# Patient Record
Sex: MALE | Race: BLACK OR AFRICAN AMERICAN | Employment: OTHER | URBAN - METROPOLITAN AREA
[De-identification: names, ages, dates, MRNs, and addresses within clinical notes are randomized per-mention and may not be internally consistent; named-entity substitution may affect disease eponyms.]

---

## 2017-03-16 ENCOUNTER — CARE COORDINATOR VISIT (OUTPATIENT)
Dept: INTERNAL MEDICINE | Age: 72
End: 2017-03-16

## 2017-03-16 ENCOUNTER — HOSPITAL ENCOUNTER (OUTPATIENT)
Age: 72
Discharge: HOME | End: 2017-03-16
Payer: MEDICARE

## 2017-03-16 ENCOUNTER — OFFICE VISIT (OUTPATIENT)
Dept: INTERNAL MEDICINE | Age: 72
End: 2017-03-16
Payer: MEDICARE

## 2017-03-16 VITALS
DIASTOLIC BLOOD PRESSURE: 67 MMHG | SYSTOLIC BLOOD PRESSURE: 124 MMHG | HEIGHT: 73 IN | WEIGHT: 234 LBS | BODY MASS INDEX: 31.01 KG/M2 | HEART RATE: 84 BPM

## 2017-03-16 DIAGNOSIS — E78.00 PURE HYPERCHOLESTEROLEMIA: ICD-10-CM

## 2017-03-16 DIAGNOSIS — N18.5 TYPE 2 DIABETES MELLITUS WITH STAGE 5 CHRONIC KIDNEY DISEASE NOT ON CHRONIC DIALYSIS, WITHOUT LONG-TERM CURRENT USE OF INSULIN (HCC): Primary | ICD-10-CM

## 2017-03-16 DIAGNOSIS — E79.0 HYPERURICEMIA: ICD-10-CM

## 2017-03-16 DIAGNOSIS — I10 ESSENTIAL HYPERTENSION: ICD-10-CM

## 2017-03-16 DIAGNOSIS — G40.909 SEIZURE DISORDER, SECONDARY (HCC): Chronic | ICD-10-CM

## 2017-03-16 DIAGNOSIS — E11.22 TYPE 2 DIABETES MELLITUS WITH STAGE 5 CHRONIC KIDNEY DISEASE NOT ON CHRONIC DIALYSIS, WITHOUT LONG-TERM CURRENT USE OF INSULIN (HCC): Primary | ICD-10-CM

## 2017-03-16 PROCEDURE — G8484 FLU IMMUNIZE NO ADMIN: HCPCS | Performed by: INTERNAL MEDICINE

## 2017-03-16 PROCEDURE — 3017F COLORECTAL CA SCREEN DOC REV: CPT | Performed by: INTERNAL MEDICINE

## 2017-03-16 PROCEDURE — 1123F ACP DISCUSS/DSCN MKR DOCD: CPT | Performed by: INTERNAL MEDICINE

## 2017-03-16 PROCEDURE — 4040F PNEUMOC VAC/ADMIN/RCVD: CPT | Performed by: INTERNAL MEDICINE

## 2017-03-16 PROCEDURE — G8599 NO ASA/ANTIPLAT THER USE RNG: HCPCS | Performed by: INTERNAL MEDICINE

## 2017-03-16 PROCEDURE — 99212 OFFICE O/P EST SF 10 MIN: CPT | Performed by: INTERNAL MEDICINE

## 2017-03-16 PROCEDURE — 3044F HG A1C LEVEL LT 7.0%: CPT | Performed by: INTERNAL MEDICINE

## 2017-03-16 PROCEDURE — G8427 DOCREV CUR MEDS BY ELIG CLIN: HCPCS | Performed by: INTERNAL MEDICINE

## 2017-03-16 PROCEDURE — 1036F TOBACCO NON-USER: CPT | Performed by: INTERNAL MEDICINE

## 2017-03-16 PROCEDURE — 99214 OFFICE O/P EST MOD 30 MIN: CPT | Performed by: INTERNAL MEDICINE

## 2017-03-16 PROCEDURE — G8417 CALC BMI ABV UP PARAM F/U: HCPCS | Performed by: INTERNAL MEDICINE

## 2017-03-16 RX ORDER — ALLOPURINOL 100 MG/1
TABLET ORAL
Qty: 30 TABLET | Refills: 5 | Status: SHIPPED | OUTPATIENT
Start: 2017-03-16 | End: 2018-04-26 | Stop reason: SDUPTHER

## 2017-03-16 RX ORDER — FOLIC ACID 1 MG/1
1 TABLET ORAL DAILY
Qty: 30 TABLET | Refills: 3 | Status: SHIPPED | OUTPATIENT
Start: 2017-03-16 | End: 2017-10-19 | Stop reason: ALTCHOICE

## 2017-03-16 RX ORDER — AMLODIPINE BESYLATE 5 MG/1
5 TABLET ORAL DAILY
COMMUNITY
End: 2017-06-20 | Stop reason: DRUGHIGH

## 2017-03-16 RX ORDER — CARVEDILOL 3.12 MG/1
3.12 TABLET ORAL 2 TIMES DAILY
Qty: 60 TABLET | Refills: 3 | Status: SHIPPED | OUTPATIENT
Start: 2017-03-16 | End: 2017-06-20 | Stop reason: SDUPTHER

## 2017-03-16 ASSESSMENT — ENCOUNTER SYMPTOMS
EYE REDNESS: 0
NAUSEA: 0
COUGH: 0
DIARRHEA: 0
SHORTNESS OF BREATH: 0
CONSTIPATION: 0
BLURRED VISION: 0
ABDOMINAL PAIN: 0
BLOOD IN STOOL: 0
HEARTBURN: 0

## 2017-03-16 ASSESSMENT — PATIENT HEALTH QUESTIONNAIRE - PHQ9
SUM OF ALL RESPONSES TO PHQ QUESTIONS 1-9: 0
1. LITTLE INTEREST OR PLEASURE IN DOING THINGS: 0
2. FEELING DOWN, DEPRESSED OR HOPELESS: 0
SUM OF ALL RESPONSES TO PHQ9 QUESTIONS 1 & 2: 0

## 2017-03-22 ENCOUNTER — CARE COORDINATOR VISIT (OUTPATIENT)
Dept: INTERNAL MEDICINE | Age: 72
End: 2017-03-22

## 2017-03-31 ENCOUNTER — HOSPITAL ENCOUNTER (INPATIENT)
Age: 72
LOS: 3 days | Discharge: HOME | DRG: 193 | End: 2017-04-03
Attending: EMERGENCY MEDICINE | Admitting: INTERNAL MEDICINE
Payer: MEDICARE

## 2017-03-31 ENCOUNTER — APPOINTMENT (OUTPATIENT)
Dept: GENERAL RADIOLOGY | Age: 72
DRG: 193 | End: 2017-03-31
Payer: MEDICARE

## 2017-03-31 DIAGNOSIS — Z99.2 ESRD (END STAGE RENAL DISEASE) ON DIALYSIS (HCC): ICD-10-CM

## 2017-03-31 DIAGNOSIS — N18.6 ESRD (END STAGE RENAL DISEASE) ON DIALYSIS (HCC): ICD-10-CM

## 2017-03-31 DIAGNOSIS — J18.9 PNEUMONIA DUE TO ORGANISM: Primary | ICD-10-CM

## 2017-03-31 DIAGNOSIS — J10.1 INFLUENZA B: ICD-10-CM

## 2017-03-31 PROBLEM — J06.9 URI (UPPER RESPIRATORY INFECTION): Status: ACTIVE | Noted: 2017-03-31

## 2017-03-31 PROBLEM — J40 BRONCHITIS: Status: ACTIVE | Noted: 2017-03-31

## 2017-03-31 PROBLEM — J40 BRONCHITIS: Status: RESOLVED | Noted: 2017-03-31 | Resolved: 2017-03-31

## 2017-03-31 LAB
ANION GAP SERPL CALCULATED.3IONS-SCNC: 21 MMOL/L (ref 9–17)
ANION GAP: 15 MMOL/L (ref 8–16)
BUN BLDV-MCNC: 22 MG/DL (ref 8–23)
BUN/CREAT BLD: ABNORMAL (ref 9–20)
CALCIUM SERPL-MCNC: 9.3 MG/DL (ref 8.6–10.4)
CHLORIDE BLD-SCNC: 97 MMOL/L (ref 98–107)
CO2: 19 MMOL/L (ref 20–31)
CREAT SERPL-MCNC: 6.93 MG/DL (ref 0.7–1.2)
DIRECT EXAM: ABNORMAL
FIO2: ABNORMAL
GFR AFRICAN AMERICAN: 10 ML/MIN
GFR NON-AFRICAN AMERICAN: 8 ML/MIN
GFR SERPL CREATININE-BSD FRML MDRD: ABNORMAL ML/MIN/{1.73_M2}
GFR SERPL CREATININE-BSD FRML MDRD: ABNORMAL ML/MIN/{1.73_M2}
GLUCOSE BLD-MCNC: 102 MG/DL (ref 70–99)
GLUCOSE BLD-MCNC: 102 MG/DL (ref 75–110)
GLUCOSE BLD-MCNC: 104 MG/DL (ref 74–106)
HCO3 VENOUS: 29.4 MMOL/L (ref 24–30)
HCO3 VENOUS: 30.7 MMOL/L (ref 24–30)
HCT VFR BLD CALC: 35.9 % (ref 41–53)
HEMOGLOBIN: 11.6 G/DL (ref 13.5–17.5)
Lab: ABNORMAL
MCH RBC QN AUTO: 30.1 PG (ref 26–34)
MCHC RBC AUTO-ENTMCNC: 32.3 G/DL (ref 31–37)
MCV RBC AUTO: 93.2 FL (ref 80–100)
NEGATIVE BASE EXCESS, VEN: ABNORMAL (ref 0–2)
NEGATIVE BASE EXCESS, VEN: ABNORMAL (ref 0–2)
O2 DEVICE/FLOW/%: ABNORMAL
O2 SAT, VEN: 25 %
PATIENT TEMP: ABNORMAL
PCO2, VEN: 48 MM HG (ref 39–55)
PCO2, VEN: 48 MM HG (ref 39–55)
PDW BLD-RTO: 20.6 % (ref 12.5–15.4)
PH VENOUS: 7.4 (ref 7.32–7.42)
PH VENOUS: 7.42 (ref 7.32–7.42)
PHENYTOIN DATE LAST DOSE: ABNORMAL
PHENYTOIN DOSE AMOUNT: ABNORMAL
PHENYTOIN DOSE TIME: ABNORMAL
PHENYTOIN LEVEL: 5.7 UG/ML (ref 10–20)
PLATELET # BLD: 162 K/UL (ref 140–450)
PMV BLD AUTO: 11.1 FL (ref 6–12)
PO2, VEN: 17 MM HG (ref 30–50)
POC BUN: 29 MG/DL (ref 6–20)
POC CHLORIDE: 99 MMOL/L (ref 98–110)
POC HEMATOCRIT: 38 % (ref 41–53)
POC HEMOGLOBIN: 12.9 GM/DL (ref 13.5–17.5)
POC LACTIC ACID, VEN: 1.21 MMOL/L (ref 0.9–1.7)
POC PCO2 TEMP: ABNORMAL MM HG
POC PH TEMP: ABNORMAL
POC PO2 TEMP: ABNORMAL MM HG
POC POTASSIUM: 4.1 MMOL/L (ref 3.5–5.1)
POC SODIUM: 139 MMOL/L (ref 136–145)
POC TROPONIN I: 0.01 NG/ML (ref 0–0.1)
POC TROPONIN I: 0.01 NG/ML (ref 0–0.1)
POC TROPONIN INTERP: NORMAL
POC TROPONIN INTERP: NORMAL
POSITIVE BASE EXCESS, VEN: 5 (ref 0–2)
POSITIVE BASE EXCESS, VEN: 6 (ref 0–2)
POTASSIUM SERPL-SCNC: 4.3 MMOL/L (ref 3.7–5.3)
RBC # BLD: 3.85 M/UL (ref 4.5–5.9)
SODIUM BLD-SCNC: 137 MMOL/L (ref 135–144)
SPECIMEN DESCRIPTION: ABNORMAL
STATUS: ABNORMAL
TOTAL CO2, VENOUS: 31 MM HG (ref 25–31)
TOTAL CO2, VENOUS: 32 MM HG (ref 25–31)
WBC # BLD: 6.2 K/UL (ref 3.5–11)

## 2017-03-31 PROCEDURE — 6370000000 HC RX 637 (ALT 250 FOR IP): Performed by: INTERNAL MEDICINE

## 2017-03-31 PROCEDURE — 87205 SMEAR GRAM STAIN: CPT

## 2017-03-31 PROCEDURE — 84132 ASSAY OF SERUM POTASSIUM: CPT

## 2017-03-31 PROCEDURE — 86403 PARTICLE AGGLUT ANTBDY SCRN: CPT

## 2017-03-31 PROCEDURE — 2580000003 HC RX 258: Performed by: EMERGENCY MEDICINE

## 2017-03-31 PROCEDURE — 82947 ASSAY GLUCOSE BLOOD QUANT: CPT

## 2017-03-31 PROCEDURE — 99285 EMERGENCY DEPT VISIT HI MDM: CPT

## 2017-03-31 PROCEDURE — 6360000002 HC RX W HCPCS: Performed by: INTERNAL MEDICINE

## 2017-03-31 PROCEDURE — 85027 COMPLETE CBC AUTOMATED: CPT

## 2017-03-31 PROCEDURE — 83605 ASSAY OF LACTIC ACID: CPT

## 2017-03-31 PROCEDURE — 87040 BLOOD CULTURE FOR BACTERIA: CPT

## 2017-03-31 PROCEDURE — 84295 ASSAY OF SERUM SODIUM: CPT

## 2017-03-31 PROCEDURE — 6370000000 HC RX 637 (ALT 250 FOR IP): Performed by: HOSPITALIST

## 2017-03-31 PROCEDURE — 82435 ASSAY OF BLOOD CHLORIDE: CPT

## 2017-03-31 PROCEDURE — 87804 INFLUENZA ASSAY W/OPTIC: CPT

## 2017-03-31 PROCEDURE — 2060000000 HC ICU INTERMEDIATE R&B

## 2017-03-31 PROCEDURE — 93005 ELECTROCARDIOGRAM TRACING: CPT

## 2017-03-31 PROCEDURE — 71020 XR CHEST STANDARD TWO VW: CPT

## 2017-03-31 PROCEDURE — 84484 ASSAY OF TROPONIN QUANT: CPT

## 2017-03-31 PROCEDURE — 85014 HEMATOCRIT: CPT

## 2017-03-31 PROCEDURE — 6370000000 HC RX 637 (ALT 250 FOR IP): Performed by: EMERGENCY MEDICINE

## 2017-03-31 PROCEDURE — 84520 ASSAY OF UREA NITROGEN: CPT

## 2017-03-31 PROCEDURE — 82803 BLOOD GASES ANY COMBINATION: CPT

## 2017-03-31 PROCEDURE — 80048 BASIC METABOLIC PNL TOTAL CA: CPT

## 2017-03-31 PROCEDURE — 2580000003 HC RX 258: Performed by: INTERNAL MEDICINE

## 2017-03-31 PROCEDURE — 80185 ASSAY OF PHENYTOIN TOTAL: CPT

## 2017-03-31 RX ORDER — VANCOMYCIN HYDROCHLORIDE 1 G/200ML
1000 INJECTION, SOLUTION INTRAVENOUS
Status: DISCONTINUED | OUTPATIENT
Start: 2017-04-03 | End: 2017-04-03

## 2017-03-31 RX ORDER — SODIUM CHLORIDE 0.9 % (FLUSH) 0.9 %
10 SYRINGE (ML) INJECTION PRN
Status: DISCONTINUED | OUTPATIENT
Start: 2017-03-31 | End: 2017-04-03 | Stop reason: HOSPADM

## 2017-03-31 RX ORDER — ATORVASTATIN CALCIUM 40 MG/1
40 TABLET, FILM COATED ORAL NIGHTLY
Status: DISCONTINUED | OUTPATIENT
Start: 2017-03-31 | End: 2017-04-03 | Stop reason: HOSPADM

## 2017-03-31 RX ORDER — ONDANSETRON 2 MG/ML
4 INJECTION INTRAMUSCULAR; INTRAVENOUS EVERY 6 HOURS PRN
Status: DISCONTINUED | OUTPATIENT
Start: 2017-03-31 | End: 2017-04-03 | Stop reason: HOSPADM

## 2017-03-31 RX ORDER — SODIUM CHLORIDE 0.9 % (FLUSH) 0.9 %
10 SYRINGE (ML) INJECTION EVERY 12 HOURS SCHEDULED
Status: DISCONTINUED | OUTPATIENT
Start: 2017-03-31 | End: 2017-04-03 | Stop reason: HOSPADM

## 2017-03-31 RX ORDER — VANCOMYCIN HYDROCHLORIDE 500 MG/100ML
500 INJECTION, SOLUTION INTRAVENOUS ONCE
Status: COMPLETED | OUTPATIENT
Start: 2017-03-31 | End: 2017-03-31

## 2017-03-31 RX ORDER — 0.9 % SODIUM CHLORIDE 0.9 %
500 INTRAVENOUS SOLUTION INTRAVENOUS ONCE
Status: COMPLETED | OUTPATIENT
Start: 2017-03-31 | End: 2017-03-31

## 2017-03-31 RX ORDER — PHENYTOIN SODIUM 100 MG/1
100 CAPSULE, EXTENDED RELEASE ORAL EVERY 8 HOURS
Status: DISCONTINUED | OUTPATIENT
Start: 2017-03-31 | End: 2017-04-03 | Stop reason: HOSPADM

## 2017-03-31 RX ORDER — GUAIFENESIN 600 MG/1
600 TABLET, EXTENDED RELEASE ORAL 2 TIMES DAILY
Status: DISCONTINUED | OUTPATIENT
Start: 2017-03-31 | End: 2017-04-03 | Stop reason: HOSPADM

## 2017-03-31 RX ORDER — ACETAMINOPHEN 325 MG/1
650 TABLET ORAL EVERY 4 HOURS PRN
Status: DISCONTINUED | OUTPATIENT
Start: 2017-03-31 | End: 2017-04-03 | Stop reason: HOSPADM

## 2017-03-31 RX ORDER — CARVEDILOL 3.12 MG/1
3.12 TABLET ORAL 2 TIMES DAILY
Status: DISCONTINUED | OUTPATIENT
Start: 2017-03-31 | End: 2017-04-03 | Stop reason: HOSPADM

## 2017-03-31 RX ORDER — BENZONATATE 100 MG/1
100 CAPSULE ORAL 3 TIMES DAILY PRN
Status: DISCONTINUED | OUTPATIENT
Start: 2017-03-31 | End: 2017-04-03 | Stop reason: HOSPADM

## 2017-03-31 RX ORDER — ALLOPURINOL 100 MG/1
100 TABLET ORAL DAILY
Status: DISCONTINUED | OUTPATIENT
Start: 2017-03-31 | End: 2017-04-03 | Stop reason: HOSPADM

## 2017-03-31 RX ORDER — OSELTAMIVIR PHOSPHATE 30 MG/1
30 CAPSULE ORAL ONCE
Status: COMPLETED | OUTPATIENT
Start: 2017-03-31 | End: 2017-03-31

## 2017-03-31 RX ORDER — ACETAMINOPHEN 500 MG
1000 TABLET ORAL ONCE
Status: COMPLETED | OUTPATIENT
Start: 2017-03-31 | End: 2017-03-31

## 2017-03-31 RX ADMIN — CARVEDILOL 3.12 MG: 3.12 TABLET, FILM COATED ORAL at 19:59

## 2017-03-31 RX ADMIN — ALLOPURINOL 100 MG: 100 TABLET ORAL at 19:58

## 2017-03-31 RX ADMIN — OSELTAMIVIR PHOSPHATE 30 MG: 30 CAPSULE ORAL at 20:26

## 2017-03-31 RX ADMIN — SODIUM CHLORIDE 500 ML: 9 INJECTION, SOLUTION INTRAVENOUS at 16:55

## 2017-03-31 RX ADMIN — ACETAMINOPHEN 650 MG: 325 TABLET ORAL at 20:34

## 2017-03-31 RX ADMIN — ENOXAPARIN SODIUM 30 MG: 100 INJECTION SUBCUTANEOUS at 19:58

## 2017-03-31 RX ADMIN — Medication 10 ML: at 20:00

## 2017-03-31 RX ADMIN — BENZONATATE 100 MG: 100 CAPSULE ORAL at 19:58

## 2017-03-31 RX ADMIN — PIPERACILLIN SODIUM, TAZOBACTAM SODIUM 2.25 G: 2; .25 INJECTION, POWDER, LYOPHILIZED, FOR SOLUTION INTRAVENOUS at 21:46

## 2017-03-31 RX ADMIN — GUAIFENESIN 600 MG: 600 TABLET, EXTENDED RELEASE ORAL at 19:59

## 2017-03-31 RX ADMIN — EXTENDED PHENYTOIN SODIUM 100 MG: 100 CAPSULE ORAL at 19:58

## 2017-03-31 RX ADMIN — ATORVASTATIN CALCIUM 40 MG: 40 TABLET, FILM COATED ORAL at 19:59

## 2017-03-31 RX ADMIN — ACETAMINOPHEN 1000 MG: 500 TABLET ORAL at 16:31

## 2017-03-31 RX ADMIN — VANCOMYCIN HYDROCHLORIDE 500 MG: 500 INJECTION, SOLUTION INTRAVENOUS at 20:29

## 2017-03-31 ASSESSMENT — ENCOUNTER SYMPTOMS
RHINORRHEA: 0
DIARRHEA: 0
CONSTIPATION: 0
NAUSEA: 0
COUGH: 1
ABDOMINAL DISTENTION: 0
SORE THROAT: 0
WHEEZING: 0
SHORTNESS OF BREATH: 1
VOMITING: 0

## 2017-03-31 ASSESSMENT — PAIN DESCRIPTION - PAIN TYPE
TYPE: ACUTE PAIN
TYPE: ACUTE PAIN

## 2017-03-31 ASSESSMENT — PAIN SCALES - GENERAL
PAINLEVEL_OUTOF10: 0
PAINLEVEL_OUTOF10: 8
PAINLEVEL_OUTOF10: 7
PAINLEVEL_OUTOF10: 8

## 2017-03-31 ASSESSMENT — PAIN DESCRIPTION - LOCATION
LOCATION: THROAT
LOCATION: OTHER (COMMENT)

## 2017-03-31 ASSESSMENT — PAIN DESCRIPTION - FREQUENCY: FREQUENCY: CONTINUOUS

## 2017-03-31 ASSESSMENT — PAIN DESCRIPTION - DESCRIPTORS: DESCRIPTORS: SORE

## 2017-04-01 LAB
ANION GAP SERPL CALCULATED.3IONS-SCNC: 20 MMOL/L (ref 9–17)
BUN BLDV-MCNC: 34 MG/DL (ref 8–23)
BUN/CREAT BLD: ABNORMAL (ref 9–20)
CALCIUM SERPL-MCNC: 8.9 MG/DL (ref 8.6–10.4)
CHLORIDE BLD-SCNC: 96 MMOL/L (ref 98–107)
CO2: 21 MMOL/L (ref 20–31)
CREAT SERPL-MCNC: 9.35 MG/DL (ref 0.7–1.2)
CULTURE: ABNORMAL
DIRECT EXAM: ABNORMAL
GFR AFRICAN AMERICAN: 7 ML/MIN
GFR NON-AFRICAN AMERICAN: 6 ML/MIN
GFR SERPL CREATININE-BSD FRML MDRD: ABNORMAL ML/MIN/{1.73_M2}
GFR SERPL CREATININE-BSD FRML MDRD: ABNORMAL ML/MIN/{1.73_M2}
GLUCOSE BLD-MCNC: 108 MG/DL (ref 75–110)
GLUCOSE BLD-MCNC: 134 MG/DL (ref 75–110)
GLUCOSE BLD-MCNC: 145 MG/DL (ref 70–99)
GLUCOSE BLD-MCNC: 90 MG/DL (ref 75–110)
HCT VFR BLD CALC: 31.6 % (ref 41–53)
HEMOGLOBIN: 10.4 G/DL (ref 13.5–17.5)
Lab: ABNORMAL
MCH RBC QN AUTO: 30.1 PG (ref 26–34)
MCHC RBC AUTO-ENTMCNC: 32.7 G/DL (ref 31–37)
MCV RBC AUTO: 92 FL (ref 80–100)
PDW BLD-RTO: 20.5 % (ref 12.5–15.4)
PLATELET # BLD: 147 K/UL (ref 140–450)
PMV BLD AUTO: 10.3 FL (ref 6–12)
POTASSIUM SERPL-SCNC: 4 MMOL/L (ref 3.7–5.3)
RBC # BLD: 3.44 M/UL (ref 4.5–5.9)
SODIUM BLD-SCNC: 137 MMOL/L (ref 135–144)
SPECIMEN DESCRIPTION: ABNORMAL
STATUS: ABNORMAL
WBC # BLD: 5 K/UL (ref 3.5–11)

## 2017-04-01 PROCEDURE — 6370000000 HC RX 637 (ALT 250 FOR IP): Performed by: INTERNAL MEDICINE

## 2017-04-01 PROCEDURE — 6370000000 HC RX 637 (ALT 250 FOR IP): Performed by: HOSPITALIST

## 2017-04-01 PROCEDURE — 97161 PT EVAL LOW COMPLEX 20 MIN: CPT

## 2017-04-01 PROCEDURE — G8979 MOBILITY GOAL STATUS: HCPCS

## 2017-04-01 PROCEDURE — 6360000002 HC RX W HCPCS: Performed by: INTERNAL MEDICINE

## 2017-04-01 PROCEDURE — G8978 MOBILITY CURRENT STATUS: HCPCS

## 2017-04-01 PROCEDURE — G8980 MOBILITY D/C STATUS: HCPCS

## 2017-04-01 PROCEDURE — 80048 BASIC METABOLIC PNL TOTAL CA: CPT

## 2017-04-01 PROCEDURE — 97535 SELF CARE MNGMENT TRAINING: CPT

## 2017-04-01 PROCEDURE — 2060000000 HC ICU INTERMEDIATE R&B

## 2017-04-01 PROCEDURE — 97165 OT EVAL LOW COMPLEX 30 MIN: CPT

## 2017-04-01 PROCEDURE — 36415 COLL VENOUS BLD VENIPUNCTURE: CPT

## 2017-04-01 PROCEDURE — 87070 CULTURE OTHR SPECIMN AEROBIC: CPT

## 2017-04-01 PROCEDURE — 2580000003 HC RX 258: Performed by: INTERNAL MEDICINE

## 2017-04-01 PROCEDURE — 82947 ASSAY GLUCOSE BLOOD QUANT: CPT

## 2017-04-01 PROCEDURE — 97116 GAIT TRAINING THERAPY: CPT

## 2017-04-01 PROCEDURE — 87205 SMEAR GRAM STAIN: CPT

## 2017-04-01 PROCEDURE — G8987 SELF CARE CURRENT STATUS: HCPCS

## 2017-04-01 PROCEDURE — G8988 SELF CARE GOAL STATUS: HCPCS

## 2017-04-01 PROCEDURE — G8989 SELF CARE D/C STATUS: HCPCS

## 2017-04-01 PROCEDURE — 85027 COMPLETE CBC AUTOMATED: CPT

## 2017-04-01 PROCEDURE — 99223 1ST HOSP IP/OBS HIGH 75: CPT | Performed by: INTERNAL MEDICINE

## 2017-04-01 PROCEDURE — 94762 N-INVAS EAR/PLS OXIMTRY CONT: CPT

## 2017-04-01 RX ORDER — LABETALOL HYDROCHLORIDE 5 MG/ML
10 INJECTION, SOLUTION INTRAVENOUS EVERY 4 HOURS PRN
Status: DISCONTINUED | OUTPATIENT
Start: 2017-04-01 | End: 2017-04-03 | Stop reason: HOSPADM

## 2017-04-01 RX ORDER — DEXTROSE MONOHYDRATE 25 G/50ML
12.5 INJECTION, SOLUTION INTRAVENOUS PRN
Status: DISCONTINUED | OUTPATIENT
Start: 2017-04-01 | End: 2017-04-03 | Stop reason: HOSPADM

## 2017-04-01 RX ORDER — HEPARIN SODIUM 5000 [USP'U]/ML
5000 INJECTION, SOLUTION INTRAVENOUS; SUBCUTANEOUS EVERY 8 HOURS SCHEDULED
Status: DISCONTINUED | OUTPATIENT
Start: 2017-04-01 | End: 2017-04-03 | Stop reason: HOSPADM

## 2017-04-01 RX ORDER — NICOTINE POLACRILEX 4 MG
15 LOZENGE BUCCAL PRN
Status: DISCONTINUED | OUTPATIENT
Start: 2017-04-01 | End: 2017-04-03 | Stop reason: HOSPADM

## 2017-04-01 RX ORDER — DEXTROSE MONOHYDRATE 50 MG/ML
100 INJECTION, SOLUTION INTRAVENOUS PRN
Status: DISCONTINUED | OUTPATIENT
Start: 2017-04-01 | End: 2017-04-03 | Stop reason: HOSPADM

## 2017-04-01 RX ADMIN — CARVEDILOL 3.12 MG: 3.12 TABLET, FILM COATED ORAL at 21:40

## 2017-04-01 RX ADMIN — EXTENDED PHENYTOIN SODIUM 100 MG: 100 CAPSULE ORAL at 12:14

## 2017-04-01 RX ADMIN — PIPERACILLIN SODIUM, TAZOBACTAM SODIUM 2.25 G: 2; .25 INJECTION, POWDER, LYOPHILIZED, FOR SOLUTION INTRAVENOUS at 23:55

## 2017-04-01 RX ADMIN — PIPERACILLIN SODIUM, TAZOBACTAM SODIUM 2.25 G: 2; .25 INJECTION, POWDER, LYOPHILIZED, FOR SOLUTION INTRAVENOUS at 04:30

## 2017-04-01 RX ADMIN — ALLOPURINOL 100 MG: 100 TABLET ORAL at 09:40

## 2017-04-01 RX ADMIN — EXTENDED PHENYTOIN SODIUM 100 MG: 100 CAPSULE ORAL at 17:53

## 2017-04-01 RX ADMIN — CARVEDILOL 3.12 MG: 3.12 TABLET, FILM COATED ORAL at 09:40

## 2017-04-01 RX ADMIN — HEPARIN SODIUM 5000 UNITS: 5000 INJECTION, SOLUTION INTRAVENOUS; SUBCUTANEOUS at 22:01

## 2017-04-01 RX ADMIN — EXTENDED PHENYTOIN SODIUM 100 MG: 100 CAPSULE ORAL at 02:58

## 2017-04-01 RX ADMIN — GUAIFENESIN 600 MG: 600 TABLET, EXTENDED RELEASE ORAL at 21:40

## 2017-04-01 RX ADMIN — ENOXAPARIN SODIUM 30 MG: 100 INJECTION SUBCUTANEOUS at 09:40

## 2017-04-01 RX ADMIN — ATORVASTATIN CALCIUM 40 MG: 40 TABLET, FILM COATED ORAL at 21:40

## 2017-04-01 RX ADMIN — PIPERACILLIN SODIUM, TAZOBACTAM SODIUM 2.25 G: 2; .25 INJECTION, POWDER, LYOPHILIZED, FOR SOLUTION INTRAVENOUS at 12:37

## 2017-04-01 RX ADMIN — GUAIFENESIN 600 MG: 600 TABLET, EXTENDED RELEASE ORAL at 09:40

## 2017-04-01 RX ADMIN — Medication 10 ML: at 09:42

## 2017-04-01 RX ADMIN — HEPARIN SODIUM 5000 UNITS: 5000 INJECTION, SOLUTION INTRAVENOUS; SUBCUTANEOUS at 13:51

## 2017-04-02 LAB
ANION GAP SERPL CALCULATED.3IONS-SCNC: 22 MMOL/L (ref 9–17)
BUN BLDV-MCNC: 47 MG/DL (ref 8–23)
BUN/CREAT BLD: ABNORMAL (ref 9–20)
CALCIUM SERPL-MCNC: 8.6 MG/DL (ref 8.6–10.4)
CHLORIDE BLD-SCNC: 95 MMOL/L (ref 98–107)
CO2: 20 MMOL/L (ref 20–31)
CREAT SERPL-MCNC: 12.4 MG/DL (ref 0.7–1.2)
CULTURE: ABNORMAL
DIRECT EXAM: ABNORMAL
GFR AFRICAN AMERICAN: 5 ML/MIN
GFR NON-AFRICAN AMERICAN: 4 ML/MIN
GFR SERPL CREATININE-BSD FRML MDRD: ABNORMAL ML/MIN/{1.73_M2}
GFR SERPL CREATININE-BSD FRML MDRD: ABNORMAL ML/MIN/{1.73_M2}
GLUCOSE BLD-MCNC: 185 MG/DL (ref 75–110)
GLUCOSE BLD-MCNC: 62 MG/DL (ref 70–99)
GLUCOSE BLD-MCNC: 76 MG/DL (ref 75–110)
HCT VFR BLD CALC: 29.9 % (ref 41–53)
HEMOGLOBIN: 9.7 G/DL (ref 13.5–17.5)
Lab: ABNORMAL
MAGNESIUM: 2.2 MG/DL (ref 1.6–2.6)
MCH RBC QN AUTO: 29.9 PG (ref 26–34)
MCHC RBC AUTO-ENTMCNC: 32.4 G/DL (ref 31–37)
MCV RBC AUTO: 92.4 FL (ref 80–100)
PDW BLD-RTO: 20.6 % (ref 12.5–15.4)
PHOSPHORUS: 6.3 MG/DL (ref 2.5–4.5)
PLATELET # BLD: 136 K/UL (ref 140–450)
PMV BLD AUTO: 10.1 FL (ref 6–12)
POTASSIUM SERPL-SCNC: 4.6 MMOL/L (ref 3.7–5.3)
RBC # BLD: 3.23 M/UL (ref 4.5–5.9)
SODIUM BLD-SCNC: 137 MMOL/L (ref 135–144)
SPECIMEN DESCRIPTION: ABNORMAL
STATUS: ABNORMAL
WBC # BLD: 4 K/UL (ref 3.5–11)

## 2017-04-02 PROCEDURE — 6370000000 HC RX 637 (ALT 250 FOR IP): Performed by: HOSPITALIST

## 2017-04-02 PROCEDURE — 2060000000 HC ICU INTERMEDIATE R&B

## 2017-04-02 PROCEDURE — 2580000003 HC RX 258: Performed by: INTERNAL MEDICINE

## 2017-04-02 PROCEDURE — 6370000000 HC RX 637 (ALT 250 FOR IP): Performed by: INTERNAL MEDICINE

## 2017-04-02 PROCEDURE — 85027 COMPLETE CBC AUTOMATED: CPT

## 2017-04-02 PROCEDURE — 80048 BASIC METABOLIC PNL TOTAL CA: CPT

## 2017-04-02 PROCEDURE — 84100 ASSAY OF PHOSPHORUS: CPT

## 2017-04-02 PROCEDURE — 36415 COLL VENOUS BLD VENIPUNCTURE: CPT

## 2017-04-02 PROCEDURE — 99232 SBSQ HOSP IP/OBS MODERATE 35: CPT | Performed by: INTERNAL MEDICINE

## 2017-04-02 PROCEDURE — 94762 N-INVAS EAR/PLS OXIMTRY CONT: CPT

## 2017-04-02 PROCEDURE — 82947 ASSAY GLUCOSE BLOOD QUANT: CPT

## 2017-04-02 PROCEDURE — 83735 ASSAY OF MAGNESIUM: CPT

## 2017-04-02 PROCEDURE — 87070 CULTURE OTHR SPECIMN AEROBIC: CPT

## 2017-04-02 PROCEDURE — 6360000002 HC RX W HCPCS: Performed by: INTERNAL MEDICINE

## 2017-04-02 PROCEDURE — 87205 SMEAR GRAM STAIN: CPT

## 2017-04-02 RX ADMIN — EXTENDED PHENYTOIN SODIUM 100 MG: 100 CAPSULE ORAL at 03:37

## 2017-04-02 RX ADMIN — ATORVASTATIN CALCIUM 40 MG: 40 TABLET, FILM COATED ORAL at 21:04

## 2017-04-02 RX ADMIN — GUAIFENESIN 600 MG: 600 TABLET, EXTENDED RELEASE ORAL at 08:29

## 2017-04-02 RX ADMIN — HEPARIN SODIUM 5000 UNITS: 5000 INJECTION, SOLUTION INTRAVENOUS; SUBCUTANEOUS at 05:58

## 2017-04-02 RX ADMIN — CARVEDILOL 3.12 MG: 3.12 TABLET, FILM COATED ORAL at 21:04

## 2017-04-02 RX ADMIN — Medication 10 ML: at 21:03

## 2017-04-02 RX ADMIN — PIPERACILLIN SODIUM, TAZOBACTAM SODIUM 2.25 G: 2; .25 INJECTION, POWDER, LYOPHILIZED, FOR SOLUTION INTRAVENOUS at 16:52

## 2017-04-02 RX ADMIN — ALLOPURINOL 100 MG: 100 TABLET ORAL at 08:29

## 2017-04-02 RX ADMIN — EXTENDED PHENYTOIN SODIUM 100 MG: 100 CAPSULE ORAL at 11:44

## 2017-04-02 RX ADMIN — PIPERACILLIN SODIUM, TAZOBACTAM SODIUM 2.25 G: 2; .25 INJECTION, POWDER, LYOPHILIZED, FOR SOLUTION INTRAVENOUS at 08:28

## 2017-04-02 RX ADMIN — HEPARIN SODIUM 5000 UNITS: 5000 INJECTION, SOLUTION INTRAVENOUS; SUBCUTANEOUS at 21:04

## 2017-04-02 RX ADMIN — HEPARIN SODIUM 5000 UNITS: 5000 INJECTION, SOLUTION INTRAVENOUS; SUBCUTANEOUS at 14:36

## 2017-04-02 RX ADMIN — GUAIFENESIN 600 MG: 600 TABLET, EXTENDED RELEASE ORAL at 21:04

## 2017-04-02 RX ADMIN — CARVEDILOL 3.12 MG: 3.12 TABLET, FILM COATED ORAL at 08:29

## 2017-04-02 RX ADMIN — EXTENDED PHENYTOIN SODIUM 100 MG: 100 CAPSULE ORAL at 18:31

## 2017-04-03 ENCOUNTER — HOSPITAL ENCOUNTER (INPATIENT)
Dept: DIALYSIS | Age: 72
Discharge: HOME | DRG: 193 | End: 2017-04-03
Payer: MEDICARE

## 2017-04-03 ENCOUNTER — APPOINTMENT (OUTPATIENT)
Dept: INTERVENTIONAL RADIOLOGY/VASCULAR | Age: 72
DRG: 193 | End: 2017-04-03
Payer: MEDICARE

## 2017-04-03 VITALS
TEMPERATURE: 98.2 F | BODY MASS INDEX: 36.46 KG/M2 | DIASTOLIC BLOOD PRESSURE: 84 MMHG | OXYGEN SATURATION: 97 % | HEIGHT: 66 IN | RESPIRATION RATE: 18 BRPM | WEIGHT: 226.85 LBS | SYSTOLIC BLOOD PRESSURE: 150 MMHG | HEART RATE: 84 BPM

## 2017-04-03 LAB
ANION GAP SERPL CALCULATED.3IONS-SCNC: 25 MMOL/L (ref 9–17)
BUN BLDV-MCNC: 55 MG/DL (ref 8–23)
BUN/CREAT BLD: ABNORMAL (ref 9–20)
CALCIUM SERPL-MCNC: 8.4 MG/DL (ref 8.6–10.4)
CHLORIDE BLD-SCNC: 96 MMOL/L (ref 98–107)
CO2: 19 MMOL/L (ref 20–31)
CREAT SERPL-MCNC: 14.88 MG/DL (ref 0.7–1.2)
GFR AFRICAN AMERICAN: 4 ML/MIN
GFR NON-AFRICAN AMERICAN: 3 ML/MIN
GFR SERPL CREATININE-BSD FRML MDRD: ABNORMAL ML/MIN/{1.73_M2}
GFR SERPL CREATININE-BSD FRML MDRD: ABNORMAL ML/MIN/{1.73_M2}
GLUCOSE BLD-MCNC: 117 MG/DL (ref 75–110)
GLUCOSE BLD-MCNC: 91 MG/DL (ref 70–99)
HCT VFR BLD CALC: 32.1 % (ref 41–53)
HEMOGLOBIN: 10.7 G/DL (ref 13.5–17.5)
INR BLD: 1.1
INR BLD: 1.1
MCH RBC QN AUTO: 30.2 PG (ref 26–34)
MCHC RBC AUTO-ENTMCNC: 33.4 G/DL (ref 31–37)
MCV RBC AUTO: 90.3 FL (ref 80–100)
PARTIAL THROMBOPLASTIN TIME: 29.4 SEC (ref 21.3–31.3)
PDW BLD-RTO: 20.2 % (ref 12.5–15.4)
PLATELET # BLD: 150 K/UL (ref 140–450)
PMV BLD AUTO: 10.4 FL (ref 6–12)
POTASSIUM SERPL-SCNC: 4.7 MMOL/L (ref 3.7–5.3)
PROTHROMBIN TIME: 11.7 SEC (ref 9.4–12.6)
PROTHROMBIN TIME: 12 SEC (ref 9.4–12.6)
RBC # BLD: 3.55 M/UL (ref 4.5–5.9)
SODIUM BLD-SCNC: 140 MMOL/L (ref 135–144)
WBC # BLD: 4 K/UL (ref 3.5–11)

## 2017-04-03 PROCEDURE — 6360000002 HC RX W HCPCS: Performed by: RADIOLOGY

## 2017-04-03 PROCEDURE — C1769 GUIDE WIRE: HCPCS

## 2017-04-03 PROCEDURE — 90937 HEMODIALYSIS REPEATED EVAL: CPT

## 2017-04-03 PROCEDURE — 75774 ARTERY X-RAY EACH VESSEL: CPT | Performed by: RADIOLOGY

## 2017-04-03 PROCEDURE — 5A1D00Z PERFORMANCE OF URINARY FILTRATION, SINGLE: ICD-10-PCS | Performed by: INTERNAL MEDICINE

## 2017-04-03 PROCEDURE — 80048 BASIC METABOLIC PNL TOTAL CA: CPT

## 2017-04-03 PROCEDURE — 6360000002 HC RX W HCPCS: Performed by: INTERNAL MEDICINE

## 2017-04-03 PROCEDURE — 36216 PLACE CATHETER IN ARTERY: CPT | Performed by: RADIOLOGY

## 2017-04-03 PROCEDURE — 75710 ARTERY X-RAYS ARM/LEG: CPT | Performed by: RADIOLOGY

## 2017-04-03 PROCEDURE — 94762 N-INVAS EAR/PLS OXIMTRY CONT: CPT

## 2017-04-03 PROCEDURE — 6370000000 HC RX 637 (ALT 250 FOR IP): Performed by: INTERNAL MEDICINE

## 2017-04-03 PROCEDURE — 6370000000 HC RX 637 (ALT 250 FOR IP): Performed by: HOSPITALIST

## 2017-04-03 PROCEDURE — 2580000003 HC RX 258: Performed by: INTERNAL MEDICINE

## 2017-04-03 PROCEDURE — 85610 PROTHROMBIN TIME: CPT

## 2017-04-03 PROCEDURE — 99233 SBSQ HOSP IP/OBS HIGH 50: CPT | Performed by: INTERNAL MEDICINE

## 2017-04-03 PROCEDURE — B51WYZZ FLUOROSCOPY OF DIALYSIS SHUNT/FISTULA USING OTHER CONTRAST: ICD-10-PCS | Performed by: RADIOLOGY

## 2017-04-03 PROCEDURE — 36901 INTRO CATH DIALYSIS CIRCUIT: CPT | Performed by: RADIOLOGY

## 2017-04-03 PROCEDURE — 85027 COMPLETE CBC AUTOMATED: CPT

## 2017-04-03 PROCEDURE — 85730 THROMBOPLASTIN TIME PARTIAL: CPT

## 2017-04-03 PROCEDURE — 6360000004 HC RX CONTRAST MEDICATION: Performed by: INTERNAL MEDICINE

## 2017-04-03 PROCEDURE — 6360000002 HC RX W HCPCS

## 2017-04-03 PROCEDURE — 36415 COLL VENOUS BLD VENIPUNCTURE: CPT

## 2017-04-03 PROCEDURE — 82947 ASSAY GLUCOSE BLOOD QUANT: CPT

## 2017-04-03 RX ORDER — HEPARIN SODIUM 5000 [USP'U]/ML
9500 INJECTION, SOLUTION INTRAVENOUS; SUBCUTANEOUS PRN
Status: DISCONTINUED | OUTPATIENT
Start: 2017-04-03 | End: 2017-04-03 | Stop reason: HOSPADM

## 2017-04-03 RX ORDER — LEVOFLOXACIN 500 MG/1
500 TABLET, FILM COATED ORAL ONCE
Status: COMPLETED | OUTPATIENT
Start: 2017-04-03 | End: 2017-04-03

## 2017-04-03 RX ORDER — SODIUM CHLORIDE 9 MG/ML
INJECTION, SOLUTION INTRAVENOUS CONTINUOUS
Status: DISCONTINUED | OUTPATIENT
Start: 2017-04-03 | End: 2017-04-03 | Stop reason: HOSPADM

## 2017-04-03 RX ORDER — 0.9 % SODIUM CHLORIDE 0.9 %
250 INTRAVENOUS SOLUTION INTRAVENOUS PRN
Status: DISCONTINUED | OUTPATIENT
Start: 2017-04-03 | End: 2017-04-03 | Stop reason: HOSPADM

## 2017-04-03 RX ORDER — OSELTAMIVIR PHOSPHATE 30 MG/1
30 CAPSULE ORAL ONCE
Status: COMPLETED | OUTPATIENT
Start: 2017-04-03 | End: 2017-04-03

## 2017-04-03 RX ORDER — LEVOFLOXACIN 250 MG/1
250 TABLET ORAL ONCE
Qty: 1 TABLET | Refills: 0 | Status: SHIPPED | OUTPATIENT
Start: 2017-04-05 | End: 2017-04-05

## 2017-04-03 RX ORDER — HEPARIN SODIUM 1000 [USP'U]/ML
3000 INJECTION INTRAVENOUS; SUBCUTANEOUS PRN
Status: DISCONTINUED | OUTPATIENT
Start: 2017-04-03 | End: 2017-04-03 | Stop reason: HOSPADM

## 2017-04-03 RX ORDER — 0.9 % SODIUM CHLORIDE 0.9 %
150 INTRAVENOUS SOLUTION INTRAVENOUS PRN
Status: DISCONTINUED | OUTPATIENT
Start: 2017-04-03 | End: 2017-04-03 | Stop reason: HOSPADM

## 2017-04-03 RX ORDER — FENTANYL CITRATE 50 UG/ML
INJECTION, SOLUTION INTRAMUSCULAR; INTRAVENOUS
Status: COMPLETED | OUTPATIENT
Start: 2017-04-03 | End: 2017-04-03

## 2017-04-03 RX ORDER — LEVOFLOXACIN 250 MG/1
250 TABLET ORAL ONCE
Status: DISCONTINUED | OUTPATIENT
Start: 2017-04-05 | End: 2017-04-03 | Stop reason: HOSPADM

## 2017-04-03 RX ADMIN — HEPARIN SODIUM 3000 UNITS: 1000 INJECTION, SOLUTION INTRAVENOUS; SUBCUTANEOUS at 09:02

## 2017-04-03 RX ADMIN — CARVEDILOL 3.12 MG: 3.12 TABLET, FILM COATED ORAL at 08:10

## 2017-04-03 RX ADMIN — PIPERACILLIN SODIUM, TAZOBACTAM SODIUM 2.25 G: 2; .25 INJECTION, POWDER, LYOPHILIZED, FOR SOLUTION INTRAVENOUS at 00:13

## 2017-04-03 RX ADMIN — ALLOPURINOL 100 MG: 100 TABLET ORAL at 15:57

## 2017-04-03 RX ADMIN — LEVOFLOXACIN 500 MG: 500 TABLET, FILM COATED ORAL at 15:57

## 2017-04-03 RX ADMIN — HEPARIN SODIUM 9500 UNITS: 5000 INJECTION, SOLUTION INTRAVENOUS; SUBCUTANEOUS at 13:01

## 2017-04-03 RX ADMIN — FENTANYL CITRATE 50 MCG: 50 INJECTION INTRAMUSCULAR; INTRAVENOUS at 14:23

## 2017-04-03 RX ADMIN — Medication 10 ML: at 08:10

## 2017-04-03 RX ADMIN — IOVERSOL 90 ML: 509 INJECTION INTRA-ARTERIAL; INTRAVENOUS at 14:30

## 2017-04-03 RX ADMIN — GUAIFENESIN 600 MG: 600 TABLET, EXTENDED RELEASE ORAL at 15:58

## 2017-04-03 RX ADMIN — PIPERACILLIN SODIUM, TAZOBACTAM SODIUM 2.25 G: 2; .25 INJECTION, POWDER, LYOPHILIZED, FOR SOLUTION INTRAVENOUS at 08:10

## 2017-04-03 RX ADMIN — DOXERCALCIFEROL 14 MCG: 2 INJECTION, SOLUTION INTRAVENOUS at 12:55

## 2017-04-03 RX ADMIN — EXTENDED PHENYTOIN SODIUM 100 MG: 100 CAPSULE ORAL at 15:57

## 2017-04-03 RX ADMIN — OSELTAMIVIR PHOSPHATE 30 MG: 30 CAPSULE ORAL at 16:33

## 2017-04-03 RX ADMIN — EXTENDED PHENYTOIN SODIUM 100 MG: 100 CAPSULE ORAL at 03:04

## 2017-04-03 RX ADMIN — HEPARIN SODIUM 5000 UNITS: 5000 INJECTION, SOLUTION INTRAVENOUS; SUBCUTANEOUS at 06:18

## 2017-04-03 RX ADMIN — HEPARIN SODIUM 5000 UNITS: 5000 INJECTION, SOLUTION INTRAVENOUS; SUBCUTANEOUS at 15:58

## 2017-04-03 ASSESSMENT — PAIN SCALES - GENERAL
PAINLEVEL_OUTOF10: 0
PAINLEVEL_OUTOF10: 0

## 2017-04-04 ENCOUNTER — CARE COORDINATION (OUTPATIENT)
Dept: CARE COORDINATION | Age: 72
End: 2017-04-04

## 2017-04-04 LAB
EKG ATRIAL RATE: 106 BPM
EKG P AXIS: 49 DEGREES
EKG P-R INTERVAL: 176 MS
EKG Q-T INTERVAL: 354 MS
EKG QRS DURATION: 84 MS
EKG QTC CALCULATION (BAZETT): 470 MS
EKG R AXIS: -12 DEGREES
EKG T AXIS: 66 DEGREES
EKG VENTRICULAR RATE: 106 BPM

## 2017-04-05 ENCOUNTER — CARE COORDINATION (OUTPATIENT)
Dept: CARE COORDINATION | Age: 72
End: 2017-04-05

## 2017-04-06 LAB
CULTURE: NORMAL
CULTURE: NORMAL
Lab: NORMAL
SPECIMEN DESCRIPTION: NORMAL
STATUS: NORMAL

## 2017-04-08 LAB
CULTURE: ABNORMAL
Lab: ABNORMAL
SPECIMEN DESCRIPTION: ABNORMAL
STATUS: ABNORMAL

## 2017-05-09 ENCOUNTER — CARE COORDINATION (OUTPATIENT)
Dept: INTERNAL MEDICINE | Age: 72
End: 2017-05-09

## 2017-05-11 ENCOUNTER — CARE COORDINATOR VISIT (OUTPATIENT)
Dept: INTERNAL MEDICINE | Age: 72
End: 2017-05-11

## 2017-06-20 ENCOUNTER — OFFICE VISIT (OUTPATIENT)
Dept: INTERNAL MEDICINE | Age: 72
End: 2017-06-20
Payer: MEDICARE

## 2017-06-20 VITALS
HEIGHT: 73 IN | BODY MASS INDEX: 30.48 KG/M2 | WEIGHT: 230 LBS | DIASTOLIC BLOOD PRESSURE: 74 MMHG | SYSTOLIC BLOOD PRESSURE: 118 MMHG | HEART RATE: 65 BPM

## 2017-06-20 DIAGNOSIS — G40.909 SEIZURE DISORDER, SECONDARY (HCC): Chronic | ICD-10-CM

## 2017-06-20 DIAGNOSIS — E78.00 PURE HYPERCHOLESTEROLEMIA: ICD-10-CM

## 2017-06-20 DIAGNOSIS — Z23 NEED FOR PROPHYLACTIC VACCINATION AGAINST STREPTOCOCCUS PNEUMONIAE (PNEUMOCOCCUS): ICD-10-CM

## 2017-06-20 DIAGNOSIS — I10 ESSENTIAL HYPERTENSION: Primary | ICD-10-CM

## 2017-06-20 DIAGNOSIS — E79.0 HYPERURICEMIA: ICD-10-CM

## 2017-06-20 DIAGNOSIS — Z99.2 TYPE 2 DIABETES MELLITUS WITH CHRONIC KIDNEY DISEASE ON CHRONIC DIALYSIS, WITHOUT LONG-TERM CURRENT USE OF INSULIN (HCC): ICD-10-CM

## 2017-06-20 DIAGNOSIS — E11.22 TYPE 2 DIABETES MELLITUS WITH CHRONIC KIDNEY DISEASE ON CHRONIC DIALYSIS, WITHOUT LONG-TERM CURRENT USE OF INSULIN (HCC): ICD-10-CM

## 2017-06-20 DIAGNOSIS — R41.89 COGNITIVE IMPAIRMENT: ICD-10-CM

## 2017-06-20 DIAGNOSIS — N18.6 TYPE 2 DIABETES MELLITUS WITH CHRONIC KIDNEY DISEASE ON CHRONIC DIALYSIS, WITHOUT LONG-TERM CURRENT USE OF INSULIN (HCC): ICD-10-CM

## 2017-06-20 LAB — HBA1C MFR BLD: 5.2 %

## 2017-06-20 PROCEDURE — G8417 CALC BMI ABV UP PARAM F/U: HCPCS | Performed by: INTERNAL MEDICINE

## 2017-06-20 PROCEDURE — 4040F PNEUMOC VAC/ADMIN/RCVD: CPT | Performed by: INTERNAL MEDICINE

## 2017-06-20 PROCEDURE — 3046F HEMOGLOBIN A1C LEVEL >9.0%: CPT | Performed by: INTERNAL MEDICINE

## 2017-06-20 PROCEDURE — 83036 HEMOGLOBIN GLYCOSYLATED A1C: CPT | Performed by: INTERNAL MEDICINE

## 2017-06-20 PROCEDURE — G0009 ADMIN PNEUMOCOCCAL VACCINE: HCPCS | Performed by: INTERNAL MEDICINE

## 2017-06-20 PROCEDURE — 99212 OFFICE O/P EST SF 10 MIN: CPT

## 2017-06-20 PROCEDURE — G8427 DOCREV CUR MEDS BY ELIG CLIN: HCPCS | Performed by: INTERNAL MEDICINE

## 2017-06-20 PROCEDURE — 1036F TOBACCO NON-USER: CPT | Performed by: INTERNAL MEDICINE

## 2017-06-20 PROCEDURE — 3017F COLORECTAL CA SCREEN DOC REV: CPT | Performed by: INTERNAL MEDICINE

## 2017-06-20 PROCEDURE — 99214 OFFICE O/P EST MOD 30 MIN: CPT | Performed by: INTERNAL MEDICINE

## 2017-06-20 PROCEDURE — G8598 ASA/ANTIPLAT THER USED: HCPCS | Performed by: INTERNAL MEDICINE

## 2017-06-20 PROCEDURE — 1123F ACP DISCUSS/DSCN MKR DOCD: CPT | Performed by: INTERNAL MEDICINE

## 2017-06-20 PROCEDURE — 90732 PPSV23 VACC 2 YRS+ SUBQ/IM: CPT | Performed by: INTERNAL MEDICINE

## 2017-06-20 RX ORDER — CARVEDILOL 3.12 MG/1
3.12 TABLET ORAL 2 TIMES DAILY
Qty: 60 TABLET | Refills: 3 | Status: SHIPPED | OUTPATIENT
Start: 2017-06-20 | End: 2017-10-19 | Stop reason: DRUGHIGH

## 2017-06-20 RX ORDER — AMLODIPINE BESYLATE 10 MG/1
10 TABLET ORAL DAILY
Status: SHIPPED | COMMUNITY
Start: 2017-06-20 | End: 2018-01-29 | Stop reason: DRUGHIGH

## 2017-06-20 ASSESSMENT — ENCOUNTER SYMPTOMS
BLURRED VISION: 0
COUGH: 1
EYE REDNESS: 0
ABDOMINAL PAIN: 0
CONSTIPATION: 0
WHEEZING: 0
NAUSEA: 0
SHORTNESS OF BREATH: 0
SPUTUM PRODUCTION: 0

## 2017-06-21 RX ORDER — ASPIRIN 81 MG/1
81 TABLET ORAL DAILY
Qty: 30 TABLET | Refills: 3 | Status: SHIPPED | OUTPATIENT
Start: 2017-06-21 | End: 2017-08-11 | Stop reason: ALTCHOICE

## 2017-06-21 ASSESSMENT — ENCOUNTER SYMPTOMS
SORE THROAT: 0
BACK PAIN: 0

## 2017-08-11 ENCOUNTER — HOSPITAL ENCOUNTER (OUTPATIENT)
Dept: INTERVENTIONAL RADIOLOGY/VASCULAR | Age: 72
Discharge: HOME | End: 2017-08-11
Payer: MEDICARE

## 2017-08-11 VITALS
RESPIRATION RATE: 18 BRPM | WEIGHT: 220 LBS | HEART RATE: 80 BPM | SYSTOLIC BLOOD PRESSURE: 158 MMHG | BODY MASS INDEX: 29.16 KG/M2 | TEMPERATURE: 100 F | OXYGEN SATURATION: 97 % | HEIGHT: 73 IN | DIASTOLIC BLOOD PRESSURE: 84 MMHG

## 2017-08-11 DIAGNOSIS — N18.6 END STAGE RENAL DISEASE (HCC): ICD-10-CM

## 2017-08-11 LAB
ACTION: NORMAL
DATE AND TIME: NORMAL
INR BLD: 1.1
NOTIFY: NORMAL
PARTIAL THROMBOPLASTIN TIME: 27.9 SEC (ref 21.3–31.3)
PLATELET # BLD: 177 K/UL (ref 140–450)
POC POTASSIUM: 6.8 MMOL/L (ref 3.5–4.5)
PROTHROMBIN TIME: 12.4 SEC (ref 9.4–12.6)
READ BACK: YES

## 2017-08-11 PROCEDURE — C1769 GUIDE WIRE: HCPCS

## 2017-08-11 PROCEDURE — 76937 US GUIDE VASCULAR ACCESS: CPT

## 2017-08-11 PROCEDURE — 77001 FLUOROGUIDE FOR VEIN DEVICE: CPT

## 2017-08-11 PROCEDURE — 36556 INSERT NON-TUNNEL CV CATH: CPT

## 2017-08-11 PROCEDURE — 7100000010 HC PHASE II RECOVERY - FIRST 15 MIN

## 2017-08-11 PROCEDURE — G0257 UNSCHED DIALYSIS ESRD PT HOS: HCPCS

## 2017-08-11 PROCEDURE — 85049 AUTOMATED PLATELET COUNT: CPT

## 2017-08-11 PROCEDURE — 7100000011 HC PHASE II RECOVERY - ADDTL 15 MIN

## 2017-08-11 PROCEDURE — 84132 ASSAY OF SERUM POTASSIUM: CPT

## 2017-08-11 PROCEDURE — 90937 HEMODIALYSIS REPEATED EVAL: CPT

## 2017-08-11 PROCEDURE — 6360000002 HC RX W HCPCS

## 2017-08-11 PROCEDURE — 36558 INSERT TUNNELED CV CATH: CPT

## 2017-08-11 PROCEDURE — 85730 THROMBOPLASTIN TIME PARTIAL: CPT

## 2017-08-11 PROCEDURE — 85610 PROTHROMBIN TIME: CPT

## 2017-08-11 PROCEDURE — C1887 CATHETER, GUIDING: HCPCS

## 2017-08-11 PROCEDURE — 2580000003 HC RX 258: Performed by: RADIOLOGY

## 2017-08-11 PROCEDURE — 6360000002 HC RX W HCPCS: Performed by: RADIOLOGY

## 2017-08-11 RX ORDER — HEPARIN SODIUM 5000 [USP'U]/ML
INJECTION, SOLUTION INTRAVENOUS; SUBCUTANEOUS
Status: COMPLETED | OUTPATIENT
Start: 2017-08-11 | End: 2017-08-11

## 2017-08-11 RX ORDER — FENTANYL CITRATE 50 UG/ML
INJECTION, SOLUTION INTRAMUSCULAR; INTRAVENOUS
Status: COMPLETED | OUTPATIENT
Start: 2017-08-11 | End: 2017-08-11

## 2017-08-11 RX ORDER — MIDAZOLAM HYDROCHLORIDE 1 MG/ML
INJECTION INTRAMUSCULAR; INTRAVENOUS
Status: COMPLETED | OUTPATIENT
Start: 2017-08-11 | End: 2017-08-11

## 2017-08-11 RX ORDER — SODIUM CHLORIDE 9 MG/ML
INJECTION, SOLUTION INTRAVENOUS CONTINUOUS
Status: DISCONTINUED | OUTPATIENT
Start: 2017-08-11 | End: 2017-08-14 | Stop reason: HOSPADM

## 2017-08-11 RX ADMIN — MIDAZOLAM HYDROCHLORIDE 1 MG: 1 INJECTION, SOLUTION INTRAMUSCULAR; INTRAVENOUS at 15:40

## 2017-08-11 RX ADMIN — HEPARIN SODIUM 1.6 ML: 5000 INJECTION, SOLUTION INTRAVENOUS; SUBCUTANEOUS at 11:34

## 2017-08-11 RX ADMIN — CEFAZOLIN SODIUM 1 G: 1 INJECTION, SOLUTION INTRAVENOUS at 09:20

## 2017-08-11 RX ADMIN — HEPARIN SODIUM 1.5 ML: 5000 INJECTION, SOLUTION INTRAVENOUS; SUBCUTANEOUS at 11:32

## 2017-08-11 RX ADMIN — HEPARIN SODIUM 1.6 ML: 5000 INJECTION, SOLUTION INTRAVENOUS; SUBCUTANEOUS at 11:33

## 2017-08-11 RX ADMIN — HEPARIN SODIUM 1000 UNITS: 5000 INJECTION, SOLUTION INTRAVENOUS; SUBCUTANEOUS at 16:05

## 2017-08-11 RX ADMIN — FENTANYL CITRATE 50 MCG: 50 INJECTION INTRAMUSCULAR; INTRAVENOUS at 15:40

## 2017-08-11 RX ADMIN — HEPARIN SODIUM 1000 UNITS: 5000 INJECTION, SOLUTION INTRAVENOUS; SUBCUTANEOUS at 16:00

## 2017-08-11 RX ADMIN — SODIUM CHLORIDE: 9 INJECTION, SOLUTION INTRAVENOUS at 09:11

## 2017-08-11 ASSESSMENT — PAIN SCALES - GENERAL
PAINLEVEL_OUTOF10: 0

## 2017-08-11 ASSESSMENT — PAIN - FUNCTIONAL ASSESSMENT
PAIN_FUNCTIONAL_ASSESSMENT: 0-10
PAIN_FUNCTIONAL_ASSESSMENT: 0-10

## 2017-08-15 ENCOUNTER — HOSPITAL ENCOUNTER (OUTPATIENT)
Dept: INTERVENTIONAL RADIOLOGY/VASCULAR | Age: 72
Discharge: HOME | End: 2017-08-15
Payer: MEDICARE

## 2017-08-15 VITALS
DIASTOLIC BLOOD PRESSURE: 81 MMHG | TEMPERATURE: 98.3 F | RESPIRATION RATE: 16 BRPM | HEIGHT: 73 IN | HEART RATE: 77 BPM | BODY MASS INDEX: 29.16 KG/M2 | SYSTOLIC BLOOD PRESSURE: 154 MMHG | WEIGHT: 220 LBS | OXYGEN SATURATION: 99 %

## 2017-08-15 DIAGNOSIS — N19 RENAL FAILURE: ICD-10-CM

## 2017-08-15 LAB
INR BLD: 1.1
PARTIAL THROMBOPLASTIN TIME: 25.5 SEC (ref 21.3–31.3)
PLATELET # BLD: 161 K/UL (ref 140–450)
POTASSIUM SERPL-SCNC: 4.2 MMOL/L (ref 3.7–5.3)
PROTHROMBIN TIME: 11.7 SEC (ref 9.4–12.6)

## 2017-08-15 PROCEDURE — 2580000003 HC RX 258: Performed by: RADIOLOGY

## 2017-08-15 PROCEDURE — 85610 PROTHROMBIN TIME: CPT

## 2017-08-15 PROCEDURE — C1725 CATH, TRANSLUMIN NON-LASER: HCPCS

## 2017-08-15 PROCEDURE — 85049 AUTOMATED PLATELET COUNT: CPT

## 2017-08-15 PROCEDURE — 36905 THRMBC/NFS DIALYSIS CIRCUIT: CPT | Performed by: RADIOLOGY

## 2017-08-15 PROCEDURE — 85730 THROMBOPLASTIN TIME PARTIAL: CPT

## 2017-08-15 PROCEDURE — 76937 US GUIDE VASCULAR ACCESS: CPT

## 2017-08-15 PROCEDURE — 84132 ASSAY OF SERUM POTASSIUM: CPT

## 2017-08-15 PROCEDURE — 6360000002 HC RX W HCPCS

## 2017-08-15 PROCEDURE — 36907 BALO ANGIOP CTR DIALYSIS SEG: CPT | Performed by: RADIOLOGY

## 2017-08-15 PROCEDURE — 6360000002 HC RX W HCPCS: Performed by: RADIOLOGY

## 2017-08-15 PROCEDURE — 6360000004 HC RX CONTRAST MEDICATION: Performed by: INTERNAL MEDICINE

## 2017-08-15 PROCEDURE — 7100000010 HC PHASE II RECOVERY - FIRST 15 MIN

## 2017-08-15 PROCEDURE — 7100000011 HC PHASE II RECOVERY - ADDTL 15 MIN

## 2017-08-15 PROCEDURE — A4216 STERILE WATER/SALINE, 10 ML: HCPCS | Performed by: RADIOLOGY

## 2017-08-15 RX ORDER — FENTANYL CITRATE 50 UG/ML
INJECTION, SOLUTION INTRAMUSCULAR; INTRAVENOUS
Status: COMPLETED | OUTPATIENT
Start: 2017-08-15 | End: 2017-08-15

## 2017-08-15 RX ORDER — HEPARIN SODIUM 5000 [USP'U]/ML
INJECTION, SOLUTION INTRAVENOUS; SUBCUTANEOUS
Status: COMPLETED | OUTPATIENT
Start: 2017-08-15 | End: 2017-08-15

## 2017-08-15 RX ORDER — ACETAMINOPHEN 325 MG/1
650 TABLET ORAL EVERY 4 HOURS PRN
Status: DISCONTINUED | OUTPATIENT
Start: 2017-08-15 | End: 2017-08-18 | Stop reason: HOSPADM

## 2017-08-15 RX ORDER — SODIUM CHLORIDE 9 MG/ML
INJECTION, SOLUTION INTRAVENOUS CONTINUOUS
Status: DISCONTINUED | OUTPATIENT
Start: 2017-08-15 | End: 2017-08-18 | Stop reason: HOSPADM

## 2017-08-15 RX ADMIN — HEPARIN SODIUM 2000 UNITS: 5000 INJECTION, SOLUTION INTRAVENOUS; SUBCUTANEOUS at 14:55

## 2017-08-15 RX ADMIN — ALTEPLASE 2 MG: 2.2 INJECTION, POWDER, LYOPHILIZED, FOR SOLUTION INTRAVENOUS at 15:07

## 2017-08-15 RX ADMIN — FENTANYL CITRATE 50 MCG: 50 INJECTION INTRAMUSCULAR; INTRAVENOUS at 14:55

## 2017-08-15 RX ADMIN — FENTANYL CITRATE 50 MCG: 50 INJECTION INTRAMUSCULAR; INTRAVENOUS at 15:04

## 2017-08-15 RX ADMIN — WATER 2.2 ML: 1 INJECTION INTRAMUSCULAR; INTRAVENOUS; SUBCUTANEOUS at 15:07

## 2017-08-15 RX ADMIN — IOVERSOL 85 ML: 509 INJECTION INTRA-ARTERIAL; INTRAVENOUS at 16:08

## 2017-08-15 RX ADMIN — FENTANYL CITRATE 50 MCG: 50 INJECTION INTRAMUSCULAR; INTRAVENOUS at 15:27

## 2017-08-15 ASSESSMENT — PAIN DESCRIPTION - PAIN TYPE: TYPE: ACUTE PAIN

## 2017-08-15 ASSESSMENT — PAIN DESCRIPTION - ORIENTATION: ORIENTATION: RIGHT

## 2017-08-15 ASSESSMENT — PAIN SCALES - GENERAL
PAINLEVEL_OUTOF10: 0
PAINLEVEL_OUTOF10: 5

## 2017-08-15 ASSESSMENT — PAIN DESCRIPTION - DESCRIPTORS: DESCRIPTORS: ACHING

## 2017-08-15 ASSESSMENT — PAIN DESCRIPTION - LOCATION: LOCATION: ARM

## 2017-08-15 ASSESSMENT — PAIN DESCRIPTION - ONSET: ONSET: ON-GOING

## 2017-08-15 ASSESSMENT — PAIN - FUNCTIONAL ASSESSMENT: PAIN_FUNCTIONAL_ASSESSMENT: 0-10

## 2017-08-15 ASSESSMENT — PAIN DESCRIPTION - FREQUENCY: FREQUENCY: CONTINUOUS

## 2017-08-24 ENCOUNTER — HOSPITAL ENCOUNTER (OUTPATIENT)
Dept: INTERVENTIONAL RADIOLOGY/VASCULAR | Age: 72
Discharge: HOME | End: 2017-08-24
Payer: MEDICARE

## 2017-09-01 ENCOUNTER — HOSPITAL ENCOUNTER (OUTPATIENT)
Dept: INTERVENTIONAL RADIOLOGY/VASCULAR | Age: 72
Discharge: HOME | End: 2017-09-01
Payer: MEDICARE

## 2017-09-01 VITALS
HEIGHT: 73 IN | HEART RATE: 66 BPM | OXYGEN SATURATION: 100 % | DIASTOLIC BLOOD PRESSURE: 90 MMHG | BODY MASS INDEX: 29.16 KG/M2 | WEIGHT: 220 LBS | RESPIRATION RATE: 18 BRPM | SYSTOLIC BLOOD PRESSURE: 148 MMHG | TEMPERATURE: 97.9 F

## 2017-09-01 DIAGNOSIS — N18.6 ESRD (END STAGE RENAL DISEASE) (HCC): ICD-10-CM

## 2017-09-01 LAB
INR BLD: 1.2
PARTIAL THROMBOPLASTIN TIME: 26.6 SEC (ref 21.3–31.3)
PLATELET # BLD: 162 K/UL (ref 140–450)
POTASSIUM SERPL-SCNC: 4.8 MMOL/L (ref 3.7–5.3)
PROTHROMBIN TIME: 12.6 SEC (ref 9.4–12.6)

## 2017-09-01 PROCEDURE — 6360000002 HC RX W HCPCS: Performed by: RADIOLOGY

## 2017-09-01 PROCEDURE — 75774 ARTERY X-RAY EACH VESSEL: CPT | Performed by: RADIOLOGY

## 2017-09-01 PROCEDURE — 84132 ASSAY OF SERUM POTASSIUM: CPT

## 2017-09-01 PROCEDURE — 85610 PROTHROMBIN TIME: CPT

## 2017-09-01 PROCEDURE — 76937 US GUIDE VASCULAR ACCESS: CPT

## 2017-09-01 PROCEDURE — 36905 THRMBC/NFS DIALYSIS CIRCUIT: CPT | Performed by: RADIOLOGY

## 2017-09-01 PROCEDURE — 2580000003 HC RX 258: Performed by: RADIOLOGY

## 2017-09-01 PROCEDURE — 6360000004 HC RX CONTRAST MEDICATION: Performed by: RADIOLOGY

## 2017-09-01 PROCEDURE — 85049 AUTOMATED PLATELET COUNT: CPT

## 2017-09-01 PROCEDURE — 7100000010 HC PHASE II RECOVERY - FIRST 15 MIN

## 2017-09-01 PROCEDURE — 85730 THROMBOPLASTIN TIME PARTIAL: CPT

## 2017-09-01 PROCEDURE — 7100000011 HC PHASE II RECOVERY - ADDTL 15 MIN

## 2017-09-01 PROCEDURE — 36216 PLACE CATHETER IN ARTERY: CPT | Performed by: RADIOLOGY

## 2017-09-01 PROCEDURE — 75710 ARTERY X-RAYS ARM/LEG: CPT | Performed by: RADIOLOGY

## 2017-09-01 PROCEDURE — C2623 CATH, TRANSLUMIN, DRUG-COAT: HCPCS

## 2017-09-01 PROCEDURE — 6360000002 HC RX W HCPCS

## 2017-09-01 RX ORDER — FENTANYL CITRATE 50 UG/ML
INJECTION, SOLUTION INTRAMUSCULAR; INTRAVENOUS
Status: COMPLETED | OUTPATIENT
Start: 2017-09-01 | End: 2017-09-01

## 2017-09-01 RX ORDER — SODIUM CHLORIDE 0.9 % (FLUSH) 0.9 %
10 SYRINGE (ML) INJECTION PRN
Status: DISCONTINUED | OUTPATIENT
Start: 2017-09-01 | End: 2017-09-04 | Stop reason: HOSPADM

## 2017-09-01 RX ORDER — MIDAZOLAM HYDROCHLORIDE 1 MG/ML
INJECTION INTRAMUSCULAR; INTRAVENOUS
Status: COMPLETED | OUTPATIENT
Start: 2017-09-01 | End: 2017-09-01

## 2017-09-01 RX ADMIN — FENTANYL CITRATE 50 MCG: 50 INJECTION INTRAMUSCULAR; INTRAVENOUS at 11:55

## 2017-09-01 RX ADMIN — ALTEPLASE 6 MG: 2.2 INJECTION, POWDER, LYOPHILIZED, FOR SOLUTION INTRAVENOUS at 11:21

## 2017-09-01 RX ADMIN — WATER 2.2 ML: 1 INJECTION INTRAMUSCULAR; INTRAVENOUS; SUBCUTANEOUS at 11:23

## 2017-09-01 RX ADMIN — FENTANYL CITRATE 50 MCG: 50 INJECTION INTRAMUSCULAR; INTRAVENOUS at 10:56

## 2017-09-01 RX ADMIN — FENTANYL CITRATE 50 MCG: 50 INJECTION INTRAMUSCULAR; INTRAVENOUS at 12:00

## 2017-09-01 RX ADMIN — IOVERSOL 100 ML: 509 INJECTION INTRA-ARTERIAL; INTRAVENOUS at 12:22

## 2017-09-01 RX ADMIN — MIDAZOLAM HYDROCHLORIDE 1 MG: 1 INJECTION, SOLUTION INTRAMUSCULAR; INTRAVENOUS at 10:56

## 2017-09-01 ASSESSMENT — PAIN SCALES - GENERAL
PAINLEVEL_OUTOF10: 0
PAINLEVEL_OUTOF10: 0
PAINLEVEL_OUTOF10: 6
PAINLEVEL_OUTOF10: 0
PAINLEVEL_OUTOF10: 7
PAINLEVEL_OUTOF10: 0

## 2017-09-01 ASSESSMENT — PAIN - FUNCTIONAL ASSESSMENT: PAIN_FUNCTIONAL_ASSESSMENT: 0-10

## 2017-10-03 ENCOUNTER — HOSPITAL ENCOUNTER (OUTPATIENT)
Dept: INTERVENTIONAL RADIOLOGY/VASCULAR | Age: 72
Discharge: HOME | End: 2017-10-03
Payer: MEDICARE

## 2017-10-03 DIAGNOSIS — N18.6 ESRD (END STAGE RENAL DISEASE) (HCC): ICD-10-CM

## 2017-10-19 ENCOUNTER — OFFICE VISIT (OUTPATIENT)
Dept: INTERNAL MEDICINE | Age: 72
End: 2017-10-19
Payer: MEDICARE

## 2017-10-19 VITALS
SYSTOLIC BLOOD PRESSURE: 118 MMHG | BODY MASS INDEX: 30.75 KG/M2 | DIASTOLIC BLOOD PRESSURE: 72 MMHG | WEIGHT: 232 LBS | HEART RATE: 78 BPM | HEIGHT: 73 IN

## 2017-10-19 DIAGNOSIS — E78.00 PURE HYPERCHOLESTEROLEMIA: ICD-10-CM

## 2017-10-19 DIAGNOSIS — I10 ESSENTIAL HYPERTENSION: Primary | ICD-10-CM

## 2017-10-19 DIAGNOSIS — Z99.2 TYPE 2 DIABETES MELLITUS WITH CHRONIC KIDNEY DISEASE ON CHRONIC DIALYSIS, WITHOUT LONG-TERM CURRENT USE OF INSULIN (HCC): ICD-10-CM

## 2017-10-19 DIAGNOSIS — G40.909 SEIZURE DISORDER, SECONDARY (HCC): Chronic | ICD-10-CM

## 2017-10-19 DIAGNOSIS — N18.6 TYPE 2 DIABETES MELLITUS WITH CHRONIC KIDNEY DISEASE ON CHRONIC DIALYSIS, WITHOUT LONG-TERM CURRENT USE OF INSULIN (HCC): ICD-10-CM

## 2017-10-19 DIAGNOSIS — K21.9 GASTROESOPHAGEAL REFLUX DISEASE, ESOPHAGITIS PRESENCE NOT SPECIFIED: ICD-10-CM

## 2017-10-19 DIAGNOSIS — Z23 NEEDS FLU SHOT: ICD-10-CM

## 2017-10-19 DIAGNOSIS — E11.22 TYPE 2 DIABETES MELLITUS WITH CHRONIC KIDNEY DISEASE ON CHRONIC DIALYSIS, WITHOUT LONG-TERM CURRENT USE OF INSULIN (HCC): ICD-10-CM

## 2017-10-19 PROCEDURE — G8427 DOCREV CUR MEDS BY ELIG CLIN: HCPCS | Performed by: INTERNAL MEDICINE

## 2017-10-19 PROCEDURE — G8484 FLU IMMUNIZE NO ADMIN: HCPCS | Performed by: INTERNAL MEDICINE

## 2017-10-19 PROCEDURE — 3044F HG A1C LEVEL LT 7.0%: CPT | Performed by: INTERNAL MEDICINE

## 2017-10-19 PROCEDURE — 99214 OFFICE O/P EST MOD 30 MIN: CPT | Performed by: INTERNAL MEDICINE

## 2017-10-19 PROCEDURE — 4040F PNEUMOC VAC/ADMIN/RCVD: CPT | Performed by: INTERNAL MEDICINE

## 2017-10-19 PROCEDURE — 1123F ACP DISCUSS/DSCN MKR DOCD: CPT | Performed by: INTERNAL MEDICINE

## 2017-10-19 PROCEDURE — G8417 CALC BMI ABV UP PARAM F/U: HCPCS | Performed by: INTERNAL MEDICINE

## 2017-10-19 PROCEDURE — 3017F COLORECTAL CA SCREEN DOC REV: CPT | Performed by: INTERNAL MEDICINE

## 2017-10-19 PROCEDURE — G8598 ASA/ANTIPLAT THER USED: HCPCS | Performed by: INTERNAL MEDICINE

## 2017-10-19 PROCEDURE — 99212 OFFICE O/P EST SF 10 MIN: CPT

## 2017-10-19 PROCEDURE — 1036F TOBACCO NON-USER: CPT | Performed by: INTERNAL MEDICINE

## 2017-10-19 RX ORDER — CARVEDILOL 25 MG/1
25 TABLET ORAL 2 TIMES DAILY
COMMUNITY
Start: 2017-08-31 | End: 2018-01-26 | Stop reason: ALTCHOICE

## 2017-10-19 RX ORDER — OMEPRAZOLE 20 MG/1
20 CAPSULE, DELAYED RELEASE ORAL DAILY
Qty: 30 CAPSULE | Refills: 2 | Status: SHIPPED | OUTPATIENT
Start: 2017-10-19 | End: 2018-01-26 | Stop reason: ALTCHOICE

## 2017-10-19 ASSESSMENT — ENCOUNTER SYMPTOMS
ABDOMINAL PAIN: 0
DIARRHEA: 0
NAUSEA: 0
HEARTBURN: 1
COUGH: 0
SHORTNESS OF BREATH: 0
CONSTIPATION: 0

## 2017-10-19 NOTE — PROGRESS NOTES
Visit Information    Have you changed or started any medications since your last visit including any over-the-counter medicines, vitamins, or herbal medicines? no   Are you having any side effects from any of your medications? -  no  Have you stopped taking any of your medications? Is so, why? -  no    Have you seen any other physician or provider since your last visit? Yes - Records Requested  Have you had any other diagnostic tests since your last visit? No  Have you been seen in the emergency room and/or had an admission to a hospital since we last saw you? No  Have you had your routine dental cleaning in the past 6 months? no    Have you activated your [employer] account? If not, what are your barriers?  No:      Patient Care Team:  Mireay Wen MD as PCP - Pat Porter MD as Consulting Physician (Urology)  Joaquin Donnelly MD as Consulting Physician (Gastroenterology)  Mireya Wen MD (Internal Medicine)  [address] [facility] (Dialysis)  Mateo Chawla MD as Surgeon (General Surgery)    Medical History Review  Past Medical, Family, and Social History reviewed and does contribute to the patient presenting condition    Health Maintenance   Topic Date Due    Diabetic retinal exam  11/10/2015    Flu vaccine (1) 09/01/2017    DTaP/Tdap/Td vaccine (1 - Tdap) 03/16/2018 (Originally 9/9/1964)    Lipid screen  12/15/2017    Diabetic foot exam  03/16/2018    Colon cancer screen colonoscopy  03/25/2018    Diabetic hemoglobin A1C test  06/20/2018    Zostavax vaccine  Addressed    Pneumococcal high/highest risk  Completed

## 2017-10-19 NOTE — PROGRESS NOTES
that he has never smoked. He has never used smokeless tobacco.    FAMILY HISTORY:    Reviewed and No change from previous visit    HEALTH MAINTENANCE DUE:      Health Maintenance Due   Topic Date Due    Diabetic retinal exam  11/10/2015    Flu vaccine (1) 09/01/2017       REVIEW OF SYSTEMS:    12 point review of symptoms completed and found to be normal except noted in the HPI    Review of Systems   Constitutional: Negative for fever, malaise/fatigue and weight loss. HENT: Negative for congestion and hearing loss. Eyes: Negative for blurred vision and redness. Respiratory: Negative for cough and shortness of breath. Cardiovascular: Negative for chest pain, palpitations and leg swelling. Gastrointestinal: Positive for heartburn. Negative for abdominal pain, constipation, diarrhea and nausea. Musculoskeletal: Positive for joint pain. Neurological: Positive for sensory change. Negative for dizziness, tingling, seizures, loss of consciousness and headaches. Endo/Heme/Allergies: Negative for polydipsia. Does not bruise/bleed easily. Psychiatric/Behavioral: Positive for depression and memory loss. Negative for substance abuse. The patient is not nervous/anxious. PHYSICAL EXAM:      Vitals:    10/19/17 0937 10/19/17 0955   BP: (!) 121/94 118/72   Site: Left Arm    Position: Sitting    Cuff Size: Large Adult    Pulse: 78    Weight: 232 lb (105.2 kg)    Height: 6' 1\" (1.854 m)      Body mass index is 30.61 kg/m². BP Readings from Last 3 Encounters:   10/19/17 118/72   09/01/17 (!) 148/90   08/15/17 (!) 154/81        Wt Readings from Last 3 Encounters:   10/19/17 232 lb (105.2 kg)   09/01/17 220 lb (99.8 kg)   08/15/17 220 lb (99.8 kg)       Physical Exam      HENT:  Normocephalic, Atraumatic,  Neck- Normal range of motion, No tenderness, Supple, No stridor. Eyes:  PERRL, EOMI, Conjunctiva normal, No discharge.    Respiratory:  Normal breath sounds, No respiratory distress, No wheezing, No chest tenderness. Cardiovascular:  Normal heart rate, Normal rhythm, No murmurs  GI:  Bowel sounds normal, Soft, No tenderness  Musculoskeletal:  Intact distal pulses, No edema, No tenderness, right forearm AV fistula bruit  Integument:  Warm, Dry, No erythema, No rash. Lymphatic:  No lymphadenopathy noted. Neurologic:  Alert & oriented x 3, Normal motor function, Normal sensory function, No focal deficits noted. Psychiatric:  Affect normal          LABORATORY FINDINGS:    CBC:  Lab Results   Component Value Date    WBC 4.0 04/03/2017    HGB 10.7 04/03/2017     09/01/2017    PLT Platelet clumps present, count appears adequate. 09/16/2011     BMP:    Lab Results   Component Value Date     04/03/2017    K 4.8 09/01/2017    CL 96 04/03/2017    CO2 19 04/03/2017    BUN 55 04/03/2017    CREATININE 14.88 04/03/2017    GLUCOSE 91 04/03/2017    GLUCOSE 87 09/16/2011     HEMOGLOBIN A1C:   Lab Results   Component Value Date    LABA1C 5.2 06/20/2017     MICROALBUMIN URINE:   Lab Results   Component Value Date    MICROALBUR 971 09/29/2015     FASTING LIPID PANEL:  Lab Results   Component Value Date    CHOL 157 12/15/2016    HDL 31 (L) 12/15/2016    TRIG 132 12/15/2016     Lab Results   Component Value Date    LDLCHOLESTEROL 100 12/15/2016       LIVER PROFILE:  Lab Results   Component Value Date    ALT 9 09/29/2015    AST 9 09/29/2015    PROT 6.8 09/29/2015    PROT 7.6 10/17/2012    BILITOT 0.27 09/29/2015    BILIDIR <0.08 09/29/2015    LABALBU 3.8 05/20/2016    LABALBU 4.5 09/16/2011      THYROID FUNCTION:   Lab Results   Component Value Date    TSH 3.21 12/08/2014      URINE ANALYSIS: No results found for: LABURIN  ASSESSMENT AND PLAN:    1. Essential hypertension    - carvedilol (COREG) 25 MG tablet; Take 25 mg by mouth 2 times daily    2. Type 2 diabetes mellitus with chronic kidney disease on chronic dialysis, without long-term current use of insulin (HCC)  Monitor pff meds      3.  Pure

## 2017-10-19 NOTE — PATIENT INSTRUCTIONS
-Follow-up appointment scheduled for 1/25/18, AVS given to patient.     It is very important that you keep your appointments, please contact us if you are unable to keep your scheduled appt \---Maureen

## 2017-10-22 ASSESSMENT — ENCOUNTER SYMPTOMS
BLURRED VISION: 0
EYE REDNESS: 0

## 2018-01-17 DIAGNOSIS — G40.909 SEIZURE DISORDER, SECONDARY (HCC): Chronic | ICD-10-CM

## 2018-01-17 NOTE — TELEPHONE ENCOUNTER
escribe request for phenytoin (DILANTIN) 100 MG ER capsule (looks to be d/c), future appt scheduled      Health Maintenance   Topic Date Due    Diabetic retinal exam  11/10/2015    Flu vaccine (1) 09/01/2017    Lipid screen  12/15/2017    DTaP/Tdap/Td vaccine (1 - Tdap) 03/16/2018 (Originally 9/9/1964)    Diabetic foot exam  03/16/2018    Colon cancer screen colonoscopy  03/25/2018    A1C test (Diabetic or Prediabetic)  06/20/2018    Zostavax vaccine  Addressed    Pneumococcal high/highest risk  Completed             (applicable per patient's age: Cancer Screenings, Depression Screening, Fall Risk Screening, Immunizations)    Hemoglobin A1C (%)   Date Value   06/20/2017 5.2   12/15/2016 6.1 (H)   09/15/2016 6.2     Microalb/Crt.  Ratio (mcg/mg creat)   Date Value   09/29/2015 558     LDL Cholesterol (mg/dL)   Date Value   12/15/2016 100     AST (U/L)   Date Value   09/29/2015 9     ALT (U/L)   Date Value   09/29/2015 9     BUN (mg/dL)   Date Value   04/03/2017 55 (H)      (goal A1C is < 7)   (goal LDL is <100) need 30-50% reduction from baseline     BP Readings from Last 3 Encounters:   10/19/17 118/72   09/01/17 (!) 148/90   08/15/17 (!) 154/81    (goal /80)      All Future Testing planned in [facility]:  Lab Frequency Next Occurrence       Next Visit Date:  Future Appointments  Date Time Provider Oriana Das   1/25/2018 1:15 PM Tamiko Mckinnon MD [facility] IM [address] [facility]            Patient Active Problem List:     Hypertension     BPH (benign prostatic hyperplasia)     Hyperlipidemia     CAD (coronary artery disease)     Anemia     Diabetes mellitus (HCC)     CVA (cerebrovascular accident due to intracerebral hemorrhage) ([facility]     ICH (intracerebral hemorrhage) (HCC)     HLD (hyperlipidemia)     Seizure disorder, secondary (HCC)     Stroke-like symptom     Unresponsiveness     Seizures (HCC)     Cerebral artery occlusion with cerebral infarction ([facility] [address]     Hypertension     Hyperlipidemia     GERD

## 2018-01-21 RX ORDER — PHENYTOIN SODIUM 100 MG/1
CAPSULE, EXTENDED RELEASE ORAL
Qty: 150 CAPSULE | Refills: 0 | Status: SHIPPED | OUTPATIENT
Start: 2018-01-21 | End: 2018-01-26 | Stop reason: SDUPTHER

## 2018-01-25 ENCOUNTER — OFFICE VISIT (OUTPATIENT)
Dept: INTERNAL MEDICINE | Age: 73
End: 2018-01-25
Payer: MEDICARE

## 2018-01-25 VITALS — DIASTOLIC BLOOD PRESSURE: 72 MMHG | SYSTOLIC BLOOD PRESSURE: 131 MMHG | HEART RATE: 74 BPM

## 2018-01-25 DIAGNOSIS — G40.909 SEIZURE DISORDER AS SEQUELA OF CEREBROVASCULAR ACCIDENT (HCC): ICD-10-CM

## 2018-01-25 DIAGNOSIS — I10 ESSENTIAL HYPERTENSION: ICD-10-CM

## 2018-01-25 DIAGNOSIS — K64.9 HEMORRHOIDS, UNSPECIFIED HEMORRHOID TYPE: ICD-10-CM

## 2018-01-25 DIAGNOSIS — E11.22 TYPE 2 DIABETES MELLITUS WITH CHRONIC KIDNEY DISEASE ON CHRONIC DIALYSIS, WITHOUT LONG-TERM CURRENT USE OF INSULIN (HCC): Primary | Chronic | ICD-10-CM

## 2018-01-25 DIAGNOSIS — N18.6 TYPE 2 DIABETES MELLITUS WITH CHRONIC KIDNEY DISEASE ON CHRONIC DIALYSIS, WITHOUT LONG-TERM CURRENT USE OF INSULIN (HCC): Primary | Chronic | ICD-10-CM

## 2018-01-25 DIAGNOSIS — E78.00 PURE HYPERCHOLESTEROLEMIA: ICD-10-CM

## 2018-01-25 DIAGNOSIS — I69.398 SEIZURE DISORDER AS SEQUELA OF CEREBROVASCULAR ACCIDENT (HCC): ICD-10-CM

## 2018-01-25 DIAGNOSIS — Z99.2 TYPE 2 DIABETES MELLITUS WITH CHRONIC KIDNEY DISEASE ON CHRONIC DIALYSIS, WITHOUT LONG-TERM CURRENT USE OF INSULIN (HCC): Primary | Chronic | ICD-10-CM

## 2018-01-25 PROBLEM — J10.1 INFLUENZA A: Status: RESOLVED | Noted: 2017-03-31 | Resolved: 2018-01-25

## 2018-01-25 LAB — HBA1C MFR BLD: 5.4 %

## 2018-01-25 PROCEDURE — G8417 CALC BMI ABV UP PARAM F/U: HCPCS | Performed by: INTERNAL MEDICINE

## 2018-01-25 PROCEDURE — 99214 OFFICE O/P EST MOD 30 MIN: CPT | Performed by: INTERNAL MEDICINE

## 2018-01-25 PROCEDURE — 83036 HEMOGLOBIN GLYCOSYLATED A1C: CPT | Performed by: INTERNAL MEDICINE

## 2018-01-25 PROCEDURE — 4040F PNEUMOC VAC/ADMIN/RCVD: CPT | Performed by: INTERNAL MEDICINE

## 2018-01-25 PROCEDURE — 1123F ACP DISCUSS/DSCN MKR DOCD: CPT | Performed by: INTERNAL MEDICINE

## 2018-01-25 PROCEDURE — 99214 OFFICE O/P EST MOD 30 MIN: CPT

## 2018-01-25 PROCEDURE — 1036F TOBACCO NON-USER: CPT | Performed by: INTERNAL MEDICINE

## 2018-01-25 PROCEDURE — G8482 FLU IMMUNIZE ORDER/ADMIN: HCPCS | Performed by: INTERNAL MEDICINE

## 2018-01-25 PROCEDURE — 3044F HG A1C LEVEL LT 7.0%: CPT | Performed by: INTERNAL MEDICINE

## 2018-01-25 PROCEDURE — G8598 ASA/ANTIPLAT THER USED: HCPCS | Performed by: INTERNAL MEDICINE

## 2018-01-25 PROCEDURE — 3017F COLORECTAL CA SCREEN DOC REV: CPT | Performed by: INTERNAL MEDICINE

## 2018-01-25 PROCEDURE — G8427 DOCREV CUR MEDS BY ELIG CLIN: HCPCS | Performed by: INTERNAL MEDICINE

## 2018-01-25 RX ORDER — OMEPRAZOLE 20 MG/1
20 CAPSULE, DELAYED RELEASE ORAL DAILY
Qty: 30 CAPSULE | Refills: 3 | Status: SHIPPED | OUTPATIENT
Start: 2018-01-25 | End: 2018-07-26 | Stop reason: SDUPTHER

## 2018-01-25 RX ORDER — ATORVASTATIN CALCIUM 40 MG/1
40 TABLET, FILM COATED ORAL NIGHTLY
Qty: 30 TABLET | Refills: 11 | Status: SHIPPED | OUTPATIENT
Start: 2018-01-25 | End: 2018-11-06 | Stop reason: SDUPTHER

## 2018-01-25 RX ORDER — POLYETHYLENE GLYCOL 3350 17 G/17G
17 POWDER, FOR SOLUTION ORAL DAILY
Qty: 510 G | Refills: 0 | Status: SHIPPED | OUTPATIENT
Start: 2018-01-25 | End: 2018-02-24

## 2018-01-25 ASSESSMENT — ENCOUNTER SYMPTOMS
BLOOD IN STOOL: 1
HEARTBURN: 1
VOMITING: 0
NAUSEA: 1
CONSTIPATION: 1
ABDOMINAL PAIN: 0

## 2018-01-25 NOTE — PATIENT INSTRUCTIONS
-Follow-up appointment scheduled for , AVS given to patient.     It is very important that you keep your appointments, please contact us if you are unable to keep your scheduled appt     -Neurology appointment scheduled for 2/1/18 @ 3:00 pm  -Labs given to patient, they will have them done before their next visit. ---Ludy Triplett

## 2018-01-25 NOTE — PROGRESS NOTES
years ago and since then has had some short-term memory loss. Results for POC orders placed in visit on 01/25/18   POCT glycosylated hemoglobin (Hb A1C)   Result Value Ref Range    Hemoglobin A1C 5.4 %       Findings:  Terminal ileum: normal     Cecum/Ascending colon: abnormal: few diverticulosis      Transverse colon: normal     Descending/Sigmoid colon: abnormal: diverticulosis, frequent      Rectum/Anus: examined in normal and retroflexed positions and was abnormal: hemorrhoids.     Withdrawal Time was (minutes): 12     The colon was decompressed and the scope was removed. The patient tolerated the procedure well.      Recommendations/Plan:   1. Lifestyle and dietary modifications as discussed  2. F/U Biopsies  3. F/U In OfficeYes  4. Discussed with the family  5.  Repeat colonoscopy [id number]    Past Medical History:   Diagnosis Date    Allergic rhinitis     Anemia     BPH (benign prostatic hyperplasia)     CAD (coronary artery disease)     Carotid artery stenosis 2013    ulcerated plaque in left ICA 60 - 70% on carotid angiogram    Cerebrovascular disease 2013    left frontal hemorrhage    Diabetes mellitus ([address] 2013    Eczema     ESRD (end stage renal disease) on dialysis ([address]     Full dentures     Upper / Lower    Full dentures     GERD (gastroesophageal reflux disease)     Gout     Hemodialysis patient ([address] 10/2015    US [address]  M,W F    Hyperlipidemia     Hypertension     Osteoarthritis     Peripheral vascular disease ([address]     Seizures ([address] 2013    after stroke, LAST [id number]/20/2016    Stroke [facility]) 2013    Unspecified cerebral artery occlusion with cerebral infarction 4-    speech short term and lt side       Past Surgical History:   Procedure Laterality Date    ABDOMEN SURGERY  2016    PERITONEAL CATHETER    BLADDER SURGERY  Spring 2014    dilatated     CAROTID ENDARTERECTOMY  2015    left    COLONOSCOPY  2008    polypectomy, diverticulosis    COLONOSCOPY 1/28/2015    CYSTOSCOPY  2008    bladder tumor removal    DIALYSIS FISTULA CREATION Right 08/25/2016    INGUINAL HERNIA REPAIR Right     KIDNEY BIOPSY  May 2013    OTHER SURGICAL HISTORY Right 10/2015    dialysis catheter- chest     OTHER SURGICAL HISTORY  05/26/2016    pd catherter removed from abdomen    SINUS SURGERY      UPPER GASTROINTESTINAL ENDOSCOPY  1/28/2015         ALLERGIES    No Known Allergies    MEDICATIONS:      Current Outpatient Prescriptions on File Prior to Visit   Medication Sig Dispense Refill    carvedilol (COREG) 25 MG tablet Take 25 mg by mouth 2 times daily      calcium acetate (PHOSLO) 667 MG capsule Take 1,334 mg by mouth 3 times daily (with meals)      amLODIPine (NORVASC) 10 MG tablet Take 1 tablet by mouth daily      allopurinol (ZYLOPRIM) 100 MG tablet take 1 tablet by mouth once daily 30 tablet 5    atorvastatin (LIPITOR) 40 MG tablet Take 1 tablet by mouth nightly 30 tablet 11    Sucroferric Oxyhydroxide (VELPHORO) 500 MG CHEW Take 500 mg by mouth daily      phenytoin (DILANTIN) 100 MG ER capsule Take 1 capsule by mouth every 8 hours (Patient taking differently: Take by mouth every 8 hours ) 90 capsule 10    phenytoin (DILANTIN) 100 MG ER capsule TAKE 2 CAPSULES BY MOUTH IN THE MORNING AND TAKE 3 CAPSULES BY MOUTH AT NIGHT AS DIRECTED 150 capsule 0    omeprazole (PRILOSEC) 20 MG delayed release capsule Take 1 capsule by mouth daily 30 capsule 2     No current facility-administered medications on file prior to visit. SOCIAL HISTORY    Reviewed and no change from previous record. Danna Medeiros  reports that he has never smoked.  He has never used smokeless tobacco.    FAMILY HISTORY:    Reviewed and No change from previous visit    HEALTH MAINTENANCE DUE:      Health Maintenance Due   Topic Date Due    Diabetic retinal exam  11/10/2015    Lipid screen  12/15/2017       REVIEW OF SYSTEMS:    12 point review of symptoms completed and found to be normal except noted in the HPI    Review of Systems   Constitutional: Positive for malaise/fatigue. Negative for fever and weight loss. Eyes: Negative for blurred vision and redness. Respiratory: Negative for cough, sputum production and shortness of breath. Cardiovascular: Negative for chest pain, palpitations and leg swelling. Gastrointestinal: Positive for blood in stool, constipation, heartburn and nausea. Negative for abdominal pain, melena and vomiting. Skin: Negative for itching and rash. Neurological: Negative for dizziness, tremors, seizures, loss of consciousness and headaches. Endo/Heme/Allergies: Negative for polydipsia. Does not bruise/bleed easily. Psychiatric/Behavioral: Positive for memory loss. Negative for depression and substance abuse. The patient has insomnia. The patient is not nervous/anxious. PHYSICAL EXAM:      Vitals:    01/25/18 1328   BP: 131/72   Site: Left Arm   Position: Sitting   Cuff Size: Large Adult   Pulse: 74     There is no height or weight on file to calculate BMI. BP Readings from Last 3 Encounters:   01/25/18 131/72   10/19/17 118/72   09/01/17 (!) 148/90        Wt Readings from Last 3 Encounters:   10/19/17 232 lb (105.2 kg)   09/01/17 220 lb (99.8 kg)   08/15/17 220 lb (99.8 kg)       Physical Exam        HENT:  Normocephalic, Atraumatic,  Neck- Normal range of motion, No tenderness, Supple, No stridor. Eyes:  PERRL, EOMI, Conjunctiva normal, No discharge. Respiratory:  Normal breath sounds, No respiratory distress, No wheezing, No chest tenderness. Cardiovascular:  Normal heart rate, Normal rhythm, No murmurs  GI:  Bowel sounds normal, Soft, No tenderness  Musculoskeletal:  Intact distal pulses, No edema, No tenderness, right forearm AV fistula bruit  Integument:  Warm, Dry, No erythema, No rash. Lymphatic:  No lymphadenopathy noted. Neurologic:  Alert & oriented x 3, Normal motor function, Normal sensory function, No focal deficits noted.    Psychiatric: Future          FOLLOW UP AND INSTRUCTIONS:   Return in about 3 months (around 4/25/2018). 1. Patrice Hudson received counseling on the following healthy behaviors: nutrition, exercise and medication adherence    2. Reviewed prior labs and health maintenance. 3. Discussed use, benefit, and side effects of prescribed medications. Barriers to medication compliance addressed. All patient questions answered. Pt voiced understanding.        Elmer Arcos  Attending Physician, [address] [place] [address], Internal Medicine Residency Program  [address] [place] [address]  1/25/2018, 1:46 PM

## 2018-01-25 NOTE — PROGRESS NOTES
Visit Information    Have you changed or started any medications since your last visit including any over-the-counter medicines, vitamins, or herbal medicines? no   Are you having any side effects from any of your medications? -  no  Have you stopped taking any of your medications? Is so, why? -  no    Have you seen any other physician or provider since your last visit? No  Have you had any other diagnostic tests since your last visit? no  Have you been seen in the emergency room and/or had an admission to a hospital since we last saw you? No  Have you had your routine dental cleaning in the past 6 months? no    Have you activated your [employer] account? If not, what are your barriers?  No:      Patient Care Team:  Duglas Porter MD as PCP - Olegario Delgado MD as PCP - S Attributed Provider  Zuleyka Issa MD as Consulting Physician (Urology)  Juwan Joel MD as Consulting Physician (Gastroenterology)  Duglas Porter MD (Internal Medicine)  [address] [facility] (Dialysis)  Desmond Santos MD as Surgeon (General Surgery)    Medical History Review  Past Medical, Family, and Social History reviewed and does contribute to the patient presenting condition    Health Maintenance   Topic Date Due    Diabetic retinal exam  11/10/2015    Flu vaccine (1) 09/01/2017    Lipid screen  12/15/2017    DTaP/Tdap/Td vaccine (1 - Tdap) 03/16/2018 (Originally 9/9/1964)    Diabetic foot exam  03/16/2018    Colon cancer screen colonoscopy  03/25/2018    A1C test (Diabetic or Prediabetic)  06/20/2018    Zostavax vaccine  Addressed    Pneumococcal high/highest risk  Completed

## 2018-01-26 DIAGNOSIS — I10 ESSENTIAL HYPERTENSION: Primary | ICD-10-CM

## 2018-01-26 RX ORDER — CINACALCET HYDROCHLORIDE 30 MG/1
30 TABLET, COATED ORAL DAILY
Status: ON HOLD | COMMUNITY
End: 2021-07-27 | Stop reason: HOSPADM

## 2018-01-26 RX ORDER — PHENYTOIN SODIUM 100 MG/1
CAPSULE, EXTENDED RELEASE ORAL 2 TIMES DAILY
COMMUNITY
End: 2018-04-26

## 2018-01-26 RX ORDER — MULTIVIT-MIN/IRON/FOLIC ACID/K 18-600-40
2000 CAPSULE ORAL DAILY
COMMUNITY
End: 2018-05-29 | Stop reason: ALTCHOICE

## 2018-01-26 NOTE — TELEPHONE ENCOUNTER
PC from dialysis--they got new med list from us and they stated that they started him on :    Rhona Rye once daily on 1/17/18 and his calcium was increased on 1/12/18 to 2001 mg with meals (3 tablets). Pt is there for dialysis today and his BP is running low, when he got to dialysis it was 128/55 which is around what he normally runs, since noon he has been running even lower, 80/45 84/49 81/61 81/50. They stated that he normally does not drop low.  Please advise

## 2018-01-26 NOTE — TELEPHONE ENCOUNTER
PC from  to update med list, pt to be taking:    -Lipitor 40 mg once daily    -Dilantin 100 mg--- 2 capsules in the morning, 3 capsules at night    -Sensipar 30 mg daily    -Miralax Prn    -Prilosec 20 mg daily    -Calcium acetate 1334 mg tid     -Norvasc 10 mg daily    -Allopurinal 100 mg daily    -ASA 81 mg    -Dialyvit     PC to dialysis and pharmacy to update them on patients medication list

## 2018-01-26 NOTE — TELEPHONE ENCOUNTER
PC to dialysis for medication list, per their med list patient is taking:    -Dilantin 100 mg TID  -Sensipar 30 mg Daily-  -Calcium Acetate 2000 mg with meals   -Viagra 25 mg prn  -Coreg 25 mg BID  -Norvasc 10 mg daily  -Cozaar 100 mg nightly   -ASA 81 mg daily  -Gabapentin 100 mg nightly  -Flomax 0.4 mg daily  -Claritiin 10 mg daily  -Miralax daily prn  -Vit D 2000U daily  -Lantus 20U nightly  -Humalog sliding scale with meals   -Allopurinol 100 mg daily

## 2018-01-27 ASSESSMENT — ENCOUNTER SYMPTOMS
COUGH: 0
BLURRED VISION: 0
SHORTNESS OF BREATH: 0
EYE REDNESS: 0
SPUTUM PRODUCTION: 0

## 2018-01-29 RX ORDER — AMLODIPINE BESYLATE 5 MG/1
5 TABLET ORAL DAILY
Qty: 30 TABLET | Refills: 3 | OUTPATIENT
Start: 2018-01-29 | End: 2018-04-26

## 2018-02-16 ENCOUNTER — TELEPHONE (OUTPATIENT)
Dept: INTERNAL MEDICINE | Age: 73
End: 2018-02-16

## 2018-02-16 NOTE — TELEPHONE ENCOUNTER
FYI:      VM left from Molly RN at [facility] calling stating that Dr Radha Campos changed the pt's Amlodipine 5 mg to Amlodipine 10 mg. Health Maintenance   Topic Date Due    Diabetic retinal exam  11/10/2015    Lipid screen  12/15/2017    DTaP/Tdap/Td vaccine (1 - Tdap) 03/16/2018 (Originally 9/9/1964)    Diabetic foot exam  03/16/2018    A1C test (Diabetic or Prediabetic)  01/25/2019    Colon cancer screen colonoscopy  01/28/2020    Zostavax vaccine  Addressed    Flu vaccine  Completed    Pneumococcal high/highest risk  Completed             (applicable per patient's age: Cancer Screenings, Depression Screening, Fall Risk Screening, Immunizations)    Hemoglobin A1C (%)   Date Value   01/25/2018 5.4   06/20/2017 5.2   12/15/2016 6.1 (H)     Microalb/Crt.  Ratio (mcg/mg creat)   Date Value   09/29/2015 558     LDL Cholesterol (mg/dL)   Date Value   12/15/2016 100     AST (U/L)   Date Value   09/29/2015 9     ALT (U/L)   Date Value   09/29/2015 9     BUN (mg/dL)   Date Value   04/03/2017 55 (H)      (goal A1C is < 7)   (goal LDL is <100) need 30-50% reduction from baseline     BP Readings from Last 3 Encounters:   01/25/18 131/72   10/19/17 118/72   09/01/17 (!) 148/90    (goal /80)      All Future Testing planned in [facility]:  Lab Frequency Next Occurrence   Lipid Panel Once 02/24/2018   Phenytoin Level, Total And Free Once 05/02/2018   CBC With Auto Differential Once 05/02/2018   TSH with Reflex Once 04/28/2018       Next Visit Date:  Future Appointments  Date Time Provider Oriana Das   4/27/2018 1:45 PM Rock Parrish MD [id number]            Patient Active Problem List:     Hypertension     BPH (benign prostatic hyperplasia)     Hyperlipidemia     CAD (coronary artery disease)     Anemia     Diabetes mellitus (HCC)     CVA (cerebrovascular accident due to intracerebral hemorrhage) ([facility]     ICH (intracerebral hemorrhage) (HCC)     HLD (hyperlipidemia)     Seizure disorder, secondary ([facility]     Stroke-like symptom     Unresponsiveness     Seizures (HCC)     Cerebral artery occlusion with cerebral infarction ([facility] [address]     Hypertension     Hyperlipidemia     GERD (gastroesophageal reflux disease)     Diabetes mellitus (HCC)     Gout     Subtherapeutic serum dilantin level     Seizure disorder (HCC)     End-stage renal disease (ESRD) (HCC)     Acute hyperkalemia     Uncontrolled hypertension     Hyperkalemia     Facial droop     Right sided weakness     Breakthrough seizure (HCC)     Convulsions (HCC)     ESRD on hemodialysis ([facility] [address]     Carotid stenosis, asymptomatic     Seizure disorder as sequela of cerebrovascular accident ([facility] [address]     Chronic ischemic left middle cerebral artery (MCA) stroke     Encounter regarding vascular access for dialysis for ESRD ([facility]     Folate deficiency     URI (upper respiratory infection)     Type 2 diabetes mellitus with chronic kidney disease on chronic dialysis, without long-term current use of insulin ([address]

## 2018-03-15 DIAGNOSIS — G40.909 SEIZURE DISORDER, SECONDARY (HCC): Chronic | ICD-10-CM

## 2018-03-15 NOTE — TELEPHONE ENCOUNTER
E-scribe request for PHENYTOIN SODIUM EXTENDED 100MG ORAL CAPSULE. Please review and e-scribe if applicable. Last Visit Date:  1/25/18  Next Visit Date:  4/27/18    Hemoglobin A1C (%)   Date Value   01/25/2018 5.4   06/20/2017 5.2   12/15/2016 6.1 (H)             ( goal A1C is < 7)   Microalb/Crt.  Ratio (mcg/mg creat)   Date Value   09/29/2015 558     LDL Cholesterol (mg/dL)   Date Value   12/15/2016 100       (goal LDL is <100)   AST (U/L)   Date Value   09/29/2015 9     ALT (U/L)   Date Value   09/29/2015 9     BUN (mg/dL)   Date Value   04/03/2017 55 (H)     BP Readings from Last 3 Encounters:   01/25/18 131/72   10/19/17 118/72   09/01/17 (!) 148/90          (goal 120/80)        Patient Active Problem List:     Hypertension     BPH (benign prostatic hyperplasia)     Hyperlipidemia     CAD (coronary artery disease)     Anemia     Diabetes mellitus (HCC)     CVA (cerebrovascular accident due to intracerebral hemorrhage) ([address]     ICH (intracerebral hemorrhage) (HCC)     HLD (hyperlipidemia)     Seizure disorder, secondary (HCC)     Stroke-like symptom     Unresponsiveness     Seizures (HCC)     Cerebral artery occlusion with cerebral infarction ([address]     Hypertension     Hyperlipidemia     GERD (gastroesophageal reflux disease)     Diabetes mellitus (HCC)     Gout     Subtherapeutic serum dilantin level     Seizure disorder (HCC)     End-stage renal disease (ESRD) ([address]     Acute hyperkalemia     Uncontrolled hypertension     Hyperkalemia     Facial droop     Right sided weakness     Breakthrough seizure ([address]     Convulsions ([address]     ESRD on hemodialysis ([address]     Carotid stenosis, asymptomatic     Seizure disorder as sequela of cerebrovascular accident ([address]     Chronic ischemic left middle cerebral artery (MCA) stroke     Encounter regarding vascular access for dialysis for ESRD ([address]     Folate deficiency     URI (upper respiratory infection)     Type 2 diabetes mellitus with chronic kidney disease on

## 2018-03-16 RX ORDER — PHENYTOIN SODIUM 100 MG/1
CAPSULE, EXTENDED RELEASE ORAL
Qty: 150 CAPSULE | Refills: 3 | Status: SHIPPED | OUTPATIENT
Start: 2018-03-16 | End: 2018-07-26 | Stop reason: SDUPTHER

## 2018-03-27 ENCOUNTER — HOSPITAL ENCOUNTER (OUTPATIENT)
Dept: INTERVENTIONAL RADIOLOGY/VASCULAR | Age: 73
Discharge: HOME | End: 2018-03-29
Payer: MEDICARE

## 2018-03-27 VITALS
HEIGHT: 73 IN | WEIGHT: 245 LBS | RESPIRATION RATE: 16 BRPM | BODY MASS INDEX: 32.47 KG/M2 | HEART RATE: 65 BPM | DIASTOLIC BLOOD PRESSURE: 89 MMHG | OXYGEN SATURATION: 98 % | SYSTOLIC BLOOD PRESSURE: 147 MMHG | TEMPERATURE: 97.3 F

## 2018-03-27 DIAGNOSIS — N18.5 STAGE 5 CHRONIC KIDNEY DISEASE NOT ON CHRONIC DIALYSIS (HCC): ICD-10-CM

## 2018-03-27 LAB
INR BLD: 1.1
PARTIAL THROMBOPLASTIN TIME: 26.8 SEC (ref 20.5–30.5)
PLATELET # BLD: 167 K/UL (ref 138–453)
POTASSIUM SERPL-SCNC: 5.2 MMOL/L (ref 3.7–5.3)
PROTHROMBIN TIME: 11.2 SEC (ref 9–12)

## 2018-03-27 PROCEDURE — C2623 CATH, TRANSLUMIN, DRUG-COAT: HCPCS

## 2018-03-27 PROCEDURE — 6360000004 HC RX CONTRAST MEDICATION: Performed by: NURSE PRACTITIONER

## 2018-03-27 PROCEDURE — 85610 PROTHROMBIN TIME: CPT

## 2018-03-27 PROCEDURE — 85049 AUTOMATED PLATELET COUNT: CPT

## 2018-03-27 PROCEDURE — 76937 US GUIDE VASCULAR ACCESS: CPT

## 2018-03-27 PROCEDURE — 6360000002 HC RX W HCPCS: Performed by: RADIOLOGY

## 2018-03-27 PROCEDURE — 7100000011 HC PHASE II RECOVERY - ADDTL 15 MIN

## 2018-03-27 PROCEDURE — 2580000003 HC RX 258: Performed by: RADIOLOGY

## 2018-03-27 PROCEDURE — 36902 INTRO CATH DIALYSIS CIRCUIT: CPT

## 2018-03-27 PROCEDURE — 85730 THROMBOPLASTIN TIME PARTIAL: CPT

## 2018-03-27 PROCEDURE — 7100000010 HC PHASE II RECOVERY - FIRST 15 MIN

## 2018-03-27 PROCEDURE — 84132 ASSAY OF SERUM POTASSIUM: CPT

## 2018-03-27 PROCEDURE — 36907 BALO ANGIOP CTR DIALYSIS SEG: CPT

## 2018-03-27 RX ORDER — FENTANYL CITRATE 50 UG/ML
INJECTION, SOLUTION INTRAMUSCULAR; INTRAVENOUS
Status: COMPLETED | OUTPATIENT
Start: 2018-03-27 | End: 2018-03-27

## 2018-03-27 RX ORDER — ONDANSETRON 2 MG/ML
4 INJECTION INTRAMUSCULAR; INTRAVENOUS ONCE
Status: COMPLETED | OUTPATIENT
Start: 2018-03-27 | End: 2018-03-27

## 2018-03-27 RX ORDER — SODIUM CHLORIDE 9 MG/ML
INJECTION, SOLUTION INTRAVENOUS CONTINUOUS
Status: DISCONTINUED | OUTPATIENT
Start: 2018-03-27 | End: 2018-03-30 | Stop reason: HOSPADM

## 2018-03-27 RX ORDER — MIDAZOLAM HYDROCHLORIDE 1 MG/ML
INJECTION INTRAMUSCULAR; INTRAVENOUS
Status: COMPLETED | OUTPATIENT
Start: 2018-03-27 | End: 2018-03-27

## 2018-03-27 RX ORDER — ACETAMINOPHEN 325 MG/1
650 TABLET ORAL EVERY 4 HOURS PRN
Status: DISCONTINUED | OUTPATIENT
Start: 2018-03-27 | End: 2018-03-30 | Stop reason: HOSPADM

## 2018-03-27 RX ADMIN — IOVERSOL 115 ML: 509 INJECTION INTRA-ARTERIAL; INTRAVENOUS at 11:57

## 2018-03-27 RX ADMIN — ONDANSETRON 4 MG: 2 INJECTION INTRAMUSCULAR; INTRAVENOUS at 12:23

## 2018-03-27 RX ADMIN — MIDAZOLAM HYDROCHLORIDE 0.5 MG: 1 INJECTION, SOLUTION INTRAMUSCULAR; INTRAVENOUS at 11:23

## 2018-03-27 RX ADMIN — FENTANYL CITRATE 25 MCG: 50 INJECTION INTRAMUSCULAR; INTRAVENOUS at 11:32

## 2018-03-27 RX ADMIN — FENTANYL CITRATE 50 MCG: 50 INJECTION INTRAMUSCULAR; INTRAVENOUS at 10:50

## 2018-03-27 RX ADMIN — FENTANYL CITRATE 50 MCG: 50 INJECTION INTRAMUSCULAR; INTRAVENOUS at 11:40

## 2018-03-27 RX ADMIN — MIDAZOLAM HYDROCHLORIDE 0.5 MG: 1 INJECTION, SOLUTION INTRAMUSCULAR; INTRAVENOUS at 11:34

## 2018-03-27 RX ADMIN — MIDAZOLAM HYDROCHLORIDE 0.5 MG: 1 INJECTION, SOLUTION INTRAMUSCULAR; INTRAVENOUS at 10:58

## 2018-03-27 RX ADMIN — FENTANYL CITRATE 25 MCG: 50 INJECTION INTRAMUSCULAR; INTRAVENOUS at 11:10

## 2018-03-27 RX ADMIN — SODIUM CHLORIDE: 9 INJECTION, SOLUTION INTRAVENOUS at 08:54

## 2018-03-27 ASSESSMENT — PAIN SCALES - GENERAL
PAINLEVEL_OUTOF10: 4
PAINLEVEL_OUTOF10: 0
PAINLEVEL_OUTOF10: 0
PAINLEVEL_OUTOF10: 4
PAINLEVEL_OUTOF10: 4
PAINLEVEL_OUTOF10: 0
PAINLEVEL_OUTOF10: 0
PAINLEVEL_OUTOF10: 6
PAINLEVEL_OUTOF10: 0
PAINLEVEL_OUTOF10: 0
PAINLEVEL_OUTOF10: 5
PAINLEVEL_OUTOF10: 0
PAINLEVEL_OUTOF10: 0
PAINLEVEL_OUTOF10: 5
PAINLEVEL_OUTOF10: 4
PAINLEVEL_OUTOF10: 0

## 2018-03-27 ASSESSMENT — PAIN - FUNCTIONAL ASSESSMENT: PAIN_FUNCTIONAL_ASSESSMENT: 0-10

## 2018-03-27 ASSESSMENT — PAIN DESCRIPTION - PAIN TYPE
TYPE: ACUTE PAIN

## 2018-03-27 NOTE — POST SEDATION
Sedation Post Procedure Note    Patient Name: Akash Mccall   YOB: 1945  Room/Bed: Room/bed info not found  Medical Record Number: [id number]  Date: 3/27/2018   Time: 11:57 AM         Physicians/Assistants: Tiffanie Tompkins MD    Procedure Performed:  Fistulagram and angioplasty    Post-Sedation Vital Signs:  Vitals:    03/27/18 1145   BP: (!) 140/73   Pulse: 65   Resp: 13   Temp:    SpO2: 100%      Vital signs were reviewed and were stable after the procedure (see flow sheet for vitals)            Complications: none    Electronically signed by Tiffanie Tompkins MD on 3/27/2018 at 11:57 AM

## 2018-03-27 NOTE — PRE SEDATION
Sedation Pre-Procedure Note    Patient Name: Sharon Hoffman   YOB: 1945  Room/Bed: Room/bed info not found  Medical Record Number: [id number]  Date: 3/27/2018   Time: 10:29 AM       Indication:  Malfunctioning fistula    Consent: I have discussed with the patient and/or the patient representative the indication, alternatives, and the possible risks and/or complications of the planned procedure and the anesthesia methods. The patient and/or patient representative appear to understand and agree to proceed. Vital Signs:   Vitals:    03/27/18 0826   BP: 117/71   Pulse: 73   Resp: 14   Temp: 97.3 °F (36.3 °C)   SpO2: 96%       Past Medical History:   has a past medical history of Allergic rhinitis; Anemia; BPH (benign prostatic hyperplasia); CAD (coronary artery disease); Carotid artery stenosis; Cerebrovascular disease; Diabetes mellitus ([address] Eczema; ESRD (end stage renal disease) on dialysis ([facility] Full dentures; Full dentures; GERD (gastroesophageal reflux disease); Gout; Hemodialysis patient ([address] Hyperlipidemia; Hypertension; Neuropathy ([address] Osteoarthritis; Peripheral vascular disease ([facility] Seizures ([facility] Stroke [facility]); and Unspecified cerebral artery occlusion with cerebral infarction. Past Surgical History:   has a past surgical history that includes Bladder surgery (Spring 2014); sinus surgery; Cystocopy (2008); Inguinal hernia repair (Right); Kidney biopsy (May 2013); Upper gastrointestinal endoscopy (1/28/2015); Colonoscopy (2008); Colonoscopy (1/28/2015); other surgical history (Right, 10/2015); Carotid endarterectomy (2015); Abdomen surgery (2016); other surgical history (05/26/2016); and Dialysis fistula creation (Right, 08/25/2016). Medications:   Scheduled Meds:   Continuous Infusions:    sodium chloride 20 mL/hr at 03/27/18 0854     PRN Meds:   Home Meds:   Prior to Admission medications    Medication Sig Start Date End Date Taking?  Authorizing Provider   phenytoin (DILANTIN)

## 2018-04-26 ENCOUNTER — OFFICE VISIT (OUTPATIENT)
Dept: INTERNAL MEDICINE | Age: 73
End: 2018-04-26
Payer: MEDICARE

## 2018-04-26 ENCOUNTER — HOSPITAL ENCOUNTER (OUTPATIENT)
Age: 73
Setting detail: SPECIMEN
Discharge: HOME | End: 2018-04-26
Payer: MEDICARE

## 2018-04-26 VITALS
HEIGHT: 73 IN | WEIGHT: 232 LBS | BODY MASS INDEX: 30.75 KG/M2 | DIASTOLIC BLOOD PRESSURE: 66 MMHG | SYSTOLIC BLOOD PRESSURE: 112 MMHG | HEART RATE: 66 BPM

## 2018-04-26 DIAGNOSIS — N18.6 TYPE 2 DIABETES MELLITUS WITH CHRONIC KIDNEY DISEASE ON CHRONIC DIALYSIS, WITHOUT LONG-TERM CURRENT USE OF INSULIN (HCC): Primary | ICD-10-CM

## 2018-04-26 DIAGNOSIS — E78.00 PURE HYPERCHOLESTEROLEMIA: ICD-10-CM

## 2018-04-26 DIAGNOSIS — I65.22 STENOSIS OF LEFT CAROTID ARTERY: ICD-10-CM

## 2018-04-26 DIAGNOSIS — N18.6 TYPE 2 DIABETES MELLITUS WITH CHRONIC KIDNEY DISEASE ON CHRONIC DIALYSIS, WITHOUT LONG-TERM CURRENT USE OF INSULIN (HCC): Chronic | ICD-10-CM

## 2018-04-26 DIAGNOSIS — Z99.2 TYPE 2 DIABETES MELLITUS WITH CHRONIC KIDNEY DISEASE ON CHRONIC DIALYSIS, WITHOUT LONG-TERM CURRENT USE OF INSULIN (HCC): Chronic | ICD-10-CM

## 2018-04-26 DIAGNOSIS — Z99.2 TYPE 2 DIABETES MELLITUS WITH CHRONIC KIDNEY DISEASE ON CHRONIC DIALYSIS, WITHOUT LONG-TERM CURRENT USE OF INSULIN (HCC): Primary | ICD-10-CM

## 2018-04-26 DIAGNOSIS — N18.6 ANEMIA IN CHRONIC KIDNEY DISEASE, ON CHRONIC DIALYSIS (HCC): Chronic | ICD-10-CM

## 2018-04-26 DIAGNOSIS — G40.909 SEIZURE DISORDER AS SEQUELA OF CEREBROVASCULAR ACCIDENT (HCC): ICD-10-CM

## 2018-04-26 DIAGNOSIS — E79.0 HYPERURICEMIA: ICD-10-CM

## 2018-04-26 DIAGNOSIS — I69.398 SEIZURE DISORDER AS SEQUELA OF CEREBROVASCULAR ACCIDENT (HCC): ICD-10-CM

## 2018-04-26 DIAGNOSIS — I10 ESSENTIAL HYPERTENSION: ICD-10-CM

## 2018-04-26 DIAGNOSIS — E11.22 TYPE 2 DIABETES MELLITUS WITH CHRONIC KIDNEY DISEASE ON CHRONIC DIALYSIS, WITHOUT LONG-TERM CURRENT USE OF INSULIN (HCC): Chronic | ICD-10-CM

## 2018-04-26 DIAGNOSIS — B36.0 TINEA VERSICOLOR: ICD-10-CM

## 2018-04-26 DIAGNOSIS — I69.30 CHRONIC ISCHEMIC LEFT MIDDLE CEREBRAL ARTERY (MCA) STROKE: ICD-10-CM

## 2018-04-26 DIAGNOSIS — K64.9 HEMORRHOIDS, UNSPECIFIED HEMORRHOID TYPE: ICD-10-CM

## 2018-04-26 DIAGNOSIS — E11.22 TYPE 2 DIABETES MELLITUS WITH CHRONIC KIDNEY DISEASE ON CHRONIC DIALYSIS, WITHOUT LONG-TERM CURRENT USE OF INSULIN (HCC): Primary | ICD-10-CM

## 2018-04-26 DIAGNOSIS — Z99.2 ANEMIA IN CHRONIC KIDNEY DISEASE, ON CHRONIC DIALYSIS (HCC): Chronic | ICD-10-CM

## 2018-04-26 DIAGNOSIS — D63.1 ANEMIA IN CHRONIC KIDNEY DISEASE, ON CHRONIC DIALYSIS (HCC): Chronic | ICD-10-CM

## 2018-04-26 LAB
ABSOLUTE EOS #: 0.1 K/UL (ref 0–0.44)
ABSOLUTE IMMATURE GRANULOCYTE: 0.04 K/UL (ref 0–0.3)
ABSOLUTE LYMPH #: 1.64 K/UL (ref 1.1–3.7)
ABSOLUTE MONO #: 0.6 K/UL (ref 0.1–1.2)
BASOPHILS # BLD: 1 % (ref 0–2)
BASOPHILS ABSOLUTE: 0.04 K/UL (ref 0–0.2)
CHOLESTEROL/HDL RATIO: 3.8
CHOLESTEROL: 173 MG/DL
DIFFERENTIAL TYPE: ABNORMAL
EOSINOPHILS RELATIVE PERCENT: 2 % (ref 1–4)
HCT VFR BLD CALC: 35.1 % (ref 40.7–50.3)
HDLC SERPL-MCNC: 45 MG/DL
HEMOGLOBIN: 10.8 G/DL (ref 13–17)
IMMATURE GRANULOCYTES: 1 %
LDL CHOLESTEROL: 111 MG/DL (ref 0–130)
LYMPHOCYTES # BLD: 30 % (ref 24–43)
MCH RBC QN AUTO: 30 PG (ref 25.2–33.5)
MCHC RBC AUTO-ENTMCNC: 30.8 G/DL (ref 28.4–34.8)
MCV RBC AUTO: 97.5 FL (ref 82.6–102.9)
MONOCYTES # BLD: 11 % (ref 3–12)
NRBC AUTOMATED: 0.5 PER 100 WBC
PDW BLD-RTO: 19.9 % (ref 11.8–14.4)
PHENYTOIN DATE LAST DOSE: ABNORMAL
PHENYTOIN DOSE AMOUNT: ABNORMAL
PHENYTOIN DOSE TIME: ABNORMAL
PHENYTOIN FREE: 0.5 UG/ML (ref 1–2)
PHENYTOIN LEVEL: 5.2 UG/ML (ref 10–20)
PLATELET # BLD: 183 K/UL (ref 138–453)
PLATELET ESTIMATE: ABNORMAL
PMV BLD AUTO: 12.1 FL (ref 8.1–13.5)
RBC # BLD: 3.6 M/UL (ref 4.21–5.77)
RBC # BLD: ABNORMAL 10*6/UL
SEG NEUTROPHILS: 55 % (ref 36–65)
SEGMENTED NEUTROPHILS ABSOLUTE COUNT: 3.1 K/UL (ref 1.5–8.1)
TRIGL SERPL-MCNC: 87 MG/DL
TSH SERPL DL<=0.05 MIU/L-ACNC: 2.34 MIU/L (ref 0.3–5)
VLDLC SERPL CALC-MCNC: NORMAL MG/DL (ref 1–30)
WBC # BLD: 5.5 K/UL (ref 3.5–11.3)
WBC # BLD: ABNORMAL 10*3/UL

## 2018-04-26 PROCEDURE — 3017F COLORECTAL CA SCREEN DOC REV: CPT | Performed by: INTERNAL MEDICINE

## 2018-04-26 PROCEDURE — 2022F DILAT RTA XM EVC RTNOPTHY: CPT | Performed by: INTERNAL MEDICINE

## 2018-04-26 PROCEDURE — 80061 LIPID PANEL: CPT

## 2018-04-26 PROCEDURE — 99215 OFFICE O/P EST HI 40 MIN: CPT

## 2018-04-26 PROCEDURE — 36415 COLL VENOUS BLD VENIPUNCTURE: CPT

## 2018-04-26 PROCEDURE — G8598 ASA/ANTIPLAT THER USED: HCPCS | Performed by: INTERNAL MEDICINE

## 2018-04-26 PROCEDURE — G8417 CALC BMI ABV UP PARAM F/U: HCPCS | Performed by: INTERNAL MEDICINE

## 2018-04-26 PROCEDURE — 84443 ASSAY THYROID STIM HORMONE: CPT

## 2018-04-26 PROCEDURE — 3044F HG A1C LEVEL LT 7.0%: CPT | Performed by: INTERNAL MEDICINE

## 2018-04-26 PROCEDURE — 99214 OFFICE O/P EST MOD 30 MIN: CPT | Performed by: INTERNAL MEDICINE

## 2018-04-26 PROCEDURE — 1036F TOBACCO NON-USER: CPT | Performed by: INTERNAL MEDICINE

## 2018-04-26 PROCEDURE — 1123F ACP DISCUSS/DSCN MKR DOCD: CPT | Performed by: INTERNAL MEDICINE

## 2018-04-26 PROCEDURE — 4040F PNEUMOC VAC/ADMIN/RCVD: CPT | Performed by: INTERNAL MEDICINE

## 2018-04-26 PROCEDURE — 85025 COMPLETE CBC W/AUTO DIFF WBC: CPT

## 2018-04-26 PROCEDURE — 80186 ASSAY OF PHENYTOIN FREE: CPT

## 2018-04-26 PROCEDURE — 80185 ASSAY OF PHENYTOIN TOTAL: CPT

## 2018-04-26 PROCEDURE — G8427 DOCREV CUR MEDS BY ELIG CLIN: HCPCS | Performed by: INTERNAL MEDICINE

## 2018-04-26 RX ORDER — FOLIC ACID 1 MG/1
1 TABLET ORAL DAILY
Qty: 30 TABLET | Refills: 3 | Status: SHIPPED | OUTPATIENT
Start: 2018-04-26 | End: 2018-11-06 | Stop reason: SDUPTHER

## 2018-04-26 RX ORDER — NYSTATIN 100000 U/G
CREAM TOPICAL
Qty: 60 G | Refills: 2 | Status: SHIPPED | OUTPATIENT
Start: 2018-04-26 | End: 2018-05-29 | Stop reason: ALTCHOICE

## 2018-04-26 RX ORDER — ALLOPURINOL 100 MG/1
TABLET ORAL
Qty: 30 TABLET | Refills: 5 | Status: ON HOLD | OUTPATIENT
Start: 2018-04-26 | End: 2021-07-27 | Stop reason: HOSPADM

## 2018-04-26 ASSESSMENT — ENCOUNTER SYMPTOMS
VOMITING: 0
NAUSEA: 1
EYE REDNESS: 0
SPUTUM PRODUCTION: 0
BLURRED VISION: 0
HEARTBURN: 1
ABDOMINAL PAIN: 0
CONSTIPATION: 1
COUGH: 0
SHORTNESS OF BREATH: 0
BLOOD IN STOOL: 1

## 2018-04-26 ASSESSMENT — PATIENT HEALTH QUESTIONNAIRE - PHQ9
SUM OF ALL RESPONSES TO PHQ9 QUESTIONS 1 & 2: 0
2. FEELING DOWN, DEPRESSED OR HOPELESS: 0
SUM OF ALL RESPONSES TO PHQ QUESTIONS 1-9: 0
1. LITTLE INTEREST OR PLEASURE IN DOING THINGS: 0

## 2018-04-30 ENCOUNTER — TELEPHONE (OUTPATIENT)
Dept: INTERNAL MEDICINE | Age: 73
End: 2018-04-30

## 2018-04-30 RX ORDER — CARVEDILOL 6.25 MG/1
6.25 TABLET ORAL 2 TIMES DAILY
COMMUNITY
Start: 2018-04-25 | End: 2018-11-06 | Stop reason: SDUPTHER

## 2018-05-01 ENCOUNTER — HOSPITAL ENCOUNTER (OUTPATIENT)
Dept: VASCULAR LAB | Age: 73
Discharge: HOME | End: 2018-05-01
Payer: MEDICARE

## 2018-05-03 ENCOUNTER — HOSPITAL ENCOUNTER (OUTPATIENT)
Dept: VASCULAR LAB | Age: 73
Discharge: HOME | End: 2018-05-03
Payer: MEDICARE

## 2018-05-03 DIAGNOSIS — I65.22 STENOSIS OF LEFT CAROTID ARTERY: ICD-10-CM

## 2018-05-03 PROCEDURE — 93880 EXTRACRANIAL BILAT STUDY: CPT

## 2018-05-10 ENCOUNTER — HOSPITAL ENCOUNTER (OUTPATIENT)
Age: 73
Setting detail: OUTPATIENT SURGERY
Discharge: HOME | End: 2018-05-10
Attending: COLON & RECTAL SURGERY | Admitting: COLON & RECTAL SURGERY
Payer: MEDICARE

## 2018-05-10 ENCOUNTER — ANESTHESIA EVENT (OUTPATIENT)
Dept: ENDOSCOPY | Age: 73
End: 2018-05-10
Payer: MEDICARE

## 2018-05-10 ENCOUNTER — ANESTHESIA (OUTPATIENT)
Dept: ENDOSCOPY | Age: 73
End: 2018-05-10
Payer: MEDICARE

## 2018-05-10 VITALS
BODY MASS INDEX: 30.48 KG/M2 | WEIGHT: 230 LBS | RESPIRATION RATE: 16 BRPM | SYSTOLIC BLOOD PRESSURE: 101 MMHG | DIASTOLIC BLOOD PRESSURE: 49 MMHG | OXYGEN SATURATION: 97 % | TEMPERATURE: 98.6 F | HEART RATE: 71 BPM | HEIGHT: 73 IN

## 2018-05-10 VITALS — DIASTOLIC BLOOD PRESSURE: 37 MMHG | SYSTOLIC BLOOD PRESSURE: 81 MMHG | OXYGEN SATURATION: 85 %

## 2018-05-10 PROBLEM — K57.30 SIGMOID DIVERTICULOSIS: Status: ACTIVE | Noted: 2018-05-10

## 2018-05-10 PROBLEM — Q43.8 REDUNDANT COLON: Status: ACTIVE | Noted: 2018-05-10

## 2018-05-10 PROCEDURE — 3609027000 HC COLONOSCOPY: Performed by: COLON & RECTAL SURGERY

## 2018-05-10 PROCEDURE — 3700000001 HC ADD 15 MINUTES (ANESTHESIA): Performed by: COLON & RECTAL SURGERY

## 2018-05-10 PROCEDURE — 7100000011 HC PHASE II RECOVERY - ADDTL 15 MIN: Performed by: COLON & RECTAL SURGERY

## 2018-05-10 PROCEDURE — 3700000000 HC ANESTHESIA ATTENDED CARE: Performed by: COLON & RECTAL SURGERY

## 2018-05-10 PROCEDURE — 6360000002 HC RX W HCPCS: Performed by: NURSE ANESTHETIST, CERTIFIED REGISTERED

## 2018-05-10 PROCEDURE — 7100000010 HC PHASE II RECOVERY - FIRST 15 MIN: Performed by: COLON & RECTAL SURGERY

## 2018-05-10 PROCEDURE — 2500000003 HC RX 250 WO HCPCS: Performed by: NURSE ANESTHETIST, CERTIFIED REGISTERED

## 2018-05-10 PROCEDURE — 2580000003 HC RX 258: Performed by: COLON & RECTAL SURGERY

## 2018-05-10 RX ORDER — PROPOFOL 10 MG/ML
INJECTION, EMULSION INTRAVENOUS PRN
Status: DISCONTINUED | OUTPATIENT
Start: 2018-05-10 | End: 2018-05-10 | Stop reason: SDUPTHER

## 2018-05-10 RX ORDER — SODIUM CHLORIDE 9 MG/ML
INJECTION, SOLUTION INTRAVENOUS CONTINUOUS
Status: DISCONTINUED | OUTPATIENT
Start: 2018-05-10 | End: 2018-05-10 | Stop reason: HOSPADM

## 2018-05-10 RX ORDER — LIDOCAINE HYDROCHLORIDE 10 MG/ML
INJECTION, SOLUTION EPIDURAL; INFILTRATION; INTRACAUDAL; PERINEURAL PRN
Status: DISCONTINUED | OUTPATIENT
Start: 2018-05-10 | End: 2018-05-10 | Stop reason: SDUPTHER

## 2018-05-10 RX ADMIN — PROPOFOL 550 MG: 10 INJECTION, EMULSION INTRAVENOUS at 10:47

## 2018-05-10 RX ADMIN — PHENYLEPHRINE HYDROCHLORIDE 100 MCG: 10 INJECTION INTRAVENOUS at 11:01

## 2018-05-10 RX ADMIN — SODIUM CHLORIDE: 9 INJECTION, SOLUTION INTRAVENOUS at 11:05

## 2018-05-10 RX ADMIN — LIDOCAINE HYDROCHLORIDE 50 MG: 10 INJECTION, SOLUTION EPIDURAL; INFILTRATION; INTRACAUDAL; PERINEURAL at 10:47

## 2018-05-10 RX ADMIN — SODIUM CHLORIDE: 9 INJECTION, SOLUTION INTRAVENOUS at 09:43

## 2018-05-10 ASSESSMENT — PAIN - FUNCTIONAL ASSESSMENT: PAIN_FUNCTIONAL_ASSESSMENT: 0-10

## 2018-05-10 ASSESSMENT — PAIN SCALES - GENERAL
PAINLEVEL_OUTOF10: 0
PAINLEVEL_OUTOF10: 0

## 2018-05-29 ENCOUNTER — OFFICE VISIT (OUTPATIENT)
Dept: NEUROLOGY | Age: 73
End: 2018-05-29
Payer: MEDICARE

## 2018-05-29 VITALS
DIASTOLIC BLOOD PRESSURE: 66 MMHG | BODY MASS INDEX: 31.36 KG/M2 | HEART RATE: 75 BPM | HEIGHT: 73 IN | SYSTOLIC BLOOD PRESSURE: 108 MMHG | WEIGHT: 236.6 LBS

## 2018-05-29 DIAGNOSIS — Z86.73 HISTORY OF STROKE: ICD-10-CM

## 2018-05-29 DIAGNOSIS — R56.9 SEIZURE (HCC): Primary | ICD-10-CM

## 2018-05-29 PROCEDURE — 1123F ACP DISCUSS/DSCN MKR DOCD: CPT | Performed by: PSYCHIATRY & NEUROLOGY

## 2018-05-29 PROCEDURE — G8598 ASA/ANTIPLAT THER USED: HCPCS | Performed by: PSYCHIATRY & NEUROLOGY

## 2018-05-29 PROCEDURE — 99204 OFFICE O/P NEW MOD 45 MIN: CPT | Performed by: PSYCHIATRY & NEUROLOGY

## 2018-05-29 PROCEDURE — 3017F COLORECTAL CA SCREEN DOC REV: CPT | Performed by: PSYCHIATRY & NEUROLOGY

## 2018-05-29 PROCEDURE — 4040F PNEUMOC VAC/ADMIN/RCVD: CPT | Performed by: PSYCHIATRY & NEUROLOGY

## 2018-05-29 PROCEDURE — G8417 CALC BMI ABV UP PARAM F/U: HCPCS | Performed by: PSYCHIATRY & NEUROLOGY

## 2018-05-29 PROCEDURE — G8427 DOCREV CUR MEDS BY ELIG CLIN: HCPCS | Performed by: PSYCHIATRY & NEUROLOGY

## 2018-05-29 PROCEDURE — 1036F TOBACCO NON-USER: CPT | Performed by: PSYCHIATRY & NEUROLOGY

## 2018-07-26 ENCOUNTER — OFFICE VISIT (OUTPATIENT)
Dept: INTERNAL MEDICINE | Age: 73
End: 2018-07-26
Payer: MEDICARE

## 2018-07-26 VITALS
DIASTOLIC BLOOD PRESSURE: 83 MMHG | WEIGHT: 238 LBS | BODY MASS INDEX: 31.54 KG/M2 | HEART RATE: 86 BPM | SYSTOLIC BLOOD PRESSURE: 111 MMHG | HEIGHT: 73 IN

## 2018-07-26 DIAGNOSIS — E78.00 PURE HYPERCHOLESTEROLEMIA: ICD-10-CM

## 2018-07-26 DIAGNOSIS — N18.6 END-STAGE RENAL DISEASE (ESRD) (HCC): ICD-10-CM

## 2018-07-26 DIAGNOSIS — B36.0 TINEA VERSICOLOR: ICD-10-CM

## 2018-07-26 DIAGNOSIS — E53.8 FOLATE DEFICIENCY: ICD-10-CM

## 2018-07-26 DIAGNOSIS — I10 ESSENTIAL HYPERTENSION: ICD-10-CM

## 2018-07-26 DIAGNOSIS — E11.22 TYPE 2 DIABETES MELLITUS WITH CHRONIC KIDNEY DISEASE ON CHRONIC DIALYSIS, WITHOUT LONG-TERM CURRENT USE OF INSULIN (HCC): Primary | Chronic | ICD-10-CM

## 2018-07-26 DIAGNOSIS — N18.6 TYPE 2 DIABETES MELLITUS WITH CHRONIC KIDNEY DISEASE ON CHRONIC DIALYSIS, WITHOUT LONG-TERM CURRENT USE OF INSULIN (HCC): Primary | Chronic | ICD-10-CM

## 2018-07-26 DIAGNOSIS — G40.909 SEIZURE DISORDER, SECONDARY (HCC): Chronic | ICD-10-CM

## 2018-07-26 DIAGNOSIS — Z99.2 TYPE 2 DIABETES MELLITUS WITH CHRONIC KIDNEY DISEASE ON CHRONIC DIALYSIS, WITHOUT LONG-TERM CURRENT USE OF INSULIN (HCC): Primary | Chronic | ICD-10-CM

## 2018-07-26 DIAGNOSIS — K57.30 DIVERTICULOSIS OF LARGE INTESTINE WITHOUT HEMORRHAGE: ICD-10-CM

## 2018-07-26 LAB — HBA1C MFR BLD: 6 %

## 2018-07-26 PROCEDURE — G8417 CALC BMI ABV UP PARAM F/U: HCPCS | Performed by: INTERNAL MEDICINE

## 2018-07-26 PROCEDURE — 1036F TOBACCO NON-USER: CPT | Performed by: INTERNAL MEDICINE

## 2018-07-26 PROCEDURE — 83036 HEMOGLOBIN GLYCOSYLATED A1C: CPT | Performed by: INTERNAL MEDICINE

## 2018-07-26 PROCEDURE — 2022F DILAT RTA XM EVC RTNOPTHY: CPT | Performed by: INTERNAL MEDICINE

## 2018-07-26 PROCEDURE — 4040F PNEUMOC VAC/ADMIN/RCVD: CPT | Performed by: INTERNAL MEDICINE

## 2018-07-26 PROCEDURE — 3017F COLORECTAL CA SCREEN DOC REV: CPT | Performed by: INTERNAL MEDICINE

## 2018-07-26 PROCEDURE — 3044F HG A1C LEVEL LT 7.0%: CPT | Performed by: INTERNAL MEDICINE

## 2018-07-26 PROCEDURE — 1101F PT FALLS ASSESS-DOCD LE1/YR: CPT | Performed by: INTERNAL MEDICINE

## 2018-07-26 PROCEDURE — G8427 DOCREV CUR MEDS BY ELIG CLIN: HCPCS | Performed by: INTERNAL MEDICINE

## 2018-07-26 PROCEDURE — 99213 OFFICE O/P EST LOW 20 MIN: CPT | Performed by: INTERNAL MEDICINE

## 2018-07-26 PROCEDURE — 1123F ACP DISCUSS/DSCN MKR DOCD: CPT | Performed by: INTERNAL MEDICINE

## 2018-07-26 PROCEDURE — G8598 ASA/ANTIPLAT THER USED: HCPCS | Performed by: INTERNAL MEDICINE

## 2018-07-26 PROCEDURE — 99214 OFFICE O/P EST MOD 30 MIN: CPT | Performed by: INTERNAL MEDICINE

## 2018-07-26 RX ORDER — POLYETHYLENE GLYCOL 3350 17 G/17G
17 POWDER, FOR SOLUTION ORAL DAILY
Qty: 510 G | Refills: 5 | Status: SHIPPED | OUTPATIENT
Start: 2018-07-26 | End: 2018-08-25

## 2018-07-26 RX ORDER — PHENYTOIN SODIUM 100 MG/1
CAPSULE, EXTENDED RELEASE ORAL
Qty: 150 CAPSULE | Refills: 3 | Status: SHIPPED | OUTPATIENT
Start: 2018-07-26 | End: 2019-09-05 | Stop reason: SDUPTHER

## 2018-07-26 RX ORDER — CLOTRIMAZOLE 1 %
CREAM (GRAM) TOPICAL
Qty: 60 G | Refills: 3 | Status: SHIPPED | OUTPATIENT
Start: 2018-07-26 | End: 2018-08-02

## 2018-07-26 RX ORDER — OMEPRAZOLE 20 MG/1
20 CAPSULE, DELAYED RELEASE ORAL DAILY
Qty: 30 CAPSULE | Refills: 3 | Status: SHIPPED | OUTPATIENT
Start: 2018-07-26 | End: 2018-11-06 | Stop reason: SDUPTHER

## 2018-07-26 ASSESSMENT — ENCOUNTER SYMPTOMS
ABDOMINAL PAIN: 0
NAUSEA: 0
CONSTIPATION: 1
SPUTUM PRODUCTION: 0
BLOOD IN STOOL: 0
COUGH: 0
HEARTBURN: 1
SHORTNESS OF BREATH: 0

## 2018-07-26 NOTE — PROGRESS NOTES
BIOPSY  May 2013    SINUS SURGERY      TUNNELED VENOUS PORT PLACEMENT Right 10/2015    dialysis catheter- chest     UPPER GASTROINTESTINAL ENDOSCOPY  1/28/2015         ALLERGIES    No Known Allergies    MEDICATIONS:      Current Outpatient Prescriptions on File Prior to Visit   Medication Sig Dispense Refill    carvedilol (COREG) 6.25 MG tablet Take 6.25 mg by mouth 2 times daily      folic acid (FOLVITE) 1 MG tablet Take 1 tablet by mouth daily 30 tablet 3    allopurinol (ZYLOPRIM) 100 MG tablet take 1 tablet by mouth once daily 30 tablet 5    phenytoin (DILANTIN) 100 MG ER capsule TAKE 2 CAPSULES BY MOUTH IN THE MORNING AND TAKE 3 CAPSULES BY MOUTH AT NIGHT AS DIRECTED 150 capsule 3    cinacalcet (SENSIPAR) 30 MG tablet Take 30 mg by mouth daily      atorvastatin (LIPITOR) 40 MG tablet Take 1 tablet by mouth nightly 30 tablet 11    omeprazole (PRILOSEC) 20 MG delayed release capsule Take 1 capsule by mouth daily 30 capsule 3    calcium acetate (PHOSLO) 667 MG capsule Take 3 capsules by mouth 3 times daily (with meals) 60 capsule 0    hydrocortisone (ANUSOL-HC) 2.5 % rectal cream Place rectally 2 times daily. 15 g 2     No current facility-administered medications on file prior to visit. SOCIAL HISTORY    Reviewed and no change from previous record. John Dumas  reports that he has never smoked. He has never used smokeless tobacco.    FAMILY HISTORY:    Reviewed and No change from previous visit    HEALTH MAINTENANCE DUE:      Health Maintenance Due   Topic Date Due    Diabetic foot exam  03/16/2018       REVIEW OF SYSTEMS:    12 point review of symptoms completed and found to be normal except noted in the HPI    Review of Systems   Constitutional: Negative for fever, malaise/fatigue and weight loss. HENT: Negative for congestion and sinus pain. Eyes: Positive for blurred vision. Negative for redness. Respiratory: Negative for cough, sputum production and shortness of breath.     Cardiovascular: Platelet clumps present, count appears adequate. 09/16/2011     BMP:    Lab Results   Component Value Date     04/03/2017    K 5.2 03/27/2018    CL 96 04/03/2017    CO2 19 04/03/2017    BUN 55 04/03/2017    CREATININE 14.88 04/03/2017    GLUCOSE 91 04/03/2017    GLUCOSE 87 09/16/2011     HEMOGLOBIN A1C:   Lab Results   Component Value Date    LABA1C 5.4 01/25/2018     MICROALBUMIN URINE:   Lab Results   Component Value Date    MICROALBUR 971 09/29/2015     FASTING LIPID PANEL:  Lab Results   Component Value Date    CHOL 173 04/26/2018    HDL 45 04/26/2018    TRIG 87 04/26/2018     Lab Results   Component Value Date    LDLCHOLESTEROL 111 04/26/2018       LIVER PROFILE:  Lab Results   Component Value Date    ALT 9 09/29/2015    AST 9 09/29/2015    PROT 6.8 09/29/2015    PROT 7.6 10/17/2012    BILITOT 0.27 09/29/2015    BILIDIR <0.08 09/29/2015    LABALBU 3.8 05/20/2016    LABALBU 4.5 09/16/2011      THYROID FUNCTION:   Lab Results   Component Value Date    TSH 2.34 04/26/2018      URINE ANALYSIS: No results found for: LABURIN  ASSESSMENT AND PLAN:    1. Type 2 diabetes mellitus with chronic kidney disease on chronic dialysis, without long-term current use of insulin (HCC)  Off meds    - POCT glycosylated hemoglobin (Hb A1C)    2. Seizure disorder, secondary (HCC)    - phenytoin (DILANTIN) 100 MG ER capsule; TAKE 2 CAPSULES BY MOUTH IN THE MORNING AND TAKE 3 CAPSULES BY MOUTH AT NIGHT AS DIRECTED  Dispense: 150 capsule; Refill: 3    3. Tinea versicolor    - clotrimazole (LOTRIMIN AF) 1 % cream; Apply topically 2 times daily. Dispense: 60 g; Refill: 3    4. End-stage renal disease (ESRD) ([address]  On HD    5. Essential hypertension  Same meds      6. Pure hypercholesterolemia  On statins    7. Folate deficiency  Continue folic acid    - Multiple Vitamins-Minerals (RENAPLEX-D PO); Take by mouth    8.  Diverticulosis of large intestine without hemorrhage  Fiber  miralax  hydration          FOLLOW UP AND INSTRUCTIONS: Return in about 3 months (around 10/26/2018). 1. Yessica Kilgore received counseling on the following healthy behaviors: nutrition, exercise and medication adherence    2. Reviewed prior labs and health maintenance. 3. Discussed use, benefit, and side effects of prescribed medications. Barriers to medication compliance addressed. All patient questions answered. Pt voiced understanding.      Surendra Jang  Attending Physician, [address] [place] [address], Internal Medicine Residency Program  [address] [place] [address]  7/26/2018, 2:27 PM

## 2018-07-27 ENCOUNTER — TELEPHONE (OUTPATIENT)
Dept: INTERNAL MEDICINE | Age: 73
End: 2018-07-27

## 2018-07-28 ASSESSMENT — ENCOUNTER SYMPTOMS
BLURRED VISION: 1
BACK PAIN: 0
EYE REDNESS: 0
SINUS PAIN: 0

## 2018-08-01 NOTE — TELEPHONE ENCOUNTER
Advise getting full driving test done to evaluate.  Also follow up with neurology for cognitive testing

## 2018-09-26 PROBLEM — J06.9 URI (UPPER RESPIRATORY INFECTION): Status: RESOLVED | Noted: 2017-03-31 | Resolved: 2018-09-26

## 2018-10-05 ENCOUNTER — HOSPITAL ENCOUNTER (EMERGENCY)
Age: 73
Discharge: HOME | End: 2018-10-05
Attending: EMERGENCY MEDICINE
Payer: MEDICARE

## 2018-10-05 ENCOUNTER — APPOINTMENT (OUTPATIENT)
Dept: GENERAL RADIOLOGY | Age: 73
End: 2018-10-05
Payer: MEDICARE

## 2018-10-05 VITALS
HEART RATE: 79 BPM | SYSTOLIC BLOOD PRESSURE: 124 MMHG | DIASTOLIC BLOOD PRESSURE: 69 MMHG | RESPIRATION RATE: 16 BRPM | HEIGHT: 73 IN | OXYGEN SATURATION: 100 % | TEMPERATURE: 97.7 F | BODY MASS INDEX: 31.4 KG/M2

## 2018-10-05 DIAGNOSIS — R11.2 NAUSEA VOMITING AND DIARRHEA: Primary | ICD-10-CM

## 2018-10-05 DIAGNOSIS — R19.7 NAUSEA VOMITING AND DIARRHEA: Primary | ICD-10-CM

## 2018-10-05 LAB
ABSOLUTE EOS #: 0.09 K/UL (ref 0–0.4)
ABSOLUTE IMMATURE GRANULOCYTE: 0 K/UL (ref 0–0.3)
ABSOLUTE LYMPH #: 0.77 K/UL (ref 1–4.8)
ABSOLUTE MONO #: 0.23 K/UL (ref 0.1–0.8)
ALBUMIN SERPL-MCNC: 4.1 G/DL (ref 3.5–5.2)
ALBUMIN/GLOBULIN RATIO: 1.5 (ref 1–2.5)
ALP BLD-CCNC: 69 U/L (ref 40–129)
ALT SERPL-CCNC: 18 U/L (ref 5–41)
ANION GAP SERPL CALCULATED.3IONS-SCNC: 17 MMOL/L (ref 9–17)
AST SERPL-CCNC: 17 U/L
BASOPHILS # BLD: 2 % (ref 0–2)
BASOPHILS ABSOLUTE: 0.09 K/UL (ref 0–0.2)
BILIRUB SERPL-MCNC: 0.22 MG/DL (ref 0.3–1.2)
BILIRUBIN DIRECT: 0.09 MG/DL
BILIRUBIN, INDIRECT: 0.13 MG/DL (ref 0–1)
BNP INTERPRETATION: ABNORMAL
BUN BLDV-MCNC: 51 MG/DL (ref 8–23)
BUN/CREAT BLD: ABNORMAL (ref 9–20)
CALCIUM SERPL-MCNC: 9.2 MG/DL (ref 8.6–10.4)
CHLORIDE BLD-SCNC: 100 MMOL/L (ref 98–107)
CO2: 25 MMOL/L (ref 20–31)
CREAT SERPL-MCNC: 12.47 MG/DL (ref 0.7–1.2)
DIFFERENTIAL TYPE: ABNORMAL
EKG ATRIAL RATE: 73 BPM
EKG P AXIS: 55 DEGREES
EKG P-R INTERVAL: 214 MS
EKG Q-T INTERVAL: 398 MS
EKG QRS DURATION: 106 MS
EKG QTC CALCULATION (BAZETT): 438 MS
EKG R AXIS: 1 DEGREES
EKG T AXIS: 37 DEGREES
EKG VENTRICULAR RATE: 73 BPM
EOSINOPHILS RELATIVE PERCENT: 2 % (ref 1–4)
GFR AFRICAN AMERICAN: 5 ML/MIN
GFR NON-AFRICAN AMERICAN: 4 ML/MIN
GFR SERPL CREATININE-BSD FRML MDRD: ABNORMAL ML/MIN/{1.73_M2}
GFR SERPL CREATININE-BSD FRML MDRD: ABNORMAL ML/MIN/{1.73_M2}
GLOBULIN: ABNORMAL G/DL (ref 1.5–3.8)
GLUCOSE BLD-MCNC: 131 MG/DL (ref 70–99)
HCT VFR BLD CALC: 32.9 % (ref 40.7–50.3)
HEMOGLOBIN: 10.3 G/DL (ref 13–17)
IMMATURE GRANULOCYTES: 0 %
LIPASE: 158 U/L (ref 13–60)
LYMPHOCYTES # BLD: 17 % (ref 24–44)
MCH RBC QN AUTO: 31.4 PG (ref 25.2–33.5)
MCHC RBC AUTO-ENTMCNC: 31.3 G/DL (ref 28.4–34.8)
MCV RBC AUTO: 100.3 FL (ref 82.6–102.9)
MONOCYTES # BLD: 5 % (ref 1–7)
MORPHOLOGY: ABNORMAL
NRBC AUTOMATED: 0 PER 100 WBC
PDW BLD-RTO: 20.1 % (ref 11.8–14.4)
PLATELET # BLD: ABNORMAL K/UL (ref 138–453)
PLATELET ESTIMATE: ABNORMAL
PLATELET, FLUORESCENCE: 156 K/UL (ref 138–453)
PLATELET, IMMATURE FRACTION: 6.8 % (ref 1.1–10.3)
PMV BLD AUTO: ABNORMAL FL (ref 8.1–13.5)
POTASSIUM SERPL-SCNC: 5.1 MMOL/L (ref 3.7–5.3)
PRO-BNP: 1054 PG/ML
RBC # BLD: 3.28 M/UL (ref 4.21–5.77)
RBC # BLD: ABNORMAL 10*6/UL
SEG NEUTROPHILS: 74 % (ref 36–66)
SEGMENTED NEUTROPHILS ABSOLUTE COUNT: 3.32 K/UL (ref 1.8–7.7)
SODIUM BLD-SCNC: 142 MMOL/L (ref 135–144)
TOTAL PROTEIN: 6.9 G/DL (ref 6.4–8.3)
WBC # BLD: 4.5 K/UL (ref 3.5–11.3)
WBC # BLD: ABNORMAL 10*3/UL

## 2018-10-05 PROCEDURE — 99284 EMERGENCY DEPT VISIT MOD MDM: CPT

## 2018-10-05 PROCEDURE — 83690 ASSAY OF LIPASE: CPT

## 2018-10-05 PROCEDURE — 80048 BASIC METABOLIC PNL TOTAL CA: CPT

## 2018-10-05 PROCEDURE — 93005 ELECTROCARDIOGRAM TRACING: CPT

## 2018-10-05 PROCEDURE — 85025 COMPLETE CBC W/AUTO DIFF WBC: CPT

## 2018-10-05 PROCEDURE — 83880 ASSAY OF NATRIURETIC PEPTIDE: CPT

## 2018-10-05 PROCEDURE — 71046 X-RAY EXAM CHEST 2 VIEWS: CPT

## 2018-10-05 PROCEDURE — 80076 HEPATIC FUNCTION PANEL: CPT

## 2018-10-05 PROCEDURE — 85055 RETICULATED PLATELET ASSAY: CPT

## 2018-10-05 RX ORDER — ONDANSETRON 4 MG/1
4 TABLET, ORALLY DISINTEGRATING ORAL EVERY 8 HOURS PRN
Qty: 20 TABLET | Refills: 0 | Status: SHIPPED | OUTPATIENT
Start: 2018-10-05 | End: 2018-11-06 | Stop reason: ALTCHOICE

## 2018-10-05 ASSESSMENT — ENCOUNTER SYMPTOMS
COLOR CHANGE: 0
EYE REDNESS: 0
VOMITING: 1
ABDOMINAL PAIN: 0
TROUBLE SWALLOWING: 0
CONSTIPATION: 0
SHORTNESS OF BREATH: 0
NAUSEA: 1
DIARRHEA: 1
COUGH: 1

## 2018-10-05 NOTE — ED NOTES
Patient states has dialysis appointment tomorrow at [facility]. Patient confirms having transportation to appointment. Patient denies needs.       ELZA Sauer, [place]  10/05/18 1122

## 2018-10-05 NOTE — ED PROVIDER NOTES
x2-3 days, no f/c, no CP/SOB  Does have n/v/d since last night  3 episodes of each, NBNB  No lt headedness, no CP/Abd pain    Exam reassuring    Plan, CXR, cbc, CMP, EKG   D/w nephrology    Update:  EKG without signs of ischemia, labwork shows no indications for emergent dialysis, patient tolerating by mouth, abdomen soft and nontender with deep palpation, hemoglobin stable, patient desiring to be discharged. This time despite his concerning history and complaint, his exam is reassuring, he is tolerating by mouth fluids, he is ambulatory without any lightheaded or dizziness with an even gait, and he has dialysis scheduled for tomorrow. He is aware of our recommendation to be observed overnight however he would like to go home, I feel this time the patient has no acute emergency and as long as he can stay hydrated, is stable his feet, and will follow-up with dialysis in the morning he is safe for discharge.     (Please note that portions of this note were completed with a voice recognition program.  Efforts were made to edit the dictations but occasionally words are mis-transcribed.)      Ray MD  Attending Emergency Physician          Marilee Christianson MD  10/06/18 9406
care and was discharged in no acute distress. PROCEDURES:  None    CONSULTS:  None    CRITICAL CARE:  None    FINAL IMPRESSION      1.  Nausea vomiting and diarrhea          DISPOSITION / PLAN     DISPOSITION Decision To Discharge 10/05/2018 10:31:38 AM      PATIENT REFERRED TO:  MD Jill Hawkins [facility] 28. 2nd [address] [facility] [address] [facility]  [phone]    Call in 1 day      [facility] ED  [address] [address]  [phone]  Go to   If symptoms worsen      DISCHARGE MEDICATIONS:  Discharge Medication List as of 10/5/2018 10:32 AM      START taking these medications    Details   ondansetron (ZOFRAN ODT) 4 MG disintegrating tablet Take 1 tablet by mouth every 8 hours as needed for Nausea, Disp-20 tablet, R-0Print             Neisha Blount DO  Emergency Medicine Resident    (Please note that portions of this note were completed with a voice recognition program.  Efforts were made to edit the dictations but occasionally words are mis-transcribed.)     Neisha Blount MD  10/06/18 1187

## 2018-11-01 ENCOUNTER — HOSPITAL ENCOUNTER (OUTPATIENT)
Age: 73
Setting detail: OUTPATIENT SURGERY
Discharge: HOME | End: 2018-11-01
Attending: INTERNAL MEDICINE | Admitting: INTERNAL MEDICINE
Payer: MEDICARE

## 2018-11-01 ENCOUNTER — ANESTHESIA EVENT (OUTPATIENT)
Dept: ENDOSCOPY | Age: 73
End: 2018-11-01
Payer: MEDICARE

## 2018-11-01 ENCOUNTER — ANESTHESIA (OUTPATIENT)
Dept: ENDOSCOPY | Age: 73
End: 2018-11-01
Payer: MEDICARE

## 2018-11-01 VITALS
RESPIRATION RATE: 14 BRPM | HEART RATE: 75 BPM | TEMPERATURE: 97.9 F | WEIGHT: 220 LBS | HEIGHT: 73 IN | SYSTOLIC BLOOD PRESSURE: 112 MMHG | OXYGEN SATURATION: 97 % | BODY MASS INDEX: 29.16 KG/M2 | DIASTOLIC BLOOD PRESSURE: 73 MMHG

## 2018-11-01 VITALS
SYSTOLIC BLOOD PRESSURE: 115 MMHG | RESPIRATION RATE: 23 BRPM | DIASTOLIC BLOOD PRESSURE: 60 MMHG | OXYGEN SATURATION: 99 %

## 2018-11-01 PROCEDURE — 3700000000 HC ANESTHESIA ATTENDED CARE: Performed by: INTERNAL MEDICINE

## 2018-11-01 PROCEDURE — 2500000003 HC RX 250 WO HCPCS: Performed by: SPECIALIST

## 2018-11-01 PROCEDURE — 2580000003 HC RX 258: Performed by: INTERNAL MEDICINE

## 2018-11-01 PROCEDURE — 88305 TISSUE EXAM BY PATHOLOGIST: CPT

## 2018-11-01 PROCEDURE — 7100000010 HC PHASE II RECOVERY - FIRST 15 MIN: Performed by: INTERNAL MEDICINE

## 2018-11-01 PROCEDURE — 7100000011 HC PHASE II RECOVERY - ADDTL 15 MIN: Performed by: INTERNAL MEDICINE

## 2018-11-01 PROCEDURE — 6360000002 HC RX W HCPCS: Performed by: SPECIALIST

## 2018-11-01 PROCEDURE — 3700000001 HC ADD 15 MINUTES (ANESTHESIA): Performed by: INTERNAL MEDICINE

## 2018-11-01 PROCEDURE — 88342 IMHCHEM/IMCYTCHM 1ST ANTB: CPT

## 2018-11-01 PROCEDURE — 3609012400 HC EGD TRANSORAL BIOPSY SINGLE/MULTIPLE: Performed by: INTERNAL MEDICINE

## 2018-11-01 RX ORDER — LIDOCAINE HYDROCHLORIDE 10 MG/ML
INJECTION, SOLUTION EPIDURAL; INFILTRATION; INTRACAUDAL; PERINEURAL PRN
Status: DISCONTINUED | OUTPATIENT
Start: 2018-11-01 | End: 2018-11-01 | Stop reason: SDUPTHER

## 2018-11-01 RX ORDER — PROPOFOL 10 MG/ML
INJECTION, EMULSION INTRAVENOUS PRN
Status: DISCONTINUED | OUTPATIENT
Start: 2018-11-01 | End: 2018-11-01 | Stop reason: SDUPTHER

## 2018-11-01 RX ORDER — SODIUM CHLORIDE 9 MG/ML
INJECTION, SOLUTION INTRAVENOUS CONTINUOUS
Status: DISCONTINUED | OUTPATIENT
Start: 2018-11-01 | End: 2018-11-01 | Stop reason: HOSPADM

## 2018-11-01 RX ADMIN — PROPOFOL 50 MG: 10 INJECTION, EMULSION INTRAVENOUS at 11:25

## 2018-11-01 RX ADMIN — SODIUM CHLORIDE: 9 INJECTION, SOLUTION INTRAVENOUS at 09:12

## 2018-11-01 RX ADMIN — PROPOFOL 50 MG: 10 INJECTION, EMULSION INTRAVENOUS at 11:30

## 2018-11-01 RX ADMIN — LIDOCAINE HYDROCHLORIDE 25 MG: 10 INJECTION, SOLUTION EPIDURAL; INFILTRATION; INTRACAUDAL at 11:22

## 2018-11-01 RX ADMIN — SODIUM CHLORIDE: 9 INJECTION, SOLUTION INTRAVENOUS at 11:18

## 2018-11-01 RX ADMIN — PROPOFOL 50 MG: 10 INJECTION, EMULSION INTRAVENOUS at 11:22

## 2018-11-01 ASSESSMENT — PAIN SCALES - GENERAL
PAINLEVEL_OUTOF10: 0

## 2018-11-01 ASSESSMENT — PAIN - FUNCTIONAL ASSESSMENT: PAIN_FUNCTIONAL_ASSESSMENT: 0-10

## 2018-11-01 NOTE — ANESTHESIA PRE PROCEDURE
Time of last solid consumption: 1800                        Date of last liquid consumption: 10/31/18                        Date of last solid food consumption: 10/31/18    BMI:   Wt Readings from Last 3 Encounters:   11/01/18 220 lb (99.8 kg)   07/26/18 238 lb (108 kg)   05/29/18 236 lb 9.6 oz (107.3 kg)     Body mass index is 29.03 kg/m². CBC:   Lab Results   Component Value Date    WBC 4.5 10/05/2018    RBC 3.28 10/05/2018    RBC 3.96 09/16/2011    HGB 10.3 10/05/2018    HCT 32.9 10/05/2018    .3 10/05/2018    RDW 20.1 10/05/2018    PLT See Reflexed IPF Result 10/05/2018    PLT Platelet clumps present, count appears adequate. 09/16/2011       CMP:   Lab Results   Component Value Date     10/05/2018    K 5.1 10/05/2018     10/05/2018    CO2 25 10/05/2018    BUN 51 10/05/2018    CREATININE 12.47 10/05/2018    GFRAA 5 10/05/2018    LABGLOM 4 10/05/2018    GLUCOSE 131 10/05/2018    GLUCOSE 87 09/16/2011    PROT 6.9 10/05/2018    PROT 7.6 10/17/2012    CALCIUM 9.2 10/05/2018    BILITOT 0.22 10/05/2018    ALKPHOS 69 10/05/2018    AST 17 10/05/2018    ALT 18 10/05/2018       POC Tests: No results for input(s): POCGLU, POCNA, POCK, POCCL, POCBUN, POCHEMO, POCHCT in the last 72 hours.     Coags:   Lab Results   Component Value Date    PROTIME 11.2 03/27/2018    PROTIME 12.6 06/28/2013    INR 1.1 03/27/2018    APTT 26.8 03/27/2018       HCG (If Applicable): No results found for: PREGTESTUR, PREGSERUM, HCG, HCGQUANT     ABGs: No results found for: PHART, PO2ART, DPO2YOW, VQB9VYA, BEART, E4JUUZXN     Type & Screen (If Applicable):  No results found for: LABABO, [address]    Anesthesia Evaluation  Patient summary reviewed no history of anesthetic complications:   Airway: Mallampati: III  TM distance: >3 FB   Neck ROM: full  Mouth opening: > = 3 FB Dental:    (+) lower dentures, upper dentures and partials      Pulmonary:Negative Pulmonary ROS and normal exam

## 2018-11-01 NOTE — ADDENDUM NOTE
Addendum  created 11/01/18 1147 by SYLVIE Heller - JAYME    Anesthesia Intra Meds edited, Orders acknowledged in Narrator

## 2018-11-01 NOTE — ANESTHESIA POSTPROCEDURE EVALUATION
Department of Anesthesiology  Postprocedure Note    Patient: Elmer Dior  MRN: [id number]  [place]: 1945  Date of evaluation: 11/1/2018  Time:  11:42 AM     Procedure Summary     Date:  11/01/18 Room / Location:  [facility] 04 / [facility] Endoscopy    Anesthesia Start:  1120 Anesthesia Stop:  [phone]    Procedure:  EGD BIOPSY (N/A ) Diagnosis:  (PYROSIS)    Surgeon:  Maria Antonia Biggs DO Responsible Provider:  Michael Carmona MD    Anesthesia Type:  MAC ASA Status:  4          Anesthesia Type: MAC    Francis Phase I: Francis Score: 10    Francis Phase II:      Last vitals: Reviewed and per EMR flowsheets.        Anesthesia Post Evaluation    Patient location during evaluation: PACU  Patient participation: complete - patient participated  Level of consciousness: awake and alert  Pain score: 0  Airway patency: patent  Nausea & Vomiting: no vomiting and no nausea  Complications: no  Cardiovascular status: hemodynamically stable  Respiratory status: acceptable  Hydration status: stable

## 2018-11-05 LAB — SURGICAL PATHOLOGY REPORT: NORMAL

## 2018-11-06 ENCOUNTER — OFFICE VISIT (OUTPATIENT)
Dept: INTERNAL MEDICINE | Age: 73
End: 2018-11-06
Payer: MEDICARE

## 2018-11-06 VITALS
SYSTOLIC BLOOD PRESSURE: 130 MMHG | HEART RATE: 72 BPM | DIASTOLIC BLOOD PRESSURE: 85 MMHG | BODY MASS INDEX: 29.03 KG/M2 | HEIGHT: 73 IN

## 2018-11-06 DIAGNOSIS — K21.9 GASTROESOPHAGEAL REFLUX DISEASE WITHOUT ESOPHAGITIS: Primary | ICD-10-CM

## 2018-11-06 DIAGNOSIS — Z99.2 ESRD (END STAGE RENAL DISEASE) ON DIALYSIS (HCC): ICD-10-CM

## 2018-11-06 DIAGNOSIS — N18.6 ESRD (END STAGE RENAL DISEASE) ON DIALYSIS (HCC): ICD-10-CM

## 2018-11-06 DIAGNOSIS — I10 ESSENTIAL HYPERTENSION: ICD-10-CM

## 2018-11-06 DIAGNOSIS — Z99.2 TYPE 2 DIABETES MELLITUS WITH CHRONIC KIDNEY DISEASE ON CHRONIC DIALYSIS, WITHOUT LONG-TERM CURRENT USE OF INSULIN (HCC): Chronic | ICD-10-CM

## 2018-11-06 DIAGNOSIS — E11.22 TYPE 2 DIABETES MELLITUS WITH CHRONIC KIDNEY DISEASE ON CHRONIC DIALYSIS, WITHOUT LONG-TERM CURRENT USE OF INSULIN (HCC): Chronic | ICD-10-CM

## 2018-11-06 DIAGNOSIS — E78.00 PURE HYPERCHOLESTEROLEMIA: ICD-10-CM

## 2018-11-06 DIAGNOSIS — N18.6 TYPE 2 DIABETES MELLITUS WITH CHRONIC KIDNEY DISEASE ON CHRONIC DIALYSIS, WITHOUT LONG-TERM CURRENT USE OF INSULIN (HCC): Chronic | ICD-10-CM

## 2018-11-06 DIAGNOSIS — G40.909 SEIZURE DISORDER, SECONDARY (HCC): Chronic | ICD-10-CM

## 2018-11-06 PROCEDURE — G8427 DOCREV CUR MEDS BY ELIG CLIN: HCPCS | Performed by: INTERNAL MEDICINE

## 2018-11-06 PROCEDURE — 99211 OFF/OP EST MAY X REQ PHY/QHP: CPT | Performed by: INTERNAL MEDICINE

## 2018-11-06 PROCEDURE — 2022F DILAT RTA XM EVC RTNOPTHY: CPT | Performed by: INTERNAL MEDICINE

## 2018-11-06 PROCEDURE — 1101F PT FALLS ASSESS-DOCD LE1/YR: CPT | Performed by: INTERNAL MEDICINE

## 2018-11-06 PROCEDURE — 3017F COLORECTAL CA SCREEN DOC REV: CPT | Performed by: INTERNAL MEDICINE

## 2018-11-06 PROCEDURE — G8482 FLU IMMUNIZE ORDER/ADMIN: HCPCS | Performed by: INTERNAL MEDICINE

## 2018-11-06 PROCEDURE — G8598 ASA/ANTIPLAT THER USED: HCPCS | Performed by: INTERNAL MEDICINE

## 2018-11-06 PROCEDURE — 1036F TOBACCO NON-USER: CPT | Performed by: INTERNAL MEDICINE

## 2018-11-06 PROCEDURE — 3044F HG A1C LEVEL LT 7.0%: CPT | Performed by: INTERNAL MEDICINE

## 2018-11-06 PROCEDURE — G8417 CALC BMI ABV UP PARAM F/U: HCPCS | Performed by: INTERNAL MEDICINE

## 2018-11-06 PROCEDURE — 99213 OFFICE O/P EST LOW 20 MIN: CPT | Performed by: INTERNAL MEDICINE

## 2018-11-06 PROCEDURE — 1123F ACP DISCUSS/DSCN MKR DOCD: CPT | Performed by: INTERNAL MEDICINE

## 2018-11-06 PROCEDURE — 4040F PNEUMOC VAC/ADMIN/RCVD: CPT | Performed by: INTERNAL MEDICINE

## 2018-11-06 RX ORDER — CARVEDILOL 6.25 MG/1
6.25 TABLET ORAL 2 TIMES DAILY
Qty: 60 TABLET | Refills: 3 | Status: SHIPPED | OUTPATIENT
Start: 2018-11-06 | End: 2020-01-16

## 2018-11-06 RX ORDER — FOLIC ACID 1 MG/1
1 TABLET ORAL DAILY
Qty: 30 TABLET | Refills: 3 | Status: SHIPPED | OUTPATIENT
Start: 2018-11-06 | End: 2019-09-05 | Stop reason: SDUPTHER

## 2018-11-06 RX ORDER — OMEPRAZOLE 20 MG/1
20 CAPSULE, DELAYED RELEASE ORAL DAILY
Qty: 30 CAPSULE | Refills: 3 | Status: SHIPPED | OUTPATIENT
Start: 2018-11-06 | End: 2020-01-16

## 2018-11-06 RX ORDER — ATORVASTATIN CALCIUM 40 MG/1
40 TABLET, FILM COATED ORAL NIGHTLY
Qty: 30 TABLET | Refills: 3 | Status: SHIPPED | OUTPATIENT
Start: 2018-11-06 | End: 2019-09-05 | Stop reason: SDUPTHER

## 2018-12-20 ENCOUNTER — HOSPITAL ENCOUNTER (OUTPATIENT)
Dept: INTERVENTIONAL RADIOLOGY/VASCULAR | Age: 73
Discharge: HOME | End: 2018-12-22
Payer: MEDICARE

## 2018-12-20 VITALS
DIASTOLIC BLOOD PRESSURE: 85 MMHG | HEIGHT: 73 IN | OXYGEN SATURATION: 98 % | TEMPERATURE: 97.1 F | BODY MASS INDEX: 29.82 KG/M2 | WEIGHT: 225 LBS | SYSTOLIC BLOOD PRESSURE: 156 MMHG | RESPIRATION RATE: 16 BRPM | HEART RATE: 78 BPM

## 2018-12-20 DIAGNOSIS — N18.6 ESRD (END STAGE RENAL DISEASE) (HCC): ICD-10-CM

## 2018-12-20 LAB
INR BLD: 1.1
PARTIAL THROMBOPLASTIN TIME: 26.8 SEC (ref 20.5–30.5)
PLATELET # BLD: 163 K/UL (ref 138–453)
POTASSIUM SERPL-SCNC: 4.9 MMOL/L (ref 3.7–5.3)
PROTHROMBIN TIME: 11.6 SEC (ref 9–12)

## 2018-12-20 PROCEDURE — 7100000011 HC PHASE II RECOVERY - ADDTL 15 MIN

## 2018-12-20 PROCEDURE — 6360000002 HC RX W HCPCS: Performed by: RADIOLOGY

## 2018-12-20 PROCEDURE — 84132 ASSAY OF SERUM POTASSIUM: CPT

## 2018-12-20 PROCEDURE — 2709999900 HC NON-CHARGEABLE SUPPLY

## 2018-12-20 PROCEDURE — 7100000010 HC PHASE II RECOVERY - FIRST 15 MIN

## 2018-12-20 PROCEDURE — 85049 AUTOMATED PLATELET COUNT: CPT

## 2018-12-20 PROCEDURE — 76937 US GUIDE VASCULAR ACCESS: CPT

## 2018-12-20 PROCEDURE — 85730 THROMBOPLASTIN TIME PARTIAL: CPT

## 2018-12-20 PROCEDURE — 85610 PROTHROMBIN TIME: CPT

## 2018-12-20 PROCEDURE — C1894 INTRO/SHEATH, NON-LASER: HCPCS

## 2018-12-20 PROCEDURE — 36907 BALO ANGIOP CTR DIALYSIS SEG: CPT | Performed by: RADIOLOGY

## 2018-12-20 PROCEDURE — C1769 GUIDE WIRE: HCPCS

## 2018-12-20 PROCEDURE — C1725 CATH, TRANSLUMIN NON-LASER: HCPCS

## 2018-12-20 PROCEDURE — 6360000004 HC RX CONTRAST MEDICATION: Performed by: INTERNAL MEDICINE

## 2018-12-20 PROCEDURE — 36902 INTRO CATH DIALYSIS CIRCUIT: CPT | Performed by: RADIOLOGY

## 2018-12-20 RX ORDER — FENTANYL CITRATE 50 UG/ML
INJECTION, SOLUTION INTRAMUSCULAR; INTRAVENOUS
Status: COMPLETED | OUTPATIENT
Start: 2018-12-20 | End: 2018-12-20

## 2018-12-20 RX ORDER — SODIUM CHLORIDE 9 MG/ML
INJECTION, SOLUTION INTRAVENOUS CONTINUOUS
Status: DISCONTINUED | OUTPATIENT
Start: 2018-12-20 | End: 2018-12-23 | Stop reason: HOSPADM

## 2018-12-20 RX ORDER — ACETAMINOPHEN 500 MG
500 TABLET ORAL EVERY 4 HOURS PRN
Status: DISCONTINUED | OUTPATIENT
Start: 2018-12-20 | End: 2018-12-23 | Stop reason: HOSPADM

## 2018-12-20 RX ADMIN — FENTANYL CITRATE 50 MCG: 50 INJECTION INTRAMUSCULAR; INTRAVENOUS at 16:53

## 2018-12-20 RX ADMIN — FENTANYL CITRATE 50 MCG: 50 INJECTION INTRAMUSCULAR; INTRAVENOUS at 17:03

## 2018-12-20 RX ADMIN — IOVERSOL 100 ML: 509 INJECTION INTRA-ARTERIAL; INTRAVENOUS at 17:56

## 2018-12-20 RX ADMIN — FENTANYL CITRATE 50 MCG: 50 INJECTION INTRAMUSCULAR; INTRAVENOUS at 16:21

## 2018-12-20 RX ADMIN — FENTANYL CITRATE 50 MCG: 50 INJECTION INTRAMUSCULAR; INTRAVENOUS at 17:29

## 2018-12-20 ASSESSMENT — PAIN SCALES - GENERAL: PAINLEVEL_OUTOF10: 0

## 2018-12-20 ASSESSMENT — PAIN - FUNCTIONAL ASSESSMENT: PAIN_FUNCTIONAL_ASSESSMENT: 0-10

## 2018-12-20 NOTE — BRIEF OP NOTE
Brief Postoperative Note    Josiane Patino  YOB: 1945  [id number]    Pre-operative Diagnosis: CRF. 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 Occlusion traversed and PTV'd with 10, 12, and 14 mm x 4 cm balloons. Forearm outflow stenoses rx'd with 6 mm and 7 mm balloons. Good technical results. Ready for use at this time.     Electronically signed by Pablito Diallo MD on 12/20/2018 at 5:54 PM

## 2019-03-18 ENCOUNTER — OFFICE VISIT (OUTPATIENT)
Dept: INTERNAL MEDICINE | Age: 74
End: 2019-03-18
Payer: MEDICARE

## 2019-03-18 VITALS
WEIGHT: 242 LBS | SYSTOLIC BLOOD PRESSURE: 120 MMHG | HEART RATE: 87 BPM | DIASTOLIC BLOOD PRESSURE: 71 MMHG | BODY MASS INDEX: 31.93 KG/M2 | TEMPERATURE: 97.5 F

## 2019-03-18 DIAGNOSIS — J06.9 UPPER RESPIRATORY TRACT INFECTION, UNSPECIFIED TYPE: ICD-10-CM

## 2019-03-18 DIAGNOSIS — R05.9 COUGH: Primary | ICD-10-CM

## 2019-03-18 PROCEDURE — 1123F ACP DISCUSS/DSCN MKR DOCD: CPT | Performed by: INTERNAL MEDICINE

## 2019-03-18 PROCEDURE — G8599 NO ASA/ANTIPLAT THER USE RNG: HCPCS | Performed by: INTERNAL MEDICINE

## 2019-03-18 PROCEDURE — 4040F PNEUMOC VAC/ADMIN/RCVD: CPT | Performed by: INTERNAL MEDICINE

## 2019-03-18 PROCEDURE — 99211 OFF/OP EST MAY X REQ PHY/QHP: CPT | Performed by: INTERNAL MEDICINE

## 2019-03-18 PROCEDURE — 1036F TOBACCO NON-USER: CPT | Performed by: INTERNAL MEDICINE

## 2019-03-18 PROCEDURE — G8482 FLU IMMUNIZE ORDER/ADMIN: HCPCS | Performed by: INTERNAL MEDICINE

## 2019-03-18 PROCEDURE — 1101F PT FALLS ASSESS-DOCD LE1/YR: CPT | Performed by: INTERNAL MEDICINE

## 2019-03-18 PROCEDURE — 3017F COLORECTAL CA SCREEN DOC REV: CPT | Performed by: INTERNAL MEDICINE

## 2019-03-18 PROCEDURE — G8427 DOCREV CUR MEDS BY ELIG CLIN: HCPCS | Performed by: INTERNAL MEDICINE

## 2019-03-18 PROCEDURE — 99213 OFFICE O/P EST LOW 20 MIN: CPT | Performed by: INTERNAL MEDICINE

## 2019-03-18 PROCEDURE — G8417 CALC BMI ABV UP PARAM F/U: HCPCS | Performed by: INTERNAL MEDICINE

## 2019-03-18 RX ORDER — SEVELAMER CARBONATE 800 MG/1
800 TABLET, FILM COATED ORAL 3 TIMES DAILY
COMMUNITY
Start: 2019-02-28 | End: 2021-02-02 | Stop reason: SDUPTHER

## 2019-03-18 RX ORDER — FLUTICASONE PROPIONATE 50 MCG
1 SPRAY, SUSPENSION (ML) NASAL DAILY
Qty: 2 BOTTLE | Refills: 1 | Status: SHIPPED | OUTPATIENT
Start: 2019-03-18 | End: 2019-09-05 | Stop reason: SDUPTHER

## 2019-03-18 RX ORDER — AZITHROMYCIN 250 MG/1
250 TABLET, FILM COATED ORAL SEE ADMIN INSTRUCTIONS
Qty: 6 TABLET | Refills: 0 | Status: SHIPPED | OUTPATIENT
Start: 2019-03-18 | End: 2019-03-23

## 2019-03-18 RX ORDER — GUAIFENESIN/DEXTROMETHORPHAN 100-10MG/5
5 SYRUP ORAL 3 TIMES DAILY PRN
Qty: 120 ML | Refills: 0 | Status: SHIPPED | OUTPATIENT
Start: 2019-03-18 | End: 2019-03-28

## 2019-04-04 ENCOUNTER — HOSPITAL ENCOUNTER (OUTPATIENT)
Dept: INTERVENTIONAL RADIOLOGY/VASCULAR | Age: 74
Discharge: HOME | End: 2019-04-06
Payer: MEDICARE

## 2019-04-04 VITALS
OXYGEN SATURATION: 100 % | DIASTOLIC BLOOD PRESSURE: 88 MMHG | TEMPERATURE: 98.2 F | HEART RATE: 73 BPM | RESPIRATION RATE: 16 BRPM | HEIGHT: 73 IN | BODY MASS INDEX: 31.14 KG/M2 | SYSTOLIC BLOOD PRESSURE: 168 MMHG | WEIGHT: 235 LBS

## 2019-04-04 DIAGNOSIS — N18.6 ESRD (END STAGE RENAL DISEASE) (HCC): ICD-10-CM

## 2019-04-04 LAB
INR BLD: 1.2
PARTIAL THROMBOPLASTIN TIME: 23.7 SEC (ref 20.5–30.5)
PLATELET # BLD: 204 K/UL (ref 138–453)
POC POTASSIUM: 4.7 MMOL/L (ref 3.5–4.5)
PROTHROMBIN TIME: 12.1 SEC (ref 9–12)

## 2019-04-04 PROCEDURE — C1769 GUIDE WIRE: HCPCS

## 2019-04-04 PROCEDURE — 2709999900 HC NON-CHARGEABLE SUPPLY

## 2019-04-04 PROCEDURE — 85610 PROTHROMBIN TIME: CPT

## 2019-04-04 PROCEDURE — 36901 INTRO CATH DIALYSIS CIRCUIT: CPT | Performed by: RADIOLOGY

## 2019-04-04 PROCEDURE — 6360000004 HC RX CONTRAST MEDICATION: Performed by: RADIOLOGY

## 2019-04-04 PROCEDURE — 84132 ASSAY OF SERUM POTASSIUM: CPT

## 2019-04-04 PROCEDURE — 6360000002 HC RX W HCPCS: Performed by: RADIOLOGY

## 2019-04-04 PROCEDURE — C1887 CATHETER, GUIDING: HCPCS

## 2019-04-04 PROCEDURE — 85049 AUTOMATED PLATELET COUNT: CPT

## 2019-04-04 PROCEDURE — 7100000010 HC PHASE II RECOVERY - FIRST 15 MIN

## 2019-04-04 PROCEDURE — C1725 CATH, TRANSLUMIN NON-LASER: HCPCS

## 2019-04-04 PROCEDURE — 36907 BALO ANGIOP CTR DIALYSIS SEG: CPT | Performed by: RADIOLOGY

## 2019-04-04 PROCEDURE — 85730 THROMBOPLASTIN TIME PARTIAL: CPT

## 2019-04-04 PROCEDURE — 7100000011 HC PHASE II RECOVERY - ADDTL 15 MIN

## 2019-04-04 PROCEDURE — C1894 INTRO/SHEATH, NON-LASER: HCPCS

## 2019-04-04 PROCEDURE — 2580000003 HC RX 258: Performed by: PHYSICIAN ASSISTANT

## 2019-04-04 RX ORDER — IODIXANOL 270 MG/ML
82 INJECTION, SOLUTION INTRAVASCULAR
Status: COMPLETED | OUTPATIENT
Start: 2019-04-04 | End: 2019-04-04

## 2019-04-04 RX ORDER — ACETAMINOPHEN 325 MG/1
650 TABLET ORAL EVERY 4 HOURS PRN
Status: DISCONTINUED | OUTPATIENT
Start: 2019-04-04 | End: 2019-04-07 | Stop reason: HOSPADM

## 2019-04-04 RX ORDER — MIDAZOLAM HYDROCHLORIDE 1 MG/ML
INJECTION INTRAMUSCULAR; INTRAVENOUS
Status: COMPLETED | OUTPATIENT
Start: 2019-04-04 | End: 2019-04-04

## 2019-04-04 RX ORDER — SODIUM CHLORIDE 9 MG/ML
INJECTION, SOLUTION INTRAVENOUS CONTINUOUS
Status: DISCONTINUED | OUTPATIENT
Start: 2019-04-04 | End: 2019-04-07 | Stop reason: HOSPADM

## 2019-04-04 RX ORDER — FENTANYL CITRATE 50 UG/ML
INJECTION, SOLUTION INTRAMUSCULAR; INTRAVENOUS
Status: COMPLETED | OUTPATIENT
Start: 2019-04-04 | End: 2019-04-04

## 2019-04-04 RX ADMIN — SODIUM CHLORIDE: 9 INJECTION, SOLUTION INTRAVENOUS at 08:04

## 2019-04-04 RX ADMIN — FENTANYL CITRATE 50 MCG: 50 INJECTION INTRAMUSCULAR; INTRAVENOUS at 09:31

## 2019-04-04 RX ADMIN — IODIXANOL 82 ML: 270 INJECTION, SOLUTION INTRAVASCULAR at 10:11

## 2019-04-04 RX ADMIN — MIDAZOLAM HYDROCHLORIDE 0.5 MG: 1 INJECTION, SOLUTION INTRAMUSCULAR; INTRAVENOUS at 09:20

## 2019-04-04 ASSESSMENT — PAIN SCALES - GENERAL
PAINLEVEL_OUTOF10: 0

## 2019-04-04 ASSESSMENT — PAIN - FUNCTIONAL ASSESSMENT: PAIN_FUNCTIONAL_ASSESSMENT: 0-10

## 2019-04-04 NOTE — POST SEDATION
Sedation Post Procedure Note    Patient Name: Wendy Clifton   YOB: 1945  Room/Bed: Room/bed info not found  Medical Record Number: [id number]  Date: 4/4/2019   Time: 10:14 AM         Physicians/Assistants: Cathy Mendoza MD    Procedure Performed:  Fistulagram and venoplasty    Post-Sedation Vital Signs:  Vitals:    04/04/19 1000   BP: (!) 156/84   Pulse: 73   Resp: 17   Temp:    SpO2: 100%      Vital signs were reviewed and were stable after the procedure (see flow sheet for vitals)    Complications: none    Electronically signed by Cathy Mendoza MD on 4/4/2019 at 10:14 AM

## 2019-04-04 NOTE — SEDATION DOCUMENTATION
Angioplasty done at stricture site. Per Dr Cici Casey, patient to come back in three months.  notified.

## 2019-04-04 NOTE — H&P
History and Physical    Pt Name: Jason Osuna  MRN: [id number]  YOB: 1945  Date of evaluation: 4/4/2019  Primary Care Physician: Stewart Vasquez MD  Patient evaluated at the request of  Dr. Jazz Garnica    Reason for evaluation:  ESRD  SUBJECTIVE:   History of Chief Complaint:      Paresh Keenan is a 74 y.o. male ,  Who has been on hemodialysis x 4 yrs , HTN x 7 yrs , was at hemodialysis yesterday for a treatment for approx 3 hours ,  Hx of a right slightly swollen hand . DM x 5 yrs , hx of ischemic CVA . Past Medical History      has a past medical history of Allergic rhinitis, Anemia, BPH (benign prostatic hyperplasia), CAD (coronary artery disease), Carotid artery stenosis, Cerebrovascular disease, Diabetes mellitus ([address] Eczema, ESRD (end stage renal disease) on dialysis ([address] Full dentures, GERD (gastroesophageal reflux disease), Gout, Hemodialysis patient ([address] Hyperlipidemia, Hypertension, Neuropathy, Osteoarthritis, Peripheral vascular disease ([address] Seizures ([address] Stroke ([address] and Unspecified cerebral artery occlusion with cerebral infarction. Past Surgical History   has a past surgical history that includes Bladder surgery (Spring 2014); sinus surgery; Cystocopy (2008); Inguinal hernia repair (Right); Kidney biopsy (May 2013); Upper gastrointestinal endoscopy (1/28/2015); Colonoscopy (2008); Colonoscopy (1/28/2015); Carotid endarterectomy (2015); Abdomen surgery (2016); Dialysis fistula creation (Right, 08/25/2016); Tunneled venous port placement (Right, 10/2015); Abdomen surgery (05/26/2016); Colonoscopy (5/10/2018); and Upper gastrointestinal endoscopy (N/A, 11/1/2018).        Medications   Scheduled Meds:  Current Outpatient Rx   Medication Sig Dispense Refill    RENVELA 800 MG tablet Take 800 mg by mouth 3 times daily      fluticasone (FLONASE) 50 MCG/ACT nasal spray 1 spray by Each Nare route daily 1 Spray in each nostril 2 Bottle 1    atorvastatin (LIPITOR) 40 MG tablet Take 1 tablet by mouth nightly 30 tablet 3    folic acid (FOLVITE) 1 MG tablet Take 1 tablet by mouth daily 30 tablet 3    omeprazole (PRILOSEC) 20 MG delayed release capsule Take 1 capsule by mouth daily 30 capsule 3    carvedilol (COREG) 6.25 MG tablet Take 1 tablet by mouth 2 times daily 60 tablet 3    Multiple Vitamins-Minerals (RENAPLEX-D PO) Take by mouth      phenytoin (DILANTIN) 100 MG ER capsule TAKE 2 CAPSULES BY MOUTH IN THE MORNING AND TAKE 3 CAPSULES BY MOUTH AT NIGHT AS DIRECTED 150 capsule 3    allopurinol (ZYLOPRIM) 100 MG tablet take 1 tablet by mouth once daily 30 tablet 5    cinacalcet (SENSIPAR) 30 MG tablet Take 30 mg by mouth daily      calcium acetate (PHOSLO) 667 MG capsule Take 3 capsules by mouth 3 times daily (with meals) 60 capsule 0    hydrocortisone (ANUSOL-HC) 2.5 % rectal cream Place rectally 2 times daily. 15 g 2     Continuous Infusions:  PRN Meds:. Allergies  has No Known Allergies. Family History    family history includes Anesth Problems in his mother; Cancer in his brother and mother; Diabetes in his mother and sister; High Blood Pressure in his sister; Kidney Disease in his mother; Other in his father.     Family Status   Relation Name Status    Mother Antwan Simon        at age 80    Father Alma Mcdaniel     [address] Alive    MGM      MGF      [facility]      PGF      Brother RENZO Alive    Sister JERAD Alive         Social History  Social History     Socioeconomic History    Marital status:      Spouse name: Susanna Riedel Number of children: 1    Years of education: Not on file    Highest education level: Not on file   Occupational History    Not on file   Social Needs    Financial resource strain: Not on file    Food insecurity:     Worry: Not on file     Inability: Not on file    Transportation needs:     Medical: Not on file     Non-medical: Not on file   Tobacco Use    Smoking ,slight slight swollen rigtht hand ist dorsal interosseous  Region       Patient Active Problem List   Diagnosis    Hypertension    BPH (benign prostatic hyperplasia)    Hyperlipidemia    CAD (coronary artery disease)    Anemia    Diabetes mellitus ([address]    CVA (cerebrovascular accident due to intracerebral hemorrhage) ([address]    ICH (intracerebral hemorrhage) (HCC)    HLD (hyperlipidemia)    Seizure disorder, secondary (HCC)    Stroke-like symptom    Unresponsiveness    Seizures (HCC)    Cerebral artery occlusion with cerebral infarction ([address]    Hypertension    Hyperlipidemia    GERD (gastroesophageal reflux disease)    Diabetes mellitus (HCC)    Gout    Subtherapeutic serum dilantin level    Seizure disorder (HCC)    End-stage renal disease (ESRD) (HCC)    Acute hyperkalemia    Uncontrolled hypertension    Hyperkalemia    Facial droop    Right sided weakness    Breakthrough seizure ([address]    Convulsions ([address]    ESRD on hemodialysis ([address]    Carotid stenosis, asymptomatic    Seizure disorder as sequela of cerebrovascular accident ([address]    Chronic ischemic left middle cerebral artery (MCA) stroke    Encounter regarding vascular access for dialysis for ESRD ([address]    Folate deficiency    Type 2 diabetes mellitus with chronic kidney disease on chronic dialysis, without long-term current use of insulin ([address]    Sigmoid diverticulosis    Redundant colon               IMPRESSIONS:   1. ESRD  2. does not have any pertinent problems on file.     Cristi [facility]  Electronically signed 4/4/2019 at 7:26 AM       Scheduled for:  Right arm fistulogram

## 2019-04-04 NOTE — PRE SEDATION
Sedation Pre-Procedure Note    Patient Name: Kirstie Ribeiro   YOB: 1945  Room/Bed: Room/bed info not found  Medical Record Number: [id number]  Date: 4/4/2019   Time: 9:03 AM       Indication:  Arm swelling    Consent: I have discussed with the patient and/or the patient representative the indication, alternatives, and the possible risks and/or complications of the planned procedure and the anesthesia methods. The patient and/or patient representative appear to understand and agree to proceed. Vital Signs:   Vitals:    04/04/19 0858   BP: (!) 152/78   Pulse: 76   Resp: 16   Temp:    SpO2: 99%       Past Medical History:   has a past medical history of Allergic rhinitis, Anemia, BPH (benign prostatic hyperplasia), CAD (coronary artery disease), Carotid artery stenosis, Cerebrovascular disease, Diabetes mellitus ([address] Eczema, ESRD (end stage renal disease) on dialysis ([address] Full dentures, GERD (gastroesophageal reflux disease), Gout, Hemodialysis patient ([address] Hyperlipidemia, Hypertension, Neuropathy, Osteoarthritis, Peripheral vascular disease ([address] Seizures ([address] Stroke ([address] and Unspecified cerebral artery occlusion with cerebral infarction. Past Surgical History:   has a past surgical history that includes Bladder surgery (Spring 2014); sinus surgery; Cystocopy (2008); Inguinal hernia repair (Right); Kidney biopsy (May 2013); Upper gastrointestinal endoscopy (1/28/2015); Colonoscopy (2008); Colonoscopy (1/28/2015); Carotid endarterectomy (2015); Abdomen surgery (2016); Dialysis fistula creation (Right, 08/25/2016); Tunneled venous port placement (Right, 10/2015); Abdomen surgery (05/26/2016); Colonoscopy (5/10/2018); and Upper gastrointestinal endoscopy (N/A, 11/1/2018). Medications:   Scheduled Meds:   Continuous Infusions:    sodium chloride 20 mL/hr at 04/04/19 0804     PRN Meds:   Home Meds:   Prior to Admission medications    Medication Sig Start Date End Date Taking?  Authorizing sedation    Medications Planned:   midazolam (Versed) intravenously and fentanyl intravenously    Patient is an appropriate candidate for plan of sedation: yes    Electronically signed by Serafin Aceves MD on 4/4/2019 at 9:03 AM

## 2019-04-04 NOTE — PROGRESS NOTES
Discharge instructions reviewed and verbalizes understanding. Discharge criteria met. Bandage at R arm fistula clean, dry and intact. Strong bruit and thrill.  Radial pulse +3

## 2019-04-11 ENCOUNTER — OFFICE VISIT (OUTPATIENT)
Dept: INTERNAL MEDICINE | Age: 74
End: 2019-04-11
Payer: MEDICARE

## 2019-04-11 VITALS
WEIGHT: 247 LBS | HEART RATE: 87 BPM | SYSTOLIC BLOOD PRESSURE: 135 MMHG | BODY MASS INDEX: 32.74 KG/M2 | HEIGHT: 73 IN | DIASTOLIC BLOOD PRESSURE: 69 MMHG

## 2019-04-11 DIAGNOSIS — I69.398 SEIZURE DISORDER AS SEQUELA OF CEREBROVASCULAR ACCIDENT (HCC): ICD-10-CM

## 2019-04-11 DIAGNOSIS — D64.9 NORMOCYTIC ANEMIA: ICD-10-CM

## 2019-04-11 DIAGNOSIS — E78.5 HYPERLIPIDEMIA, UNSPECIFIED HYPERLIPIDEMIA TYPE: ICD-10-CM

## 2019-04-11 DIAGNOSIS — E11.22 TYPE 2 DIABETES MELLITUS WITH CHRONIC KIDNEY DISEASE ON CHRONIC DIALYSIS, WITHOUT LONG-TERM CURRENT USE OF INSULIN (HCC): Primary | ICD-10-CM

## 2019-04-11 DIAGNOSIS — Z99.2 TYPE 2 DIABETES MELLITUS WITH CHRONIC KIDNEY DISEASE ON CHRONIC DIALYSIS, WITHOUT LONG-TERM CURRENT USE OF INSULIN (HCC): Primary | ICD-10-CM

## 2019-04-11 DIAGNOSIS — N18.6 TYPE 2 DIABETES MELLITUS WITH CHRONIC KIDNEY DISEASE ON CHRONIC DIALYSIS, WITHOUT LONG-TERM CURRENT USE OF INSULIN (HCC): Primary | ICD-10-CM

## 2019-04-11 DIAGNOSIS — I10 ESSENTIAL HYPERTENSION: ICD-10-CM

## 2019-04-11 DIAGNOSIS — G40.909 SEIZURE DISORDER AS SEQUELA OF CEREBROVASCULAR ACCIDENT (HCC): ICD-10-CM

## 2019-04-11 LAB
CHP ED QC CHECK: NORMAL
GLUCOSE BLD-MCNC: 109 MG/DL
HBA1C MFR BLD: 5.1 %

## 2019-04-11 PROCEDURE — G8427 DOCREV CUR MEDS BY ELIG CLIN: HCPCS | Performed by: INTERNAL MEDICINE

## 2019-04-11 PROCEDURE — 4040F PNEUMOC VAC/ADMIN/RCVD: CPT | Performed by: INTERNAL MEDICINE

## 2019-04-11 PROCEDURE — 1036F TOBACCO NON-USER: CPT | Performed by: INTERNAL MEDICINE

## 2019-04-11 PROCEDURE — G8599 NO ASA/ANTIPLAT THER USE RNG: HCPCS | Performed by: INTERNAL MEDICINE

## 2019-04-11 PROCEDURE — 83036 HEMOGLOBIN GLYCOSYLATED A1C: CPT | Performed by: INTERNAL MEDICINE

## 2019-04-11 PROCEDURE — 82962 GLUCOSE BLOOD TEST: CPT | Performed by: INTERNAL MEDICINE

## 2019-04-11 PROCEDURE — 3044F HG A1C LEVEL LT 7.0%: CPT | Performed by: INTERNAL MEDICINE

## 2019-04-11 PROCEDURE — 99211 OFF/OP EST MAY X REQ PHY/QHP: CPT | Performed by: INTERNAL MEDICINE

## 2019-04-11 PROCEDURE — G8417 CALC BMI ABV UP PARAM F/U: HCPCS | Performed by: INTERNAL MEDICINE

## 2019-04-11 PROCEDURE — 3017F COLORECTAL CA SCREEN DOC REV: CPT | Performed by: INTERNAL MEDICINE

## 2019-04-11 PROCEDURE — 99213 OFFICE O/P EST LOW 20 MIN: CPT | Performed by: INTERNAL MEDICINE

## 2019-04-11 PROCEDURE — 1123F ACP DISCUSS/DSCN MKR DOCD: CPT | Performed by: INTERNAL MEDICINE

## 2019-04-11 PROCEDURE — 2022F DILAT RTA XM EVC RTNOPTHY: CPT | Performed by: INTERNAL MEDICINE

## 2019-04-11 ASSESSMENT — PATIENT HEALTH QUESTIONNAIRE - PHQ9
1. LITTLE INTEREST OR PLEASURE IN DOING THINGS: 0
SUM OF ALL RESPONSES TO PHQ9 QUESTIONS 1 & 2: 0
SUM OF ALL RESPONSES TO PHQ QUESTIONS 1-9: 0
SUM OF ALL RESPONSES TO PHQ QUESTIONS 1-9: 0
2. FEELING DOWN, DEPRESSED OR HOPELESS: 0

## 2019-04-11 ASSESSMENT — ENCOUNTER SYMPTOMS
CONSTIPATION: 0
DIARRHEA: 0
BLOOD IN STOOL: 0
WHEEZING: 0
PHOTOPHOBIA: 0
COUGH: 1
BACK PAIN: 0
SHORTNESS OF BREATH: 0
ABDOMINAL PAIN: 0
NAUSEA: 0

## 2019-04-11 NOTE — PROGRESS NOTES
[facility]    Progress Note    Date of patient's visit: 4/11/2019    Patient's Name:  Josh Solomon    YOB: 1945            Patient Care Team:  Fanta Padilla MD as PCP - Yamil Chance MD as PCP - S Attributed Provider  Kris Puckett MD as Consulting Physician (Urology)  Bernice Ruiz MD as Consulting Physician (Gastroenterology)  Fanta Padilla MD (Internal Medicine)  [address] (Dialysis)  Yimi Cornelius MD as Surgeon (General Surgery)    REASON FOR VISIT: Routine outpatient follow     Chief Complaint   Patient presents with    Diabetes    Hypertension    Cough     Pt states that he still has a cough since his last visit. states he cant seem to get rid of it          HISTORY OF PRESENT ILLNESS:    History was obtained from the patient. Josh Solomon is a 74 y.o. is here for follow-up of his chronic medical problems. He is still having a light cough but the phlegm is white in color. No fever or chills. He was treated for bronchitis with Z-Kirk 2 weeks ago. No sore throat. He continues with dialysis. He denies any leg edema or shortness of breath. No chest pain. His diabetes has been diet controlled since she's been on dialysis. He checks his blood sugars sometimes in the morning and they're below 120. He would like to have another blood sugar checked here today. He has not seen an ophthalmologist in a long time per patient. He is continuing with all his medications. He did not bring his medication bottles with him. Results for POC orders placed in visit on 04/11/19   POCT glycosylated hemoglobin (Hb A1C)   Result Value Ref Range    Hemoglobin A1C 5.1 %   POCT Glucose   Result Value Ref Range    Glucose 109 mg/dL    QC OK? EGD 2018  Findings[de-identified]   Esophagus: abnormal: Small HH 43-45 cm.    Stomach: normal. Bx'd  Duodenum: Patchy bulbar erythema     Recommendations: -Await pathology.     Pathology  -- Diagnosis --   STOMACH, BIOPSY:       - MILD CHRONIC GASTRITIS.     - NEGATIVE FOR HELICOBACTER PYLORI.     Colonoscopy 2018  Op Note  Date of Service: 5/10/2018 10:45 AM  Guicho Yadav MD   Colorectal Surgery      []Manual[]Template  []Copied  PROCEDURE NOTE     DATE OF PROCEDURE: 5/10/2018     SURGEON:  Dr. Valencia Seip     ASSISTANT: Dr. Allan Bell, PGY-4     PREOPERATIVE DIAGNOSIS:   1. Anal fissure  2. Screening colonoscopy      POSTOPERATIVE DIAGNOSIS:   1. Anal fissure  2. Severe sigmoid diverticulosis   3.  Redundant right colon                           Past Medical History:   Diagnosis Date    Allergic rhinitis     Anemia     BPH (benign prostatic hyperplasia)     CAD (coronary artery disease)     Carotid artery stenosis 2013    ulcerated plaque in left ICA 60 - 70% on carotid angiogram    Cerebrovascular disease 2013    left frontal hemorrhage    Diabetes mellitus ([address] 2013    Eczema     ESRD (end stage renal disease) on dialysis ([address]     Full dentures     Upper / Lower    GERD (gastroesophageal reflux disease)     Gout     Hemodialysis patient ([address] 10/2015     [address]NAL [address]  M,W F    Hyperlipidemia     Hypertension     Neuropathy     Osteoarthritis     Peripheral vascular disease ([address]     Seizures ([address] 2013    after stroke, LAST [id number]/20/2016    Stroke [facility]) 2013    Unspecified cerebral artery occlusion with cerebral infarction 4-    speech short term and lt side       Past Surgical History:   Procedure Laterality Date    ABDOMEN SURGERY  2016    PERITONEAL CATHETER    ABDOMEN SURGERY  05/26/2016    pd catherter removed from abdomen    BLADDER SURGERY  Spring 2014    dilatated     CAROTID ENDARTERECTOMY  2015    left    COLONOSCOPY  2008    polypectomy, diverticulosis    COLONOSCOPY  1/28/2015    COLONOSCOPY  5/10/2018    COLONOSCOPY performed by Ambreen Myers MD at [address] [place] [address]  2008    bladder tumor removal    DIALYSIS FISTULA CREATION Right 08/25/2016    INGUINAL HERNIA REPAIR Right     KIDNEY BIOPSY  May 2013    SINUS SURGERY      TUNNELED VENOUS PORT PLACEMENT Right 10/2015    dialysis catheter- chest     UPPER GASTROINTESTINAL ENDOSCOPY  1/28/2015    UPPER GASTROINTESTINAL ENDOSCOPY N/A 11/1/2018    EGD BIOPSY performed by Tricia Lucero DO at [facility] Endoscopy         ALLERGIES    No Known Allergies    MEDICATIONS:      Current Outpatient Medications on File Prior to Visit   Medication Sig Dispense Refill    RENVELA 800 MG tablet Take 800 mg by mouth 3 times daily      fluticasone (FLONASE) 50 MCG/ACT nasal spray 1 spray by Each Nare route daily 1 Spray in each nostril 2 Bottle 1    atorvastatin (LIPITOR) 40 MG tablet Take 1 tablet by mouth nightly 30 tablet 3    folic acid (FOLVITE) 1 MG tablet Take 1 tablet by mouth daily 30 tablet 3    omeprazole (PRILOSEC) 20 MG delayed release capsule Take 1 capsule by mouth daily 30 capsule 3    carvedilol (COREG) 6.25 MG tablet Take 1 tablet by mouth 2 times daily 60 tablet 3    Multiple Vitamins-Minerals (RENAPLEX-D PO) Take by mouth      phenytoin (DILANTIN) 100 MG ER capsule TAKE 2 CAPSULES BY MOUTH IN THE MORNING AND TAKE 3 CAPSULES BY MOUTH AT NIGHT AS DIRECTED 150 capsule 3    allopurinol (ZYLOPRIM) 100 MG tablet take 1 tablet by mouth once daily 30 tablet 5    cinacalcet (SENSIPAR) 30 MG tablet Take 30 mg by mouth daily      calcium acetate (PHOSLO) 667 MG capsule Take 3 capsules by mouth 3 times daily (with meals) 60 capsule 0    hydrocortisone (ANUSOL-HC) 2.5 % rectal cream Place rectally 2 times daily. 15 g 2     No current facility-administered medications on file prior to visit. SOCIAL HISTORY    Reviewed and no change from previous record. Juan Carlos Akshat  reports that he has never smoked.  He has never used smokeless tobacco.    FAMILY HISTORY:    Reviewed and No change from previous visit    HEALTH MAINTENANCE DUE:      Health Maintenance Due   Topic Date Due  Diabetic retinal exam  11/10/2015    Diabetic foot exam  03/16/2018       REVIEW OF SYSTEMS:    12 point review of symptoms completed and found to be normal except noted in the HPI    Review of Systems   Constitutional: Negative for chills, fatigue and fever. HENT: Positive for congestion. Eyes: Positive for visual disturbance. Negative for photophobia. Respiratory: Positive for cough. Negative for shortness of breath and wheezing. Cardiovascular: Negative for chest pain, palpitations and leg swelling. Gastrointestinal: Negative for abdominal pain, blood in stool, constipation, diarrhea and nausea. Endocrine: Negative for polydipsia and polyuria. Genitourinary: Positive for urgency. Negative for dysuria and frequency. Musculoskeletal: Positive for arthralgias. Negative for back pain. Neurological: Positive for numbness. Negative for dizziness, seizures, syncope, weakness and headaches. Hematological: Negative for adenopathy. Does not bruise/bleed easily. Psychiatric/Behavioral: Negative for self-injury. The patient is not nervous/anxious. PHYSICAL EXAM:     Vitals:    04/11/19 1414 04/11/19 1427   BP: (!) 147/83 135/69   Site: Left Upper Arm Left Upper Arm   Position: Sitting Sitting   Cuff Size: Large Adult Large Adult   Pulse: 87    Weight: 247 lb (112 kg)    Height: 6' 1\" (1.854 m)      Body mass index is 32.59 kg/m². BP Readings from Last 3 Encounters:   04/11/19 135/69   04/04/19 (!) 168/88   03/18/19 120/71        Wt Readings from Last 3 Encounters:   04/11/19 247 lb (112 kg)   04/04/19 235 lb (106.6 kg)   03/18/19 242 lb (109.8 kg)       Physical Exam      HENT:  Normocephalic, Atraumatic,  Neck- Normal range of motion, No tenderness, Supple, No thyromegaly  Eyes:  PERRL, EOMI, Conjunctiva normal, No discharge. Respiratory:  Normal breath sounds, No respiratory distress, No wheezing, No chest tenderness.    Cardiovascular:  Normal heart rate, Normal rhythm, No murmurs, No rubs, No gallops. GI:  Bowel sounds normal, Soft, No tenderness, No masses  :   No CVA tenderness. Musculoskeletal:  Intact distal pulses, No edema, No tenderness,  Back- No tenderness. Integument:  Warm, Dry, No erythema,   Lymphatic:  No lymphadenopathy noted. Neurologic:  Alert & oriented x 3, Normal motor function, Normal sensory function, No focal deficits noted. Psychiatric:  Affect normal         LABORATORY FINDINGS:    CBC:  Lab Results   Component Value Date    WBC 4.5 10/05/2018    HGB 10.3 10/05/2018     04/04/2019    PLT Platelet clumps present, count appears adequate. 09/16/2011     BMP:    Lab Results   Component Value Date     10/05/2018    K 4.9 12/20/2018     10/05/2018    CO2 25 10/05/2018    BUN 51 10/05/2018    CREATININE 12.47 10/05/2018    GLUCOSE 131 10/05/2018    GLUCOSE 87 09/16/2011     HEMOGLOBIN A1C:   Lab Results   Component Value Date    LABA1C 6.0 07/26/2018     MICROALBUMIN URINE:   Lab Results   Component Value Date    MICROALBUR 971 09/29/2015     FASTING LIPID @[employer]  Lab Results   Component Value Date    LDLCHOLESTEROL 111 04/26/2018       LIVER PROFILE:  Lab Results   Component Value Date    ALT 18 10/05/2018    AST 17 10/05/2018    PROT 6.9 10/05/2018    PROT 7.6 10/17/2012    BILITOT 0.22 10/05/2018    BILIDIR 0.09 10/05/2018    LABALBU 4.1 10/05/2018    LABALBU 4.5 09/16/2011      THYROID FUNCTION:   Lab Results   Component Value Date    TSH 2.34 04/26/2018      URINEANALYSIS: No results found for: LABURIN  ASSESSMENT AND PLAN:    1. Type 2 diabetes mellitus with chronic kidney disease on chronic dialysis, without long-term current use of insulin (HCC)  Off meds    - POCT glycosylated hemoglobin (Hb A1C)  - POCT Glucose  - Lipid Panel; Future  - AFL - Edmond Salinas MD, Ophthalmology, [place]    2. Essential hypertension  Same meds    3.  Seizure disorder as sequela of cerebrovascular accident [facility])    - Phenytoin Level, Total And Free; Future    4. Normocytic anemia  Stable due to ESRD      5. Hyperlipidemia, unspecified hyperlipidemia type    - Lipid Panel; Future          FOLLOW UP AND INSTRUCTIONS:   Return in about 3 months (around 7/11/2019). 1. Kevin Hayward received counseling on the following healthy behaviors: nutrition, exercise and medication adherence    2. Reviewed prior labs and health maintenance. 3. Discussed use, benefit, and side effects of prescribed medications. Barriers to medication compliance addressed. All patient questions answered. Pt voiced understanding.        Sanjay Willis  Attending Physician, [address] [place] [address], Internal Medicine Residency Program  [address] [place] [address]  4/11/2019, 2:44 PM

## 2019-04-11 NOTE — PROGRESS NOTES
Visit Information    Have you changed or started any medications since your last visit including any over-the-counter medicines, vitamins, or herbal medicines? no   Are you having any side effects from any of your medications? -  no  Have you stopped taking any of your medications? Is so, why? -  no    Have you seen any other physician or provider since your last visit? Yes - Records Obtained  Have you had any other diagnostic tests since your last visit? Yes - Records Obtained  Have you been seen in the emergency room and/or had an admission to a hospital since we last saw you? No  Have you had your routine dental cleaning in the past 6 months? yes -     Have you activated your [employer] account? If not, what are your barriers?  Yes     Patient Care Team:  Teena Miranda MD as PCP - Devora Gupta MD as PCP - S Attributed Provider  Brianne Carrasco MD as Consulting Physician (Urology)  Tomas Wei MD as Consulting Physician (Gastroenterology)  Teena Miranda MD (Internal Medicine)  [address] [facility] [address] [facility]  Ángel Carver MD as Surgeon (General Surgery)    Medical History Review  Past Medical, Family, and Social History reviewed and does contribute to the patient presenting condition    Health Maintenance   Topic Date Due    Diabetic retinal exam  11/10/2015    Diabetic foot exam  03/16/2018    DTaP/Tdap/Td vaccine (1 - Tdap) 09/18/2019 (Originally 9/9/1964)    Shingles Vaccine (1 of 2) 09/18/2019 (Originally 9/9/1993)    Hepatitis B Vaccine (1 of 3 - Risk Recombivax 3-dose series) 03/18/2020 (Originally 9/9/1964)    Lipid screen  04/26/2019    A1C test (Diabetic or Prediabetic)  07/26/2019    Colon cancer screen colonoscopy  05/10/2023    Flu vaccine  Completed    Pneumococcal 65+ years Vaccine  Completed    HPV vaccine  Aged Out

## 2019-07-22 ENCOUNTER — TELEPHONE (OUTPATIENT)
Dept: INTERNAL MEDICINE | Age: 74
End: 2019-07-22

## 2019-09-05 ENCOUNTER — OFFICE VISIT (OUTPATIENT)
Dept: INTERNAL MEDICINE | Age: 74
End: 2019-09-05
Payer: MEDICARE

## 2019-09-05 VITALS
HEIGHT: 73 IN | DIASTOLIC BLOOD PRESSURE: 72 MMHG | WEIGHT: 241 LBS | BODY MASS INDEX: 31.94 KG/M2 | HEART RATE: 83 BPM | SYSTOLIC BLOOD PRESSURE: 113 MMHG

## 2019-09-05 DIAGNOSIS — E79.0 HYPERURICEMIA: ICD-10-CM

## 2019-09-05 DIAGNOSIS — Z99.2 TYPE 2 DIABETES MELLITUS WITH CHRONIC KIDNEY DISEASE ON CHRONIC DIALYSIS, WITHOUT LONG-TERM CURRENT USE OF INSULIN (HCC): Primary | ICD-10-CM

## 2019-09-05 DIAGNOSIS — K21.9 GASTROESOPHAGEAL REFLUX DISEASE WITHOUT ESOPHAGITIS: ICD-10-CM

## 2019-09-05 DIAGNOSIS — R05.3 CHRONIC COUGH: ICD-10-CM

## 2019-09-05 DIAGNOSIS — I10 ESSENTIAL HYPERTENSION: ICD-10-CM

## 2019-09-05 DIAGNOSIS — N18.6 ESRD (END STAGE RENAL DISEASE) ON DIALYSIS (HCC): ICD-10-CM

## 2019-09-05 DIAGNOSIS — D64.9 NORMOCYTIC ANEMIA: ICD-10-CM

## 2019-09-05 DIAGNOSIS — E78.00 PURE HYPERCHOLESTEROLEMIA: ICD-10-CM

## 2019-09-05 DIAGNOSIS — Z99.2 ESRD (END STAGE RENAL DISEASE) ON DIALYSIS (HCC): ICD-10-CM

## 2019-09-05 DIAGNOSIS — E11.22 TYPE 2 DIABETES MELLITUS WITH CHRONIC KIDNEY DISEASE ON CHRONIC DIALYSIS, WITHOUT LONG-TERM CURRENT USE OF INSULIN (HCC): Primary | ICD-10-CM

## 2019-09-05 DIAGNOSIS — E78.5 HYPERLIPIDEMIA, UNSPECIFIED HYPERLIPIDEMIA TYPE: ICD-10-CM

## 2019-09-05 DIAGNOSIS — N18.6 TYPE 2 DIABETES MELLITUS WITH CHRONIC KIDNEY DISEASE ON CHRONIC DIALYSIS, WITHOUT LONG-TERM CURRENT USE OF INSULIN (HCC): Primary | ICD-10-CM

## 2019-09-05 DIAGNOSIS — G40.909 SEIZURE DISORDER, SECONDARY (HCC): Chronic | ICD-10-CM

## 2019-09-05 PROCEDURE — G8417 CALC BMI ABV UP PARAM F/U: HCPCS | Performed by: INTERNAL MEDICINE

## 2019-09-05 PROCEDURE — 2022F DILAT RTA XM EVC RTNOPTHY: CPT | Performed by: INTERNAL MEDICINE

## 2019-09-05 PROCEDURE — 3044F HG A1C LEVEL LT 7.0%: CPT | Performed by: INTERNAL MEDICINE

## 2019-09-05 PROCEDURE — G8427 DOCREV CUR MEDS BY ELIG CLIN: HCPCS | Performed by: INTERNAL MEDICINE

## 2019-09-05 PROCEDURE — 3017F COLORECTAL CA SCREEN DOC REV: CPT | Performed by: INTERNAL MEDICINE

## 2019-09-05 PROCEDURE — G8599 NO ASA/ANTIPLAT THER USE RNG: HCPCS | Performed by: INTERNAL MEDICINE

## 2019-09-05 PROCEDURE — 1123F ACP DISCUSS/DSCN MKR DOCD: CPT | Performed by: INTERNAL MEDICINE

## 2019-09-05 PROCEDURE — 4040F PNEUMOC VAC/ADMIN/RCVD: CPT | Performed by: INTERNAL MEDICINE

## 2019-09-05 PROCEDURE — 1036F TOBACCO NON-USER: CPT | Performed by: INTERNAL MEDICINE

## 2019-09-05 PROCEDURE — 99214 OFFICE O/P EST MOD 30 MIN: CPT | Performed by: INTERNAL MEDICINE

## 2019-09-05 PROCEDURE — 99211 OFF/OP EST MAY X REQ PHY/QHP: CPT | Performed by: INTERNAL MEDICINE

## 2019-09-05 RX ORDER — FLUTICASONE PROPIONATE 50 MCG
1 SPRAY, SUSPENSION (ML) NASAL DAILY
Qty: 2 BOTTLE | Refills: 1 | Status: SHIPPED | OUTPATIENT
Start: 2019-09-05 | End: 2021-09-16

## 2019-09-05 RX ORDER — FOLIC ACID 1 MG/1
1 TABLET ORAL DAILY
Qty: 30 TABLET | Refills: 3 | Status: SHIPPED | OUTPATIENT
Start: 2019-09-05 | End: 2021-02-02 | Stop reason: SDUPTHER

## 2019-09-05 RX ORDER — CARVEDILOL 6.25 MG/1
6.25 TABLET ORAL 2 TIMES DAILY
Qty: 60 TABLET | Refills: 3 | Status: CANCELLED | OUTPATIENT
Start: 2019-09-05

## 2019-09-05 RX ORDER — ALLOPURINOL 100 MG/1
TABLET ORAL
Qty: 30 TABLET | Refills: 5 | Status: CANCELLED | OUTPATIENT
Start: 2019-09-05

## 2019-09-05 RX ORDER — ATORVASTATIN CALCIUM 40 MG/1
40 TABLET, FILM COATED ORAL NIGHTLY
Qty: 30 TABLET | Refills: 3 | Status: SHIPPED | OUTPATIENT
Start: 2019-09-05 | End: 2021-02-02 | Stop reason: SDUPTHER

## 2019-09-05 RX ORDER — OMEPRAZOLE 20 MG/1
20 CAPSULE, DELAYED RELEASE ORAL DAILY
Qty: 30 CAPSULE | Refills: 3 | Status: CANCELLED | OUTPATIENT
Start: 2019-09-05

## 2019-09-05 RX ORDER — PHENYTOIN SODIUM 100 MG/1
CAPSULE, EXTENDED RELEASE ORAL
Qty: 150 CAPSULE | Refills: 3 | Status: SHIPPED | OUTPATIENT
Start: 2019-09-05 | End: 2021-02-02

## 2019-09-05 ASSESSMENT — ENCOUNTER SYMPTOMS
SINUS PAIN: 0
SINUS PRESSURE: 0
CONSTIPATION: 0
SHORTNESS OF BREATH: 0
BACK PAIN: 0
RHINORRHEA: 0
ABDOMINAL PAIN: 0
WHEEZING: 0
COUGH: 1
BLOOD IN STOOL: 0
PHOTOPHOBIA: 0

## 2019-09-05 NOTE — PROGRESS NOTES
[facility]    Progress Note    Date of patient's visit: 9/5/2019    Patient's Name:  Carli Diaz    YOB: 1945            Patient Care Team:  Vidhya Diaz MD as PCP - Fatimah Echavarria MD as PCP - [facility]  Jefry Gill MD as Consulting Physician (Urology)  Maddie Herrera MD as Consulting Physician (Gastroenterology)  Vidhya Diaz MD (Internal Medicine)  [address] Dialysis (Dialysis)  Karl Wu MD as Surgeon (General Surgery)    REASON FOR VISIT: Routine outpatient follow     Chief Complaint   Patient presents with    Hypertension    Chronic Kidney Disease    Cough     Pt states that he has had a deep cough with congestion x 1 month     Health Maintenance     will let his daughter know he neds eye adn foot exam          HISTORY OF PRESENT ILLNESS:    History was obtained from the patient. Carli Diaz is a 74 y.o. is here for follow up. He states he is not taking any meds. He did not bring any of his med bottles with him. He has memory problems. No family with him. He has not had labs and CXR. He is c/o cough still. He has whitish expectoration. Afebrile  He has postnasal drip. Cough is worse at night. He h/o edema. He has never been a smoker. No h/o asthma.              Past Medical History:   Diagnosis Date    Allergic rhinitis     Anemia     BPH (benign prostatic hyperplasia)     CAD (coronary artery disease)     Carotid artery stenosis 2013    ulcerated plaque in left ICA 60 - 70% on carotid angiogram    Cerebrovascular disease 2013    left frontal hemorrhage    Diabetes mellitus ([address] 2013    Eczema     ESRD (end stage renal disease) on dialysis ([address]     Full dentures     Upper / Lower    GERD (gastroesophageal reflux disease)     Gout     [facility] 10[facility]2015     [address]  M,W F    Hyperlipidemia     Hypertension     Neuropathy    

## 2019-09-24 ENCOUNTER — HOSPITAL ENCOUNTER (OUTPATIENT)
Age: 74
Discharge: HOME | End: 2019-09-26
Payer: MEDICARE

## 2019-09-24 ENCOUNTER — HOSPITAL ENCOUNTER (OUTPATIENT)
Dept: GENERAL RADIOLOGY | Age: 74
Discharge: HOME | End: 2019-09-26
Payer: MEDICARE

## 2019-09-24 DIAGNOSIS — R05.9 COUGH: ICD-10-CM

## 2019-09-24 PROCEDURE — 71046 X-RAY EXAM CHEST 2 VIEWS: CPT

## 2019-10-30 ENCOUNTER — TELEPHONE (OUTPATIENT)
Dept: INTERNAL MEDICINE | Age: 74
End: 2019-10-30

## 2019-12-16 ENCOUNTER — TELEPHONE (OUTPATIENT)
Dept: INTERNAL MEDICINE | Age: 74
End: 2019-12-16

## 2020-01-16 ENCOUNTER — OFFICE VISIT (OUTPATIENT)
Dept: INTERNAL MEDICINE | Age: 75
End: 2020-01-16
Payer: MEDICARE

## 2020-01-16 VITALS — DIASTOLIC BLOOD PRESSURE: 66 MMHG | HEART RATE: 84 BPM | SYSTOLIC BLOOD PRESSURE: 121 MMHG

## 2020-01-16 PROCEDURE — G8484 FLU IMMUNIZE NO ADMIN: HCPCS | Performed by: INTERNAL MEDICINE

## 2020-01-16 PROCEDURE — G8417 CALC BMI ABV UP PARAM F/U: HCPCS | Performed by: INTERNAL MEDICINE

## 2020-01-16 PROCEDURE — 1123F ACP DISCUSS/DSCN MKR DOCD: CPT | Performed by: INTERNAL MEDICINE

## 2020-01-16 PROCEDURE — 99213 OFFICE O/P EST LOW 20 MIN: CPT | Performed by: INTERNAL MEDICINE

## 2020-01-16 PROCEDURE — 99211 OFF/OP EST MAY X REQ PHY/QHP: CPT | Performed by: INTERNAL MEDICINE

## 2020-01-16 PROCEDURE — 1036F TOBACCO NON-USER: CPT | Performed by: INTERNAL MEDICINE

## 2020-01-16 PROCEDURE — G8427 DOCREV CUR MEDS BY ELIG CLIN: HCPCS | Performed by: INTERNAL MEDICINE

## 2020-01-16 PROCEDURE — 4040F PNEUMOC VAC/ADMIN/RCVD: CPT | Performed by: INTERNAL MEDICINE

## 2020-01-16 NOTE — PROGRESS NOTES
[facility]    Progress Note    Date of patient's visit: 1/16/2020    Patient's Name:  Zach Landaverde    YOB: 1945            Patient Care Team:  Tete Torrez MD as PCP - Chidi Silver MD as PCP - [facility]  Octavio Spears MD as Consulting Physician (Urology)  Lupis Vasquez MD as Consulting Physician (Gastroenterology)  Tete Torrez MD (Internal Medicine)  [address] (Dialysis)  Collin Burr MD as Surgeon (General Surgery)    REASON FOR VISIT: Routine outpatient follow     Chief Complaint   Patient presents with    Discuss Medications     Pt pts daughter pt is not taking any of his medications, records requested from dialysis    [facility] Maintenance     flu vaccine given at dialysis          HISTORY OF PRESENT ILLNESS:    History was obtained from the patient. Zach Landaverde is a 66 y.o. is here for follow-up. He is accompanied today by his daughter. She is concerned that he is not taking any medications. His hypertension has been under control since he has been on hemodialysis and he is advised he does not need any medications. Also his diabetes has resolved since he has been on dialysis and he has not been on medications. He has stopped taking his statin and aspirin though. He has a history of carotid artery disease and history of CVA in the past.  He is advised he needs to continue on these medications. He is seeing vascular surgery. He has another carotid Dopplers ordered before his appointment. He also had a seizure status post CVA. He says he stopped taking Dilantin a year ago. He has not had any seizures in the last 2 or 3 years. He is also concerned about a couple of skin tags on his neck and near his eye which she would like removed. He is also still having a cough. Cough has been going on for a few months now.   Chest x-ray was done in September showed infrahilar bronchial thickening possibly bronchitis. He is never been a smoker. He continues to have a cough. He is also having nasal congestion postnasal drip. He is not compliant with Flonase  Has a couple of skin tags he would like me to remove. One is in her left side of his neck and one under his left lower eyelid. They are very small. Echo 2015  CONCLUSIONS     Summary  Left ventricle is normal in size. Concentric left ventricular hypertrophy. Hyperdynamic left ventricular function. Evidence of diastolic dysfunction. ( E/A .4)  No significant pericardial effusion is seen.   No significant valvular regurgitation or stenosis seen.      carotid us 2018  Conclusions        Summary        Mild <50% stenosis of the right internal carotid artery.    Patent left endarterectomy without evidence of significant stenosis.    Patent vertebral arteries bilaterally with antegrade flow.         CXR 2019     Impression   There is some left infrahilar bronchial thickening which may be related to   bronchitis, without acute focal infiltrate or pleural effusion.             Past Medical History:   Diagnosis Date    Allergic rhinitis     Anemia     BPH (benign prostatic hyperplasia)     CAD (coronary artery disease)     Carotid artery stenosis 2013    ulcerated plaque in left ICA 60 - 70% on carotid angiogram    Cerebrovascular disease 2013    left frontal hemorrhage    Diabetes mellitus ([address] 2013    Eczema     ESRD (end stage renal disease) on dialysis ([address]     Full dentures     Upper / Lower    GERD (gastroesophageal reflux disease)     Gout     Hemodialysis patient ([address] 10/2015    [facility] [address]  M,W F    Hyperlipidemia     Hypertension     Neuropathy     Osteoarthritis     Peripheral vascular disease ([address]     Seizures ([address] 2013    after stroke, LAST [id number]/20/2016    Stroke [facility]) 2013    Unspecified cerebral artery occlusion with cerebral infarction 4-    speech short term and lt side Past Surgical History:   Procedure Laterality Date    ABDOMEN SURGERY  2016    PERITONEAL CATHETER    ABDOMEN SURGERY  05/26/2016    pd catherter removed from abdomen    BLADDER SURGERY  Spring 2014    dilatated     CAROTID ENDARTERECTOMY  2015    left    COLONOSCOPY  2008    polypectomy, diverticulosis    COLONOSCOPY  1/28/2015    COLONOSCOPY  5/10/2018    COLONOSCOPY performed by Duglas Martinez MD at [address] [place] [address]  2008    bladder tumor removal    DIALYSIS FISTULA CREATION Right 08/25/2016    INGUINAL HERNIA REPAIR Right     KIDNEY BIOPSY  May 2013    SINUS SURGERY      TUNNELED VENOUS PORT PLACEMENT Right 10/2015    dialysis catheter- chest     UPPER GASTROINTESTINAL ENDOSCOPY  1/28/2015    UPPER GASTROINTESTINAL ENDOSCOPY N/A 11/1/2018    EGD BIOPSY performed by Luzmaria Wade DO at [facility] Endoscopy         ALLERGIES    No Known Allergies    MEDICATIONS:      Current Outpatient Medications on File Prior to Visit   Medication Sig Dispense Refill    fluticasone (FLONASE) 50 MCG/ACT nasal spray 1 spray by Each Nostril route daily 1 Spray in each nostril (Patient not taking: Reported on 1/16/2020) 2 Bottle 1    atorvastatin (LIPITOR) 40 MG tablet Take 1 tablet by mouth nightly (Patient not taking: Reported on 1/16/2020) 30 tablet 3    folic acid (FOLVITE) 1 MG tablet Take 1 tablet by mouth daily (Patient not taking: Reported on 1/16/2020) 30 tablet 3    phenytoin (DILANTIN) 100 MG ER capsule TAKE 2 CAPSULES BY MOUTH IN THE MORNING AND TAKE 3 CAPSULES BY MOUTH AT NIGHT AS DIRECTED (Patient not taking: Reported on 1/16/2020) 150 capsule 3    RENVELA 800 MG tablet Take 800 mg by mouth 3 times daily      omeprazole (PRILOSEC) 20 MG delayed release capsule Take 1 capsule by mouth daily (Patient not taking: Reported on 1/16/2020) 30 capsule 3    carvedilol (COREG) 6.25 MG tablet Take 1 tablet by mouth 2 times daily (Patient not taking: Reported on 1/16/2020) 60 tablet 3    Multiple

## 2020-01-20 ASSESSMENT — ENCOUNTER SYMPTOMS
SHORTNESS OF BREATH: 0
CONSTIPATION: 0
COUGH: 1
WHEEZING: 0
RHINORRHEA: 0
EYE REDNESS: 0
ABDOMINAL PAIN: 0
BLOOD IN STOOL: 0

## 2020-02-04 ENCOUNTER — OFFICE VISIT (OUTPATIENT)
Dept: PRIMARY CARE CLINIC | Age: 75
End: 2020-02-04
Payer: MEDICARE

## 2020-02-04 VITALS
BODY MASS INDEX: 31.53 KG/M2 | DIASTOLIC BLOOD PRESSURE: 76 MMHG | HEART RATE: 79 BPM | WEIGHT: 239 LBS | TEMPERATURE: 98.1 F | SYSTOLIC BLOOD PRESSURE: 128 MMHG | OXYGEN SATURATION: 95 %

## 2020-02-04 PROCEDURE — G8484 FLU IMMUNIZE NO ADMIN: HCPCS | Performed by: INTERNAL MEDICINE

## 2020-02-04 PROCEDURE — G8417 CALC BMI ABV UP PARAM F/U: HCPCS | Performed by: INTERNAL MEDICINE

## 2020-02-04 PROCEDURE — 99213 OFFICE O/P EST LOW 20 MIN: CPT | Performed by: INTERNAL MEDICINE

## 2020-02-04 PROCEDURE — 1123F ACP DISCUSS/DSCN MKR DOCD: CPT | Performed by: INTERNAL MEDICINE

## 2020-02-04 PROCEDURE — G8427 DOCREV CUR MEDS BY ELIG CLIN: HCPCS | Performed by: INTERNAL MEDICINE

## 2020-02-04 PROCEDURE — 1036F TOBACCO NON-USER: CPT | Performed by: INTERNAL MEDICINE

## 2020-02-04 PROCEDURE — 4040F PNEUMOC VAC/ADMIN/RCVD: CPT | Performed by: INTERNAL MEDICINE

## 2020-02-04 RX ORDER — AZITHROMYCIN 250 MG/1
TABLET, FILM COATED ORAL
Qty: 1 PACKET | Refills: 0 | Status: SHIPPED | OUTPATIENT
Start: 2020-02-04 | End: 2021-02-02

## 2020-02-04 RX ORDER — BENZONATATE 100 MG/1
100 CAPSULE ORAL 3 TIMES DAILY PRN
Qty: 30 CAPSULE | Refills: 0 | Status: SHIPPED | OUTPATIENT
Start: 2020-02-04 | End: 2020-02-11

## 2020-02-04 ASSESSMENT — PATIENT HEALTH QUESTIONNAIRE - PHQ9
SUM OF ALL RESPONSES TO PHQ QUESTIONS 1-9: 0
1. LITTLE INTEREST OR PLEASURE IN DOING THINGS: 0
2. FEELING DOWN, DEPRESSED OR HOPELESS: 0
SUM OF ALL RESPONSES TO PHQ QUESTIONS 1-9: 0
SUM OF ALL RESPONSES TO PHQ9 QUESTIONS 1 & 2: 0

## 2020-02-04 ASSESSMENT — ENCOUNTER SYMPTOMS
RHINORRHEA: 1
COUGH: 1

## 2020-02-04 NOTE — PROGRESS NOTES
Visit Information    Have you changed or started any medications since your last visit including any over-the-counter medicines, vitamins, or herbal medicines? no   Are you having any side effects from any of your medications? -  no  Have you stopped taking any of your medications? Is so, why? -  no    Have you seen any other physician or provider since your last visit? No  Have you had any other diagnostic tests since your last visit? Yes - Records Requested  Have you been seen in the emergency room and/or had an admission to a hospital since we last saw you? No  Have you had your routine dental cleaning in the past 6 months? no    Have you activated your On[employer] account? If not, what are your barriers?  Yes     Patient Care Team:  Carlos Farmer MD as PCP - General  Carlos Farmer MD as PCP - [address] [place] [address] Provider  Kelley Downs MD as Consulting Physician (Urology)  Sheela Langley MD as Consulting Physician (Gastroenterology)  Carlos Farmer MD (Internal Medicine)  [address] [facility]  Jonathan Meza MD as Surgeon (General Surgery)    Medical History Review  Past Medical, Family, and Social History reviewed and does contribute to the patient presenting condition    Health Maintenance   Topic Date Due    DTaP/Tdap/Td vaccine (1 - Tdap) 09/09/1956    Shingles Vaccine (1 of 2) 09/09/1993    Lipid screen  04/26/2019    Annual Wellness Visit (AWV)  05/29/2019    Flu vaccine (1) 09/01/2019    Hepatitis B vaccine (3 of 3 - Risk 3-dose series) 03/18/2020 (Originally 9/27/2016)    Pneumococcal 65+ years Vaccine  Completed    HPV vaccine  Aged Out      [address] [facility]  [address] [facility] [address]  Dept: [phone]  Dept Fax: [phone]    Teddy Sewell is a 66 y.o. male who presents today for   Chief Complaint   Patient presents with    Cough     started about 2 weeks ago    Congestion    and follow upof dilatated     CAROTID ENDARTERECTOMY  2015    left    COLONOSCOPY  2008    polypectomy, diverticulosis    COLONOSCOPY  1/28/2015    COLONOSCOPY  5/10/2018    COLONOSCOPY performed by Clementine Mehta MD at [address] [place] [address]  2008    bladder tumor removal    DIALYSIS FISTULA CREATION Right 08/25/2016    INGUINAL HERNIA REPAIR Right     KIDNEY BIOPSY  May 2013    SINUS SURGERY      TUNNELED VENOUS PORT PLACEMENT Right 10/2015    dialysis catheter- chest     UPPER GASTROINTESTINAL ENDOSCOPY  1/28/2015    UPPER GASTROINTESTINAL ENDOSCOPY N/A 11/1/2018    EGD BIOPSY performed by Kedar Ulrich DO at [facility]       Family History   Problem Relation Age of Onset    Diabetes Mother     Kidney Disease Mother         Dialysis    Cancer Mother         KIDNEY    Anesth Problems Mother         delayed awakening     Other Father         Andrade Brock    Diabetes Sister     High Blood Pressure Sister     Cancer Brother        Social History     Tobacco Use    Smoking status: Never Smoker    Smokeless tobacco: Never Used   Substance Use Topics    Alcohol use: No     Comment: none      Current Outpatient Medications   Medication Sig Dispense Refill    fluticasone (FLONASE) 50 MCG/ACT nasal spray 1 spray by Each Nostril route daily 1 Spray in each nostril 2 Bottle 1    atorvastatin (LIPITOR) 40 MG tablet Take 1 tablet by mouth nightly 30 tablet 3    phenytoin (DILANTIN) 100 MG ER capsule TAKE 2 CAPSULES BY MOUTH IN THE MORNING AND TAKE 3 CAPSULES BY MOUTH AT NIGHT AS DIRECTED 150 capsule 3    RENVELA 800 MG tablet Take 800 mg by mouth 3 times daily      Multiple Vitamins-Minerals (RENAPLEX-D PO) Take by mouth      cinacalcet (SENSIPAR) 30 MG tablet Take 30 mg by mouth daily      calcium acetate (PHOSLO) 667 MG capsule Take 3 capsules by mouth 3 times daily (with meals) 60 capsule 0    hydrocortisone (ANUSOL-HC) 2.5 % rectal cream Place rectally 2 times daily.  15 g 2    folic acid (Luis Morales) 1 MG tablet Take 1 tablet by mouth daily (Patient not taking: Reported on 2/4/2020) 30 tablet 3    allopurinol (ZYLOPRIM) 100 MG tablet take 1 tablet by mouth once daily (Patient not taking: Reported on 1/16/2020) 30 tablet 5     No current facility-administered medications for this visit.       No Known Allergies    Health Maintenance   Topic Date Due    DTaP/Tdap/Td vaccine (1 - Tdap) 09[birth year]09/1956    Shingles Vaccine (1 of 2) 09/09/1993    Lipid screen  04/26/2019    Annual Wellness Visit (AWV)  05/29/2019    Flu vaccine (1) 09/01/2019    Hepatitis B vaccine (3 of 3 - Risk 3-dose series) 03/18/2020 (Originally 9/27/2016)    Pneumococcal 65+ years Vaccine  Completed    HPV vaccine  Aged Out       Controlled Substances Monitoring:      HPI:     HPI    ROS:     Review of Systems    Objective:     Physical Exam

## 2020-02-04 NOTE — PROGRESS NOTES
[address] [facility]  [address] [facility] [address]  Dept: [phone]  Dept Fax: [phone]    Luis Daniel Avila is a 66 y.o. male who presents to the urgent care today for his medicalconditions/complaints as noted below. Luis Daniel Avila is c/o of Cough (started about 2 weeks ago); Congestion; and Health Maintenance (pt received flu vaccine at PCP )      HPI:     Cough   This is a new problem. The current episode started 1 to 4 weeks ago (two weeks). The problem has been unchanged. The cough is productive of sputum. Associated symptoms include rhinorrhea. Pertinent negatives include no fever. Nothing aggravates the symptoms. He has tried OTC cough suppressant for the symptoms. The treatment provided mild relief.        Past Medical History:   Diagnosis Date    Allergic rhinitis     Anemia     BPH (benign prostatic hyperplasia)     CAD (coronary artery disease)     Carotid artery stenosis 2013    ulcerated plaque in left ICA 60 - 70% on carotid angiogram    Cerebrovascular disease 2013    left frontal hemorrhage    Diabetes mellitus ([address] 2013    Eczema     ESRD (end stage renal disease) on dialysis ([address]     Full dentures     Upper / Lower    GERD (gastroesophageal reflux disease)     Gout     Hemodialysis patient ([address] 10/2015    US [address] [address]  M,W F    Hyperlipidemia     Hypertension     Neuropathy     Osteoarthritis     Peripheral vascular disease ([address]     Seizures ([address] 2013    after stroke, LAST [id number]/20/2016    Stroke [facility]) 2013    Unspecified cerebral artery occlusion with cerebral infarction 4-    speech short term and lt side        Current Outpatient Medications   Medication Sig Dispense Refill    azithromycin (ZITHROMAX) 250 MG tablet Take 2 tabs (500 mg) on Day 1, and take 1 tab (250 mg) on days 2 through 5. 1 packet 0    benzonatate (TESSALON) 100 MG capsule Take 1 capsule by mouth 3 times daily as needed for Cough 30 capsule 0    fluticasone (FLONASE) 50 MCG/ACT nasal spray 1 spray by Each Nostril route daily 1 Spray in each nostril 2 Bottle 1    atorvastatin (LIPITOR) 40 MG tablet Take 1 tablet by mouth nightly 30 tablet 3    phenytoin (DILANTIN) 100 MG ER capsule TAKE 2 CAPSULES BY MOUTH IN THE MORNING AND TAKE 3 CAPSULES BY MOUTH AT NIGHT AS DIRECTED 150 capsule 3    RENVELA 800 MG tablet Take 800 mg by mouth 3 times daily      Multiple Vitamins-Minerals (RENAPLEX-D PO) Take by mouth      cinacalcet (SENSIPAR) 30 MG tablet Take 30 mg by mouth daily      calcium acetate (PHOSLO) 667 MG capsule Take 3 capsules by mouth 3 times daily (with meals) 60 capsule 0    hydrocortisone (ANUSOL-HC) 2.5 % rectal cream Place rectally 2 times daily. 15 g 2    folic acid (FOLVITE) 1 MG tablet Take 1 tablet by mouth daily (Patient not taking: Reported on 2/4/2020) 30 tablet 3    allopurinol (ZYLOPRIM) 100 MG tablet take 1 tablet by mouth once daily (Patient not taking: Reported on 1/16/2020) 30 tablet 5     No current facility-administered medications for this visit. No Known Allergies    Health Maintenance   Topic Date Due    Shingles Vaccine (1 of 2) 09/09/1993    Lipid screen  04/26/2019    Annual Wellness Visit (AWV)  05/29/2019    Flu vaccine (1) 09/01/2019    Hepatitis B vaccine (3 of 3 - Risk 3-dose series) 03/18/2020 (Originally 9/27/2016)    DTaP/Tdap/Td vaccine (1 - Tdap) 02/04/2021 (Originally 9/9/1956)    Pneumococcal 65+ years Vaccine  Completed    HPV vaccine  Aged Out       Subjective:      Review of Systems   Constitutional: Negative for fever. HENT: Positive for rhinorrhea. Respiratory: Positive for cough. All other systems reviewed and are negative. Objective:     Physical Exam  Vitals signs reviewed. Constitutional:       Appearance: Normal appearance. HENT:      Head: Normocephalic and atraumatic.       Mouth/Throat:      Mouth: Mucous membranes are moist. Pharynx: Oropharynx is clear. Pulmonary:      Effort: Pulmonary effort is normal.      Breath sounds: Normal breath sounds. Skin:     General: Skin is warm and dry. Neurological:      General: No focal deficit present. Mental Status: He is alert and oriented to person, place, and time. Psychiatric:         Mood and Affect: Mood normal.         Behavior: Behavior normal.       /76 (Site: Right Upper Arm, Position: Sitting, Cuff Size: Large Adult)   Pulse 79   Temp 98.1 °F (36.7 °C) (Oral)   Wt 239 lb (108.4 kg)   SpO2 95%   BMI 31.53 kg/m²       Assessment:       Diagnosis Orders   1. Acute bronchitis, unspecified organism         Plan:      No follow-ups on file. Orders Placed This Encounter   Medications    azithromycin (ZITHROMAX) 250 MG tablet     Sig: Take 2 tabs (500 mg) on Day 1, and take 1 tab (250 mg) on days 2 through 5. Dispense:  1 packet     Refill:  0    benzonatate (TESSALON) 100 MG capsule     Sig: Take 1 capsule by mouth 3 times daily as needed for Cough     Dispense:  30 capsule     Refill:  0     No orders of the defined types were placed in this encounter. Patient given educational materials - see patient instructions. Discussed use, benefit, and side effects of prescribed medications. All patientquestions answered. Pt voiced understanding.     Electronically signed by Gerry Kamara MD on 2/4/2020at 2:54 PM

## 2020-02-07 ENCOUNTER — TELEPHONE (OUTPATIENT)
Dept: INTERNAL MEDICINE | Age: 75
End: 2020-02-07

## 2020-02-07 NOTE — LETTER
[address]  [address] [address]  Phone: [phone]  Fax: [phone]    Bennett Mcghee MD        February 7, 2020    [address] [place] [address]      Dear Khushi Knox: We are sending this letter because your PCP ordered [facility] for you to have done at your last visit here and they have not yet been completed. If you can please come to our office on the 2nd floor to  your orders to have them compelted. If you do not have a follow-up appointment scheduled you can either contact the office to make an appointment with us or you can make one when you come in to pick-up your orders. If you have any questions or concerns, please don't hesitate to call.     Sincerely,        Bennett Mcghee MD

## 2020-02-11 ENCOUNTER — OFFICE VISIT (OUTPATIENT)
Dept: INTERNAL MEDICINE | Age: 75
End: 2020-02-11
Payer: MEDICARE

## 2020-02-11 VITALS
HEIGHT: 73 IN | SYSTOLIC BLOOD PRESSURE: 117 MMHG | HEART RATE: 88 BPM | BODY MASS INDEX: 31.78 KG/M2 | DIASTOLIC BLOOD PRESSURE: 64 MMHG | WEIGHT: 239.8 LBS

## 2020-02-11 PROCEDURE — 99213 OFFICE O/P EST LOW 20 MIN: CPT | Performed by: STUDENT IN AN ORGANIZED HEALTH CARE EDUCATION/TRAINING PROGRAM

## 2020-02-11 PROCEDURE — 4040F PNEUMOC VAC/ADMIN/RCVD: CPT | Performed by: STUDENT IN AN ORGANIZED HEALTH CARE EDUCATION/TRAINING PROGRAM

## 2020-02-11 PROCEDURE — 1123F ACP DISCUSS/DSCN MKR DOCD: CPT | Performed by: STUDENT IN AN ORGANIZED HEALTH CARE EDUCATION/TRAINING PROGRAM

## 2020-02-11 PROCEDURE — 1036F TOBACCO NON-USER: CPT | Performed by: STUDENT IN AN ORGANIZED HEALTH CARE EDUCATION/TRAINING PROGRAM

## 2020-02-11 PROCEDURE — G8484 FLU IMMUNIZE NO ADMIN: HCPCS | Performed by: STUDENT IN AN ORGANIZED HEALTH CARE EDUCATION/TRAINING PROGRAM

## 2020-02-11 PROCEDURE — 99211 OFF/OP EST MAY X REQ PHY/QHP: CPT | Performed by: INTERNAL MEDICINE

## 2020-02-11 PROCEDURE — G8417 CALC BMI ABV UP PARAM F/U: HCPCS | Performed by: STUDENT IN AN ORGANIZED HEALTH CARE EDUCATION/TRAINING PROGRAM

## 2020-02-11 PROCEDURE — G8427 DOCREV CUR MEDS BY ELIG CLIN: HCPCS | Performed by: STUDENT IN AN ORGANIZED HEALTH CARE EDUCATION/TRAINING PROGRAM

## 2020-02-11 NOTE — PROGRESS NOTES
Attending Physician Statement  I have discussed the care of David Mederos, including pertinent history and exam findings with the resident. I have reviewed the key elements of all parts of the encounter with the resident. I agree with the assessment, and status of the problem list as documented. Diagnosis Orders   1. Seizures ([facility]     2. Benign prostatic hyperplasia, unspecified whether lower urinary tract symptoms present     3. Type 2 diabetes mellitus with chronic kidney disease on chronic dialysis, without long-term current use of insulin ([facility]       The plan and orders should include No orders of the defined types were placed in this encounter. and this was also documented by the resident. The medication list was reviewed with the resident and is up to date.  The return visit should be with PCP as previously scheduled  Josette Ocasio MD, [address] [place] [address] Internal Medicine Associate & [facility]  Associate  Department of Internal Medicine  Internal Medicine Clerkship - Cindy Reyes  2/11/2020, 5:01 PM
Visit Information    Have you changed or started any medications since your last visit including any over-the-counter medicines, vitamins, or herbal medicines? no   Have you stopped taking any of your medications? Is so, why? -  no  Are you having any side effects from any of your medications? - no    Have you seen any other physician or provider since your last visit?  no   Have you had any other diagnostic tests since your last visit?  no   Have you been seen in the emergency room and/or had an admission in a hospital since we last saw you?  yes [facility]   Have you had your routine dental cleaning in the past 6 months?  no     Do you have an active MyChart account? If no, what is the barrier?   No:     Patient Care Team:  Carlos Farmer MD as PCP - Sonu Menchaca MD as PCP - [facility]  Kelley Downs MD as Consulting Physician (Urology)  Sheela Langley MD as Consulting Physician (Gastroenterology)  Carlos Farmer MD (Internal Medicine)  [address] Dialysis (Dialysis)  Jonathan Meza MD as Surgeon (General Surgery)    Medical History Review  Past Medical, Family, and Social History reviewed and does contribute to the patient presenting condition    Health Maintenance   Topic Date Due    Shingles Vaccine (1 of 2) 09/09/1993    Lipid screen  04/26/2019    Annual Wellness Visit (AWV)  05/29/2019    Flu vaccine (1) 09/01/2019    Hepatitis B vaccine (3 of 3 - Risk 3-dose series) 03/18/2020 (Originally 9/27/2016)    DTaP/Tdap/Td vaccine (1 - Tdap) 02/04/2021 (Originally 9/9/1956)    Pneumococcal 65+ yrs at Risk Vaccine  Completed    Hepatitis A vaccine  Aged Out    Hib vaccine  Aged Out    Meningococcal (ACWY) vaccine  Aged Out
FINDINGS:    CBC:  Lab Results   Component Value Date    WBC 4.5 10/05/2018    HGB 10.3 10/05/2018     04/04/2019    PLT Platelet clumps present, count appears adequate. 09/16/2011       BMP:    Lab Results   Component Value Date     10/05/2018    K 4.9 12/20/2018     10/05/2018    CO2 25 10/05/2018    BUN 51 10/05/2018    CREATININE 12.47 10/05/2018    GLUCOSE 109 04/11/2019    GLUCOSE 87 09/16/2011       HEMOGLOBIN A1C:   Lab Results   Component Value Date    LABA1C 5.1 04/11/2019       FASTING LIPID PANEL:  Lab Results   Component Value Date    CHOL 173 04/26/2018    HDL 45 04/26/2018    TRIG 87 04/26/2018       ASSESSMENT AND PLAN:    There are no diagnoses linked to this encounter. 1. Seizures ([facility]  History of intracranial hemorrhage  Off medications since one year, no episode of seizure since then      2. Type 2 diabetes mellitus with chronic kidney disease on chronic dialysis, without long-term current use of insulin (HCC)  not taking any medications  Last Hba1c (4/11/2019) 5.1      3. Chronic cough  Tessalon 100 mg TID  Flonase nasal spray daily        4. ESRD (end stage renal disease) on dialysis ([facility]  On hemodialysis MWF     5. History of CAD and cerebral artery occlusion with cerebral infarctions  Aspirin 81 mg   Atorvastatin 40 mg       FOLLOW UP AND INSTRUCTIONS:   · No follow-ups on file. · Brittani Bertin received counseling on the following healthy behaviors: medication adherence    · Discussed use, benefit, and side effects of prescribed medications. Barriers to medication compliance addressed. All patient questions answered. Pt voiced understanding.      · Patient given educational materials - see patient instructions    Dinorah Nayak MD  PGY-1  [facility]  2/11/2020, 4:33 PM

## 2020-03-25 PROBLEM — E78.5 HYPERLIPIDEMIA: Status: RESOLVED | Noted: 2020-03-25 | Resolved: 2020-03-24

## 2020-03-25 PROBLEM — I10 HYPERTENSION: Status: RESOLVED | Noted: 2020-03-25 | Resolved: 2020-03-24

## 2020-04-20 ENCOUNTER — TELEPHONE (OUTPATIENT)
Dept: INTERNAL MEDICINE | Age: 75
End: 2020-04-20

## 2020-08-06 ENCOUNTER — TELEPHONE (OUTPATIENT)
Dept: INTERNAL MEDICINE | Age: 75
End: 2020-08-06

## 2020-08-06 NOTE — LETTER
[address]  [address] [address]  Phone: [phone]  Fax: [phone]    Destini Vang MD        August 6, 2020    [address] [place] [address]      Dear Kiah Camarena: We are sending this letter because your PCP ordered [facility] for you to have done at your last visit here and they have not yet been completed. If you can please come to our office on the 2nd floor to  your orders to have them compelted. If you do not have a follow-up appointment scheduled you can either contact the office to make an appointment with us or you can make one when you come in to pick-up your orders. If you have any questions or concerns, please don't hesitate to call.     Sincerely,        Destini Vang MD

## 2020-09-30 ENCOUNTER — PREP FOR PROCEDURE (OUTPATIENT)
Dept: GENERAL RADIOLOGY | Age: 75
End: 2020-09-30

## 2020-09-30 RX ORDER — SODIUM CHLORIDE 9 MG/ML
INJECTION, SOLUTION INTRAVENOUS CONTINUOUS
Status: CANCELLED | OUTPATIENT
Start: 2020-09-30

## 2020-10-01 ENCOUNTER — HOSPITAL ENCOUNTER (OUTPATIENT)
Dept: INTERVENTIONAL RADIOLOGY/VASCULAR | Age: 75
Discharge: HOME | End: 2020-10-03
Payer: MEDICARE

## 2020-10-01 VITALS
RESPIRATION RATE: 16 BRPM | WEIGHT: 220 LBS | TEMPERATURE: 96.9 F | HEIGHT: 74 IN | BODY MASS INDEX: 28.23 KG/M2 | DIASTOLIC BLOOD PRESSURE: 94 MMHG | OXYGEN SATURATION: 98 % | HEART RATE: 77 BPM | SYSTOLIC BLOOD PRESSURE: 178 MMHG

## 2020-10-01 LAB
INR BLD: 1.1
PARTIAL THROMBOPLASTIN TIME: 29.4 SEC (ref 20.5–30.5)
PLATELET # BLD: 200 K/UL (ref 138–453)
POTASSIUM SERPL-SCNC: 4.8 MMOL/L (ref 3.7–5.3)
PROTHROMBIN TIME: 11.2 SEC (ref 9–12)

## 2020-10-01 PROCEDURE — 85049 AUTOMATED PLATELET COUNT: CPT

## 2020-10-01 PROCEDURE — 85610 PROTHROMBIN TIME: CPT

## 2020-10-01 PROCEDURE — 7100000010 HC PHASE II RECOVERY - FIRST 15 MIN

## 2020-10-01 PROCEDURE — C1894 INTRO/SHEATH, NON-LASER: HCPCS

## 2020-10-01 PROCEDURE — 76937 US GUIDE VASCULAR ACCESS: CPT

## 2020-10-01 PROCEDURE — 6360000002 HC RX W HCPCS: Performed by: RADIOLOGY

## 2020-10-01 PROCEDURE — 36902 INTRO CATH DIALYSIS CIRCUIT: CPT

## 2020-10-01 PROCEDURE — 84132 ASSAY OF SERUM POTASSIUM: CPT

## 2020-10-01 PROCEDURE — 85730 THROMBOPLASTIN TIME PARTIAL: CPT

## 2020-10-01 PROCEDURE — 7100000011 HC PHASE II RECOVERY - ADDTL 15 MIN

## 2020-10-01 PROCEDURE — 6360000004 HC RX CONTRAST MEDICATION: Performed by: INTERNAL MEDICINE

## 2020-10-01 PROCEDURE — 2709999900 HC NON-CHARGEABLE SUPPLY

## 2020-10-01 RX ORDER — ACETAMINOPHEN 325 MG/1
650 TABLET ORAL EVERY 4 HOURS PRN
Status: DISCONTINUED | OUTPATIENT
Start: 2020-10-01 | End: 2020-10-04 | Stop reason: HOSPADM

## 2020-10-01 RX ORDER — SODIUM CHLORIDE 9 MG/ML
INJECTION, SOLUTION INTRAVENOUS CONTINUOUS
Status: DISCONTINUED | OUTPATIENT
Start: 2020-10-01 | End: 2020-10-04 | Stop reason: HOSPADM

## 2020-10-01 RX ORDER — MIDAZOLAM HYDROCHLORIDE 1 MG/ML
INJECTION INTRAMUSCULAR; INTRAVENOUS
Status: COMPLETED | OUTPATIENT
Start: 2020-10-01 | End: 2020-10-01

## 2020-10-01 RX ORDER — FENTANYL CITRATE 50 UG/ML
INJECTION, SOLUTION INTRAMUSCULAR; INTRAVENOUS
Status: COMPLETED | OUTPATIENT
Start: 2020-10-01 | End: 2020-10-01

## 2020-10-01 RX ADMIN — MIDAZOLAM HYDROCHLORIDE 1 MG: 1 INJECTION, SOLUTION INTRAMUSCULAR; INTRAVENOUS at 11:23

## 2020-10-01 RX ADMIN — FENTANYL CITRATE 50 MCG: 50 INJECTION INTRAMUSCULAR; INTRAVENOUS at 11:23

## 2020-10-01 RX ADMIN — IOVERSOL 50 ML: 741 INJECTION INTRA-ARTERIAL; INTRAVENOUS at 12:02

## 2020-10-01 ASSESSMENT — PAIN SCALES - GENERAL: PAINLEVEL_OUTOF10: 0

## 2020-10-01 ASSESSMENT — PAIN - FUNCTIONAL ASSESSMENT: PAIN_FUNCTIONAL_ASSESSMENT: 0-10

## 2020-10-01 NOTE — BRIEF OP NOTE
Brief Postoperative Note    Josie White  YOB: 1945  [id number]    Pre-operative Diagnosis: Poorly Functioning right    Post-operative Diagnosis: Same    Procedure: Percutaneous Access right AVF    Anesthesia: MAC    Surgeons/Assistants: Emily Rajput MD and FAIZAN Krishnamurthy    Estimated Blood Loss: Minimal    Complications: none    Specimens: were not obtained    Fistula stenosis with angioplasty. Chronic occlusion of venous outflow cephalic vein unable to perform angioplasty. Central occlusion also noted. Recommend continue using fistula. Eventually may need consideration for new fistula.      Roni Pascal PA-C10/1/2020 11:44 AM

## 2020-10-01 NOTE — POST SEDATION
Sedation Post Procedure Note    Patient Name: Ying Cardoza   YOB: 1945  Room/Bed: Room/bed info not found  Medical Record Number: [id number]  Date: 10/1/2020   Time: 11:43 AM         Physicians/Assistants: FAIZAN Gudino    Procedure Performed:  Fistulagram    Post-Sedation Vital Signs:  Vitals:    10/01/20 1134   BP: (!) 159/83   Pulse: 76   Resp: 18   Temp:    SpO2: 95%      Vital signs were reviewed and were stable after the procedure (see flow sheet for vitals)            Post-Sedation Exam: Pt remains stable           Complications: none    Electronically signed by FAIZAN Marin on 10/1/2020 at 11:43 AM

## 2020-10-01 NOTE — PROGRESS NOTES
Right arm av fistula band aid clean, dry, intact. Right arm warm to touch, right hand warm to touch and right fingers cool to touch. Capillary refill less 3 secondsNoted edema RUE. Positive thrill and bruit. No c/o pain.

## 2020-10-01 NOTE — PROGRESS NOTES
Patient returned to PES from IR. Awake w/o c/o pain Right av Fistula with clean, dry, intact band aid. Right arm and hand warm to touch. Right hand fingers cool to touch. Positive bruit and thrill pulse noted.

## 2020-10-01 NOTE — PROGRESS NOTES
Right av Fistula arm edematous, arm and hand warm to touch, capillary refill less 3 seconds. Fingers to right hand cool touch. Patient denies c/o pain at this time.  Natacha ROD

## 2020-10-01 NOTE — PRE SEDATION
Sedation Pre-Procedure Note    Patient Name: Noé Arredondo   YOB: 1945  Room/Bed: Room/bed info not found  Medical Record Number: [id number]  Date: 10/1/2020   Time: 10:55 AM       Indication:  Fistulagram    Consent: I have discussed with the patient and/or the patient representative the indication, alternatives, and the possible risks and/or complications of the planned procedure and the anesthesia methods. The patient and/or patient representative appear to understand and agree to proceed. Vital Signs:   Vitals:    10/01/20 0919   BP: (!) 175/91   Pulse: 76   Resp: 16   Temp: 96.9 °F (36.1 °C)   SpO2: 96%       Past Medical History:   has a past medical history of Allergic rhinitis, Anemia, BPH (benign prostatic hyperplasia), CAD (coronary artery disease), Carotid artery stenosis, Cerebrovascular disease, Diabetes mellitus ([address] Eczema, ESRD (end stage renal disease) on dialysis ([address] Full dentures, GERD (gastroesophageal reflux disease), Gout, Hemodialysis patient ([address] Hyperlipidemia, Hypertension, Neuropathy, Osteoarthritis, Peripheral vascular disease ([address] Seizures ([address] Stroke ([address] and Unspecified cerebral artery occlusion with cerebral infarction. Past Surgical History:   has a past surgical history that includes Bladder surgery (Spring 2014); sinus surgery; Cystocopy (2008); Inguinal hernia repair (Right); Kidney biopsy (May 2013); Upper gastrointestinal endoscopy (1/28/2015); Colonoscopy (2008); Colonoscopy (1/28/2015); Carotid endarterectomy (2015); Abdomen surgery (2016); Dialysis fistula creation (Right, 08/25/2016); Tunneled venous port placement (Right, 10/2015); Abdomen surgery (05/26/2016); Colonoscopy (5/10/2018); and Upper gastrointestinal endoscopy (N/A, 11/1/2018). Medications:   Scheduled Meds:   Continuous Infusions:    sodium chloride       PRN Meds:   Home Meds:   Prior to Admission medications    Medication Sig Start Date End Date Taking?  Authorizing Provider intravenously    Patient is an appropriate candidate for plan of sedation: yes    Electronically signed by FAIZAN Degroot on 10/1/2020 at 10:55 AM

## 2020-10-01 NOTE — H&P
History and Physical    Pt Name: Hannah Oquendo  MRN: [id number]  YOB: 1945  Date of evaluation: 10/1/2020  Primary Care Physician: Clayton Wasserman MD    SUBJECTIVE:   History of Chief Complaint:    Hannah Oquendo is a 66 y.o. male who is scheduled today for fistulagram. He complains of swelling to left arm. He reports history of hemodialysis for approximately 4 years. He goes on M,W, F.   Allergies  has No Known Allergies. Medications  Prior to Admission medications    Medication Sig Start Date End Date Taking? Authorizing Provider   fluticasone (FLONASE) 50 MCG/ACT nasal spray 1 spray by Each Nostril route daily 1 Spray in each nostril 9/5/19  Yes Clayton Wasserman MD   atorvastatin (LIPITOR) 40 MG tablet Take 1 tablet by mouth nightly 9/5/19  Yes Clayton Wasserman MD   folic acid (FOLVITE) 1 MG tablet Take 1 tablet by mouth daily 9/5/19  Yes Clayton Wasserman MD   phenytoin (DILANTIN) 100 MG ER capsule TAKE 2 CAPSULES BY MOUTH IN THE MORNING AND TAKE 3 CAPSULES BY MOUTH AT NIGHT AS DIRECTED 9/5/19  Yes Clayton Wasserman MD   allopurinol (ZYLOPRIM) 100 MG tablet take 1 tablet by mouth once daily 4/26/18  Yes Clayton Wasserman MD   calcium acetate (PHOSLO) 667 MG capsule Take 3 capsules by mouth 3 times daily (with meals) 1/26/18  Yes Clayton Wasserman MD   azithromycin (ZITHROMAX) 250 MG tablet Take 2 tabs (500 mg) on Day 1, and take 1 tab (250 mg) on days 2 through 5. 2/4/20   James Dsouza MD   RENVELA 800 MG tablet Take 800 mg by mouth 3 times daily 2/28/19   Historical Provider, MD   Multiple Vitamins-Minerals (RENAPLEX-D PO) Take by mouth    Historical Provider, MD   cinacalcet (SENSIPAR) 30 MG tablet Take 30 mg by mouth daily    Historical Provider, MD   hydrocortisone (ANUSOL-HC) 2.5 % rectal cream Place rectally 2 times daily.  1/25/18   Clayton Wasserman MD     Past Medical History    has a past medical history of Allergic rhinitis, Anemia, BPH (benign prostatic hyperplasia), CAD (coronary artery disease), Carotid artery stenosis, Cerebrovascular disease, Diabetes mellitus ([address] Eczema, ESRD (end stage renal disease) on dialysis ([address] Full dentures, GERD (gastroesophageal reflux disease), Gout, Hemodialysis patient ([address] Hyperlipidemia, Hypertension, Neuropathy, Osteoarthritis, Peripheral vascular disease ([address] Seizures ([address] Stroke ([address] and Unspecified cerebral artery occlusion with cerebral infarction. Past Surgical History   has a past surgical history that includes Bladder surgery (Spring 2014); sinus surgery; Cystocopy (); Inguinal hernia repair (Right); Kidney biopsy (May 2013); Upper gastrointestinal endoscopy (2015); Colonoscopy (); Colonoscopy (2015); Carotid endarterectomy (); Abdomen surgery (); Dialysis fistula creation (Right, 2016); Tunneled venous port placement (Right, 10/2015); Abdomen surgery (2016); Colonoscopy (5/10/2018); and Upper gastrointestinal endoscopy (N/A, 2018). Social History   reports that he has never smoked. He has never used smokeless tobacco.   reports no history of alcohol use. reports no history of drug use. Family History  Family Status   Relation Name Status    Mother Lorrie Snow        at age 80    Father Leandrew Sandra     Sister [facility] Al[facility]    [facility]      F      [facility]      [facility]     [facility] Brother RENZO Alive    Sister Kristi Plascencia     family history includes Anesth Problems in his mother; Cancer in his brother and mother; Diabetes in his mother and sister; High Blood Pressure in his sister; Kidney Disease in his mother; Other in his father. OBJECTIVE:   VITALS:  height is 6' 2\" (1.88 m) and weight is 220 lb (99.8 kg). His infrared temperature is 96.9 °F (36.1 °C). His blood pressure is 175/91 (abnormal) and his pulse is 76. His respiration is 16 and oxygen saturation is 96%. CONSTITUTIONAL:Alert and orientated to person, place and time.  No acute distress. Friendly. Quiet, sleepy. SKIN:  Warm & dry, no rashes on exposed skin  HEENT: HEAD: Normocephalic, atraumatic        EYES:  PERRL, EOMs intact, conjunctiva clear, arcus senilis      EARS:  Equal bilaterally, no edema or thickening, skin is intact without lumps or lesions. No discharge. NOSE:  Nares patent, septum midline, no rhinorrhea      MOUTH/THROAT:  Mucous membranes moist, tongue is pink, uvula midline, teeth appear to be intact  NECK:  Supple, no lymphadenopathy, full ROM  LUNGS: Respirations even and non-labored. Clear to auscultation bilaterally, no wheezes/rales/rhonchi   CARDIOVASCULAR: regular rate and rhythm, no murmurs/rubs/gallops   ABDOMEN: soft, non-tender, non-distended, bowel sounds active x 4   MUSCULOSKELETAL: Full ROM bilateral upper extremities, Full ROM bilateral lower extremities. Strength of 5/5 bilateral upper extremities. Strength 5/5 bilateral lower extremities. VASCULAR:  Brisk cap refill bilateral fingers. Radial pulses are intact, 2+ bilaterally. Dorsalis pedis pulse 2+ bilaterally. No edema or varicosities bilateral lower extremities. Right Fistula +thrill and bruit. NEUROLOGIC: CN II-XII are grossly intact. Gait not assessed. IMPRESSIONS:   1.  ESRD      Diagnosis Date    Allergic rhinitis     Anemia     BPH (benign prostatic hyperplasia)     CAD (coronary artery disease)     Carotid artery stenosis 2013    ulcerated plaque in left ICA 60 - 70% on carotid angiogram    Cerebrovascular disease 2013    left frontal hemorrhage    Diabetes mellitus ([address] 2013    Eczema     ESRD (end stage renal disease) on dialysis ([address]     Full dentures     Upper / Lower    GERD (gastroesophageal reflux disease)     Gout     Hemodialysis patient ([address] 10/2015     [facility] [address]  M,W F    Hyperlipidemia     Hypertension     Neuropathy     Osteoarthritis     Peripheral vascular disease ([address]     Seizures ([address] 2013    after stroke, LAST [id number]/20/2016    Stroke [facility]) 2013    Unspecified cerebral artery occlusion with cerebral infarction 4-    speech short term and lt side   2.    PLANS:   Dilma MCNEILL  Electronically signed 10/1/2020 at 10:09 AM

## 2020-10-01 NOTE — PROGRESS NOTES
RETURNED POST IR  WITH STRONG   PULSATION AT FISTULA SITE AWAKE ALERT DENIES PAIN SKIN WARM ONE BAND AID SITE  TO R ARM FISTULA SITE

## 2021-02-02 ENCOUNTER — OFFICE VISIT (OUTPATIENT)
Dept: INTERNAL MEDICINE | Age: 76
End: 2021-02-02
Payer: MEDICARE

## 2021-02-02 VITALS — SYSTOLIC BLOOD PRESSURE: 165 MMHG | DIASTOLIC BLOOD PRESSURE: 96 MMHG | HEART RATE: 77 BPM | TEMPERATURE: 98.1 F

## 2021-02-02 DIAGNOSIS — I10 ESSENTIAL HYPERTENSION: ICD-10-CM

## 2021-02-02 DIAGNOSIS — Z99.2 ANEMIA DUE TO CHRONIC KIDNEY DISEASE, ON CHRONIC DIALYSIS (HCC): Chronic | ICD-10-CM

## 2021-02-02 DIAGNOSIS — N18.6 ESRD (END STAGE RENAL DISEASE) ON DIALYSIS (HCC): ICD-10-CM

## 2021-02-02 DIAGNOSIS — D17.1 LIPOMA OF BACK: ICD-10-CM

## 2021-02-02 DIAGNOSIS — N40.0 BENIGN PROSTATIC HYPERPLASIA, UNSPECIFIED WHETHER LOWER URINARY TRACT SYMPTOMS PRESENT: ICD-10-CM

## 2021-02-02 DIAGNOSIS — E78.00 PURE HYPERCHOLESTEROLEMIA: ICD-10-CM

## 2021-02-02 DIAGNOSIS — N18.6 ANEMIA DUE TO CHRONIC KIDNEY DISEASE, ON CHRONIC DIALYSIS (HCC): Chronic | ICD-10-CM

## 2021-02-02 DIAGNOSIS — G40.909 SEIZURE DISORDER, SECONDARY (HCC): ICD-10-CM

## 2021-02-02 DIAGNOSIS — M79.89 SWELLING OF RIGHT UPPER EXTREMITY: Primary | ICD-10-CM

## 2021-02-02 DIAGNOSIS — E11.22 TYPE 2 DIABETES MELLITUS WITH CHRONIC KIDNEY DISEASE ON CHRONIC DIALYSIS, WITHOUT LONG-TERM CURRENT USE OF INSULIN (HCC): ICD-10-CM

## 2021-02-02 DIAGNOSIS — Z99.2 TYPE 2 DIABETES MELLITUS WITH CHRONIC KIDNEY DISEASE ON CHRONIC DIALYSIS, WITHOUT LONG-TERM CURRENT USE OF INSULIN (HCC): ICD-10-CM

## 2021-02-02 DIAGNOSIS — Z99.2 ESRD (END STAGE RENAL DISEASE) ON DIALYSIS (HCC): ICD-10-CM

## 2021-02-02 DIAGNOSIS — D63.1 ANEMIA DUE TO CHRONIC KIDNEY DISEASE, ON CHRONIC DIALYSIS (HCC): Chronic | ICD-10-CM

## 2021-02-02 DIAGNOSIS — I65.22 ASYMPTOMATIC STENOSIS OF LEFT CAROTID ARTERY: ICD-10-CM

## 2021-02-02 DIAGNOSIS — N18.6 TYPE 2 DIABETES MELLITUS WITH CHRONIC KIDNEY DISEASE ON CHRONIC DIALYSIS, WITHOUT LONG-TERM CURRENT USE OF INSULIN (HCC): ICD-10-CM

## 2021-02-02 DIAGNOSIS — B36.0 TINEA VERSICOLOR: ICD-10-CM

## 2021-02-02 PROCEDURE — 99214 OFFICE O/P EST MOD 30 MIN: CPT | Performed by: INTERNAL MEDICINE

## 2021-02-02 PROCEDURE — 99211 OFF/OP EST MAY X REQ PHY/QHP: CPT | Performed by: INTERNAL MEDICINE

## 2021-02-02 RX ORDER — FOLIC ACID 1 MG/1
1 TABLET ORAL DAILY
Qty: 30 TABLET | Refills: 3 | Status: SHIPPED | OUTPATIENT
Start: 2021-02-02 | End: 2021-09-16

## 2021-02-02 RX ORDER — SEVELAMER CARBONATE 800 MG/1
800 TABLET, FILM COATED ORAL 3 TIMES DAILY
Qty: 90 TABLET | Refills: 1 | Status: ON HOLD
Start: 2021-02-02 | End: 2021-07-27 | Stop reason: HOSPADM

## 2021-02-02 RX ORDER — AMLODIPINE BESYLATE 10 MG/1
10 TABLET ORAL DAILY
Qty: 30 TABLET | Refills: 3 | Status: ON HOLD
Start: 2021-02-02 | End: 2021-07-27 | Stop reason: HOSPADM

## 2021-02-02 RX ORDER — ATORVASTATIN CALCIUM 40 MG/1
40 TABLET, FILM COATED ORAL NIGHTLY
Qty: 30 TABLET | Refills: 3 | Status: SHIPPED | OUTPATIENT
Start: 2021-02-02 | End: 2021-09-16 | Stop reason: ALTCHOICE

## 2021-02-02 RX ORDER — CICLOPIROX 7.7 MG/G
GEL TOPICAL
Qty: 30 G | Refills: 3 | Status: SHIPPED | OUTPATIENT
Start: 2021-02-02 | End: 2021-09-16

## 2021-02-02 SDOH — ECONOMIC STABILITY: TRANSPORTATION INSECURITY
IN THE PAST 12 MONTHS, HAS THE LACK OF TRANSPORTATION KEPT YOU FROM MEDICAL APPOINTMENTS OR FROM GETTING MEDICATIONS?: NOT ASKED

## 2021-02-02 SDOH — ECONOMIC STABILITY: TRANSPORTATION INSECURITY
IN THE PAST 12 MONTHS, HAS LACK OF TRANSPORTATION KEPT YOU FROM MEETINGS, WORK, OR FROM GETTING THINGS NEEDED FOR DAILY LIVING?: NOT ASKED

## 2021-02-02 SDOH — ECONOMIC STABILITY: INCOME INSECURITY: HOW HARD IS IT FOR YOU TO PAY FOR THE VERY BASICS LIKE FOOD, HOUSING, MEDICAL CARE, AND HEATING?: NOT HARD AT ALL

## 2021-02-02 ASSESSMENT — ENCOUNTER SYMPTOMS
COUGH: 0
PHOTOPHOBIA: 0
SHORTNESS OF BREATH: 0
BACK PAIN: 0
CONSTIPATION: 0
ABDOMINAL PAIN: 0
WHEEZING: 0

## 2021-02-02 ASSESSMENT — PATIENT HEALTH QUESTIONNAIRE - PHQ9
SUM OF ALL RESPONSES TO PHQ QUESTIONS 1-9: 1
SUM OF ALL RESPONSES TO PHQ9 QUESTIONS 1 & 2: 1
2. FEELING DOWN, DEPRESSED OR HOPELESS: 1
SUM OF ALL RESPONSES TO PHQ QUESTIONS 1-9: 1

## 2021-02-02 NOTE — PATIENT INSTRUCTIONS
-Pt due for 6 month f/u in August-- pt to call in July to set up an appt--reminder in [facility] to contact patient as well--AVS given to patient    -Norvasc increased to 10 mg daily, 5 mg dc'ed at [address]     -Referral to General surgery given to patient with number to call, patient will call to schedule.      22 Cata Ram MD   [address] [facility]   [phone]    Juan M Austin Cyclosporine Counseling:  I discussed with the patient the risks of cyclosporine including but not limited to hypertension, gingival hyperplasia,myelosuppression, immunosuppression, liver damage, kidney damage, neurotoxicity, lymphoma, and serious infections. The patient understands that monitoring is required including baseline blood pressure, CBC, CMP, lipid panel and uric acid, and then 1-2 times monthly CMP and blood pressure.

## 2021-02-02 NOTE — PROGRESS NOTES
@[facility]@    [facility]    Progress Note    Date of patient's visit: 2/2/2021    Patient's Name:  Maribeth Mcpherson    YOB: 1945            Patient Care Team:  Rebecca Landon MD as PCP - Julia Nunn MD as PCP - [address] [place] [address] Dr Mario Provider  Tracie Sotelo MD as Consulting Physician (Urology)  Carolyn Banuelos MD as Consulting Physician (Gastroenterology)  Rebecca Landon MD (Internal Medicine)  [address] [facility] (Dialysis)  Kumar Polk MD as Surgeon (General Surgery)    REASON FOR VISIT: Routine outpatient follow     Chief Complaint   Patient presents with    Diabetes    Hypertension    Urinary Retention     Pt states that he is not able to urinate at all     Skin Problem     pt has dry patches on his skin and a Lump on the R side of his back    Edema     Pts has R arm swelling     Health Maintenance     AWV scheduled,          HISTORY OF PRESENT ILLNESS:    History was obtained from the patient. Maribeth Mcpherson is a 66 y.o. is here for follow-up after a full year. He did not really bring any medications with him. Blood pressure is high. Apparently he is taking Norvasc 5 mg daily. per pharmacy it was picked up in January of this year. Prior to this he had not taken any medication since last February per pharmacy. He is no longer taking phenytoin or statins. He goes to dialysis. Notes from them were obtained. His blood pressure has been running slightly elevated. He has a some swelling of the right upper extremity but it appears more of varicose veins and his fistula which is engorged and varicosed. He had a recent fistulogram done. He follows up with vascular surgery next month. He has a previous history of carotid artery disease and follows up with vascular surgery for this. He had a recent fistulogram of his right arm and carotid artery ultrasounds done. He is complaining of a rash that he has had on his skin for a long time. Is not itchy. He says he was using some cream but does not remember what he was using. No seizures per patient. He has not taken phenytoin in at least 2 years. He has not followed up with neurology. He went to dialysis yesterday. He denies any shortness of breath or leg edema. He is now anuric. He has history of BPH but currently not having any problems as he has no urine production. He brought in some labs that were done recently. He has a large lump on his  Right upper back that he wants me to evaluate. Patient has a history of memory deficit since his stroke several years ago. He comes in alone though his family has been advised several times that he needs to come in with either his wife or daughter.         Right arm fistulogram 2021  IMPRESSION:      1.  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    2.  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    3.  Arterial anastomosis of the radiocephalic fistula is widely patent.    4.  Right radiocephalic fistula is patent with the 2 moderate aneurysmal dilatation.    5.  No intervention was performed given the chronic central venous occlusion with large dilated venous collaterals.        MEDICATIONS    Prescribed Medications for Dialysis Treatments  Prescribed Medications for Dialysis Treatments   Medication Instructions Dosage Route Start Date End Date Status   Doxercalciferol (Hectorol) Every Treatment 9 mcg Intravenous - push December 16, 2020 December 15, 2021 Active   Etelcalcetide [facility], 2X Week 7.5 mg Intravenous - push February 28, 2020 February 22, 2021 Active Heparin Sodium (Porcine) 1,000 Units/mL Systemic Every Treatment 3000 units Intravenous - push October 09, 2020 October 08, 2021 Active   Mircera Every 2 weeks 75 mcg Intravenous - push January 15, 2021 January 14, 2022 Active   Mircera Every 2 weeks 100 mcg Intravenous - push December 18, 2020 December 17, 2021 Discontinued     Home Medications  Home Medications   Medication Instructions Dosage Route Start Date End Date Status   aspirin 81 mg Take by mouth once a day 1 tablet ORAL February 26, 2020   Active   atorvastatin 40 mg Take by mouth at bedtime 1 tablet ORAL August 31, 2020   Active   Flonase Allergy Relief 50 mcg/actuation Spray into both nostrils twice a day 1 spray NASAL February 12, 6498   Active   folic acid 1 mg Take by mouth once a day 1 mg ORAL February 26, 2020   Active   Norvasc 5 mg Take by mouth every evening 1 tablet ORAL September 25, 2020   Active   Renalplex D Take as directed once a day 1 tablet as directed July 11, 2018   Active   Renvela 800 mg Take by mouth three times a day with meals 2 tablet ORAL January 29, 2021   Active   Auryxia 210 mg iron Take by mouth three times a day with meals 2 tablet ORAL December 13, 2019 January 29, 2021 Discontinued     VITAL SIGNS    Post-Treatment Vital Signs  Post-Treatment Vital Signs   Vital Sign Value Date / Time   Blood Pressure-sitting 103/82 mmHg February 01, 2021 10:34 AM   Blood Pressure-standing 120/69 mmHg February 01, 2021 10:34 AM   Heart Rate 61 beats per minute February 01, 2021 10:34 AM   Respiratory Rate 15 breaths per minute February 01, 2021 10:34 AM   Temperature 98.2 deg.  F February 01, 2021 10:34 AM     Weight  Weight   Vital Sign Value Date / Time   Pre-Dialysis 104.90 kg February 01, 2021 10:34 AM   Post-Dialysis 102.10 kg February 01, 2021 10:34 AM     LAB RESULTS    Hematology  Hematology   Result Type Result Value Relevant Reference Range Interpretation Date HGB 10.5 g/dL Males: 14.0 - 18.0 g/dL Females: 12.0 - 16.0 g/dL  Low January 04, 2021   HGB 10.6 g/dL Males: 14.0 - 18.0 g/dL Females: 12.0 - 16.0 g/dL  Low January 11, 2021   RBC 3.64 mill/mcL Males: 4.70 - 6.10 mill/mcL Females: 4.20 - 5.40 mill/mcL  Low January 19, 2021   HGB 10.1 g/dL Males: 14.0 - 18.0 g/dL Females: 12.0 - 16.0 g/dL  Low January 19, 2021   WBC (No Diff) 3.06 1000/mcL 4.8-10.8 thous/mcL  Low January 19, 2021   HCT 34.8 % Males: 42 - 52% Females: 37 - 47%  Low January 19, 2021   Transferrin Sat.  (Calc) 55 % 20-55%  - January 19, 2021   UIBC 101 mcg/dL 155-355 mcg/dL  Low January 19, 2021   TIBC (Calc) 226 mcg/dL 185-515 mcg/dL  - January 19, 2021   Iron 125 mcg/dL Females:  mcg/dL Males:  mcg/dL  - January 19, 2021   HGB 10.5 g/dL Males: 14.0 - 18.0 g/dL Females: 12.0 - 16.0 g/dL  Low January 25, 1112     Metabolic/Renal  Metabolic/Renal   Result Type Result Value Relevant Reference Range Interpretation Date   BUN, Post 12 mg/dL 6-19 mg/dL  - January 04, 2021   URR, Calc 71 % 65 - 80%  - January 04, 2021   BUN 42 mg/dL 6-19 mg/dl High January 04, 2021   Bicarbonate 24 mEq/L 22-29 mEq/L  - January 19, 2021   Potassium 5.4 mEq/L 3.5-5.1 mEq/L  High January 19, 2021   Chloride 102 mEq/L  mEq/L  - January 19, 2021   Creatinine, Serum 14.97 mg/dL 0.6-1.3 mg/dL  High January 19, 2021   Sodium 141 mEq/L 136-145 mEq/L  - January 19, 2021     Bone/Mineral  Bone/Mineral   Result Type Result Value Relevant Reference Range Interpretation Date   Ca x P Product 46 < 55  - January 04, 2021   Phosphorus 5.2 mg/dL 2.6-4.5 mg/dL  High January 04, 2021   Calcium, Total 8.9 mg/dL 8.4-10.2 mg/dL  - January 04, 2021   Calcium, Total 9.4 mg/dL 8.4-10.2 mg/dL  - January 11, 2021   Calcium, Total 9.0 mg/dL 8.4-10.2 mg/dL  - January 19, 2021   Calcium, Total 9.5 mg/dL 8.4-10.2 mg/dL  - January 25, 2021     Liver/Nutrition  Liver/Nutrition Result Type Result Value Relevant Reference Range Interpretation Date   Albumin (BCG) 4.0 g/dL 3.5-5.2 g/dL - January 19, 2021     Infectious Diseases  Infectious Diseases   Result Type Result Value Relevant Reference Range Interpretation Date   Hep B Surface Ag (HBsAg) Negative Negative - November 16, 2020   HCV Ab (anti-HCV) Nonreactive Non-Reactive - November 16, 2020   Hep B Surface Ab (anti-HBs) 90 mIU/mL < 10 mIU/mL,       2020 February  Vas carotid duplex bilateral2/19/2020  [address] [place] [address]  Other Result Information   This result has an attachment that is not available. Result Narrative   Previous: Previous carotid duplex exam performed 1/29/2019: RIGHT: <50% ICA stenosis. Antegrade vertebral artery flow. LEFT: <50% ICA stenosis. Antegrade vertebral artery flow. Carotid bulb is slightly ectatic. Right: Plaque with no significant ICA spectral Doppler or color flow disturbances; ICA 68/22 cm/sec. Antegrade vertebral artery flow. Left: Plaque with no significant ICA spectral Doppler or color flow disturbances; ICA 96/16 cm/sec. Antegrade vertebral artery flow. Conclusions: BILATERAL:  Plaque without significant stenosis (<50%) of the internal carotid artery.  Antegrade vertebral artery flow. When compared to previous report no significant changes were noted. Recommendations: Any questions prior to finalization, please call the reading physician during normal business hours at the phone number beside their name.           Past Medical History:   Diagnosis Date    Allergic rhinitis     Anemia     BPH (benign prostatic hyperplasia)     CAD (coronary artery disease)     Carotid artery stenosis 2013    ulcerated plaque in left ICA 60 - 70% on carotid angiogram    Cerebrovascular disease 2013    left frontal hemorrhage    Diabetes mellitus ([address] 2013    Eczema     ESRD (end stage renal disease) on dialysis ([address]     Full dentures     Upper / Lower    GERD (gastroesophageal reflux disease)  Gout     Hemod[facility] ([facility] 10/2015    [facility] [address]  M,W F    Hyperlipidemia     Hypertension     Neuropathy     Osteoarthritis     Peripheral vascular disease ([facility]     Seizures ([facility] [facility] 2013    after stroke, LAST [id number]/20/2016    Stroke [facility]) 2013    Unspecified cerebral artery occlusion with cerebral infarction 4-    speech short term and lt side       Past Surgical History:   Procedure Laterality Date    ABDOMEN SURGERY  2016    PERITONEAL CATHETER    ABDOMEN SURGERY  05/26/2016    pd catherter removed from abdomen    BLADDER SURGERY  Spring 2014    dilatated     CAROTID ENDARTERECTOMY  2015    left    COLONOSCOPY  2008    polypectomy, diverticulosis    COLONOSCOPY  1/28/2015    COLONOSCOPY  5/10/2018    COLONOSCOPY performed by Taz Stephenson MD at [address] [place] [address]  2008    bladder tumor removal    DIALYSIS FISTULA CREATION Right 08/25/2016    INGUINAL HERNIA REPAIR Right     KIDNEY BIOPSY  May 2013    SINUS SURGERY      TUNNELED VENOUS PORT PLACEMENT Right 10/2015    dialysis catheter- chest     UPPER GASTROINTESTINAL ENDOSCOPY  1/28/2015    UPPER GASTROINTESTINAL ENDOSCOPY N/A 11/1/2018    EGD BIOPSY performed by Hugo Cardoza DO at [facility]         ALLERGIES    No Known Allergies    MEDICATIONS:      Current Outpatient Medications on File Prior to Visit   Medication Sig Dispense Refill    fluticasone (FLONASE) 50 MCG/ACT nasal spray 1 spray by Each Nostril route daily 1 Spray in each nostril 2 Bottle 1    atorvastatin (LIPITOR) 40 MG tablet Take 1 tablet by mouth nightly 30 tablet 3    folic acid (FOLVITE) 1 MG tablet Take 1 tablet by mouth daily 30 tablet 3    phenytoin (DILANTIN) 100 MG ER capsule TAKE 2 CAPSULES BY MOUTH IN THE MORNING AND TAKE 3 CAPSULES BY MOUTH AT NIGHT AS DIRECTED 150 capsule 3    RENVELA 800 MG tablet Take 800 mg by mouth 3 times daily      Multiple Vitamins-Minerals (RENAPLEX-D PO) Take by mouth  allopurinol (ZYLOPRIM) 100 MG tablet take 1 tablet by mouth once daily 30 tablet 5    cinacalcet (SENSIPAR) 30 MG tablet Take 30 mg by mouth daily      calcium acetate (PHOSLO) 667 MG capsule Take 3 capsules by mouth 3 times daily (with meals) 60 capsule 0    hydrocortisone (ANUSOL-HC) 2.5 % rectal cream Place rectally 2 times daily. 15 g 2     No current facility-administered medications on file prior to visit. SOCIAL HISTORY    Reviewed and no change from previous record. Terrell Kussmaul  reports that he has never smoked. He has never used smokeless tobacco.    FAMILY HISTORY:    Reviewed and No change from previous visit    HEALTH MAINTENANCE DUE:      Health Maintenance Due   Topic Date Due    COVID-19 Vaccine (1 of 2) 09/09/1961    Lipid screen  04/26/2019    Annual Wellness Visit (AWV)  05/29/2019       REVIEW OF SYSTEMS:    12 point review of symptoms completed and found to be normal except noted in the HPI    Review of Systems   Constitutional: Negative for chills, fatigue and fever. Eyes: Positive for visual disturbance. Negative for photophobia. Respiratory: Negative for cough, shortness of breath and wheezing. Cardiovascular: Negative for chest pain, palpitations and leg swelling. Gastrointestinal: Negative for abdominal pain and constipation. Genitourinary:        Anuria   Musculoskeletal: Negative for arthralgias, back pain and joint swelling. Skin: Positive for rash. Neurological: Positive for numbness. Negative for dizziness, seizures, syncope, weakness and headaches. Hematological: Negative for adenopathy. Does not bruise/bleed easily. Psychiatric/Behavioral: Positive for confusion and dysphoric mood. Negative for sleep disturbance. The patient is not nervous/anxious.          PHYSICAL EXAM:     Vitals:    02/02/21 1309 02/02/21 1327   BP: (!) 163/95 (!) 165/96   Site: Left Upper Arm    Position: Sitting    Cuff Size: Large Adult    Pulse: 77    Temp: 98.1 °F (36.7 °C) TempSrc: Infrared      There is no height or weight on file to calculate BMI. BP Readings from Last 3 Encounters:   02/02/21 (!) 165/96   10/01/20 (!) 178/94   02/11/20 117/64        Wt Readings from Last 3 Encounters:   10/01/20 220 lb (99.8 kg)   02/11/20 239 lb 12.8 oz (108.8 kg)   02/04/20 239 lb (108.4 kg)       Physical Exam  Vitals signs and nursing note reviewed. Constitutional:       Appearance: Normal appearance. HENT:      Head: Normocephalic and atraumatic. Eyes:      General: No scleral icterus. Extraocular Movements: Extraocular movements intact. Conjunctiva/sclera: Conjunctivae normal.      Pupils: Pupils are equal, round, and reactive to light. Neck:      Musculoskeletal: Normal range of motion and neck supple. Cardiovascular:      Rate and Rhythm: Normal rate and regular rhythm. Comments: Varicosities on right chest wall and right upper arm. Engorged AV fistula. Bruit and thrill present  Pulmonary:      Effort: Pulmonary effort is normal.      Breath sounds: Normal breath sounds. No wheezing. Musculoskeletal:      Right lower leg: No edema. Left lower leg: No edema. Comments: Lipoma on right upper back   Skin:     Findings: Rash (tinea versicolor on arm and back) present. Neurological:      General: No focal deficit present. Mental Status: He is alert and oriented to person, place, and time.                LABORATORY FINDINGS:    CBC:  Lab Results   Component Value Date    WBC 4.5 10/05/2018    HGB 10.3 10/05/2018     10/01/2020    PLT Platelet clumps present, count appears adequate. 09/16/2011     BMP:    Lab Results   Component Value Date     10/05/2018    K 4.8 10/01/2020     10/05/2018    CO2 25 10/05/2018    BUN 51 10/05/2018    CREATININE 12.47 10/05/2018    GLUCOSE 109 04/11/2019    GLUCOSE 87 09/16/2011     HEMOGLOBIN A1C:   Lab Results   Component Value Date    LABA1C 5.1 04/11/2019     MICROALBUMIN URINE:   Lab Results Component Value Date    MICROALBUR 971 09/29/2015     FASTING LIPID [email or web address]  Lab Results   Component Value Date    LDLCHOLESTEROL 111 04/26/2018       LIVER PROFILE:  Lab Results   Component Value Date    ALT 18 10/05/2018    AST 17 10/05/2018    PROT 6.9 10/05/2018    PROT 7.6 10/17/2012    BILITOT 0.22 10/05/2018    BILIDIR 0.09 10/05/2018    LABALBU 4.1 10/05/2018    LABALBU 4.5 09/16/2011      THYROID FUNCTION:   Lab Results   Component Value Date    TSH 2.34 04/26/2018      URINEANALYSIS: No results found for: LABURIN  ASSESSMENT AND PLAN:    1. Swelling of right upper extremity  Varicose veins. No edema    2. Essential hypertension    - amLODIPine (NORVASC) 10 MG tablet; Take 1 tablet by mouth daily  Dispense: 30 tablet; Refill: 3    3. Tinea versicolor    - Ciclopirox (LOPROX) 0.77 % gel; 2/day to affected areas  Dispense: 30 g; Refill: 3    4. Lipoma of back    - AFL - Andre De Leon MD, General Surgery, [place]    5. ESRD (end stage renal disease) on dialysis ([facility]    - folic acid (FOLVITE) 1 MG tablet; Take 1 tablet by mouth daily  Dispense: 30 tablet; Refill: 3    6. Benign prostatic hyperplasia, unspecified whether lower urinary tract symptoms present  Anuric and asymptomatic  Not following up with Urology      7. Seizure disorder, secondary ([address]  Not on meds     8. Anemia due to chronic kidney disease, on chronic dialysis (HCC)  Stale  Iron from HD    9. Asymptomatic stenosis of left carotid artery      10. Type 2 diabetes mellitus with chronic kidney disease on chronic dialysis, without long-term current use of insulin (HCC)  Resolved    - atorvastatin (LIPITOR) 40 MG tablet; Take 1 tablet by mouth nightly  Dispense: 30 tablet; Refill: 3    11. Pure hypercholesterolemia    - atorvastatin (LIPITOR) 40 MG tablet; Take 1 tablet by mouth nightly  Dispense: 30 tablet; Refill: 3          FOLLOW UP AND INSTRUCTIONS:   Return in about 6 months (around 8/2/2021). 1. Orlando Sailors received counseling on the following healthy behaviors: nutrition, exercise and medication adherence    2. Reviewed prior labs and health maintenance. 3. Discussed use, benefit, and side effects of prescribed medications. Barriers to medication compliance addressed. All patient questions answered. Pt voiced understanding.      4. Patient given educational materials - see patient instructions    Janine Velez  Attending Physician, [address] [place] [address], Internal Medicine Residency Program  [address] [place] [address]  2/2/2021, 1:14 PM

## 2021-06-02 ENCOUNTER — TELEPHONE (OUTPATIENT)
Dept: INTERNAL MEDICINE | Age: 76
End: 2021-06-02

## 2021-07-12 ENCOUNTER — TELEPHONE (OUTPATIENT)
Dept: INTERNAL MEDICINE | Age: 76
End: 2021-07-12

## 2021-07-19 ENCOUNTER — HOSPITAL ENCOUNTER (INPATIENT)
Age: 76
LOS: 3 days | Discharge: LEFT BEFORE DISCHARGE | DRG: 280 | End: 2021-07-22
Attending: EMERGENCY MEDICINE | Admitting: INTERNAL MEDICINE
Payer: MEDICARE

## 2021-07-19 ENCOUNTER — APPOINTMENT (OUTPATIENT)
Dept: GENERAL RADIOLOGY | Age: 76
DRG: 280 | End: 2021-07-19
Payer: MEDICARE

## 2021-07-19 DIAGNOSIS — I10 ESSENTIAL HYPERTENSION: ICD-10-CM

## 2021-07-19 DIAGNOSIS — I47.29 NSVT (NONSUSTAINED VENTRICULAR TACHYCARDIA): ICD-10-CM

## 2021-07-19 DIAGNOSIS — R00.2 PALPITATIONS: Primary | ICD-10-CM

## 2021-07-19 PROBLEM — I47.1 NONSUSTAINED SUPRAVENTRICULAR TACHYCARDIA (HCC): Status: ACTIVE | Noted: 2021-07-19

## 2021-07-19 PROBLEM — I38 VALVULAR HEART DISEASE: Status: ACTIVE | Noted: 2021-07-19

## 2021-07-19 PROBLEM — B35.4 TINEA CORPORIS: Status: ACTIVE | Noted: 2021-07-19

## 2021-07-19 PROBLEM — I47.10 NONSUSTAINED SUPRAVENTRICULAR TACHYCARDIA: Status: ACTIVE | Noted: 2021-07-19

## 2021-07-19 LAB
ABSOLUTE EOS #: 0.1 K/UL (ref 0–0.44)
ABSOLUTE IMMATURE GRANULOCYTE: 0 K/UL (ref 0–0.3)
ABSOLUTE LYMPH #: 1.4 K/UL (ref 1.1–3.7)
ABSOLUTE MONO #: 0.52 K/UL (ref 0.1–1.2)
ANION GAP SERPL CALCULATED.3IONS-SCNC: 20 MMOL/L (ref 9–17)
BASOPHILS # BLD: 1 % (ref 0–2)
BASOPHILS ABSOLUTE: 0.05 K/UL (ref 0–0.2)
BNP INTERPRETATION: ABNORMAL
BUN BLDV-MCNC: 77 MG/DL (ref 8–23)
BUN/CREAT BLD: ABNORMAL (ref 9–20)
CALCIUM SERPL-MCNC: 9.8 MG/DL (ref 8.6–10.4)
CHLORIDE BLD-SCNC: 101 MMOL/L (ref 98–107)
CO2: 20 MMOL/L (ref 20–31)
CREAT SERPL-MCNC: 12.91 MG/DL (ref 0.7–1.2)
DIFFERENTIAL TYPE: ABNORMAL
EOSINOPHILS RELATIVE PERCENT: 2 % (ref 1–4)
GFR AFRICAN AMERICAN: 5 ML/MIN
GFR NON-AFRICAN AMERICAN: 4 ML/MIN
GFR SERPL CREATININE-BSD FRML MDRD: ABNORMAL ML/MIN/{1.73_M2}
GFR SERPL CREATININE-BSD FRML MDRD: ABNORMAL ML/MIN/{1.73_M2}
GLUCOSE BLD-MCNC: 81 MG/DL (ref 70–99)
GLUCOSE BLD-MCNC: 97 MG/DL (ref 75–110)
GLUCOSE BLD-MCNC: 97 MG/DL (ref 75–110)
HBV SURFACE AB TITR SER: 83.65 MIU/ML
HCT VFR BLD CALC: 34.7 % (ref 40.7–50.3)
HEMOGLOBIN: 10.4 G/DL (ref 13–17)
HEPATITIS B CORE TOTAL ANTIBODY: NONREACTIVE
HEPATITIS B SURFACE ANTIGEN: NONREACTIVE
HEPATITIS C ANTIBODY: NONREACTIVE
IMMATURE GRANULOCYTES: 0 %
LYMPHOCYTES # BLD: 27 % (ref 24–43)
MAGNESIUM: 2.5 MG/DL (ref 1.6–2.6)
MCH RBC QN AUTO: 27.2 PG (ref 25.2–33.5)
MCHC RBC AUTO-ENTMCNC: 30 G/DL (ref 28.4–34.8)
MCV RBC AUTO: 90.8 FL (ref 82.6–102.9)
MONOCYTES # BLD: 10 % (ref 3–12)
MORPHOLOGY: ABNORMAL
NRBC AUTOMATED: 0 PER 100 WBC
PDW BLD-RTO: 21.6 % (ref 11.8–14.4)
PLATELET # BLD: ABNORMAL K/UL (ref 138–453)
PLATELET ESTIMATE: ABNORMAL
PLATELET, FLUORESCENCE: 148 K/UL (ref 138–453)
PLATELET, IMMATURE FRACTION: 7.2 % (ref 1.1–10.3)
PMV BLD AUTO: ABNORMAL FL (ref 8.1–13.5)
POTASSIUM SERPL-SCNC: 5.5 MMOL/L (ref 3.7–5.3)
PRO-BNP: 4348 PG/ML
RBC # BLD: 3.82 M/UL (ref 4.21–5.77)
RBC # BLD: ABNORMAL 10*6/UL
SEG NEUTROPHILS: 60 % (ref 36–65)
SEGMENTED NEUTROPHILS ABSOLUTE COUNT: 3.13 K/UL (ref 1.5–8.1)
SODIUM BLD-SCNC: 141 MMOL/L (ref 135–144)
TROPONIN INTERP: ABNORMAL
TROPONIN T: ABNORMAL NG/ML
TROPONIN, HIGH SENSITIVITY: 259 NG/L (ref 0–22)
TROPONIN, HIGH SENSITIVITY: 279 NG/L (ref 0–22)
TROPONIN, HIGH SENSITIVITY: 288 NG/L (ref 0–22)
TROPONIN, HIGH SENSITIVITY: 300 NG/L (ref 0–22)
WBC # BLD: 5.2 K/UL (ref 3.5–11.3)
WBC # BLD: ABNORMAL 10*3/UL

## 2021-07-19 PROCEDURE — 87340 HEPATITIS B SURFACE AG IA: CPT

## 2021-07-19 PROCEDURE — 83880 ASSAY OF NATRIURETIC PEPTIDE: CPT

## 2021-07-19 PROCEDURE — 85055 RETICULATED PLATELET ASSAY: CPT

## 2021-07-19 PROCEDURE — 71045 X-RAY EXAM CHEST 1 VIEW: CPT

## 2021-07-19 PROCEDURE — 86803 HEPATITIS C AB TEST: CPT

## 2021-07-19 PROCEDURE — 6370000000 HC RX 637 (ALT 250 FOR IP): Performed by: INTERNAL MEDICINE

## 2021-07-19 PROCEDURE — 2580000003 HC RX 258: Performed by: STUDENT IN AN ORGANIZED HEALTH CARE EDUCATION/TRAINING PROGRAM

## 2021-07-19 PROCEDURE — 86704 HEP B CORE ANTIBODY TOTAL: CPT

## 2021-07-19 PROCEDURE — 83735 ASSAY OF MAGNESIUM: CPT

## 2021-07-19 PROCEDURE — 94640 AIRWAY INHALATION TREATMENT: CPT

## 2021-07-19 PROCEDURE — 6360000002 HC RX W HCPCS: Performed by: INTERNAL MEDICINE

## 2021-07-19 PROCEDURE — 2500000003 HC RX 250 WO HCPCS: Performed by: STUDENT IN AN ORGANIZED HEALTH CARE EDUCATION/TRAINING PROGRAM

## 2021-07-19 PROCEDURE — 5A1D70Z PERFORMANCE OF URINARY FILTRATION, INTERMITTENT, LESS THAN 6 HOURS PER DAY: ICD-10-PCS | Performed by: INTERNAL MEDICINE

## 2021-07-19 PROCEDURE — 99285 EMERGENCY DEPT VISIT HI MDM: CPT

## 2021-07-19 PROCEDURE — 36415 COLL VENOUS BLD VENIPUNCTURE: CPT

## 2021-07-19 PROCEDURE — 82947 ASSAY GLUCOSE BLOOD QUANT: CPT

## 2021-07-19 PROCEDURE — 96374 THER/PROPH/DIAG INJ IV PUSH: CPT

## 2021-07-19 PROCEDURE — 76937 US GUIDE VASCULAR ACCESS: CPT

## 2021-07-19 PROCEDURE — 2500000003 HC RX 250 WO HCPCS

## 2021-07-19 PROCEDURE — 86317 IMMUNOASSAY INFECTIOUS AGENT: CPT

## 2021-07-19 PROCEDURE — 85025 COMPLETE CBC W/AUTO DIFF WBC: CPT

## 2021-07-19 PROCEDURE — 99223 1ST HOSP IP/OBS HIGH 75: CPT | Performed by: INTERNAL MEDICINE

## 2021-07-19 PROCEDURE — 93005 ELECTROCARDIOGRAM TRACING: CPT | Performed by: STUDENT IN AN ORGANIZED HEALTH CARE EDUCATION/TRAINING PROGRAM

## 2021-07-19 PROCEDURE — 6360000002 HC RX W HCPCS: Performed by: STUDENT IN AN ORGANIZED HEALTH CARE EDUCATION/TRAINING PROGRAM

## 2021-07-19 PROCEDURE — 90935 HEMODIALYSIS ONE EVALUATION: CPT

## 2021-07-19 PROCEDURE — 2060000000 HC ICU INTERMEDIATE R&B

## 2021-07-19 PROCEDURE — 84484 ASSAY OF TROPONIN QUANT: CPT

## 2021-07-19 PROCEDURE — 6370000000 HC RX 637 (ALT 250 FOR IP): Performed by: STUDENT IN AN ORGANIZED HEALTH CARE EDUCATION/TRAINING PROGRAM

## 2021-07-19 PROCEDURE — 80048 BASIC METABOLIC PNL TOTAL CA: CPT

## 2021-07-19 PROCEDURE — 99233 SBSQ HOSP IP/OBS HIGH 50: CPT | Performed by: INTERNAL MEDICINE

## 2021-07-19 RX ORDER — CALCIUM GLUCONATE 20 MG/ML
1000 INJECTION, SOLUTION INTRAVENOUS ONCE
Status: COMPLETED | OUTPATIENT
Start: 2021-07-19 | End: 2021-07-19

## 2021-07-19 RX ORDER — METOPROLOL TARTRATE 5 MG/5ML
2.5 INJECTION INTRAVENOUS EVERY 4 HOURS PRN
Status: DISCONTINUED | OUTPATIENT
Start: 2021-07-19 | End: 2021-07-20

## 2021-07-19 RX ORDER — ACETAMINOPHEN 325 MG/1
650 TABLET ORAL EVERY 6 HOURS PRN
Status: DISCONTINUED | OUTPATIENT
Start: 2021-07-19 | End: 2021-07-22 | Stop reason: HOSPADM

## 2021-07-19 RX ORDER — CINACALCET 30 MG/1
120 TABLET, FILM COATED ORAL
Status: DISCONTINUED | OUTPATIENT
Start: 2021-07-19 | End: 2021-07-22 | Stop reason: HOSPADM

## 2021-07-19 RX ORDER — ONDANSETRON 2 MG/ML
4 INJECTION INTRAMUSCULAR; INTRAVENOUS EVERY 6 HOURS PRN
Status: DISCONTINUED | OUTPATIENT
Start: 2021-07-19 | End: 2021-07-22 | Stop reason: HOSPADM

## 2021-07-19 RX ORDER — ACETAMINOPHEN 650 MG/1
650 SUPPOSITORY RECTAL EVERY 6 HOURS PRN
Status: DISCONTINUED | OUTPATIENT
Start: 2021-07-19 | End: 2021-07-22 | Stop reason: HOSPADM

## 2021-07-19 RX ORDER — DEXTROSE MONOHYDRATE 25 G/50ML
25 INJECTION, SOLUTION INTRAVENOUS ONCE
Status: COMPLETED | OUTPATIENT
Start: 2021-07-19 | End: 2021-07-19

## 2021-07-19 RX ORDER — HEPARIN SODIUM 5000 [USP'U]/ML
5000 INJECTION, SOLUTION INTRAVENOUS; SUBCUTANEOUS EVERY 8 HOURS SCHEDULED
Status: DISCONTINUED | OUTPATIENT
Start: 2021-07-19 | End: 2021-07-22 | Stop reason: HOSPADM

## 2021-07-19 RX ORDER — DOXERCALCIFEROL 2 UG/ML
2 INJECTION, SOLUTION INTRAVENOUS
Status: DISCONTINUED | OUTPATIENT
Start: 2021-07-19 | End: 2021-07-22 | Stop reason: HOSPADM

## 2021-07-19 RX ORDER — SODIUM CHLORIDE 0.9 % (FLUSH) 0.9 %
5-40 SYRINGE (ML) INJECTION EVERY 12 HOURS SCHEDULED
Status: DISCONTINUED | OUTPATIENT
Start: 2021-07-19 | End: 2021-07-22 | Stop reason: HOSPADM

## 2021-07-19 RX ORDER — SODIUM CHLORIDE 9 MG/ML
25 INJECTION, SOLUTION INTRAVENOUS PRN
Status: DISCONTINUED | OUTPATIENT
Start: 2021-07-19 | End: 2021-07-22 | Stop reason: HOSPADM

## 2021-07-19 RX ORDER — ONDANSETRON 4 MG/1
4 TABLET, ORALLY DISINTEGRATING ORAL EVERY 8 HOURS PRN
Status: DISCONTINUED | OUTPATIENT
Start: 2021-07-19 | End: 2021-07-22 | Stop reason: HOSPADM

## 2021-07-19 RX ORDER — POLYETHYLENE GLYCOL 3350 17 G/17G
17 POWDER, FOR SOLUTION ORAL DAILY PRN
Status: DISCONTINUED | OUTPATIENT
Start: 2021-07-19 | End: 2021-07-22 | Stop reason: HOSPADM

## 2021-07-19 RX ORDER — SODIUM CHLORIDE 0.9 % (FLUSH) 0.9 %
5-40 SYRINGE (ML) INJECTION PRN
Status: DISCONTINUED | OUTPATIENT
Start: 2021-07-19 | End: 2021-07-22 | Stop reason: HOSPADM

## 2021-07-19 RX ADMIN — INSULIN HUMAN 10 UNITS: 100 INJECTION, SOLUTION PARENTERAL at 13:14

## 2021-07-19 RX ADMIN — ALBUTEROL SULFATE 10 MG: 5 SOLUTION RESPIRATORY (INHALATION) at 13:10

## 2021-07-19 RX ADMIN — EPOETIN ALFA-EPBX 8000 UNITS: 4000 INJECTION, SOLUTION INTRAVENOUS; SUBCUTANEOUS at 22:06

## 2021-07-19 RX ADMIN — SODIUM BICARBONATE 50 MEQ: 84 INJECTION, SOLUTION INTRAVENOUS at 13:13

## 2021-07-19 RX ADMIN — IRON SUCROSE 50 MG: 20 INJECTION, SOLUTION INTRAVENOUS at 22:02

## 2021-07-19 RX ADMIN — SODIUM CHLORIDE, PRESERVATIVE FREE 10 ML: 5 INJECTION INTRAVENOUS at 21:00

## 2021-07-19 RX ADMIN — Medication 50 MEQ: at 13:13

## 2021-07-19 RX ADMIN — CALCIUM GLUCONATE 1000 MG: 20 INJECTION, SOLUTION INTRAVENOUS at 13:06

## 2021-07-19 RX ADMIN — DEXTROSE MONOHYDRATE 25 G: 25 INJECTION, SOLUTION INTRAVENOUS at 13:14

## 2021-07-19 RX ADMIN — CINACALCET 120 MG: 30 TABLET ORAL at 22:45

## 2021-07-19 RX ADMIN — DOXERCALCIFEROL 2 MCG: 2 INJECTION, SOLUTION INTRAVENOUS at 23:53

## 2021-07-19 ASSESSMENT — ENCOUNTER SYMPTOMS
VOMITING: 0
BACK PAIN: 0
SHORTNESS OF BREATH: 0
SORE THROAT: 0
ABDOMINAL PAIN: 0
COUGH: 0
NAUSEA: 0

## 2021-07-19 ASSESSMENT — PAIN SCALES - GENERAL
PAINLEVEL_OUTOF10: 0

## 2021-07-19 NOTE — ED PROVIDER NOTES
[facility] ED  Emergency Department Encounter  EmergencyMedicine Resident     Pt Merlin Heckle  MRN: [id number]  [place] 1945  Date of evaluation: 7/19/21  PCP:  Leann Mo MD    This patient was evaluated in the Emergency Department for symptoms described in the history of present illness. The patient was evaluated in the context of the global COVID-19 pandemic, which necessitated consideration that the patient might be at risk for infection with the SARS-CoV-2 virus that causes COVID-19. Institutional protocols and algorithms that pertain to the evaluation of patients at risk for COVID-19 are in a state of rapid change based on information released by regulatory bodies including the CDC and federal and state organizations. These policies and algorithms were followed during the patient's care in the ED. CHIEF COMPLAINT       Chief Complaint   Patient presents with    Tachycardia       HISTORY OF PRESENT ILLNESS  (Location/Symptom, Timing/Onset, Context/Setting, Quality, Duration, Modifying Factors, Severity.)      Aries Archuleta is a 66 y.o. male who presents after being sent to ED by dialysis staff for intermittent nonsustained tachycardia to 150-160, with mild palpitations the patient states that he may have recalled. Otherwise patient is denying symptoms, states that he feels at baseline. Denies chest pain, palpitations, lightheadedness, headaches, fevers, chills at this time. Denies other symptoms including abdominal pain, diarrhea or constipation. Patient is oliguric. Dialysis Monday, Wednesday, Friday, did not get any fluid taken out today as this was found out at the beginning of his session. Patient has a right AV fistula in place for dialysis.     PAST MEDICAL / SURGICAL / SOCIAL / FAMILY HISTORY      has a past medical history of Allergic rhinitis, Anemia, BPH (benign prostatic hyperplasia), CAD (coronary artery disease), Carotid artery stenosis, Cerebrovascular disease, Diabetes mellitus ([facility] [address] Eczema, ESRD (end stage renal disease) on dialysis ([facility] [address] Full dentures, GERD (gastroesophageal reflux disease), Gout, Hemodialysis patient ([facility] [address] Hyperlipidemia, Hypertension, Neuropathy, Osteoarthritis, Peripheral vascular disease ([facility] [address] Seizures ([facility] [address] Stroke ([facility] [address] and Unspecified cerebral artery occlusion with cerebral infarction. has a past surgical history that includes Bladder surgery (Spring 2014); sinus surgery; Cystocopy (2008); Inguinal hernia repair (Right); Kidney biopsy (May 2013); Upper gastrointestinal endoscopy (1/28/2015); Colonoscopy (2008); Colonoscopy (1/28/2015); Carotid endarterectomy (2015); Abdomen surgery (2016); Dialysis fistula creation (Right, 08/25/2016); Tunneled venous port placement (Right, 10/2015); Abdomen surgery (05/26/2016); Colonoscopy (5/10/2018); and Upper gastrointestinal endoscopy (N/A, 11/1/2018). Social History     Socioeconomic History    Marital status:      Spouse name: Laury Dumont Number of children: 1    Years of education: Not on file    Highest education level: Not on file   Occupational History    Not on file   Tobacco Use    Smoking status: Never Smoker    Smokeless tobacco: Never Used   Vaping Use    Vaping Use: Never used   Substance and Sexual Activity    Alcohol use: No     Comment: none    Drug use: No    Sexual activity: Never   Other Topics Concern    Not on file   Social History Narrative    ** Merged History Encounter **         ** Merged History Encounter **          Social Determinants of Health     Financial Resource Strain: Low Risk     Difficulty of Paying Living Expenses: Not hard at all   Food Insecurity: No Food Insecurity    Worried About Running Out of Food in the Last Year: Never true    Ming of Food in the Last Year: Never true   Transportation Needs:     Lack of Transportation (Medical):      Lack of Transportation (Non-Medical):    Physical Activity:     Days of Exercise per Week:     Minutes of Exercise per Session:    Stress:     Feeling of Stress :    Social Connections:     Frequency of Communication with Friends and Family:     Frequency of Social Gatherings with Friends and Family:     Attends Restoration Services:     Active Member of Clubs or Organizations:     Attends Club or Organization Meetings:     Marital Status:    Intimate Partner Violence:     Fear of Current or Ex-Partner:     Emotionally Abused:     Physically Abused:     Sexually Abused:        Family History   Problem Relation Age of Onset    Diabetes Mother     Kidney Disease Mother         Dialysis    Cancer Mother         KIDNEY    Anesth Problems Mother         delayed awakening     Other Father         Sharyle Qualia    Diabetes Sister     High Blood Pressure Sister     Cancer Brother        Allergies:  Patient has no known allergies. Home Medications:  Prior to Admission medications    Medication Sig Start Date End Date Taking?  Authorizing Provider   Ciclopirox (LOPROX) 0.77 % gel 2/day to affected areas 2/2/21   Sheila Martinez MD   amLODIPine (NORVASC) 10 MG tablet Take 1 tablet by mouth daily 2/2/21   Sheila Martinez MD   folic acid (FOLVITE) 1 MG tablet Take 1 tablet by mouth daily 2/2/21   Sheila Martinez MD   RENVELA 800 MG tablet Take 1 tablet by mouth 3 times daily 2/2/21   Sheila Martinez MD   atorvastatin (LIPITOR) 40 MG tablet Take 1 tablet by mouth nightly 2/2/21   Sheila Martinez MD   fluticasone [facility]) 50 MCG/ACT nasal spray 1 spray by Each Nostril route daily 1 Spray in each nostril 9/5/19   Sheila Martinez MD   Multiple Vitamins-Minerals (RENAPLEX-D PO) Take by mouth    Historical Provider, MD   allopurinol (ZYLOPRIM) 100 MG tablet take 1 tablet by mouth once daily  Patient not taking: Reported on 2/2/2021 4/26/18   Sheila Martinez MD   cinacalcet (SENSIPAR) 30 MG tablet Take 30 mg by mouth daily    Historical Provider, MD   calcium acetate (PHOSLO) 667 MG capsule Take 3 capsules by mouth 3 times daily (with meals) 1/26/18   Jovita Hernández MD   hydrocortisone (ANUSOL-HC) 2.5 % rectal cream Place rectally 2 times daily. Patient not taking: Reported on 2/2/2021 1/25/18   Jovita Hernández MD       REVIEW OF SYSTEMS    (2-9 systems for level 4, 10 or more for level 5)      Review of Systems   Constitutional: Negative for chills and fever. HENT: Negative for sore throat. Eyes: Negative for visual disturbance. Respiratory: Negative for cough and shortness of breath. Cardiovascular: Positive for palpitations. Negative for chest pain and leg swelling. Gastrointestinal: Negative for abdominal pain, nausea and vomiting. Endocrine: Negative for polyuria. Genitourinary: Negative for dysuria and hematuria. Musculoskeletal: Negative for back pain. Skin: Negative for rash. Allergic/Immunologic: Negative for food allergies. Neurological: Negative for light-headedness, numbness and headaches. Psychiatric/Behavioral: Negative for confusion. PHYSICAL EXAM   (up to 7 for level 4, 8 or more for level 5)      INITIAL VITALS:   BP (!) 165/84   Pulse 70   Temp 97.4 °F (36.3 °C) (Oral)   Resp 18   SpO2 95%     Physical Exam  Constitutional:       General: He is not in acute distress. Appearance: Normal appearance. He is normal weight. HENT:      Head: Normocephalic. Nose: Nose normal.      Mouth/Throat:      Mouth: Mucous membranes are moist.      Pharynx: Oropharynx is clear. Eyes:      Extraocular Movements: Extraocular movements intact. Conjunctiva/sclera: Conjunctivae normal.      Pupils: Pupils are equal, round, and reactive to light. Cardiovascular:      Rate and Rhythm: Normal rate and regular rhythm. Pulses: Normal pulses. Heart sounds: Normal heart sounds. Pulmonary:      Effort: Pulmonary effort is normal.      Breath sounds: Normal breath sounds. Abdominal:      Palpations: Abdomen is soft. Tenderness: There is no abdominal tenderness. There is no right CVA tenderness or left CVA tenderness. Musculoskeletal:      Cervical back: Normal range of motion and neck supple. Right lower leg: No edema. Left lower leg: No edema. Skin:     General: Skin is warm. Capillary Refill: Capillary refill takes less than 2 seconds. Neurological:      General: No focal deficit present. Mental Status: He is alert and oriented to person, place, and time. Mental status is at baseline. Psychiatric:         Mood and Affect: Mood normal.       DIFFERENTIAL  DIAGNOSIS     PLAN (LABS / IMAGING / EKG):  Orders Placed This Encounter   Procedures    XR CHEST PORTABLE    CBC WITH AUTO DIFFERENTIAL    BASIC METABOLIC PANEL    Troponin    Brain Natriuretic Peptide    MAGNESIUM    Immature Platelet Fraction    Telemetry monitoring - continuous duration    Inpatient consult to IV Team    Inpatient consult to Nephrology    Inpatient consult to Internal Medicine    EKG 12 Lead    PATIENT STATUS (FROM ED OR OR/PROCEDURAL) Inpatient       MEDICATIONS ORDERED:  Orders Placed This Encounter   Medications    calcium gluconate 1000 mg in sodium chloride 50 mL    albuterol (PROVENTIL) nebulizer solution 10 mg     Order Specific Question:   Initiate RT Bronchodilator Protocol     Answer: Yes    dextrose 50 % IV solution    insulin regular (HUMULIN R;NOVOLIN R) injection 10 Units    sodium bicarbonate 8.4 % injection 50 mEq    sodium bicarbonate 8.4 % injection     Sofi Cobos: cabinet override       DDX: Given patient's nonsustained runs of tachycardia, will obtain EKG and cardiac work-up. Will check for electrolytes in the case that that is the cause of his tachycardia. We will continue to monitor.     DIAGNOSTIC RESULTS / EMERGENCY DEPARTMENT COURSE / MDM   LAB RESULTS:  Results for orders placed or performed during the hospital encounter of 07/19/21   CBC WITH AUTO DIFFERENTIAL   Result Value Ref Range    WBC 5.2 3.5 - 11.3 k/uL    RBC 3.82 (L) 4.21 - 5.77 m/uL    Hemoglobin 10.4 (L) 13.0 - 17.0 g/dL    Hematocrit 34.7 (L) 40.7 - 50.3 %    MCV 90.8 82.6 - 102.9 fL    MCH 27.2 25.2 - 33.5 pg    MCHC 30.0 28.4 - 34.8 g/dL    RDW 21.6 (H) 11.8 - 14.4 %    Platelets See Reflexed IPF Result 138 - 453 k/uL    MPV NOT REPORTED 8.1 - 13.5 fL    NRBC Automated 0.0 0.0 per 100 WBC    Differential Type NOT REPORTED     WBC Morphology NOT REPORTED     RBC Morphology NOT REPORTED     Platelet Estimate NOT REPORTED     Immature Granulocytes 0 0 %    Seg Neutrophils 60 36 - 65 %    Lymphocytes 27 24 - 43 %    Monocytes 10 3 - 12 %    Eosinophils % 2 1 - 4 %    Basophils 1 0 - 2 %    Absolute Immature Granulocyte 0.00 0.00 - 0.30 k/uL    Segs Absolute 3.13 1.50 - 8.10 k/uL    Absolute Lymph # 1.40 1.10 - 3.70 k/uL    Absolute Mono # 0.52 0.10 - 1.20 k/uL    Absolute Eos # 0.10 0.00 - 0.44 k/uL    Basophils Absolute 0.05 0.00 - 0.20 k/uL    Morphology ANISOCYTOSIS PRESENT    BASIC METABOLIC PANEL   Result Value Ref Range    Glucose 81 70 - 99 mg/dL    BUN 77 (H) 8 - 23 mg/dL    CREATININE 12.91 (HH) 0.70 - 1.20 mg/dL    Bun/Cre Ratio NOT REPORTED 9 - 20    Calcium 9.8 8.6 - 10.4 mg/dL    Sodium 141 135 - 144 mmol/L    Potassium 5.5 (H) 3.7 - 5.3 mmol/L    Chloride 101 98 - 107 mmol/L    CO2 20 20 - 31 mmol/L    Anion Gap 20 (H) 9 - 17 mmol/L    GFR Non-African American 4 (L) >60 mL/min    GFR African American 5 (L) >60 mL/min    GFR Comment          GFR Staging NOT REPORTED    Troponin   Result Value Ref Range    Troponin, High Sensitivity 300 (HH) 0 - 22 ng/L    Troponin T NOT REPORTED <0.03 ng/mL    Troponin Interp NOT REPORTED    Troponin   Result Value Ref Range    Troponin, High Sensitivity 259 (HH) 0 - 22 ng/L    Troponin T NOT REPORTED <0.03 ng/mL    Troponin Interp NOT REPORTED    Brain Natriuretic Peptide   Result Value Ref Range    Pro-BNP 4,348 (H) <300 pg/mL    BNP Interpretation Pro-BNP Reference Range: MAGNESIUM   Result Value Ref Range    Magnesium 2.5 1.6 - 2.6 mg/dL   Immature Platelet Fraction   Result Value Ref Range    Platelet, Immature Fraction 7.2 1.1 - 10.3 %    Platelet, Fluorescence 148 138 - 453 k/uL       IMPRESSION: 33-year-old gentleman presents during dialysis for multiple runs of nonsustained tachycardia. Patient states intermittent palpitations with these tachycardia episodes, however at this time is at baseline and does not have any symptoms. Physical exam grossly unremarkable, right AV fistula with palpable thrill. EKG grossly unchanged from previous, normal sinus rhythm. Cardiac work-up showing elevated troponin and BNP.  K5.5. Treatment for hyperkalemia initiated in ED. Calcium given. Discussed with nephrology regarding inpatient dialysis. Patient admitted to internal medicine for further work-up and care. RADIOLOGY:  See radiology report    EKG  Ventricular rate 74, mild right axis deviation, regular, intervals grossly within normal limits, morphology grossly unchanged from previous EKG done on 10/5/2018    Grossly unremarkable EKG    All EKG's are interpreted by the Emergency Department Physician who either signs or Co-signs this chart in the absence of a cardiologist.    EMERGENCY DEPARTMENT COURSE:  ED Course as of Jul 19 1345   Mon Jul 19, 2021   1232 Pro-BNP(!): 4,348 [EM]   1236 Troponin, High Sensitivity(!!): 300 [EM]   1241 Potassium(!): 5.5 [EM]   1319 CXR  No radiographic evidence of acute cardiopulmonary disease.     Moderate cardiomegaly. [EM]      ED Course User Index  [EM] Vivi Ramos MD        PROCEDURES:  None    CONSULTS:  IP CONSULT TO IV TEAM  IP CONSULT TO NEPHROLOGY  IP CONSULT TO INTERNAL MEDICINE    FINAL IMPRESSION      1.  Palpitations          DISPOSITION / PLAN     DISPOSITION        PATIENT REFERRED TO:  MD Jill [facility] [address] [facility] [address] [phone]    Schedule an appointment as soon as possible for a visit For follow up    [facility] ED  [address]  Go to   As needed      DISCHARGE MEDICATIONS:  New Prescriptions    No medications on file       Leana Mueller MD  Emergency Medicine Resident    (Please note that portions of thisnote were completed with a voice recognition program.  Efforts were made to edit the dictations but occasionally words are mis-transcribed.)       Leana Mueller MD  Resident  07/19/21 1086

## 2021-07-19 NOTE — ED NOTES
The following labs were labeled with patient stickers & tubed to lab;    [x]Lavender   []On Ice  []Blue  [x]Green/ Yellow  []Green/ Black []On Ice  []Pink  []Red  []Yellow    []COVID-19 Swab []Rapid    []Urine Sample  []Pelvic Cultures    []Blood Cultures       Kelly Amezquita LPN  27/43/30 1972

## 2021-07-19 NOTE — ED NOTES
Pt presents to ED c/o tachycardia during dialysis. Pt denies CP or SOB, pt did not complete dialysis. Pt stated he has not been taking his rx's. Pt has a hx HTN. Pt A&O x 4, placed in a gown and on continuous monitor, resting comfortably on stretcher with Dr. Nikky Taylor at bedside.      Roseanne Stovall, TANGELA  [birth year] [id number]

## 2021-07-19 NOTE — H&P
[address]     Department of Internal Medicine - Staff Internal Medicine Teaching Service          ADMISSION NOTE/HISTORY AND PHYSICAL EXAMINATION   Date: 7/19/2021  Patient Name: Noé Arredondo  Date of admission: 7/19/2021 11:49 AM  YOB: 1945  PCP: Isrrael Carmona MD  History Obtained From:  patient, spouse    CHIEF COMPLAINT     Chief complaint: SVTs    HISTORY OF PRESENTING ILLNESS     The patient is a pleasant 66 y.o. male presents with a chief complaint of SVTs with palpitations. He went for dialysis, and they found out he had intermittent SVTs. He admits having palpitations 1 day ago, with an irregular rhythm of his heart, which resolved spontaneously. Since admission, he has had some episodes of SVT's with rates up to 143, lasting about 90 seconds. He has a Hx of stroke 6 years ago, ESRD on dialysis for 6 years, HTN. No hx of arrythmias. He has cough, bilateral pedal edema, hermelindo, bilateral knee pain. No dyspnea, chest pain, syncope, nausea, vomiting, diarrhea.        Review of Systems:  General ROS: Completed and except as mentioned above were negative   HEENT ROS: Completed and except as mentioned above were negative   Allergy and Immunology ROS:  Completed and except as mentioned above were negative  Hematological and Lymphatic ROS:  Completed and except as mentioned above were negative  Respiratory ROS:  Completed and except as mentioned above were negative  Cardiovascular ROS:  Completed and except as mentioned above were negative  Gastrointestinal ROS: Completed and except as mentioned above were negative  Genito-Urinary ROS:  Completed and except as mentioned above were negative  Musculoskeletal ROS:  Completed and except as mentioned above were negative  Neurological ROS:  Completed and except as mentioned above were negative  Skin & Dermatological ROS:  Completed and except as mentioned above were negative  Psychological ROS:  Completed and except as mentioned above were negative    PAST MEDICAL HISTORY     Past Medical History:   Diagnosis Date    Allergic rhinitis     Anemia     BPH (benign prostatic hyperplasia)     CAD (coronary artery disease)     Carotid artery stenosis 2013    ulcerated plaque in left ICA 60 - 70% on carotid angiogram    Cerebrovascular disease 2013    left frontal hemorrhage    Diabetes mellitus ([facility] 2013    Eczema     ESRD (end stage renal disease) on dialysis ([address]     Full dentures     Upper / Lower    GERD (gastroesophageal reflux disease)     Gout     Hemodialysis patient ([facility] 10/2015     [address]  M,W F    Hyperlipidemia     Hypertension     Neuropathy     Osteoarthritis     Peripheral vascular disease ([address]     Seizures ([facility] 2013    after stroke, LAST BUNABGX7005/20/2016    Stroke [facility]) 2013    Unspecified cerebral artery occlusion with cerebral infarction 4-    speech short term and lt side       PAST SURGICAL HISTORY     Past Surgical History:   Procedure Laterality Date    ABDOMEN SURGERY  2016    PERITONEAL CATHETER    ABDOMEN SURGERY  05/26/2016    pd catherter removed from abdomen    BLADDER SURGERY  Spring 2014    dilatated     CAROTID ENDARTERECTOMY  2015    left    COLONOSCOPY  2008    polypectomy, diverticulosis    COLONOSCOPY  1/28/2015    COLONOSCOPY  5/10/2018    COLONOSCOPY performed by Yessi Flores MD at [address] [place] [address]  2008    bladder tumor removal    DIALYSIS FISTULA CREATION Right 08/25/2016    INGUINAL HERNIA REPAIR Right     KIDNEY BIOPSY  May 2013    SINUS SURGERY      TUNNELED VENOUS PORT PLACEMENT Right 10/2015    dialysis catheter- chest     UPPER GASTROINTESTINAL ENDOSCOPY  1/28/2015    UPPER GASTROINTESTINAL ENDOSCOPY N/A 11/1/2018    EGD BIOPSY performed by Lizet Meadows DO at [place] [address]     Patient has no known allergies.     MEDICATIONS PRIOR TO ADMISSION     Prior to Admission medications    Medication Sig Start Date End Date Taking? Authorizing Provider   atorvastatin (LIPITOR) 40 MG tablet Take 1 tablet by mouth nightly 21  Yes Saman Rader MD   Ciclopirox (LOPROX) 0.77 % gel 2/day to affected areas 21   Saman Rader MD   amLODIPine (NORVASC) 10 MG tablet Take 1 tablet by mouth daily 21   Saman Rader MD   folic acid (FOLVITE) 1 MG tablet Take 1 tablet by mouth daily 21   Saman Rader MD   RENVELA 800 MG tablet Take 1 tablet by mouth 3 times daily 21   Saman Rader MD   fluticasone [facility]) 50 MCG/ACT nasal spray 1 spray by Each Nostril route daily 1 Spray in each nostril 19   Saman Rader MD   Multiple Vitamins-Minerals (RENAPLEX-D PO) Take by mouth    Historical Provider, MD   allopurinol (ZYLOPRIM) 100 MG tablet take 1 tablet by mouth once daily  Patient not taking: Reported on 2021   Saman Rader MD   cinacalcet (SENSIPAR) 30 MG tablet Take 30 mg by mouth daily    Historical Provider, MD   calcium acetate (PHOSLO) 667 MG capsule Take 3 capsules by mouth 3 times daily (with meals) 18   Saman Rader MD   hydrocortisone (ANUSOL-HC) 2.5 % rectal cream Place rectally 2 times daily.   Patient not taking: Reported on 2021   Saman Rader MD       SOCIAL HISTORY     Tobacco: Never smoked  Alcohol: No  Illicits: no  Occupation: Retired [employer]  agent    FAMILY HISTORY     Family History   Problem Relation Age of Onset    Diabetes Mother     Kidney Disease Mother         Dialysis    Cancer Mother         KIDNEY    Anesth Problems Mother         delayed awakening     Other Father         Priscilla London    Diabetes Sister     High Blood Pressure Sister     Cancer Brother        PHYSICAL EXAM     Vitals: BP (!) 155/46   Pulse 79   Temp 97.5 °F (36.4 °C) (Oral)   Resp 17   Ht 6' 1\" (1.854 m)   Wt 230 lb 12.8 oz (104.7 kg)   SpO2 96%   BMI 30.45 kg/m²   Tmax: Temp (24hrs), Av.5 °F (36.4 °C), Min:97.4 °F (36.3 °C), Max:97.5 °F (36.4 °C)    Last Body weight:   Wt Readings from Last 3 Encounters:   07/19/21 230 lb 12.8 oz (104.7 kg)   10/01/20 220 lb (99.8 kg)   02/11/20 239 lb 12.8 oz (108.8 kg)     Body Mass Index : Body mass index is 30.45 kg/m². PHYSICAL EXAMINATION:  Constitutional: This is a well developed, well nourished, 30-34.9 - Obesity Grade I 66y.o. year old male who is alert, oriented, cooperative and in no apparent distress. Head:normocephalic and atraumatic. EENT:  PERRLA. No conjunctival injections. Septum was midline, mucosa was without erythema, exudates or cobblestoning. No thrush was noted. Neck: Supple without thyromegaly. No elevated JVP. Trachea was midline. Respiratory: Chest was symmetrical without dullness to percussion. Breath sounds bilaterally were clear to auscultation. There were no wheezes, rhonchi or rales. There is no intercostal retraction or use of accessory muscles. No egophony noted. Cardiovascular: Regular with systolic ejection murmur, loudest at the aortic area. No clicks, gallops or rubs. Abdomen: Slightly rounded and soft without organomegaly. No rebound, rigidity or guarding was appreciated. Lymphatic: No lymphadenopathy. Musculoskeletal: Pain with passive movement of right knee. No edema. Normal curvature of the spine. No gross muscle weakness. Extremities:  Bilateral pitting pedal  edema, ulcerations, tenderness, varicosities or erythema. Muscle size, tone and strength are normal.  No involuntary movements are noted. Skin: multiple ring-shaped rash with central clearing. Warm and dry. Good color, turgor and pigmentation.   No cyanosis or clubbing  Neurological/Psychiatric: The patient's general behavior, level of consciousness, thought content and emotional status is normal.          INVESTIGATIONS     Laboratory Testing:     Recent Results (from the past 24 hour(s))   CBC WITH AUTO DIFFERENTIAL    Collection Time: 07/19/21 12:05 PM   Result Value Ref Range WBC 5.2 3.5 - 11.3 k/uL    RBC 3.82 (L) 4.21 - 5.77 m/uL    Hemoglobin 10.4 (L) 13.0 - 17.0 g/dL    Hematocrit 34.7 (L) 40.7 - 50.3 %    MCV 90.8 82.6 - 102.9 fL    MCH 27.2 25.2 - 33.5 pg    MCHC 30.0 28.4 - 34.8 g/dL    RDW 21.6 (H) 11.8 - 14.4 %    Platelets See Reflexed IPF Result 138 - 453 k/uL    MPV NOT REPORTED 8.1 - 13.5 fL    NRBC Automated 0.0 0.0 per 100 WBC    Differential Type NOT REPORTED     WBC Morphology NOT REPORTED     RBC Morphology NOT REPORTED     Platelet Estimate NOT REPORTED     Immature Granulocytes 0 0 %    Seg Neutrophils 60 36 - 65 %    Lymphocytes 27 24 - 43 %    Monocytes 10 3 - 12 %    Eosinophils % 2 1 - 4 %    Basophils 1 0 - 2 %    Absolute Immature Granulocyte 0.00 0.00 - 0.30 k/uL    Segs Absolute 3.13 1.50 - 8.10 k/uL    Absolute Lymph # 1.40 1.10 - 3.70 k/uL    Absolute Mono # 0.52 0.10 - 1.20 k/uL    Absolute Eos # 0.10 0.00 - 0.44 k/uL    Basophils Absolute 0.05 0.00 - 0.20 k/uL    Morphology ANISOCYTOSIS PRESENT    BASIC METABOLIC PANEL    Collection Time: 07/19/21 12:05 PM   Result Value Ref Range    Glucose 81 70 - 99 mg/dL    BUN 77 (H) 8 - 23 mg/dL    CREATININE 12.91 (HH) 0.70 - 1.20 mg/dL    Bun/Cre Ratio NOT REPORTED 9 - 20    Calcium 9.8 8.6 - 10.4 mg/dL    Sodium 141 135 - 144 mmol/L    Potassium 5.5 (H) 3.7 - 5.3 mmol/L    Chloride 101 98 - 107 mmol/L    CO2 20 20 - 31 mmol/L    Anion Gap 20 (H) 9 - 17 mmol/L    GFR Non-African American 4 (L) >60 mL/min    GFR African American 5 (L) >60 mL/min    GFR Comment          GFR Staging NOT REPORTED    Troponin    Collection Time: 07/19/21 12:05 PM   Result Value Ref Range    Troponin, High Sensitivity 300 (HH) 0 - 22 ng/L    Troponin T NOT REPORTED <0.03 ng/mL    Troponin Interp NOT REPORTED    Brain Natriuretic Peptide    Collection Time: 07/19/21 12:05 PM   Result Value Ref Range    Pro-BNP 4,348 (H) <300 pg/mL    BNP Interpretation Pro-BNP Reference Range:    MAGNESIUM    Collection Time: 07/19/21 12:05 PM   Result Value Ref Range    Magnesium 2.5 1.6 - 2.6 mg/dL   Immature Platelet Fraction    Collection Time: 07/19/21 12:05 PM   Result Value Ref Range    Platelet, Immature Fraction 7.2 1.1 - 10.3 %    Platelet, Fluorescence 148 138 - 453 k/uL   Troponin    Collection Time: 07/19/21 12:56 PM   Result Value Ref Range    Troponin, High Sensitivity 259 (HH) 0 - 22 ng/L    Troponin T NOT REPORTED <0.03 ng/mL    Troponin Interp NOT REPORTED    POC Glucose Fingerstick    Collection Time: 07/19/21  2:31 PM   Result Value Ref Range    POC Glucose 97 75 - 110 mg/dL   POC Glucose Fingerstick    Collection Time: 07/19/21  3:50 PM   Result Value Ref Range    POC Glucose 97 75 - 110 mg/dL   Troponin    Collection Time: 07/19/21  4:35 PM   Result Value Ref Range    Troponin, High Sensitivity 279 (HH) 0 - 22 ng/L    Troponin T NOT REPORTED <0.03 ng/mL    Troponin Interp NOT REPORTED        Imaging:   XR CHEST PORTABLE    Result Date: 7/19/2021  No radiographic evidence of acute cardiopulmonary disease. Moderate cardiomegaly. ASSESSMENT & PLAN     ASSESSMENT / PLAN:     IMPRESSION  This is a 66 y.o. male who presented with SVT and found to have non sustained SVTs. Patient admitted to inpatient status for evaluation and management of SVTs. Active Problems:    Nonsustained supraventricular tachycardia [facility]): SVTs, hyperkalemia (5.5), with ,   Plan: BMP every 12 hours   Continuous telemetry   Cardiology consult   Lopressor 2.5mg IV if HR > 100    Cardiac valvular disease: systolic murmur at aortic area. Bilateral pedal edema  Plan:  Cardiology consult   Raise legs  ESRD on dialysis:    Hemodialysis   Monitor Kalemia      HTN: Hx of elevated BP on norvasc    Bronchitis: Cough, Lungs clear on CXR  Plan:  Mucinex 600mg bid    Tinea corporis: rash  Miconazole 2% cream:   Apply to affected areas twice daily.     Osteoarthritis Bilateral knees       DVT ppx: Heparin  GI ppx:     PT/OT/SW: Consulted  Discharge Planning: Case management    Maria Luisa Dobson MD  Internal Medicine Resident, PGY-1  [facility]; [place], [place]  7/19/2021, 6:12 PM   Attending Physician Statement  I have discussed the care of Hannah Oquendo, including pertinent history and exam findings,  with the resident. I have seen and examined the patient and the key elements of all parts of the encounter have been performed by me. I agree with the assessment, plan and orders as documented by the resident with additions . Treatment plan Discussed with nursing staff in detail , all questions answered . Electronically signed by Damaris Dumont MD on   7/19/21 at 9:17 PM EDT    Please note that this chart was generated using voice recognition Dragon dictation software. Although every effort was made to ensure the accuracy of this automated transcription, some errors in transcription may have occurred.

## 2021-07-19 NOTE — CONSULTS
Renal Consult Note    Patient :  Aries Archuleta; 66 y.o. MRN# [id number]  Location:  [id number]/7598-91  Attending:  Hafsa Fuller MD  Admit Date:  7/19/2021   Hospital Day: 0    Reason for Consult:     Asked by Dr Hafsa Fuller MD to see for AUSTYN/Elevated Creatinine. History Obtained From:     patient, electronic medical record, patient's nurse, patient's dialysis unit FA    HD Access:     previous  Right AV fistula    HD Unit:     [employer]    Nephrologist:     Dr. Ruchi Peterson     Dry Weight:     80.8 kg    History of Present Illness:     Aries Archuleta; 66 y.o. male with past medical history as mentioned below presented to the hospital with the chief complaint of chest discomfort and some mild palpitations. Patient went to his regular hemodialysis unit and was found to have tachycardia with heart rate in 150-160s and was complaining of some chest discomfort and palpitations and EMS was called in and patient was transferred to ED for further evaluation. In the ED patient was found to have potassium of 5.5 and he did receive treatment with insulin dextrose. He also got calcium gluconate and 1 amp of bicarb. Cardiology to eval.  Nephrology is consulted to history of ESRD on HD. Past History/Allergies? Social History:     Past Medical History:   Diagnosis Date    Allergic rhinitis     Anemia     BPH (benign prostatic hyperplasia)     CAD (coronary artery disease)     Carotid artery stenosis 2013    ulcerated plaque in left ICA 60 - 70% on carotid angiogram    Cerebrovascular disease 2013    left frontal hemorrhage    Diabetes mellitus ([address] 2013    Eczema     ESRD (end stage renal disease) on dialysis ([address]     Full dentures     Upper / Lower    GERD (gastroesophageal reflux disease)     Gout     Hemod[facility] patient ([facility] 10/2015     [address]  M,W F    Hyperlipidemia     Hypertension     Neuropathy     Osteoarthritis     Peripheral vascular disease (HCC)     Seizures [facility]) 2013    after stroke, LAST OEJOYPD0805/20/2016    Stroke [facility]) 2013    Unspecified cerebral artery occlusion with cerebral infarction 4-    speech short term and lt side       Past Surgical History:   Procedure Laterality Date    ABDOMEN SURGERY  2016    PERITONEAL CATHETER    ABDOMEN SURGERY  05/26/2016    pd catherter removed from abdomen    BLADDER SURGERY  Spring 2014    dilatated     CAROTID ENDARTERECTOMY  2015    left    COLONOSCOPY  2008    polypectomy, diverticulosis    COLONOSCOPY  1/28/2015    COLONOSCOPY  5/10/2018    COLONOSCOPY performed by Anabell Ocampo MD at [address] [place] [address]  2008    bladder tumor removal    DIALYSIS FISTULA CREATION Right 08/25/2016    INGUINAL HERNIA REPAIR Right     KIDNEY BIOPSY  May 2013    SINUS SURGERY      TUNNELED VENOUS PORT PLACEMENT Right 10/2015    dialysis catheter- chest     UPPER GASTROINTESTINAL ENDOSCOPY  1/28/2015    UPPER GASTROINTESTINAL ENDOSCOPY N/A 11/1/2018    EGD BIOPSY performed by Yann Fowler DO at [facility]       No Known Allergies    Social History     Socioeconomic History    Marital status:      Spouse name: Usman Orellana Number of children: 1    Years of education: Not on file    Highest education level: Not on file   Occupational History    Not on file   Tobacco Use    Smoking status: Never Smoker    Smokeless tobacco: Never Used   Vaping Use    Vaping Use: Never used   Substance and Sexual Activity    Alcohol use: No     Comment: none    Drug use: No    Sexual activity: Never   Other Topics Concern    Not on file   Social History Narrative    ** Merged History Encounter **         ** Merged History Encounter **          Social Determinants of Health     Financial Resource Strain: Low Risk     Difficulty of Paying Living Expenses: Not hard at all   Food Insecurity: No Food Insecurity    Worried About [facility] in the Last Year: Never true    Ming of Food in the Last Year: Never true   Transportation Needs:     Lack of Transportation (Medical):  Lack of Transportation (Non-Medical):    Physical Activity:     Days of Exercise per Week:     Minutes of Exercise per Session:    Stress:     Feeling of Stress :    Social Connections:     Frequency of Communication with Friends and Family:     Frequency of Social Gatherings with Friends and Family:     Attends [employer] Services:     Active Member of Clubs or Organizations:     Attends Club or Organization Meetings:     Marital Status:    Intimate Partner Violence:     Fear of Current or Ex-Partner:     Emotionally Abused:     Physically Abused:     Sexually Abused:        Family History:        Family History   Problem Relation Age of Onset    Diabetes Mother     Kidney Disease Mother         Dialysis    Cancer Mother         KIDNEY    Anesth Problems Mother         delayed awakening     Other Father         Richy Newer Diabetes Sister     High Blood Pressure Sister     Cancer Brother        Outpatient Medications:     Medications Prior to Admission: atorvastatin (LIPITOR) 40 MG tablet, Take 1 tablet by mouth nightly  Ciclopirox (LOPROX) 0.77 % gel, 2/day to affected areas  amLODIPine (NORVASC) 10 MG tablet, Take 1 tablet by mouth daily  folic acid (FOLVITE) 1 MG tablet, Take 1 tablet by mouth daily  RENVELA 800 MG tablet, Take 1 tablet by mouth 3 times daily  fluticasone (FLONASE) 50 MCG/ACT nasal spray, 1 spray by Each Nostril route daily 1 Spray in each nostril  Multiple Vitamins-Minerals (RENAPLEX-D PO), Take by mouth  allopurinol (ZYLOPRIM) 100 MG tablet, take 1 tablet by mouth once daily (Patient not taking: Reported on 2/2/2021)  cinacalcet (SENSIPAR) 30 MG tablet, Take 30 mg by mouth daily  calcium acetate (PHOSLO) 667 MG capsule, Take 3 capsules by mouth 3 times daily (with meals)  hydrocortisone (ANUSOL-HC) 2.5 % rectal cream, Place rectally 2 times daily.  (Patient not taking: Reported on 2021)    Current Medications:     Scheduled Meds:    sodium chloride flush  5-40 mL Intravenous 2 times per day    heparin (porcine)  5,000 Units Subcutaneous 3 times per day     Continuous Infusions:    sodium chloride       PRN Meds:  sodium chloride flush, sodium chloride, ondansetron **OR** ondansetron, polyethylene glycol, acetaminophen **OR** acetaminophen, metoprolol    Review of Systems:     Constitutional: No fever, no chills, no lethargy, no weakness. HEENT:  No headache, otalgia, itchy eyes, nasal discharge or sore throat. Cardiac:  Positive chest discomfort, no dyspnea, orthopnea or PND, positive palpitations. Chest:   No cough, phlegm or wheezing. Abdomen:  No abdominal pain, nausea or vomiting. Neuro:  No focal weakness, abnormal movements orseizure like activity. Skin:   No rashes, no itching. :   No hematuria, no pyuria, no dysuria, no flank pain. Extremities:  No swelling or joint pains. ROS was otherwise negative except as mentioned in the [address] Input/Output:     No intake/output data recorded. Vital Signs:   Temperature:  Temp: 97.5 °F (36.4 °C)  TMax:   Temp (24hrs), Av.5 °F (36.4 °C), Min:97.4 °F (36.3 °C), Max:97.5 °F (36.4 °C)    Respirations:  Resp: 17  Pulse:   Pulse: 79  BP:    BP: (!) 155/46  BP Range: Systolic (20DDQ), UMN:370 , Min:114 , FFJ:351       Diastolic (78OLT), ZYT:54, Min:46, Max:84      Physical Examination:     General:  AAO x 3, speaking in full sentences, no accessory muscle use. HEENT: Atraumatic, normocephalic, no throat congestion, moist mucosa. Eyes:   Pupils equal, round and reactive to light, EOMI. Neck:   Supple  Chest:   Bilateral vesicular breath sounds, no rales or wheezes. Cardiac:  S1 S2 RR, no murmurs, gallops or rubs. Abdomen: Soft, non-tender, no masses or organomegaly, BS audible. :   No suprapubic or flank tenderness. Neuro:  AAO x 3, No FND. SKIN:  No rashes, good skin turgor. Extremities:  Trace edema.     Labs: Recent Labs     07/19/21  1205   WBC 5.2   RBC 3.82*   HGB 10.4*   HCT 34.7*   MCV 90.8   MCH 27.2   MCHC 30.0   RDW 21.6*   PLT See Reflexed IPF Result   MPV NOT REPORTED      BMP:   Recent Labs     07/19/21  1205      K 5.5*      CO2 20   BUN 77*   CREATININE 12.91*   GLUCOSE 81   CALCIUM 9.8     Magnesium:    Recent Labs     07/19/21  1205   MG 2.5     BNP:      Lab Results   Component Value Date    BNP 31 04/29/2013     PTH:     Lab Results   Component Value Date    IPTH 157 04/01/2014     Urinalysis/Chemistries:      Lab Results   Component Value Date    NITRU NEGATIVE 09/29/2015    COLORU YELLOW 09/29/2015    PHUR 5.0 09/29/2015    WBCUA 2 TO 5 09/29/2015    RBCUA 2 TO 5 09/29/2015    MUCUS NOT REPORTED 09/29/2015    TRICHOMONAS NOT REPORTED 09/29/2015    YEAST NOT REPORTED 09/29/2015    BACTERIA FEW 09/29/2015    SPECGRAV 1.015 09/29/2015    LEUKOCYTESUR NEGATIVE 09/29/2015    UROBILINOGEN Normal 09/29/2015    BILIRUBINUR NEGATIVE 09/29/2015    GLUCOSEU NEGATIVE 09/29/2015    KETUA NEGATIVE 09/29/2015    AMORPHOUS NOT REPORTED 09/29/2015       Radiology:     Reviewed. Assessment:     1. ESRD on Hemodialysis. His regular HD days are MWF at [facility] facility using right AV fistula under Dr. Tasha Conrad. His dry weight is 80.8 kg.   2. SVT. 3.  Hyperkalemia. 4.  Chest discomfort. 5.  Anemia of chronic disease  6. Secondary hyperparathyroidism  7. Hypertension    Plan:   1. Patient to get regular dialysis today as per MWF schedule. Orders were confirmed with the dialysis nurse. 2. Strict Input and Output, Daily weigh and document in the chart. 3. Low Potassium, Low phosphorus and low salt diet. Fluids to be restricted to 1500ml/day. 4. IV Aranesp/Epogen for anemia of chronic disease with HD weekly. 5. IV Zemplar per protocol for secondary hyperparathyroidism with HD thrice a week. 6.  Agree with insulin dextrose for acute hyperkalemia treatment.   7.  SVT and chest discomfort treatment as per cardiology. 8.  BMP in AM.  9.  Will follow. Nutrition   Please ensure that patient is on a renal diet/TF. Thank you for the consultation. Please do not hesitate to contact us for any further questions/concerns. We will continue to follow along with you. Cali Angelo MD  [facility]     This note is created with the assistance of a speech-recognition program. While intending to generate a document that actually reflects the content of the visit, no guarantees can be provided that every mistake has been identified and corrected by editing.

## 2021-07-19 NOTE — PROGRESS NOTES
Senior Note       This is a 66 y.o. male admitted 7/19/2021 for Nonsustained supraventricular tachycardia ([facility] [address] [I47.1]  NSVT (nonsustained ventricular tachycardia) ([facility] [address] [I47.2]. See H&P of admitting/intern resident for more details. Chief Complaint :     Chest discomfort and palpitations. Significant Past Medical History :      ESRD on HD through Right AV fistula  MWF schedule   Coronary artery disease   Hyperlipidemia   Diabetes mellitus   History of left frontal hemorrhagic stroke   Carotid artery stenosis   Seizures history   GERD   BPH    HPI:     Patient came to hemodialysis as per his regular schedule MWF found to be having  Multiple runs (20 beat) of Non sustained wide complex Ventricular tachycardia on rhythm strips in dialysis along with palpitations. His lab work-up showed potassium of 5.5 s/p insulin dextrose and patient was transferred to ED for further evaluation and EKG showed Right axis deviation and NSVT is not seen. CXR showed moderate cardiomegaly and obliteration of costophrenic angles. Internal medicine is consulted for eval of wide complex  NSVT and admission for HD for ESRD and Cardiology is consulted and plan is to further eval for NSVT  Cardiology to evaluate and nephrology onboard  for ESRD.        Social history :   Smoking : denies     Alcohol :  denies     Recreational drug : denies       BMP:   Recent Labs     07/19/21  1205      K 5.5*      CO2 20   BUN 77*   CREATININE 12.91*   GLUCOSE 81     CBC: )  Recent Labs     07/19/21  1205   WBC 5.2   HGB 10.4*   HCT 34.7*   PLT See Reflexed IPF Result          Imaging :     Xray chest : moderate cardiomegaly      Assessment & Plan :    Principal Problem:    NSVT (nonsustained ventricular tachycardia) (HCC)  Active Problems:    Hypertension    Hyperlipidemia    ESRD on hemodialysis (HCC)    Bronchitis    Nonsustained

## 2021-07-19 NOTE — ED NOTES
The following labs were labeled with patient stickers & tubed to lab;    []Lavender   []On Ice  []Blue  [x]Green/ Yellow  []Green/ Black []On Ice  []Pink  []Red  []Yellow    []COVID-19 Swab []Rapid    []Urine Sample  []Pelvic Cultures    []Blood Cultures       Devi Stokes LPN  29/25/57 [id number]       Devi Stokes LPN  85/95/21 [id number]

## 2021-07-19 NOTE — ED PROVIDER NOTES
[facility] ED     Emergency Department     Faculty Attestation    I performed a history and physical examination of the patient and discussed management with the resident. I reviewed the residents note and agree with the documented findings and plan of care. Any areas of disagreement are noted on the chart. I was personally present for the key portions of any procedures. I have documented in the chart those procedures where I was not present during the key portions. I have reviewed the emergency nurses triage note. I agree with the chief complaint, past medical history, past surgical history, allergies, medications, social and family history as documented unless otherwise noted below. For Physician Assistant/ Nurse Practitioner cases/documentation I have personally evaluated this patient and have completed at least one if not all key elements of the E/M (history, physical exam, and MDM). Additional findings are as noted. This patient was evaluated in the Emergency Department for symptoms described in the history of present illness. He/she was evaluated in the context of the global COVID-19 pandemic, which necessitated consideration that the patient might be at risk for infection with the SARS-CoV-2 virus that causes COVID-19. Institutional protocols and algorithms that pertain to the evaluation of patients at risk for COVID-19 are in a state of rapid change based on information released by regulatory bodies including the CDC and federal and state organizations. These policies and algorithms were followed during the patient's care in the ED. Sent from dialysis for tachycardia. Patient had minimal symptoms with this. Had not started dialysis. No complaints currently. Normal 3 sessions of dialysis last week today was his neck schedule appointment. No complaints currently no chest pain shortness of breath nausea vomiting palpitations.   Resting comfortably on exam.  Sinus rhythm on the monitor confirmed on EKG. However reviewing rhythm strips from EKG did show several runs of wide-complex tachycardia with different morphology. These all resolved spontaneously. Lungs clear heart regular except loud systolic murmur. Has a fistula in the right arm. Will check labs EKG chest x-ray, probable admission    EKG interpretation: Sinus rhythm 74. Normal intervals normal axis. There is borderline LVH. No acute ST or T changes. Overall similar to 10/5/2018    12:27 PM EDT  Rhythm strip interpretation, 1222: Patient had a 20 beat run of a wide-complex tachycardia with different morphology than his initial EKG. This is similar to the rhythm strips with EMS. Concern for hyperkalemia wide-complex tachycardia versus ventricular tachycardia. Will place patient on [place]    1:41 PM EDT  Rhythm strip interpretation, 1310: Called to bedside for again run of wide-complex tachycardia, terminated spontaneously. Patient was asymptomatic during this. Critical Care     CRITICAL CARE: There was a high probability of clinically significant/life threatening deterioration in this patient's condition which required my urgent intervention. Total critical care time was 20 minutes. This excludes any time for separately reportable procedures.        Paolo Irby MD, Britni De Dios  Attending Emergency  Physician             Paolo Irby MD  07/19/21 [id number]

## 2021-07-19 NOTE — ED NOTES
Patient on stretcher with no complaints. POC updated.  Will continue to monitor        Lupe Saucedo RN  07/19/21 [id number]

## 2021-07-19 NOTE — Clinical Note
Patient Class: Inpatient [101]   REQUIRED: Diagnosis: Nonsustained supraventricular tachycardia ([facility] [address] [[id number]]   Estimated Length of Stay: Estimated stay of more than 2 midnights   Admitting Provider: Jean Pierre Givens [[id number]]   Preferred Department: IM   Telemetry/Cardiac Monitoring Required?: Yes

## 2021-07-20 LAB
ABSOLUTE EOS #: 0.04 K/UL (ref 0–0.4)
ABSOLUTE IMMATURE GRANULOCYTE: 0 K/UL (ref 0–0.3)
ABSOLUTE LYMPH #: 0.68 K/UL (ref 1–4.8)
ABSOLUTE MONO #: 0.15 K/UL (ref 0.1–0.8)
ANION GAP SERPL CALCULATED.3IONS-SCNC: 19 MMOL/L (ref 9–17)
BASOPHILS # BLD: 0 % (ref 0–2)
BASOPHILS ABSOLUTE: 0 K/UL (ref 0–0.2)
BUN BLDV-MCNC: 38 MG/DL (ref 8–23)
BUN/CREAT BLD: ABNORMAL (ref 9–20)
CALCIUM SERPL-MCNC: 8.3 MG/DL (ref 8.6–10.4)
CHLORIDE BLD-SCNC: 96 MMOL/L (ref 98–107)
CO2: 23 MMOL/L (ref 20–31)
CREAT SERPL-MCNC: 9.26 MG/DL (ref 0.7–1.2)
DIFFERENTIAL TYPE: ABNORMAL
EKG ATRIAL RATE: 74 BPM
EKG P AXIS: 63 DEGREES
EKG P-R INTERVAL: 188 MS
EKG Q-T INTERVAL: 398 MS
EKG QRS DURATION: 94 MS
EKG QTC CALCULATION (BAZETT): 441 MS
EKG R AXIS: -5 DEGREES
EKG T AXIS: 47 DEGREES
EKG VENTRICULAR RATE: 74 BPM
EOSINOPHILS RELATIVE PERCENT: 1 % (ref 1–4)
GFR AFRICAN AMERICAN: 7 ML/MIN
GFR NON-AFRICAN AMERICAN: 6 ML/MIN
GFR SERPL CREATININE-BSD FRML MDRD: ABNORMAL ML/MIN/{1.73_M2}
GFR SERPL CREATININE-BSD FRML MDRD: ABNORMAL ML/MIN/{1.73_M2}
GLUCOSE BLD-MCNC: 109 MG/DL (ref 70–99)
HCT VFR BLD CALC: 28.6 % (ref 40.7–50.3)
HEMOGLOBIN: 8.6 G/DL (ref 13–17)
IMMATURE GRANULOCYTES: 0 %
LYMPHOCYTES # BLD: 18 % (ref 24–44)
MCH RBC QN AUTO: 27.1 PG (ref 25.2–33.5)
MCHC RBC AUTO-ENTMCNC: 30.1 G/DL (ref 28.4–34.8)
MCV RBC AUTO: 90.2 FL (ref 82.6–102.9)
MONOCYTES # BLD: 4 % (ref 1–7)
MORPHOLOGY: ABNORMAL
NRBC AUTOMATED: 0.8 PER 100 WBC
PDW BLD-RTO: 21.3 % (ref 11.8–14.4)
PLATELET # BLD: 131 K/UL (ref 138–453)
PLATELET ESTIMATE: ABNORMAL
PMV BLD AUTO: ABNORMAL FL (ref 8.1–13.5)
POTASSIUM SERPL-SCNC: 3.6 MMOL/L (ref 3.7–5.3)
RBC # BLD: 3.17 M/UL (ref 4.21–5.77)
RBC # BLD: ABNORMAL 10*6/UL
SEG NEUTROPHILS: 77 % (ref 36–66)
SEGMENTED NEUTROPHILS ABSOLUTE COUNT: 2.93 K/UL (ref 1.8–7.7)
SODIUM BLD-SCNC: 138 MMOL/L (ref 135–144)
TROPONIN INTERP: ABNORMAL
TROPONIN T: ABNORMAL NG/ML
TROPONIN, HIGH SENSITIVITY: 262 NG/L (ref 0–22)
TROPONIN, HIGH SENSITIVITY: 266 NG/L (ref 0–22)
TROPONIN, HIGH SENSITIVITY: 274 NG/L (ref 0–22)
WBC # BLD: 3.8 K/UL (ref 3.5–11.3)
WBC # BLD: ABNORMAL 10*3/UL

## 2021-07-20 PROCEDURE — 36415 COLL VENOUS BLD VENIPUNCTURE: CPT

## 2021-07-20 PROCEDURE — 6370000000 HC RX 637 (ALT 250 FOR IP): Performed by: STUDENT IN AN ORGANIZED HEALTH CARE EDUCATION/TRAINING PROGRAM

## 2021-07-20 PROCEDURE — 93005 ELECTROCARDIOGRAM TRACING: CPT | Performed by: STUDENT IN AN ORGANIZED HEALTH CARE EDUCATION/TRAINING PROGRAM

## 2021-07-20 PROCEDURE — 97116 GAIT TRAINING THERAPY: CPT

## 2021-07-20 PROCEDURE — 2060000000 HC ICU INTERMEDIATE R&B

## 2021-07-20 PROCEDURE — 97163 PT EVAL HIGH COMPLEX 45 MIN: CPT

## 2021-07-20 PROCEDURE — 97530 THERAPEUTIC ACTIVITIES: CPT

## 2021-07-20 PROCEDURE — 6360000002 HC RX W HCPCS: Performed by: STUDENT IN AN ORGANIZED HEALTH CARE EDUCATION/TRAINING PROGRAM

## 2021-07-20 PROCEDURE — 2500000003 HC RX 250 WO HCPCS: Performed by: STUDENT IN AN ORGANIZED HEALTH CARE EDUCATION/TRAINING PROGRAM

## 2021-07-20 PROCEDURE — 2580000003 HC RX 258: Performed by: STUDENT IN AN ORGANIZED HEALTH CARE EDUCATION/TRAINING PROGRAM

## 2021-07-20 PROCEDURE — 85025 COMPLETE CBC W/AUTO DIFF WBC: CPT

## 2021-07-20 PROCEDURE — 99232 SBSQ HOSP IP/OBS MODERATE 35: CPT | Performed by: INTERNAL MEDICINE

## 2021-07-20 PROCEDURE — 1200000000 HC SEMI PRIVATE

## 2021-07-20 PROCEDURE — 84484 ASSAY OF TROPONIN QUANT: CPT

## 2021-07-20 PROCEDURE — 80048 BASIC METABOLIC PNL TOTAL CA: CPT

## 2021-07-20 PROCEDURE — 99231 SBSQ HOSP IP/OBS SF/LOW 25: CPT | Performed by: INTERNAL MEDICINE

## 2021-07-20 PROCEDURE — 93010 ELECTROCARDIOGRAM REPORT: CPT | Performed by: INTERNAL MEDICINE

## 2021-07-20 PROCEDURE — 6370000000 HC RX 637 (ALT 250 FOR IP)

## 2021-07-20 RX ORDER — METOPROLOL SUCCINATE 25 MG/1
25 TABLET, EXTENDED RELEASE ORAL DAILY
Status: DISCONTINUED | OUTPATIENT
Start: 2021-07-20 | End: 2021-07-22 | Stop reason: HOSPADM

## 2021-07-20 RX ORDER — GUAIFENESIN 600 MG/1
600 TABLET, EXTENDED RELEASE ORAL 2 TIMES DAILY
Status: DISCONTINUED | OUTPATIENT
Start: 2021-07-20 | End: 2021-07-22 | Stop reason: HOSPADM

## 2021-07-20 RX ORDER — METOPROLOL TARTRATE 5 MG/5ML
2.5 INJECTION INTRAVENOUS EVERY 5 MIN PRN
Status: DISCONTINUED | OUTPATIENT
Start: 2021-07-20 | End: 2021-07-22 | Stop reason: HOSPADM

## 2021-07-20 RX ORDER — POTASSIUM CHLORIDE 7.45 MG/ML
20 INJECTION INTRAVENOUS ONCE
Status: COMPLETED | OUTPATIENT
Start: 2021-07-20 | End: 2021-07-20

## 2021-07-20 RX ADMIN — SODIUM CHLORIDE, PRESERVATIVE FREE 10 ML: 5 INJECTION INTRAVENOUS at 10:16

## 2021-07-20 RX ADMIN — METOPROLOL TARTRATE 2.5 MG: 5 INJECTION INTRAVENOUS at 10:09

## 2021-07-20 RX ADMIN — MICONAZOLE NITRATE: 20 CREAM TOPICAL at 13:32

## 2021-07-20 RX ADMIN — METOPROLOL TARTRATE 2.5 MG: 5 INJECTION INTRAVENOUS at 15:41

## 2021-07-20 RX ADMIN — POTASSIUM CHLORIDE 20 MEQ: 7.46 INJECTION, SOLUTION INTRAVENOUS at 10:17

## 2021-07-20 RX ADMIN — METOPROLOL SUCCINATE 25 MG: 25 TABLET, FILM COATED, EXTENDED RELEASE ORAL at 11:10

## 2021-07-20 RX ADMIN — HEPARIN SODIUM 5000 UNITS: 5000 INJECTION INTRAVENOUS; SUBCUTANEOUS at 20:46

## 2021-07-20 RX ADMIN — HEPARIN SODIUM 5000 UNITS: 5000 INJECTION INTRAVENOUS; SUBCUTANEOUS at 13:33

## 2021-07-20 RX ADMIN — GUAIFENESIN 600 MG: 600 TABLET, EXTENDED RELEASE ORAL at 20:46

## 2021-07-20 RX ADMIN — METOPROLOL TARTRATE 2.5 MG: 5 INJECTION INTRAVENOUS at 20:49

## 2021-07-20 RX ADMIN — SODIUM CHLORIDE, PRESERVATIVE FREE 10 ML: 5 INJECTION INTRAVENOUS at 20:46

## 2021-07-20 RX ADMIN — ASPIRIN 325 MG: 325 TABLET, COATED ORAL at 11:10

## 2021-07-20 RX ADMIN — HEPARIN SODIUM 5000 UNITS: 5000 INJECTION INTRAVENOUS; SUBCUTANEOUS at 05:38

## 2021-07-20 RX ADMIN — GUAIFENESIN 600 MG: 600 TABLET, EXTENDED RELEASE ORAL at 13:32

## 2021-07-20 ASSESSMENT — PAIN DESCRIPTION - PAIN TYPE: TYPE: CHRONIC PAIN

## 2021-07-20 ASSESSMENT — PAIN SCALES - GENERAL
PAINLEVEL_OUTOF10: 0
PAINLEVEL_OUTOF10: 5
PAINLEVEL_OUTOF10: 0

## 2021-07-20 ASSESSMENT — PAIN DESCRIPTION - LOCATION: LOCATION: KNEE

## 2021-07-20 ASSESSMENT — PAIN DESCRIPTION - ORIENTATION: ORIENTATION: RIGHT

## 2021-07-20 NOTE — PLAN OF CARE
Problem: Falls - Risk of:  Goal: Will remain free from falls  Description: Will remain free from falls  7/20/2021 1949 by Jeremy Ma RN  Outcome: Ongoing  7/20/2021 1749 by Jeremy Ma RN  Outcome: Ongoing  Goal: Absence of physical injury  Description: Absence of physical injury  7/20/2021 1949 by Jeremy Ma RN  Outcome: Ongoing  7/20/2021 1749 by Jeremy Ma RN  Outcome: Ongoing     Problem: IP BALANCE  Goal: BALANCE EDUCATION  Description: Educate patients on maintaining dynamic/static standing/sitting balance, with/without upper extremity support.   7/20/2021 1949 by Jeremy Ma RN  Outcome: Ongoing  7/20/2021 1749 by Jeremy Ma RN  Outcome: Ongoing     Problem: IP MOBILITY  Goal: LTG - patient will ambulate household distance  7/20/2021 1949 by Jeremy Ma RN  Outcome: Ongoing  7/20/2021 1749 by Jeremy Ma RN  Outcome: Ongoing    Safety maintained, bed low/locked, pt uses call light appropriately for staff assist.

## 2021-07-20 NOTE — PROGRESS NOTES
Dialysis Post Treatment Note  Vitals:    07/20/21 0030   BP: (!) 142/75   Pulse: 72   Resp: 21   Temp:    SpO2:      Pre-Weight = 104.7 kg  Post-weight = Weight: 229 lb 11.5 oz (104.2 kg)  Total Liters Processed = Total Liters Processed (l/min): 77.3 l/min  Rinseback Volume (mL) = Rinseback Volume (ml): 300 ml  Net Removal (mL) = NET Removed (ml): 500 ml  Patient's dry weight 500 mls rmvd as ordered   Type of access used R FA  AVF   Length of treatment 210  Pt tolerated tx without problem.  Venous needle with high pressures, scar tissue note with cannulation

## 2021-07-20 NOTE — PROGRESS NOTES
Occupational [address]  Occupational Therapy Not Seen Note    DATE: 2021  Name: Nisha Gamble  : 1945  MRN: [id number]    Patient not available for Occupational Therapy due to:    [x] Other: Pt with tachycardia at 110 BPM at rest, just given medication, hold OT eval. Writer returned at 1330 hrs and pt went from HR in 70's to 145 bpm from simply leaning forwards in chair. Next Scheduled Treatment: Attempt on  as appropriate.     Mliss Mike, OT OTR/L

## 2021-07-20 NOTE — PROGRESS NOTES
Renal Progress Note    Patient :  Luz Maria Grjialva; 66 y.o. MRN# [id number]  Location:  [facility]6/1736-34  Attending:  Anne Lambert MD  Admit Date:  7/19/2021   Hospital Day: 1      Subjective:     Patient was seen and examined. No new issues reported overnight. Patient did have regular hemodialysis yesterday ran for 210 minutes got about 500 cc of fluid removed. Cardiology to eval due to SVT. Patient normally gets dialysis as per [facility] schedule. Outpatient Medications:     Medications Prior to Admission: atorvastatin (LIPITOR) 40 MG tablet, Take 1 tablet by mouth nightly  Ciclopirox (LOPROX) 0.77 % gel, 2/day to affected areas  amLODIPine (NORVASC) 10 MG tablet, Take 1 tablet by mouth daily  folic acid (FOLVITE) 1 MG tablet, Take 1 tablet by mouth daily  RENVELA 800 MG tablet, Take 1 tablet by mouth 3 times daily  fluticasone (FLONASE) 50 MCG/ACT nasal spray, 1 spray by Each Nostril route daily 1 Spray in each nostril  Multiple Vitamins-Minerals (RENAPLEX-D PO), Take by mouth  allopurinol (ZYLOPRIM) 100 MG tablet, take 1 tablet by mouth once daily (Patient not taking: Reported on 2/2/2021)  cinacalcet (SENSIPAR) 30 MG tablet, Take 30 mg by mouth daily  calcium acetate (PHOSLO) 667 MG capsule, Take 3 capsules by mouth 3 times daily (with meals)  hydrocortisone (ANUSOL-HC) 2.5 % rectal cream, Place rectally 2 times daily.  (Patient not taking: Reported on 2/2/2021)    Current Medications:     Scheduled Meds:    metoprolol succinate  25 mg Oral Daily    aspirin  325 mg Oral Daily    sodium chloride flush  5-40 mL Intravenous 2 times per day    heparin (porcine)  5,000 Units Subcutaneous 3 times per day    doxercalciferol  2 mcg Intravenous Q MWF    iron sucrose  50 mg Intravenous Weekly    epoetin behzad-epbx  8,000 Units Intravenous Once per day on Mon Wed Fri    cinacalcet  120 mg Oral Q MWF     Continuous Infusions:    sodium chloride       PRN Meds:  sodium chloride flush, sodium chloride, ondansetron **OR** 6.9 10/05/2018    PROT 7.6 10/17/2012    ALBCAL 3.8 05/07/2014    ALBPCT 58 05/07/2014    LABALPH 0.2 05/07/2014    LABALPH 0.7 05/07/2014    A1PCT 3 05/07/2014    A2PCT 10 05/07/2014    LABBETA 0.9 05/07/2014    BETAPCT 14 05/07/2014    GAMGLOB 1.0 05/07/2014    GGPCT 15 05/07/2014    PATH ELECTRONICALLY SIGNED. Alex Cat M.D. 05/07/2014     UPEP:     Lab Results   Component Value Date    LABPE  05/07/2014     ELEVATED PROTEIN CONCENTRATION. MOST SERUM PROTEINS ARE DETECTED IN THIS URINE. C3:     Lab Results   Component Value Date    C3 102 04/29/2013     C4:     Lab Results   Component Value Date    C4 19 04/29/2013     Hep BsAg:         Lab Results   Component Value Date    HEPBSAG NONREACTIVE 07/19/2021     Hep C AB:          Lab Results   Component Value Date    HEPCAB NONREACTIVE 07/19/2021       Urinalysis/Chemistries:      Lab Results   Component Value Date    NITRU NEGATIVE 09/29/2015    COLORU YELLOW 09/29/2015    PHUR 5.0 09/29/2015    WBCUA 2 TO 5 09/29/2015    RBCUA 2 TO 5 09/29/2015    MUCUS NOT REPORTED 09/29/2015    TRICHOMONAS NOT REPORTED 09/29/2015    YEAST NOT REPORTED 09/29/2015    BACTERIA FEW 09/29/2015    SPECGRAV 1.015 09/29/2015    LEUKOCYTESUR NEGATIVE 09/29/2015    UROBILINOGEN Normal 09/29/2015    BILIRUBINUR NEGATIVE 09/29/2015    GLUCOSEU NEGATIVE 09/29/2015    KETUA NEGATIVE 09/29/2015    AMORPHOUS NOT REPORTED 09/29/2015     Urine Sodium:     Lab Results   Component Value Date    MARTINEZ 71 04/29/2013      Urine Creatinine:     Lab Results   Component Value Date    LABCREA 174.1 09/29/2015     Radiology:     Reviewed. Assessment:     1. ESRD on Hemodialysis. His regular HD days are MWF at [facility] hemodialysis facility using right AV fistula under Dr. Carmine Barrios. His dry weight is 80.8 kg.   2. SVT. 3.  Hyperkalemia. 4.  Chest discomfort. 5.  Anemia of chronic disease  6. Secondary hyperparathyroidism  7. Hypertension. Plan:   1.   No need for dialysis today.  Will get regular dialysis in a.m. MWF schedule. 2.  Keep on renal diet. 3.  SVT treatment as per cardiology. 4.  Okay to discharge from nephrology standpoint. Nutrition   Please ensure that patient is on a renal diet/TF. Avoid nephrotoxic drugs/contrast exposure. We will continue to follow along with you. Nishant K. Federico Shone, MD  [facility]     This note is created with the assistance of a speech-recognition program. While intending to generate a document that actually reflects the content of the visit, no guarantees can be provided that every mistake has been identified and corrected by editing.

## 2021-07-20 NOTE — PROGRESS NOTES
Dialysis Time Out  To be done by RN and tech or 2 RNs  Staff Names Evelina Guzman RN and Malka Duke RN    [x]  Identity of the patient using 2 patient identifiers  [x]  Consent for treatment  [x]  Equipment-proper machine and dialyzer  [x]  B-Hep B status  [x]  Orders- to include bath, blood flow, dialyzer, time and fluid removal  [x]  Access-Correct site and in working order  [x]  Time for patient to ask questions.

## 2021-07-20 NOTE — PROGRESS NOTES
Physical Therapy    Facility/Department: [facility]  Initial Assessment    NAME: Hannah Oquendo  : 1945  MRN: [id number]  The patient is a pleasant 66 y.o. male presents with a chief complaint of SVTs with palpitations. He went for dialysis, and they found out he had intermittent SVTs. He admits having palpitations 1 day ago, with an irregular rhythm of his heart, which resolved spontaneously. Since admission, he has had some episodes of SVT's with rates up to 143, lasting about 90 seconds. He has a Hx of stroke 6 years ago, ESRD on dialysis for 6 years, HTN. No hx of arrythmias. He has cough, bilateral pedal edema, hermelindo, bilateral knee pain. Date of Service: 2021    Discharge Recommendations:  Patient would benefit from continued therapy after discharge   PT Equipment Recommendations  Equipment Needed: Yes  Mobility Devices: Theotis Jennifer: Rolling    Assessment   Body structures, Functions, Activity limitations: Decreased functional mobility ; Decreased endurance; Increased pain  Assessment: Fluctuating pulse up to 140 bpm.  So far, per nurse he's been asymptomatic. No signs of syncope/lightheadedness today from patient. Able to ambulate a short distance with a walker. Patient needs further PT to regain functional independence. Prognosis: Good  Decision Making: High Complexity  Clinical Presentation: unstable  PT Education: Goals;Transfer Training;Equipment; Functional Mobility Training;General Safety;Plan of Care; Injury Prevention;Precautions;Gait Training;Orientation  REQUIRES PT FOLLOW UP: Yes  Activity Tolerance  Activity Tolerance: Patient Tolerated treatment well       Patient Diagnosis(es): The primary encounter diagnosis was Palpitations. Diagnoses of NSVT (nonsustained ventricular tachycardia) ([address] and Essential hypertension were also pertinent to this visit.      has a past medical history of Allergic rhinitis, Anemia, BPH (benign prostatic hyperplasia), CAD (coronary artery disease), Carotid artery stenosis, Cerebrovascular disease, Diabetes mellitus ([facility] [address] Eczema, ESRD (end stage renal disease) on dialysis ([facility] [address] Full dentures, GERD (gastroesophageal reflux disease), Gout, Hemodialysis patient ([facility] [address] Hyperlipidemia, Hypertension, Neuropathy, Osteoarthritis, Peripheral vascular disease ([facility] [address] Seizures ([facility] [address] Stroke ([facility] [address] and Unspecified cerebral artery occlusion with cerebral infarction. has a past surgical history that includes Bladder surgery (Spring 2014); sinus surgery; Cystocopy (2008); Inguinal hernia repair (Right); Kidney biopsy (May 2013); Upper gastrointestinal endoscopy (1/28/2015); Colonoscopy (2008); Colonoscopy (1/28/2015); Carotid endarterectomy (2015); Abdomen surgery (2016); Dialysis fistula creation (Right, 08/25/2016); Tunneled venous port placement (Right, 10/2015); Abdomen surgery (05/26/2016); Colonoscopy (5/10/2018); and Upper gastrointestinal endoscopy (N/A, 11/1/2018). Restrictions  Restrictions/Precautions  Restrictions/Precautions: Up as Tolerated  Position Activity Restriction  Other position/activity restrictions: Cardiology: discharge on a holter monitor. Vision/Hearing  Vision: Within Functional Limits  Hearing: Within functional limits     Subjective  General  Chart Reviewed: Yes  Patient assessed for rehabilitation services?: Yes  Family / Caregiver Present: No  Follows Commands: Within Functional Limits  General Comment  Comments: Discussed with nurse prior to eval. Patient's tachycardia asymptomatic. MDs all aware. Pain Screening  Patient Currently in Pain: Yes  Pain Assessment  Pain Assessment: 0-10  Pain Level: 5  Pain Type: Chronic pain  Pain Location: Knee  Pain Orientation: Right  Vital Signs  Pulse: 78 (Pulse fluctuating between 78-80 to 139-142. Nurse aware.   Asymptomatic, /85.)  Patient Currently in Pain: Yes  Oxygen Therapy  O2 Device: None (Room air)       Orientation  Orientation  Overall Orientation Status: Impaired  Orientation Level: Dynamic: Good  Standing - Static: Fair  Standing - Dynamic: [facility]  Times per week: 5-6x/wk  Current Treatment Recommendations: Strengthening, Patient/Caregiver Education & Training, Gait Training, Home Exercise Program, Safety Education & Training, Stair training, Functional Mobility Training, Endurance Training, Transfer Training, Equipment Evaluation, Education, & procurement  Safety Devices  Type of devices: All fall risk precautions in place, Gait belt, Call light within reach, Left in chair, Nurse notified           AM-PAC Score  AM-PAC Inpatient Mobility Raw Score : 22 (07/20/21 1157)  AM-PAC Inpatient T-Scale Score : 53.28 (07/20/21 1157)  Mobility Inpatient CMS 0-100% Score: 20.91 (07/20/21 1157)  Mobility Inpatient CMS G-Code Modifier : Jaida Ayon (07/20/21 1157)          Goals  Short term goals  Time Frame for Short term goals: 14 visits  Short term goal 1: Ambulate 150' with walker with SBA. Short term goal 2: Negotiate up and down 4 steps with one railing with CGA. Short term goal 3: Sit to/from stand with independence.        Therapy Time   Individual Concurrent Group Co-treatment   Time In 1130         Time Out 1202         Minutes 32         Timed Code Treatment Minutes: DALTON Saenz

## 2021-07-20 NOTE — PROGRESS NOTES
Attending Physician Statement  I have discussed the care of Ree Florian, including pertinent history and exam findings,  with the resident. I have seen and examined the patient and the key elements of all parts of the encounter have been performed by me. I agree with the assessment, plan and orders as documented by the resident with additions . Discussed with Dr. Hershel Halsted from cardiology. He will make his recommendation after seeing the patient dictation of    Treatment plan Discussed with nursing staff in detail , all questions answered . Electronically signed by Keturah Deshpande MD on   7/20/21 at 11:39 AM EDT    Please note that this chart was generated using voice recognition Dragon dictation software. Although every effort was made to ensure the accuracy of this automated transcription, some errors in transcription may have occurred.

## 2021-07-20 NOTE — PROGRESS NOTES
Paged re: patient's HR persistently >110, episodically into 130s-140s w/ movement//upon waking. This is despite receiving his PO dose of lopressor this morning. Patient normotensive at these times, asympomatic I.e. denying CP/SOB. Case d/w Cardiology fellow Dr. Ludin Castanon: advises lopressor IV q5m PRN 2.5-5.0mg for HR>110s, and if persistently in 130s-140s, can give IV adenosine push.     Plan discussed with nursing.    -Will continue to monitor  -TTE ordered, pending completion & read    Areli Ayala MD  PGY-2, Department of Internal Medicine  [facility], [place], [place]

## 2021-07-20 NOTE — PROGRESS NOTES
Nonsustainable runs of SVT noted, pt asymptomatic, Dr. Lizett Loving notified, no new orders received, will cont to monitor.

## 2021-07-20 NOTE — PROGRESS NOTES
[facility]  Internal Medicine Teaching Residency Program  Inpatient Daily Progress Note  ______________________________________________________________________________    Patient: Felicia Mayorga  YOB: 1945   EJP:[id number]    Acct: [de-identified]     Room: [address] [place] [address]  Admit date: 7/19/2021  Today's date: 07/20/21  Number of days in the hospital: 1    SUBJECTIVE   Admitting Diagnosis: NSVT (nonsustained ventricular tachycardia) ([facility] [address]  CC: SVTs  Pt examined at bedside. Chart & results reviewed. Stable. Still had several episodes of SVTs throughout the night. Seen by cardiology and nephrology. Will discharge    Cardiology consult    IMPRESSION:    1.  New onset tachycardia which was asymptomatic during hemodialysis, likely SVT vs a flutter . 2. Troponin elevation with a flat trend with no acute EKG changes likely demand ischemia. 3.  Previous echo in 2015 with normal LVEF. 4.  ESRD on hemodialysis. 5.  Left ICA stenosis status post CEA in 2015. 6.  HTN/HLD.     RECOMMENDATIONS:  1. Troponin elevation likely secondary demand ischemia. No need for heparin at this point. 2. We will continue low-dose beta-blocker for tachycardia. 3. Please electrolyte abnormalities. Please make sure K > 4 and mag >2.  4. Will follow ECHO, discharge on holter monitor. ROS:  Constitutional:  negative for chills, fevers, sweats  Respiratory:  negative for cough, dyspnea on exertion, hemoptysis, shortness of breath, wheezing  Cardiovascular:  negative for chest pain, chest pressure/discomfort, lower extremity edema, palpitations  Gastrointestinal:  negative for abdominal pain, constipation, diarrhea, nausea, vomiting  Neurological:  negative for dizziness, headache  BRIEF HISTORY     The patient is a pleasant 66 y.o. male presents with a chief complaint of SVTs with palpitations. He went for dialysis, and they found out he had intermittent SVTs.  He admits having palpitations 1 day ago, with an irregular rhythm of his heart, which resolved spontaneously. Since admission, he has had some episodes of SVT's with rates up to 143, lasting about 90 seconds. He has a Hx of stroke 6 years ago, ESRD on dialysis for 6 years, HTN. No hx of arrythmias. He has cough, bilateral pedal edema, hermelindo, bilateral knee pain. No dyspnea, chest pain, syncope, nausea, vomiting, diarrhea. OBJECTIVE     Vital Signs:  /83   Pulse 68   Temp 97.4 °F (36.3 °C) (Oral)   Resp 17   Ht 6' 1\" (1.854 m)   Wt 229 lb 11.5 oz (104.2 kg)   SpO2 96%   BMI 30.31 kg/m²     Temp (24hrs), Av.6 °F (36.4 °C), Min:97.4 °F (36.3 °C), Max:97.9 °F (36.6 °C)    In: 500   Out: 1000     Physical Exam:  Constitutional: This is a well developed, well nourished, 30-34.9 - Obesity Grade I 66y.o. year old male who is alert, oriented, cooperative and in no apparent distress. Head:normocephalic and atraumatic. EENT:  PERRLA. No conjunctival injections. Septum was midline, mucosa was without erythema, exudates or cobblestoning. No thrush was noted. Neck: Supple without thyromegaly. No elevated JVP. Trachea was midline. Respiratory: Chest was symmetrical without dullness to percussion. Breath sounds bilaterally were clear to auscultation. There were no wheezes, rhonchi or rales. There is no intercostal retraction or use of accessory muscles. No egophony noted. Cardiovascular: Regular with systolic ejection murmur, loudest at the aortic area. No clicks, gallops or rubs. Abdomen: Slightly rounded and soft without organomegaly. No rebound, rigidity or guarding was appreciated. Lymphatic: No lymphadenopathy. Musculoskeletal: Pain with passive movement of right knee. No edema. Normal curvature of the spine. No gross muscle weakness. Extremities:  Bilateral pitting pedal  edema, ulcerations, tenderness, varicosities or erythema.   Muscle size, tone and strength are normal.  No involuntary movements are noted. Skin: multiple ring-shaped rash with central clearing. Warm and dry. Good color, turgor and pigmentation. No cyanosis or clubbing  Neurological/Psychiatric: The patient's general behavior, level of consciousness, thought content and emotional status is normal     Medications:  Scheduled Medications:    metoprolol succinate  25 mg Oral Daily    aspirin  325 mg Oral Daily    sodium chloride flush  5-40 mL Intravenous 2 times per day    heparin (porcine)  5,000 Units Subcutaneous 3 times per day    doxercalciferol  2 mcg Intravenous Q MWF    iron sucrose  50 mg Intravenous Weekly    epoetin behzad-epbx  8,000 Units Intravenous Once per day on Mon Wed Fri    cinacalcet  120 mg Oral Q MWF     Continuous Infusions:    sodium chloride       PRN Medicationssodium chloride flush, 5-40 mL, PRN  sodium chloride, 25 mL, PRN  ondansetron, 4 mg, Q8H PRN   Or  ondansetron, 4 mg, Q6H PRN  polyethylene glycol, 17 g, Daily PRN  acetaminophen, 650 mg, Q6H PRN   Or  acetaminophen, 650 mg, Q6H PRN  metoprolol, 2.5 mg, Q4H PRN        Diagnostic Labs:  CBC:   Recent Labs     07/19/21  1205 07/20/21  0324   WBC 5.2 3.8   RBC 3.82* 3.17*   HGB 10.4* 8.6*   HCT 34.7* 28.6*   MCV 90.8 90.2   RDW 21.6* 21.3*   PLT See Reflexed IPF Result 131*     BMP:   Recent Labs     07/19/21  1205 07/20/21  0324    138   K 5.5* 3.6*    96*   CO2 20 23   BUN 77* 38*   CREATININE 12.91* 9.26*     BNP: No results for input(s): BNP in the last 72 hours. PT/INR: No results for input(s): PROTIME, INR in the last 72 hours. APTT: No results for input(s): APTT in the last 72 hours. CARDIAC ENZYMES: No results for input(s): CKMB, CKMBINDEX, TROPONINI in the last 72 hours. Invalid input(s): CKTOTAL;3  FASTING LIPID PANEL:  Lab Results   Component Value Date    CHOL 173 04/26/2018    HDL 45 04/26/2018    TRIG 87 04/26/2018     LIVER PROFILE: No results for input(s): AST, ALT, ALB, BILIDIR, BILITOT, ALKPHOS in the last 72 hours. MICROBIOLOGY:   Lab Results   Component Value Date/Time    CULTURE NOT REPORTED 04/02/2017 02:00 PM       Imaging:    XR CHEST PORTABLE    Result Date: 7/19/2021  No radiographic evidence of acute cardiopulmonary disease. Moderate cardiomegaly. ASSESSMENT & PLAN     ASSESSMENT / PLAN:     Principal Problem: Active Problems:    Nonsustained supraventricular tachycardia [facility]): SVTs, hyperkalemia (5.5), with ,   Plan: BMP every 12 hours              Continuous telemetry       Cardiology consult    IMPRESSION:    1.  New onset tachycardia which was asymptomatic during hemodialysis, likely SVT vs a flutter . 2. Troponin elevation with a flat trend with no acute EKG changes likely demand ischemia. 3.  Previous echo in 2015 with normal LVEF. 4.  ESRD on hemodialysis. 5.  Left ICA stenosis status post CEA in 2015. 6.  HTN/HLD.     RECOMMENDATIONS:  5. Troponin elevation likely secondary demand ischemia. No need for heparin at this point. 6. We will continue low-dose beta-blocker for tachycardia. 7. Please electrolyte abnormalities. Please make sure K > 4 and mag >2.  8. Will follow ECHO, discharge on holter monitor. 9.  Stress test as out patient as outpatient. ESRD on dialysis:    Nephrology consult: 1. ESRD on Hemodialysis. His regular HD days are MWF at [facility] hemodialysis facility using right AV fistula under Dr. Mateo Fabian.  His dry weight is 80.8 kg.   2.  SVT. 3.  Hyperkalemia. 4.  Chest discomfort. 5.  Anemia of chronic disease  6. Secondary hyperparathyroidism  7. Hypertension. Plan:   1. Screening for dialysis today. Will get regular dialysis in a.m. MWF schedule. 2.  Keep on renal diet. 3.  SVT treatment as per cardiology. 4.  Okay to discharge from nephrology standpoint.     HTN: Hx of elevated BP on norvasc     Bronchitis: Cough, Lungs clear on CXR,  Plan:    Mucinex 600mg bid     MEME: Hx of snoring  Plan: Sleep study in outpatient    Tinea corporis: rash  Miconazole 2% cream:   Apply to affected areas twice daily. Willie Majano MD  Internal Medicine Resident, PGY-1  [address], [place]  7/20/2021, 12:02 PM

## 2021-07-20 NOTE — PROGRESS NOTES
Sustained SVT noted, EKG performed per md order. Notified Dr. Lizett Loving of EKG interpretation:  A fib with rapid ventricular response, anterior infarce, age undetermined, ST & T wave abnormality, consider inferolateral ischemia. No new orders received, will cont to monitor and give prn metoprolol as ordered.

## 2021-07-20 NOTE — PLAN OF CARE
Problem: Falls - Risk of:  Goal: Will remain free from falls  Description: Will remain free from falls  Outcome: Ongoing  Goal: Absence of physical injury  Description: Absence of physical injury  Outcome: Ongoing     Problem: IP BALANCE  Goal: BALANCE EDUCATION  Description: Educate patients on maintaining dynamic/static standing/sitting balance, with/without upper extremity support. Outcome: Ongoing     Problem: IP MOBILITY  Goal: LTG - patient will ambulate household distance  Outcome: Ongoing     Safety maintained this shift, bed low locked, bed/chair alarm on, pt educated on correct use of call light for staff assist, all safety measures in place.

## 2021-07-21 LAB
ABSOLUTE EOS #: 0.17 K/UL (ref 0–0.4)
ABSOLUTE IMMATURE GRANULOCYTE: 0 K/UL (ref 0–0.3)
ABSOLUTE LYMPH #: 0.86 K/UL (ref 1–4.8)
ABSOLUTE MONO #: 0.39 K/UL (ref 0.1–0.8)
ANION GAP SERPL CALCULATED.3IONS-SCNC: 18 MMOL/L (ref 9–17)
BASOPHILS # BLD: 0 % (ref 0–2)
BASOPHILS ABSOLUTE: 0 K/UL (ref 0–0.2)
BUN BLDV-MCNC: 53 MG/DL (ref 8–23)
BUN/CREAT BLD: ABNORMAL (ref 9–20)
CALCIUM SERPL-MCNC: 8.4 MG/DL (ref 8.6–10.4)
CHLORIDE BLD-SCNC: 94 MMOL/L (ref 98–107)
CO2: 22 MMOL/L (ref 20–31)
CREAT SERPL-MCNC: 10.45 MG/DL (ref 0.7–1.2)
DIFFERENTIAL TYPE: ABNORMAL
EKG ATRIAL RATE: 133 BPM
EKG Q-T INTERVAL: 326 MS
EKG QRS DURATION: 92 MS
EKG QTC CALCULATION (BAZETT): 486 MS
EKG R AXIS: 10 DEGREES
EKG T AXIS: -87 DEGREES
EKG VENTRICULAR RATE: 134 BPM
EOSINOPHILS RELATIVE PERCENT: 4 % (ref 1–4)
GFR AFRICAN AMERICAN: 6 ML/MIN
GFR NON-AFRICAN AMERICAN: 5 ML/MIN
GFR SERPL CREATININE-BSD FRML MDRD: ABNORMAL ML/MIN/{1.73_M2}
GFR SERPL CREATININE-BSD FRML MDRD: ABNORMAL ML/MIN/{1.73_M2}
GLUCOSE BLD-MCNC: 92 MG/DL (ref 70–99)
HCT VFR BLD CALC: 32.4 % (ref 40.7–50.3)
HEMOGLOBIN: 9.7 G/DL (ref 13–17)
IMMATURE GRANULOCYTES: 0 %
LYMPHOCYTES # BLD: 20 % (ref 24–44)
MCH RBC QN AUTO: 27.3 PG (ref 25.2–33.5)
MCHC RBC AUTO-ENTMCNC: 29.9 G/DL (ref 28.4–34.8)
MCV RBC AUTO: 91.3 FL (ref 82.6–102.9)
MONOCYTES # BLD: 9 % (ref 1–7)
MORPHOLOGY: ABNORMAL
NRBC AUTOMATED: 0.7 PER 100 WBC
PDW BLD-RTO: 21.3 % (ref 11.8–14.4)
PLATELET # BLD: ABNORMAL K/UL (ref 138–453)
PLATELET ESTIMATE: ABNORMAL
PLATELET, FLUORESCENCE: 143 K/UL (ref 138–453)
PLATELET, IMMATURE FRACTION: 10.4 % (ref 1.1–10.3)
PMV BLD AUTO: ABNORMAL FL (ref 8.1–13.5)
POTASSIUM SERPL-SCNC: 4.7 MMOL/L (ref 3.7–5.3)
RBC # BLD: 3.55 M/UL (ref 4.21–5.77)
RBC # BLD: ABNORMAL 10*6/UL
SEG NEUTROPHILS: 67 % (ref 36–66)
SEGMENTED NEUTROPHILS ABSOLUTE COUNT: 2.88 K/UL (ref 1.8–7.7)
SODIUM BLD-SCNC: 134 MMOL/L (ref 135–144)
TROPONIN INTERP: ABNORMAL
TROPONIN T: ABNORMAL NG/ML
TROPONIN, HIGH SENSITIVITY: 231 NG/L (ref 0–22)
TROPONIN, HIGH SENSITIVITY: 238 NG/L (ref 0–22)
TROPONIN, HIGH SENSITIVITY: 253 NG/L (ref 0–22)
TROPONIN, HIGH SENSITIVITY: 253 NG/L (ref 0–22)
WBC # BLD: 4.3 K/UL (ref 3.5–11.3)
WBC # BLD: ABNORMAL 10*3/UL

## 2021-07-21 PROCEDURE — 6360000002 HC RX W HCPCS: Performed by: INTERNAL MEDICINE

## 2021-07-21 PROCEDURE — 36415 COLL VENOUS BLD VENIPUNCTURE: CPT

## 2021-07-21 PROCEDURE — 97166 OT EVAL MOD COMPLEX 45 MIN: CPT

## 2021-07-21 PROCEDURE — 2580000003 HC RX 258: Performed by: STUDENT IN AN ORGANIZED HEALTH CARE EDUCATION/TRAINING PROGRAM

## 2021-07-21 PROCEDURE — 1200000000 HC SEMI PRIVATE

## 2021-07-21 PROCEDURE — 6370000000 HC RX 637 (ALT 250 FOR IP)

## 2021-07-21 PROCEDURE — 6370000000 HC RX 637 (ALT 250 FOR IP): Performed by: INTERNAL MEDICINE

## 2021-07-21 PROCEDURE — 84484 ASSAY OF TROPONIN QUANT: CPT

## 2021-07-21 PROCEDURE — 80048 BASIC METABOLIC PNL TOTAL CA: CPT

## 2021-07-21 PROCEDURE — 85025 COMPLETE CBC W/AUTO DIFF WBC: CPT

## 2021-07-21 PROCEDURE — 6360000002 HC RX W HCPCS: Performed by: STUDENT IN AN ORGANIZED HEALTH CARE EDUCATION/TRAINING PROGRAM

## 2021-07-21 PROCEDURE — 6370000000 HC RX 637 (ALT 250 FOR IP): Performed by: STUDENT IN AN ORGANIZED HEALTH CARE EDUCATION/TRAINING PROGRAM

## 2021-07-21 PROCEDURE — 85055 RETICULATED PLATELET ASSAY: CPT

## 2021-07-21 PROCEDURE — 97535 SELF CARE MNGMENT TRAINING: CPT

## 2021-07-21 PROCEDURE — 93010 ELECTROCARDIOGRAM REPORT: CPT | Performed by: INTERNAL MEDICINE

## 2021-07-21 PROCEDURE — 90935 HEMODIALYSIS ONE EVALUATION: CPT

## 2021-07-21 PROCEDURE — 90935 HEMODIALYSIS ONE EVALUATION: CPT | Performed by: INTERNAL MEDICINE

## 2021-07-21 PROCEDURE — 99232 SBSQ HOSP IP/OBS MODERATE 35: CPT | Performed by: INTERNAL MEDICINE

## 2021-07-21 RX ADMIN — GUAIFENESIN 600 MG: 600 TABLET, EXTENDED RELEASE ORAL at 12:47

## 2021-07-21 RX ADMIN — HEPARIN SODIUM 5000 UNITS: 5000 INJECTION INTRAVENOUS; SUBCUTANEOUS at 12:53

## 2021-07-21 RX ADMIN — SODIUM CHLORIDE, PRESERVATIVE FREE 10 ML: 5 INJECTION INTRAVENOUS at 22:17

## 2021-07-21 RX ADMIN — CINACALCET 120 MG: 30 TABLET ORAL at 11:58

## 2021-07-21 RX ADMIN — EPOETIN ALFA-EPBX 8000 UNITS: 4000 INJECTION, SOLUTION INTRAVENOUS; SUBCUTANEOUS at 11:26

## 2021-07-21 RX ADMIN — MICONAZOLE NITRATE: 20 CREAM TOPICAL at 22:17

## 2021-07-21 RX ADMIN — METOPROLOL SUCCINATE 25 MG: 25 TABLET, FILM COATED, EXTENDED RELEASE ORAL at 12:47

## 2021-07-21 RX ADMIN — HEPARIN SODIUM 5000 UNITS: 5000 INJECTION INTRAVENOUS; SUBCUTANEOUS at 22:17

## 2021-07-21 RX ADMIN — DOXERCALCIFEROL 2 MCG: 2 INJECTION, SOLUTION INTRAVENOUS at 11:26

## 2021-07-21 RX ADMIN — ASPIRIN 325 MG: 325 TABLET, COATED ORAL at 12:47

## 2021-07-21 RX ADMIN — GUAIFENESIN 600 MG: 600 TABLET, EXTENDED RELEASE ORAL at 22:17

## 2021-07-21 ASSESSMENT — PAIN SCALES - GENERAL
PAINLEVEL_OUTOF10: 0
PAINLEVEL_OUTOF10: 0

## 2021-07-21 NOTE — PROGRESS NOTES
Occupational [address]  Occupational Therapy Not Seen Note    DATE: 2021  Name: Umer Ma  : 1945  MRN: [id number]    Patient not available for Occupational Therapy due to:    [] Testing:    [x] Hemodialysis    [] Blood Transfusion in Progress    []Refusal by Patient:    [] Surgery/Procedure:    [] Strict Bedrest    [] Sedation    [] Spine Precautions     [] Pt with medical decline and not appropriate for continued therapy services. Spoke with pt/family and OT services to be defered. [] Pt independent with functional mobility and functional tasks. Pt with no OT acute care needs at this time, will defer OT eval.    [] Other      Next Scheduled Treatment: Attempt in pm or  as appropriate.     Anibal Bradford, OT OTR/L

## 2021-07-21 NOTE — PROGRESS NOTES
Renal Progress Note    Patient :  Felicia Mayorga; 66 y.o. MRN# [id number]  Location:  2953/8828-49  Attending:  Ko Saldana MD  Admit Date:  7/19/2021   Hospital Day: 2      Subjective:     Patient was seen and examined on HD. Tolerating the procedure well. No new issues reported overnight  Heart rate seems to be under better control today. Cardiology started patient on beta-blocker. 2D echo and stress test pending. Patient normally gets dialysis as per [facility] schedule. Outpatient Medications:     Medications Prior to Admission: atorvastatin (LIPITOR) 40 MG tablet, Take 1 tablet by mouth nightly  Ciclopirox (LOPROX) 0.77 % gel, 2/day to affected areas  amLODIPine (NORVASC) 10 MG tablet, Take 1 tablet by mouth daily  folic acid (FOLVITE) 1 MG tablet, Take 1 tablet by mouth daily  RENVELA 800 MG tablet, Take 1 tablet by mouth 3 times daily  fluticasone (FLONASE) 50 MCG/ACT nasal spray, 1 spray by Each Nostril route daily 1 Spray in each nostril  Multiple Vitamins-Minerals (RENAPLEX-D PO), Take by mouth  allopurinol (ZYLOPRIM) 100 MG tablet, take 1 tablet by mouth once daily (Patient not taking: Reported on 2/2/2021)  cinacalcet (SENSIPAR) 30 MG tablet, Take 30 mg by mouth daily  calcium acetate (PHOSLO) 667 MG capsule, Take 3 capsules by mouth 3 times daily (with meals)  hydrocortisone (ANUSOL-HC) 2.5 % rectal cream, Place rectally 2 times daily.  (Patient not taking: Reported on 2/2/2021)    Current Medications:     Scheduled Meds:    metoprolol succinate  25 mg Oral Daily    aspirin  325 mg Oral Daily    miconazole   Topical BID    guaiFENesin  600 mg Oral BID    sodium chloride flush  5-40 mL Intravenous 2 times per day    heparin (porcine)  5,000 Units Subcutaneous 3 times per day    doxercalciferol  2 mcg Intravenous Q MWF    iron sucrose  50 mg Intravenous Weekly    epoetin behzad-epbx  8,000 Units Intravenous Once per day on Mon Wed Fri    cinacalcet  120 mg Oral Q MWF     Continuous Infusions:  sodium chloride       PRN Meds:  metoprolol, sodium chloride flush, sodium chloride, ondansetron **OR** ondansetron, polyethylene glycol, acetaminophen **OR** acetaminophen    Input/Output:       No intake/output data recorded. .      Patient Vitals for the past 96 hrs (Last 3 readings):   Weight   21 0812 235 lb 14.3 oz (107 kg)   21 0030 229 lb 11.5 oz (104.2 kg)   21 2045 230 lb 13.2 oz (104.7 kg)       Vital Signs:   Temperature:  Temp: 97.7 °F (36.5 °C)  TMax:   Temp (24hrs), Av.3 °F (36.8 °C), Min:97.4 °F (36.3 °C), Max:99 °F (37.2 °C)    Respirations:  Resp: 16  Pulse:   Pulse: 62  BP:    BP: 137/76  BP Range: Systolic (63VWC), QWE:363 , Min:115 , YANA:029       Diastolic (12GSY), NZY:02, Min:71, Max:84      Physical Examination:     General:  AAO x 3, speaking in full sentences, no accessory muscle use. HEENT: Atraumatic, normocephalic, no throat congestion, moist mucosa. Eyes:   Pupils equal, round and reactive to light, EOMI. Neck:   Supple  Chest:   Bilateral vesicular breath sounds, no rales or wheezes. Cardiac:  S1 S2 RR, no murmurs, gallops or rubs. Abdomen: Soft, non-tender, no masses or organomegaly, BS audible. :   No suprapubic or flank tenderness. Neuro:  AAO x 3, No FND. SKIN:  No rashes, good skin turgor. Extremities:  +ve trace edema.     Labs:       Recent Labs     21  1205 21  0324 21  0648   WBC 5.2 3.8 4.3   RBC 3.82* 3.17* 3.55*   HGB 10.4* 8.6* 9.7*   HCT 34.7* 28.6* 32.4*   MCV 90.8 90.2 91.3   MCH 27.2 27.1 27.3   MCHC 30.0 30.1 29.9   RDW 21.6* 21.3* 21.3*   PLT See Reflexed IPF Result 131* See Reflexed IPF Result   MPV NOT REPORTED NOT REPORTED NOT REPORTED      BMP:   Recent Labs     21  1205 21  0324 21  0648    138 134*   K 5.5* 3.6* 4.7    96* 94*   CO2 20 23 22   BUN 77* 38* 53*   CREATININE 12.91* 9.26* 10.45*   GLUCOSE 81 109* 92   CALCIUM 9.8 8.3* 8.4*      Magnesium:    Recent Labs 07/19/21  1205   MG 2.5     BNP:      Lab Results   Component Value Date    BNP 31 04/29/2013     GENEVIEVE:      Lab Results   Component Value Date    GENEVIEVE NEGATIVE 04/29/2013     SPEP:  Lab Results   Component Value Date    PROT 6.9 10/05/2018    PROT 7.6 10/17/2012    ALBCAL 3.8 05/07/2014    ALBPCT 58 05/07/2014    LABALPH 0.2 05/07/2014    LABALPH 0.7 05/07/2014    A1PCT 3 05/07/2014    A2PCT 10 05/07/2014    LABBETA 0.9 05/07/2014    BETAPCT 14 05/07/2014    GAMGLOB 1.0 05/07/2014    GGPCT 15 05/07/2014    PATH ELECTRONICALLY SIGNED. Jerry Nolen M.D. 05/07/2014     UPEP:     Lab Results   Component Value Date    LABPE  05/07/2014     ELEVATED PROTEIN CONCENTRATION. MOST SERUM PROTEINS ARE DETECTED IN THIS URINE. C3:     Lab Results   Component Value Date    C3 102 04/29/2013     C4:     Lab Results   Component Value Date    C4 19 04/29/2013     Hep BsAg:         Lab Results   Component Value Date    HEPBSAG NONREACTIVE 07/19/2021     Hep C AB:          Lab Results   Component Value Date    HEPCAB NONREACTIVE 07/19/2021       Urinalysis/Chemistries:      Lab Results   Component Value Date    NITRU NEGATIVE 09/29/2015    COLORU YELLOW 09/29/2015    PHUR 5.0 09/29/2015    WBCUA 2 TO 5 09/29/2015    RBCUA 2 TO 5 09/29/2015    MUCUS NOT REPORTED 09/29/2015    TRICHOMONAS NOT REPORTED 09/29/2015    YEAST NOT REPORTED 09/29/2015    BACTERIA FEW 09/29/2015    SPECGRAV 1.015 09/29/2015    LEUKOCYTESUR NEGATIVE 09/29/2015    UROBILINOGEN Normal 09/29/2015    BILIRUBINUR NEGATIVE 09/29/2015    GLUCOSEU NEGATIVE 09/29/2015    KETUA NEGATIVE 09/29/2015    AMORPHOUS NOT REPORTED 09/29/2015     Urine Sodium:     Lab Results   Component Value Date    MARTINEZ 71 04/29/2013      Urine Creatinine:     Lab Results   Component Value Date    LABCREA 174.1 09/29/2015     Radiology:     Reviewed. Assessment:     1. ESRD on Hemodialysis.  His regular HD days are MWF at [facility] hemodialysis facility using right AV fistula under Dr. Glenny Jovel. His dry weight is 80.8 kg.   2.  SVT-likely atrial flutter per cardio. 3.  Hyperkalemia. 4.  Chest discomfort. 5.  Anemia of chronic disease  6. Secondary hyperparathyroidism  7. Hypertension. Plan:   1. Patient was seen and examined on HD at bedside. Orders were confirmed with the HD nurse. 2.  Keep on renal diet. 3.  SVT treatment as per cardiology. 4.  2D echo and stress test pending. 5.  Will follow. Nutrition   Please ensure that patient is on a renal diet/TF. Avoid nephrotoxic drugs/contrast exposure. We will continue to follow along with you. Cali Monroe MD  [facility]     This note is created with the assistance of a speech-recognition program. While intending to generate a document that actually reflects the content of the visit, no guarantees can be provided that every mistake has been identified and corrected by editing.

## 2021-07-21 NOTE — PROGRESS NOTES
[facility]  Internal Medicine Teaching Residency Program  Inpatient Daily Progress Note  ______________________________________________________________________________    Patient: Sahara Santos  YOB: 1945   DII:[id number]    Acct: [de-identified]     Room: [address] [place] [address]  Admit date: 2021  Today's date: 21  Number of days in the hospital: 2    SUBJECTIVE   Admitting Diagnosis: NSVT (nonsustained ventricular tachycardia) ([facility]  CC: SVTs  Pt examined at bedside. Chart & results reviewed. No complains. Hemodynamically stable. Pending TTE and stress test for evaluation of recurrent SVTs. Received IV metoprolol 2.5mg thrice yesterday. ROS:  Constitutional:  negative for chills, fevers, sweats  Respiratory:  negative for cough, dyspnea on exertion, hemoptysis, shortness of breath, wheezing  Cardiovascular:  negative for chest pain, chest pressure/discomfort, lower extremity edema, palpitations  Gastrointestinal:  negative for abdominal pain, constipation, diarrhea, nausea, vomiting  Neurological:  negative for dizziness, headache  BRIEF HISTORY     The patient is a pleasant 75 y. o. male presents with a chief complaint of SVTs with palpitations. He went for dialysis, and they found out he had intermittent SVTs. He admits having palpitations 1 day ago, with an irregular rhythm of his heart, which resolved spontaneously. Since admission, he has had some episodes of SVT's with rates up to 143, lasting about 90 seconds. He has a Hx of stroke 6 years ago, ESRD on dialysis for 6 years, HTN. No hx of arrythmias. He has cough, bilateral pedal edema, hermelindo, bilateral knee pain.  No dyspnea, chest pain, syncope, nausea, vomiting, diarrhea.        OBJECTIVE     Vital Signs:  BP (!) 143/77   Pulse 63   Temp 98.9 °F (37.2 °C) (Oral)   Resp 18   Ht 6' 1\" (1.854 m)   Wt 229 lb 11.5 oz (104.2 kg)   SpO2 95%   BMI 30.31 kg/m²     Temp (24hrs), Av.2 °F (36.8 °C), Min:97.4 °F (36.3 °C), Max:99 °F (37.2 °C)    No intake/output data recorded. Physical Exam:  Constitutional: This is a well developed, well nourished, 30-34.9 - Obesity Grade I 66y.o. year old male who is alert, oriented, cooperative and in no apparent distress. Head:normocephalic and atraumatic. EENT:  PERRLA.  No conjunctival injections.   Septum was midline, mucosa was without erythema, exudates or cobblestoning.  No thrush was noted. Neck: Supple without thyromegaly. No elevated JVP. Trachea was midline. Respiratory: Chest was symmetrical without dullness to percussion.  Breath sounds bilaterally were clear to auscultation. There were no wheezes, rhonchi or rales. There is no intercostal retraction or use of accessory muscles. No egophony noted. Cardiovascular: Regular with systolic ejection murmur, loudest at the aortic area. No clicks, gallops or rubs. Abdomen: Slightly rounded and soft without organomegaly. No rebound, rigidity or guarding was appreciated.    Lymphatic: No lymphadenopathy. Musculoskeletal: Pain with passive movement of right knee.  No edema. Normal curvature of the spine.  No gross muscle weakness.    Extremities:  Bilateral pitting pedal  edema, ulcerations, tenderness, varicosities or erythema.  Muscle size, tone and strength are normal.  No involuntary movements are noted.    Skin: multiple ring-shaped rash with central clearing.  Warm and dry.  Good color, turgor and pigmentation.  No cyanosis or clubbing  Neurological/Psychiatric: The patient's general behavior, level of consciousness, thought content and emotional status is normal   Medications:  Scheduled Medications:    metoprolol succinate  25 mg Oral Daily    aspirin  325 mg Oral Daily    miconazole   Topical BID    guaiFENesin  600 mg Oral BID    sodium chloride flush  5-40 mL Intravenous 2 times per day    heparin (porcine)  5,000 Units Subcutaneous 3 times per day    doxercalciferol  2 mcg Intravenous Q MWF    iron sucrose  50 mg Intravenous Weekly    epoetin behzad-epbx  8,000 Units Intravenous Once per day on Mon Wed Fri    cinacalcet  120 mg Oral Q MWF     Continuous Infusions:    sodium chloride       PRN Medicationsmetoprolol, 2.5 mg, Q5 Min PRN  sodium chloride flush, 5-40 mL, PRN  sodium chloride, 25 mL, PRN  ondansetron, 4 mg, Q8H PRN   Or  ondansetron, 4 mg, Q6H PRN  polyethylene glycol, 17 g, Daily PRN  acetaminophen, 650 mg, Q6H PRN   Or  acetaminophen, 650 mg, Q6H PRN        Diagnostic Labs:  CBC:   Recent Labs     07/19/21  1205 07/20/21  0324 07/21/21  0648   WBC 5.2 3.8 4.3   RBC 3.82* 3.17* 3.55*   HGB 10.4* 8.6* 9.7*   HCT 34.7* 28.6* 32.4*   MCV 90.8 90.2 91.3   RDW 21.6* 21.3* 21.3*   PLT See Reflexed IPF Result 131* See Reflexed IPF Result     BMP:   Recent Labs     07/19/21  1205 07/20/21  0324 07/21/21  0648    138 134*   K 5.5* 3.6* 4.7    96* 94*   CO2 20 23 22   BUN 77* 38* 53*   CREATININE 12.91* 9.26* 10.45*     BNP: No results for input(s): BNP in the last 72 hours. PT/INR: No results for input(s): PROTIME, INR in the last 72 hours. APTT: No results for input(s): APTT in the last 72 hours. CARDIAC ENZYMES: No results for input(s): CKMB, CKMBINDEX, TROPONINI in the last 72 hours. Invalid input(s): CKTOTAL;3  FASTING LIPID PANEL:  Lab Results   Component Value Date    CHOL 173 04/26/2018    HDL 45 04/26/2018    TRIG 87 04/26/2018     LIVER PROFILE: No results for input(s): AST, ALT, ALB, BILIDIR, BILITOT, ALKPHOS in the last 72 hours. MICROBIOLOGY:   Lab Results   Component Value Date/Time    CULTURE NOT REPORTED 04/02/2017 02:00 PM       Imaging:    XR CHEST PORTABLE    Result Date: 7/19/2021  No radiographic evidence of acute cardiopulmonary disease. Moderate cardiomegaly. ASSESSMENT & PLAN     ASSESSMENT / PLAN:        Principal Problem:     Active Problems:    Nonsustained supraventricular tachycardia [facility]): SVTs, with ,   Plan: Pending ECHO               Continuous telemetry                  Cardiology consult               IMPRESSION:    1.  New onset tachycardia which was asymptomatic during hemodialysis, likely SVT vs a flutter .  2.  Troponin elevation with a flat trend with no acute EKG changes likely demand ischemia. 3.  Previous echo in 2015 with normal LVEF. 4.  ESRD on hemodialysis. 5.  Left ICA stenosis status post CEA in 2015. 6.  HTN/HLD.     RECOMMENDATIONS:  1. Elevated trops type II MI secondary to demand ischemia/CKD. Continue BB. Continue ASA. 2. Per Dr. Afia Suarez, tele reviewed and likely Aflutter 2:1. Asymptomatic and rate stable now - SR. Resolved. Continue BB  And Eliquis 2.5mg PO BID on discharge  3. Await ECHO results. If low risk, no further workup from cards standpoint and can f/u in clinic for OP stress testing. 4.   ESRD on dialysis:   Nephrology consult: 1. ESRD on Hemodialysis. His regular HD days are MWF at [facility] facility using right AV fistula under Dr. Ronie Moritz.  His dry weight is 80.8 kg.   2.  SVT. 3.  Hyperkalemia. 4.  Chest discomfort. 5.  Anemia of chronic disease  6. Secondary hyperparathyroidism  7. Hypertension. Plan:  1. Patient was seen and examined on HD at bedside. Orders were confirmed with the HD nurse. 2.  Keep on renal diet. 3.  SVT treatment as per cardiology. 4.  2D echo and stress test pending. 5.  Will follow.     HTN: Hx of elevated BP on norvasc, lopressor     Bronchitis: Cough, Lungs clear on CXR,  Plan:      Mucinex 600mg bid      MEME: Hx of snoring  Plan: Sleep study in outpatient     Tinea corporis: rash  Miconazole 2% cream:   Apply to affected areas twice daily. Maria Luisa Dobson MD  Internal Medicine Resident, PGY-1  [address] [place], [place]  7/21/2021, 7:34 AM

## 2021-07-21 NOTE — PROGRESS NOTES
Dialysis Post Treatment Note  Vitals:    07/21/21 1213   BP: (!) 151/66   Pulse: 63   Resp: 18   Temp: 98 °F (36.7 °C)   SpO2:      Pre-Weight =107KG  Post-weight = Weight: 227 lb 15.3 oz (103.4 kg)  Total Liters Processed = Total Liters Processed (l/min): 86.7 l/min  Rinseback Volume (mL) = Rinseback Volume (ml): 300 ml  Net Removal (mL) = NET Removed (ml): 3100 ml  Patient's dry weight=102.5KG  Type of access used=RT AVF  Length of treatment=3.5 hours    No complications noted. Pt tolerated tx well.

## 2021-07-21 NOTE — PROGRESS NOTES
Dialysis Time Out  To be done by RN and tech or 2 RNs  Staff Names Mihaela Choudhary RN, Jose Cintron RN    [x]  Identity of the patient using 2 patient identifiers  [x]  Consent for treatment  [x]  Equipment-proper machine and dialyzer  [x]  B-Hep B status  [x]  Orders- to include bath, blood flow, dialyzer, time and fluid removal  [x]  Access-Correct site and in working order  [x]  Time for patient to ask questions.

## 2021-07-21 NOTE — PROGRESS NOTES
Occupational Therapy   Occupational Therapy Initial Assessment  Date: 2021   Patient Name: César Martinez  MRN: [id number]     : 1945    Date of Service: 2021   Chief Complaint   Patient presents with    Tachycardia     Copied from chart:   César Martinez is a 66 y.o. male who presents after being sent to ED by dialysis staff for intermittent nonsustained tachycardia to 150-160, with mild palpitations the patient states that he may have recalled. Otherwise patient is denying symptoms, states that he feels at baseline. Denies chest pain, palpitations, lightheadedness, headaches, fevers, chills at this time. Denies other symptoms including abdominal pain, diarrhea or constipation. Patient is oliguric. Dialysis Monday, Wednesday, Friday, did not get any fluid taken out today as this was found out at the beginning of his session. Patient has a right AV fistula in place for dialysis. Discharge Recommendations:  Patient would benefit from continued therapy after discharge  OT Equipment Recommendations  Equipment Needed: Yes  Mobility Devices: ADL Assistive Devices  ADL Assistive Devices: Reacher    Assessment   Performance deficits / Impairments: Decreased functional mobility ; Decreased endurance;Decreased safe awareness;Decreased high-level IADLs  Prognosis: Good  Decision Making: Medium Complexity  OT Education: OT Role;Plan of Care;ADL Adaptive Strategies;Transfer Training  Patient Education: Pt educated on breathing technique- good return  Barriers to Learning: none  REQUIRES OT FOLLOW UP: Yes  Activity Tolerance  Activity Tolerance: Patient Tolerated treatment well  Safety Devices  Safety Devices in place: Yes  Type of devices: Left in chair;Chair alarm in place;Nurse notified;Call light within reach;Gait belt;Patient at risk for falls  Restraints  Initially in place: No           Patient Diagnosis(es): The primary encounter diagnosis was Palpitations.  Diagnoses of NSVT (nonsustained ventricular tachycardia) ([facility] and Essential hypertension were also pertinent to this visit. has a past medical history of Allergic rhinitis, Anemia, BPH (benign prostatic hyperplasia), CAD (coronary artery disease), Carotid artery stenosis, Cerebrovascular disease, Diabetes mellitus ([address] Eczema, ESRD (end stage renal disease) on dialysis ([address] Full dentures, GERD (gastroesophageal reflux disease), Gout, Hemodialysis patient ([facility] Hyperlipidemia, Hypertension, Neuropathy, Osteoarthritis, Peripheral vascular disease ([address] Seizures ([facility] Stroke ([facility] and Unspecified cerebral artery occlusion with cerebral infarction. has a past surgical history that includes Bladder surgery (Spring 2014); sinus surgery; Cystocopy (2008); Inguinal hernia repair (Right); Kidney biopsy (May 2013); Upper gastrointestinal endoscopy (1/28/2015); Colonoscopy (2008); Colonoscopy (1/28/2015); Carotid endarterectomy (2015); Abdomen surgery (2016); Dialysis fistula creation (Right, 08/25/2016); Tunneled venous port placement (Right, 10/2015); Abdomen surgery (05/26/2016); Colonoscopy (5/10/2018); and Upper gastrointestinal endoscopy (N/A, 11/1/2018).            Restrictions  Restrictions/Precautions  Restrictions/Precautions: Up as Tolerated, General Precautions, Fall Risk  Required Braces or Orthoses?: No  Position Activity Restriction  Other position/activity restrictions: Tachycardia    Subjective   General  Patient assessed for rehabilitation services?: Yes  Family / Caregiver Present: No  General Comment  Comments: RN ok'd for therapy, pt agreeable to session, cooperative/pleasant throughout  Patient Currently in Pain: Denies    Social/Functional History  Social/Functional History  Lives With: Family  Type of Home: House  Home Layout: Two level  Home Access: Stairs to enter with rails  Entrance Stairs - Number of Steps: 4  Entrance Stairs - Rails: Left  Bathroom Shower/Tub: Tub only  Bathroom Toilet: Standard  Bathroom Equipment:  (No AE)  Home Equipment: Cane (Single point cane for arthritis in L knee)  ADL Assistance: Independent  Homemaking Assistance: Independent  Homemaking Responsibilities: Yes (Pt does yardwork)  Ambulation Assistance: Independent  Transfer Assistance: Independent  Active : Yes [employer] on Sunday)  Occupation: Retired  Leisure & Hobbies: teaching tennis  Additional Comments: Uses a straight cane for his arthritic knee. No falls in the last 6 months. Short-distance community ambulator. Objective   Vision: Within Functional Limits  Hearing: Within functional limits    Orientation  Overall Orientation Status: Within Functional Limits     Balance  Sitting Balance: Independent (Pt sat IND unsupported in recliner)  Standing Balance: Supervision  Standing Balance  Time: ~13 minutes  Activity: Standing at recliner, standing sinkside, standing during parts of showering, func mob  Comment: SUP d/t slight impulsivity  Functional Mobility  Functional - Mobility Device: Rolling Walker  Activity: To/from bathroom (To/from bathroom 2 times)  Assist Level: Supervision  Functional Mobility Comments: SUP d/t slight impulsivity  Shower Transfers  Shower - Transfer From: Walker  Shower - Transfer Type: To  Shower - Transfer To: Shower seat without back  Shower - Technique: Ambulating  Shower Transfers: Supervision  Shower Transfers Comments: SUP d/t impulsivity  ADL  Feeding: Modified independent ;Setup  Grooming: Supervision (Pt stood at sink for oral hygiene, washed face with wet washcloth, and combed hair.  Required SUP to ensure no LOB or SOB)  UE Bathing: Setup;Modified independent  (Pt sat during shower and washed/dried UB with Mod I for set up)  LE Bathing: Supervision (Pt sat during bathing task and washed/dried LB with set up and SUP d/t pt stood to complete task and slight impulsivity)  UE Dressing: Independent (Pt donned/doffed gown IND)  LE Dressing: Supervision (Pt doffed pants and underwear, donned underwear after shower.  Required SUP d/t pt needing to stand during task and slight impulsivity)  Toileting: Supervision  Tone RUE  RUE Tone: Normotonic  Tone LUE  LUE Tone: Normotonic  Coordination  Movements Are Fluid And Coordinated: Yes     Bed mobility  Scooting: Independent  Comment: Did not formally assess as pt was sitting in recliner upon arrival and at exit  Transfers  Sit to stand: Supervision  Stand to sit: Supervision  Transfer Comments: Pt required RW and SUP d/t slight impulsivity and greater anterior lean than necessary while standing     Cognition  Overall Cognitive Status: WFL (WFL but during shower, pt stood up despite request to notify writer before doing so)        Sensation  Overall Sensation Status: WFL        LUE AROM (degrees)  LUE AROM : WFL  Left Hand AROM (degrees)  Left Hand AROM: WFL  RUE AROM (degrees)  RUE AROM : WFL  Right Hand AROM (degrees)  Right Hand AROM: WFL  LUE Strength  L Shoulder Flex: 5/5  L Elbow Flex: 5/5  L Elbow Ext: 5/5  L Hand General: 4+/5  RUE Strength  R Shoulder Flex: 5/5  R Elbow Flex: 5/5  R Elbow Ext: 5/5  R Hand General: 4+/5              Plan   Plan  Times per week: 2-3x/wk  Current Treatment Recommendations: Patient/Caregiver Education & Training, Functional Mobility Training, Endurance Training, Safety Education & Training, Self-Care / ADL    AM-PAC Score        -[facility] Inpatient Daily Activity Raw Score: 21 (07/21/21 1626)  AM-PAC Inpatient ADL T-Scale Score : 44.27 (07/21/21 1626)  ADL Inpatient CMS 0-100% Score: 32.79 (07/21/21 1626)  ADL Inpatient CMS G-Code Modifier : Sylwia Oates (07/21/21 1626)    Goals  Short term goals  Time Frame for Short term goals: By discharge, pt will be able to:  Short term goal 1: Demo func transfers with Mod I and use of cane  Short term goal 2: Demo func mob in room with Mod I, use of cane, and no SOB  Short term goal 3: Demo dynamic standing balance during ADL tasks for 15+ minutes with Mod I  Short term goal 4: Demo UB/LB ADLs with Mod I  Short term goal 5: Demo reaching/grasping in multiple planes while standing for 20+ minutes with Mod I       Therapy Time   Individual Concurrent Group Co-treatment   Time In 1415         Time Out 1517         Minutes 62         Timed Code Treatment Minutes: [address] [facility] Yesy, S/OT

## 2021-07-21 NOTE — PROGRESS NOTES
[facility]   Progress Note                   Date:   7/21/2021  Patient name: Aries Archuleta  Date of admission:  7/19/2021 11:49 AM  MRN:   [id number]  YOB: 1945  PCP: Leann Mo MD    Reason for Admission: Nonsustained supraventricular tachycardia ([address] [I47.1]    Subjective:       Clinical Changes / Abnormalities: no acute CV issues/concerns. HD this AM. Awaiting echo. Medications:   Scheduled Meds:   metoprolol succinate  25 mg Oral Daily    aspirin  325 mg Oral Daily    miconazole   Topical BID    guaiFENesin  600 mg Oral BID    sodium chloride flush  5-40 mL Intravenous 2 times per day    heparin (porcine)  5,000 Units Subcutaneous 3 times per day    doxercalciferol  2 mcg Intravenous Q MWF    iron sucrose  50 mg Intravenous Weekly    epoetin behzad-epbx  8,000 Units Intravenous Once per day on Mon Wed Fri    cinacalcet  120 mg Oral Q MWF     Continuous Infusions:   sodium chloride       CBC:   Recent Labs     07/19/21  1205 07/20/21  0324 07/21/21  0648   WBC 5.2 3.8 4.3   HGB 10.4* 8.6* 9.7*   PLT See Reflexed IPF Result 131* See Reflexed IPF Result     BMP:    Recent Labs     07/19/21  1205 07/20/21  0324 07/21/21  0648    138 134*   K 5.5* 3.6* 4.7    96* 94*   CO2 20 23 22   BUN 77* 38* 53*   CREATININE 12.91* 9.26* 10.45*   GLUCOSE 81 109* 92     Hepatic: No results for input(s): AST, ALT, ALB, BILITOT, ALKPHOS in the last 72 hours. Troponin:   Recent Labs     07/20/21  1904 07/21/21  0019 07/21/21  0648   TROPHS 262* 253* 253*     BNP: No results for input(s): BNP in the last 72 hours. Lipids: No results for input(s): CHOL, HDL in the last 72 hours. Invalid input(s): LDLCALCU  INR: No results for input(s): INR in the last 72 hours.     Objective:   Vitals: BP (!) 134/94   Pulse 71   Temp 98 °F (36.7 °C)   Resp 19   Ht 6' 1\" (1.854 m)   Wt 227 lb 15.3 oz (103.4 kg)   SpO2 95%   BMI 30.08 kg/m²   General appearance: alert and cooperative with exam  HEENT: Head: Normocephalic, no lesions, without obvious abnormality. Neck: no JVD, trachea midline, no adenopathy  Lungs: dim throughout to auscultation  Heart: Regular rate and rhythm, s1/s2 auscultated, no murmurs. SR  Abdomen: soft, non-tender, bowel sounds active  Extremities: no edema  Neurologic: not done        Assessment / Acute Cardiac Problems:      DATA:    Diagnostics:  . ECHO: 10/2/2015:  CONCLUSIONS     Summary  Left ventricle is normal in size. Concentric left ventricular hypertrophy. Hyperdynamic left ventricular function. Evidence of diastolic dysfunction. ( E/A .4)  No significant pericardial effusion is seen. No significant valvular regurgitation or stenosis seen. .     IMPRESSION:    1.  New onset tachycardia which was asymptomatic during hemodialysis, likely SVT vs a flutter . 2. Troponin elevation with a flat trend with no acute EKG changes likely demand ischemia. 3.  Previous echo in 2015 with normal LVEF. 4.  ESRD on hemodialysis. 5.  Left ICA stenosis status post CEA in 2015. 6.  HTN/HLD. Patient Active Problem List:     BPH (benign prostatic hyperplasia)     CAD (coronary artery disease)     Anemia     CVA (cerebrovascular accident due to intracerebral hemorrhage) ([address]     Seizure disorder, secondary ([address]     Seizures ([address]     Cerebral artery occlusion with cerebral infarction ([address]     Hypertension     Hyperlipidemia     ESRD on hemodialysis ([address]     Carotid stenosis, asymptomatic     Seizure disorder as sequela of cerebrovascular accident ([address]     Chronic ischemic left middle cerebral artery (MCA) stroke     Folate deficiency     Bronchitis     Sigmoid diverticulosis     Redundant colon     Nonsustained supraventricular tachycardia ([facility])     NSVT (nonsustained ventricular tachycardia) (H[facility])     Tinea corporis     Valvular heart disease     Palpitations      Plan of Treatment:   1. Elevated trops type II MI secondary to demand ischemia/CKD. Continue BB. Continue ASA. 2. Per Dr. Abraham Yancey, tele reviewed and likely Aflutter 2:1. Asymptomatic and rate stable now - SR. Resolved. Continue BB  And Eliquis 2.5mg PO BID on discharge  3. Await ECHO results. If low risk, no further workup from cards standpoint and can f/u in clinic for OP stress testing.     Electronically signed by SYLVIE Ramirez CNP on 7/21/2021 at 1:37 PM  [address]  [phone]

## 2021-07-21 NOTE — PROGRESS NOTES
Patient waiting on TTE for evaluation of ongoing SVT and murmur appreciated on exam. Spoke with nurse regarding facilitating TTE exam; she states Ultrasound team is currently backlogged, but patient remains on list to have exam completed in the near future. North Garcia MD  Internal Medicine Resident, [facility]; [place], [place]  7/21/2021, 1:15 PM   Attending Physician Statement  I have discussed the care of Melinda Donovan, including pertinent history and exam findings,  with the resident. I have seen and examined the patient and the key elements of all parts of the encounter have been performed by me. I agree with the assessment, plan and orders as documented by the resident with additions . Treatment plan Discussed with nursing staff in detail , all questions answered . Electronically signed by Charisma Lorenzo MD on   7/21/21 at 1:28 PM EDT    Please note that this chart was generated using voice recognition Dragon dictation software. Although every effort was made to ensure the accuracy of this automated transcription, some errors in transcription may have occurred.

## 2021-07-22 VITALS
RESPIRATION RATE: 16 BRPM | DIASTOLIC BLOOD PRESSURE: 69 MMHG | HEIGHT: 73 IN | WEIGHT: 227.96 LBS | SYSTOLIC BLOOD PRESSURE: 144 MMHG | BODY MASS INDEX: 30.21 KG/M2 | OXYGEN SATURATION: 98 % | HEART RATE: 64 BPM | TEMPERATURE: 98.2 F

## 2021-07-22 LAB
ABSOLUTE EOS #: 0.18 K/UL (ref 0–0.44)
ABSOLUTE IMMATURE GRANULOCYTE: 0.05 K/UL (ref 0–0.3)
ABSOLUTE LYMPH #: 1.08 K/UL (ref 1.1–3.7)
ABSOLUTE MONO #: 0.59 K/UL (ref 0.1–1.2)
ANION GAP SERPL CALCULATED.3IONS-SCNC: 20 MMOL/L (ref 9–17)
BASOPHILS # BLD: 1 % (ref 0–2)
BASOPHILS ABSOLUTE: 0.05 K/UL (ref 0–0.2)
BUN BLDV-MCNC: 39 MG/DL (ref 8–23)
BUN/CREAT BLD: ABNORMAL (ref 9–20)
CALCIUM SERPL-MCNC: 8 MG/DL (ref 8.6–10.4)
CHLORIDE BLD-SCNC: 93 MMOL/L (ref 98–107)
CO2: 21 MMOL/L (ref 20–31)
CREAT SERPL-MCNC: 9.3 MG/DL (ref 0.7–1.2)
DIFFERENTIAL TYPE: ABNORMAL
EOSINOPHILS RELATIVE PERCENT: 4 % (ref 1–4)
GFR AFRICAN AMERICAN: 7 ML/MIN
GFR NON-AFRICAN AMERICAN: 6 ML/MIN
GFR SERPL CREATININE-BSD FRML MDRD: ABNORMAL ML/MIN/{1.73_M2}
GFR SERPL CREATININE-BSD FRML MDRD: ABNORMAL ML/MIN/{1.73_M2}
GLUCOSE BLD-MCNC: 78 MG/DL (ref 70–99)
HCT VFR BLD CALC: 31.8 % (ref 40.7–50.3)
HEMOGLOBIN: 9.7 G/DL (ref 13–17)
IMMATURE GRANULOCYTES: 1 %
LV EF: 55 %
LVEF MODALITY: NORMAL
LYMPHOCYTES # BLD: 24 % (ref 24–43)
MCH RBC QN AUTO: 27.8 PG (ref 25.2–33.5)
MCHC RBC AUTO-ENTMCNC: 30.5 G/DL (ref 28.4–34.8)
MCV RBC AUTO: 91.1 FL (ref 82.6–102.9)
MONOCYTES # BLD: 13 % (ref 3–12)
MORPHOLOGY: NORMAL
NRBC AUTOMATED: 0.7 PER 100 WBC
PDW BLD-RTO: 21.2 % (ref 11.8–14.4)
PLATELET # BLD: ABNORMAL K/UL (ref 138–453)
PLATELET ESTIMATE: ABNORMAL
PLATELET, FLUORESCENCE: 151 K/UL (ref 138–453)
PLATELET, IMMATURE FRACTION: 11.2 % (ref 1.1–10.3)
PMV BLD AUTO: ABNORMAL FL (ref 8.1–13.5)
POTASSIUM SERPL-SCNC: 4.5 MMOL/L (ref 3.7–5.3)
RBC # BLD: 3.49 M/UL (ref 4.21–5.77)
RBC # BLD: ABNORMAL 10*6/UL
SEG NEUTROPHILS: 57 % (ref 36–65)
SEGMENTED NEUTROPHILS ABSOLUTE COUNT: 2.55 K/UL (ref 1.5–8.1)
SODIUM BLD-SCNC: 134 MMOL/L (ref 135–144)
TROPONIN INTERP: ABNORMAL
TROPONIN INTERP: ABNORMAL
TROPONIN T: ABNORMAL NG/ML
TROPONIN T: ABNORMAL NG/ML
TROPONIN, HIGH SENSITIVITY: 218 NG/L (ref 0–22)
TROPONIN, HIGH SENSITIVITY: 222 NG/L (ref 0–22)
WBC # BLD: 4.5 K/UL (ref 3.5–11.3)
WBC # BLD: ABNORMAL 10*3/UL

## 2021-07-22 PROCEDURE — 85055 RETICULATED PLATELET ASSAY: CPT

## 2021-07-22 PROCEDURE — 93356 MYOCRD STRAIN IMG SPCKL TRCK: CPT

## 2021-07-22 PROCEDURE — 6360000002 HC RX W HCPCS: Performed by: STUDENT IN AN ORGANIZED HEALTH CARE EDUCATION/TRAINING PROGRAM

## 2021-07-22 PROCEDURE — 6370000000 HC RX 637 (ALT 250 FOR IP)

## 2021-07-22 PROCEDURE — 99232 SBSQ HOSP IP/OBS MODERATE 35: CPT | Performed by: INTERNAL MEDICINE

## 2021-07-22 PROCEDURE — 93306 TTE W/DOPPLER COMPLETE: CPT

## 2021-07-22 PROCEDURE — 84484 ASSAY OF TROPONIN QUANT: CPT

## 2021-07-22 PROCEDURE — 6370000000 HC RX 637 (ALT 250 FOR IP): Performed by: STUDENT IN AN ORGANIZED HEALTH CARE EDUCATION/TRAINING PROGRAM

## 2021-07-22 PROCEDURE — 85025 COMPLETE CBC W/AUTO DIFF WBC: CPT

## 2021-07-22 PROCEDURE — 80048 BASIC METABOLIC PNL TOTAL CA: CPT

## 2021-07-22 PROCEDURE — 36415 COLL VENOUS BLD VENIPUNCTURE: CPT

## 2021-07-22 PROCEDURE — 99238 HOSP IP/OBS DSCHRG MGMT 30/<: CPT | Performed by: INTERNAL MEDICINE

## 2021-07-22 RX ORDER — DOXERCALCIFEROL 2 UG/ML
2 INJECTION, SOLUTION INTRAVENOUS ONCE
Qty: 1 ML | Refills: 0 | OUTPATIENT
Start: 2021-07-22 | End: 2021-07-22

## 2021-07-22 RX ORDER — METOPROLOL SUCCINATE 25 MG/1
25 TABLET, EXTENDED RELEASE ORAL DAILY
Qty: 30 TABLET | Refills: 3 | OUTPATIENT
Start: 2021-07-23

## 2021-07-22 RX ORDER — CINACALCET 60 MG/1
120 TABLET, FILM COATED ORAL DAILY
Qty: 30 TABLET | Refills: 3 | OUTPATIENT
Start: 2021-07-22

## 2021-07-22 RX ORDER — GUAIFENESIN 600 MG/1
600 TABLET, EXTENDED RELEASE ORAL 2 TIMES DAILY
Qty: 6 TABLET | Refills: 0 | OUTPATIENT
Start: 2021-07-22

## 2021-07-22 RX ORDER — POLYETHYLENE GLYCOL 3350 17 G/17G
17 POWDER, FOR SOLUTION ORAL DAILY PRN
Qty: 527 G | Refills: 1 | OUTPATIENT
Start: 2021-07-22 | End: 2021-08-21

## 2021-07-22 RX ADMIN — HEPARIN SODIUM 5000 UNITS: 5000 INJECTION INTRAVENOUS; SUBCUTANEOUS at 14:05

## 2021-07-22 RX ADMIN — ASPIRIN 325 MG: 325 TABLET, COATED ORAL at 08:46

## 2021-07-22 RX ADMIN — GUAIFENESIN 600 MG: 600 TABLET, EXTENDED RELEASE ORAL at 08:46

## 2021-07-22 RX ADMIN — MICONAZOLE NITRATE: 20 CREAM TOPICAL at 09:25

## 2021-07-22 RX ADMIN — HEPARIN SODIUM 5000 UNITS: 5000 INJECTION INTRAVENOUS; SUBCUTANEOUS at 06:26

## 2021-07-22 RX ADMIN — METOPROLOL SUCCINATE 25 MG: 25 TABLET, FILM COATED, EXTENDED RELEASE ORAL at 08:46

## 2021-07-22 ASSESSMENT — PAIN SCALES - GENERAL
PAINLEVEL_OUTOF10: 2
PAINLEVEL_OUTOF10: 0

## 2021-07-22 NOTE — CARE COORDINATION
Case Management Initial Discharge Plan  César Martinez,             Met with:patient to discuss discharge plans. Information verified: address, contacts, phone number, , insurance Yes  Insurance Provider: [facility]     Emergency Contact/Next of Kin name & number: wife Adriano Wei [phone] dtr Ralph Ambrosio [phone]  Who are involved in patient's support system? above    PCP: Norberto Leslie MD  Date of last visit: 4 months ago      Discharge Planning    Living Arrangements:  Spouse/Significant Other     Home has 2 stories  3 stairs to climb to get into front door, 12stairs to climb to reach second floor  Location of bedroom/bathroom in home up    Patient able to perform ADL's:Assisted    Current Services (outpatient & in home)   DME equipment: cane  DME provider:     Is patient receiving oral anticoagulation therapy? No    If indicated:   Physician managing anticoagulation treatment:   Where does patient obtain lab work for ATC treatment? Potential Assistance Needed:  N/A    Patient agreeable to home care: No  Register of choice provided:  no    Prior SNF/Rehab Placement and Facility:   Agreeable to SNF/Rehab: No  Register of choice provided: no     Evaluation: no    Expected Discharge date:  21    Patient expects to be discharged to: If home: is the family and/or caregiver wiling & able to provide support at home? Lives with wife and dtr  Who will be providing this support? Verena Rubi*    Follow Up Appointment: Best Day/ Time: Monday AM    Transportation provider: family  Transportation arrangements needed for discharge: No    Readmission Risk              Risk of Unplanned Readmission:  18             Does patient have a readmission risk score greater than 14?: Yes  If yes, follow-up appointment must be made within 7 days of discharge.      Goals of Care:       Educated pt on transitional options , provided freedom of choice and are agreeable with plan      Discharge Plan: home independently assess if any needs           Electronically signed by Anastasiya Cha RN on 7/22/21 at 9:07 AM EDT

## 2021-07-22 NOTE — PROGRESS NOTES
[facility]  Internal Medicine Teaching Residency Program  Inpatient Daily Progress Note  ______________________________________________________________________________    Patient: Francisco Javier Pelayo  YOB: 1945   XE    Acct: [de-identified]     Room: [address] [place] [address]  Admit date: 2021  Today's date: 21  Number of days in the hospital: 3    SUBJECTIVE   Admitting Diagnosis: NSVT (nonsustained ventricular tachycardia) ([facility]  CC: SVTs  Pt examined at bedside. Chart & results reviewed. No complains. Hemodynamically stable. Pending TTE results and cardiology recommendations. ROS:  Constitutional:  negative for chills, fevers, sweats  Respiratory:  negative for cough, dyspnea on exertion, hemoptysis, shortness of breath, wheezing  Cardiovascular:  negative for chest pain, chest pressure/discomfort, lower extremity edema, palpitations  Gastrointestinal:  negative for abdominal pain, constipation, diarrhea, nausea, vomiting  Neurological:  negative for dizziness, headache  BRIEF HISTORY     The patient is a pleasant 75 y. o. male presents with a chief complaint of SVTs with palpitations. He went for dialysis, and they found out he had intermittent SVTs. He admits having palpitations 1 day ago, with an irregular rhythm of his heart, which resolved spontaneously. Since admission, he has had some episodes of SVT's with rates up to 143, lasting about 90 seconds. He has a Hx of stroke 6 years ago, ESRD on dialysis for 6 years, HTN. No hx of arrythmias. He has cough, bilateral pedal edema, hermelindo, bilateral knee pain. No dyspnea, chest pain, syncope, nausea, vomiting, diarrhea.     OBJECTIVE     Vital Signs:  /65   Pulse 64   Temp 98 °F (36.7 °C) (Oral)   Resp 16   Ht 6' 1\" (1.854 m)   Wt 227 lb 15.3 oz (103.4 kg)   SpO2 95%   BMI 30.08 kg/m²     Temp (24hrs), Av.9 °F (36.6 °C), Min:97.7 °F (36.5 °C), Max:98.2 °F (36.8 °C)    No intake/output data recorded. Physical Exam:  Constitutional: This is a well developed, well nourished, 30-34.9 - Obesity Grade I 66y.o. year old male who is alert, oriented, cooperative and in no apparent distress. Head:normocephalic and atraumatic. EENT:  PERRLA. No conjunctival injections. Septum was midline, mucosa was without erythema, exudates or cobblestoning. No thrush was noted. Neck: Supple without thyromegaly. No elevated JVP. Trachea was midline. Respiratory: Chest was symmetrical without dullness to percussion. Breath sounds bilaterally were clear to auscultation. There were no wheezes, rhonchi or rales. There is no intercostal retraction or use of accessory muscles. No egophony noted. Cardiovascular: Regular without murmur, clicks, gallops or rubs. Abdomen: Slightly rounded and soft without organomegaly. No rebound, rigidity or guarding was appreciated. Lymphatic: No lymphadenopathy. Musculoskeletal: Normal curvature of the spine. No gross muscle weakness. Extremities:  No lower extremity edema, ulcerations, tenderness, varicosities or erythema. Muscle size, tone and strength are normal.  No involuntary movements are noted. Skin:  Warm and dry. Good color, turgor and pigmentation. No lesions or scars.   No cyanosis or clubbing  Neurological/Psychiatric: The patient's general behavior, level of consciousness, thought content and emotional status is normal.        Medications:  Scheduled Medications:    metoprolol succinate  25 mg Oral Daily    aspirin  325 mg Oral Daily    miconazole   Topical BID    guaiFENesin  600 mg Oral BID    sodium chloride flush  5-40 mL Intravenous 2 times per day    heparin (porcine)  5,000 Units Subcutaneous 3 times per day    doxercalciferol  2 mcg Intravenous Q MWF    iron sucrose  50 mg Intravenous Weekly    epoetin behzad-epbx  8,000 Units Intravenous Once per day on Mon Wed Fri    cinacalcet  120 mg Oral Q MWF     Continuous Infusions:    sodium chloride       PRN Medicationsmetoprolol, 2.5 mg, Q5 Min PRN  sodium chloride flush, 5-40 mL, PRN  sodium chloride, 25 mL, PRN  ondansetron, 4 mg, Q8H PRN   Or  ondansetron, 4 mg, Q6H PRN  polyethylene glycol, 17 g, Daily PRN  acetaminophen, 650 mg, Q6H PRN   Or  acetaminophen, 650 mg, Q6H PRN        Diagnostic Labs:  CBC:   Recent Labs     07/20/21  0324 07/21/21  0648 07/22/21  0633   WBC 3.8 4.3 4.5   RBC 3.17* 3.55* 3.49*   HGB 8.6* 9.7* 9.7*   HCT 28.6* 32.4* 31.8*   MCV 90.2 91.3 91.1   RDW 21.3* 21.3* 21.2*   * See Reflexed IPF Result See Reflexed IPF Result     BMP:   Recent Labs     07/19/21  1205 07/20/21  0324 07/21/21  0648    138 134*   K 5.5* 3.6* 4.7    96* 94*   CO2 20 23 22   BUN 77* 38* 53*   CREATININE 12.91* 9.26* 10.45*     BNP: No results for input(s): BNP in the last 72 hours. PT/INR: No results for input(s): PROTIME, INR in the last 72 hours. APTT: No results for input(s): APTT in the last 72 hours. CARDIAC ENZYMES: No results for input(s): CKMB, CKMBINDEX, TROPONINI in the last 72 hours. Invalid input(s): CKTOTAL;3  FASTING LIPID PANEL:  Lab Results   Component Value Date    CHOL 173 04/26/2018    HDL 45 04/26/2018    TRIG 87 04/26/2018     LIVER PROFILE: No results for input(s): AST, ALT, ALB, BILIDIR, BILITOT, ALKPHOS in the last 72 hours. MICROBIOLOGY:   Lab Results   Component Value Date/Time    CULTURE NOT REPORTED 04/02/2017 02:00 PM       Imaging:    XR CHEST PORTABLE    Result Date: 7/19/2021  No radiographic evidence of acute cardiopulmonary disease. Moderate cardiomegaly. ASSESSMENT & PLAN     ASSESSMENT / PLAN:     Principal Problem:    Active Problems:    Nonsustained supraventricular tachycardia ([address]): SVTs, with , ECHO done.    Cardiology consult  1.  New onset tachycardia which was asymptomatic during hemodialysis, likely SVT vs a flutter .  2.  Troponin elevation with a flat trend with no acute EKG changes likely demand ischemia. 3.  Previous echo in 2015 with normal LVEF. 4.  ESRD on hemodialysis. 5.  Left ICA stenosis status post CEA in 2015. 6.  HTN/HLD. Plan:   1.  Per Dr. Zhang Gudino, tele reviewed and likely Aflutter 2:1. Asymptomatic and rate stable now - SR.  Resolved.  Continue BB  And Eliquis 2.5mg PO BID on discharge. 2. Pending cardiology recommendations following TTE  3. Stress test as out patient  4. Discharge                    ESRD on dialysis:   Nephrology consult:   1. ESRD on Hemodialysis. His regular HD days are MWF at [facility] hemodialysis facility using right AV fistula under Dr. Ruchi Peterson.  His dry weight is 80.8 kg.    2.  Admitted with palpitations - SVT-likely atrial flutter per cardio. 3.  Anemia of chronic disease   4. Secondary hyperparathyroidism   5. Hypertension  Plan:   HD AM   Continue home meds   Ok to dc from Nephrology stand point     HTN: Hx of elevated BP on norvasc, lopressor     Bronchitis: Cough, Lungs clear on CXR,  Plan:      Mucinex 600mg bid      MEME: Hx of snoring  Plan: Sleep study in outpatient     Tinea corporis: rash  Miconazole 2% cream:   Apply to affected areas twice daily. Lily Velasco MD  Internal Medicine Resident, PGY-1  [facility]; [place], [place]  7/22/2021, 8:48 AM   Attending Physician Statement  I have discussed the care of Aries Archuleta, including pertinent history and exam findings,  with the resident. I have seen and examined the patient and the key elements of all parts of the encounter have been performed by me. I agree with the assessment, plan and orders as documented by the resident with additions . Treatment plan Discussed with nursing staff in detail , all questions answered . Electronically signed by Hafsa Fuller MD on   7/22/21 at 8:30 PM EDT    Please note that this chart was generated using voice recognition Dragon dictation software.   Although every effort was made to ensure the accuracy of this automated transcription, some errors in transcription may have occurred.

## 2021-07-22 NOTE — PROGRESS NOTES
[facility] NEPHROLOGY PROGRESS NOTE      SUBJECTIVE     ESRD MWF. Admitted with narrow complex tachu - likely flutter with conduction block. Cardio input noted. Echo pending. Asymptomatic now  No chest pain/SOB  BP stable. No hemodynamic compromise  No prior cardiac history    OBJECTIVE     Vitals:    07/22/21 0530 07/22/21 0600 07/22/21 0830 07/22/21 1115   BP: 136/76 131/77 124/65 (!) 147/87   Pulse: 66 63 64 63   Resp: 16 14 16 20   Temp:  98.2 °F (36.8 °C) 98 °F (36.7 °C) 98.2 °F (36.8 °C)   TempSrc:  Oral Oral Oral   SpO2:  96% 95% 96%   Weight:       Height:         24HR INTAKE/OUTPUT:      Intake/Output Summary (Last 24 hours) at 7/22/2021 1144  Last data filed at 7/21/2021 1213  Gross per 24 hour   Intake 100 ml   Output 3500 ml   Net -3400 ml       General appearance:Awake, alert, in no acute distress  HEENT: PERRLA  Respiratory::vesicular breath sounds,no wheeze/crackles  Cardiovascular:S1 S2 normal,no gallop or organic murmur. Abdomen:Non tender/non distended. Bowel sounds present  Extremities: No Cyanosis or Clubbing,present Lower extremity edema  Neurological:Alert and oriented. No abnormalities of mood, affect, memory, mentation, or behavior are noted      MEDICATIONS     Scheduled Meds:    metoprolol succinate  25 mg Oral Daily    aspirin  325 mg Oral Daily    miconazole   Topical BID    guaiFENesin  600 mg Oral BID    sodium chloride flush  5-40 mL Intravenous 2 times per day    heparin (porcine)  5,000 Units Subcutaneous 3 times per day    doxercalciferol  2 mcg Intravenous Q MWF    iron sucrose  50 mg Intravenous Weekly    epoetin behzad-epbx  8,000 Units Intravenous Once per day on Mon Wed Fri    cinacalcet  120 mg Oral Q MWF     Continuous Infusions:    sodium chloride       PRN Meds:  metoprolol, sodium chloride flush, sodium chloride, ondansetron **OR** ondansetron, polyethylene glycol, acetaminophen **OR** acetaminophen  Home Meds:                Medications Prior to Admission: atorvastatin (LIPITOR) 40 MG tablet, Take 1 tablet by mouth nightly  Ciclopirox (LOPROX) 0.77 % gel, 2/day to affected areas  amLODIPine (NORVASC) 10 MG tablet, Take 1 tablet by mouth daily  folic acid (FOLVITE) 1 MG tablet, Take 1 tablet by mouth daily  RENVELA 800 MG tablet, Take 1 tablet by mouth 3 times daily  fluticasone (FLONASE) 50 MCG/ACT nasal spray, 1 spray by Each Nostril route daily 1 Spray in each nostril  Multiple Vitamins-Minerals (RENAPLEX-D PO), Take by mouth  allopurinol (ZYLOPRIM) 100 MG tablet, take 1 tablet by mouth once daily (Patient not taking: Reported on 2/2/2021)  cinacalcet (SENSIPAR) 30 MG tablet, Take 30 mg by mouth daily  calcium acetate (PHOSLO) 667 MG capsule, Take 3 capsules by mouth 3 times daily (with meals)  hydrocortisone (ANUSOL-HC) 2.5 % rectal cream, Place rectally 2 times daily. (Patient not taking: Reported on 2/2/2021)    INVESTIGATIONS     Last 3 CMP:    Recent Labs     07/20/21  0324 07/21/21  0648 07/22/21  0633    134* 134*   K 3.6* 4.7 4.5   CL 96* 94* 93*   CO2 23 22 21   BUN 38* 53* 39*   CREATININE 9.26* 10.45* 9.30*   CALCIUM 8.3* 8.4* 8.0*       Last 3 CBC:  Recent Labs     07/20/21  0324 07/21/21  0648 07/22/21  0633   WBC 3.8 4.3 4.5   RBC 3.17* 3.55* 3.49*   HGB 8.6* 9.7* 9.7*   HCT 28.6* 32.4* 31.8*   MCV 90.2 91.3 91.1   MCH 27.1 27.3 27.8   MCHC 30.1 29.9 30.5   RDW 21.3* 21.3* 21.2*   * See Reflexed IPF Result See Reflexed IPF Result   MPV NOT REPORTED NOT REPORTED NOT REPORTED       ASSESSMENT     1. ESRD on Hemodialysis. His regular HD days are MWF at [facility] hemodialysis facility using right AV fistula under Dr. Ruchi Peterson.  His dry weight is 80.8 kg.   2.  Admitted with palpitations - SVT-likely atrial flutter per cardio. 3.  Anemia of chronic disease  4. Secondary hyperparathyroidism  5.  Hypertension    PLAN     HD AM  Continue home meds  Ok to dc from Nephrology stand point    Please do not hesitate to call with questions    This note

## 2021-07-22 NOTE — DISCHARGE INSTR - COC
Continuity of Care Form    Patient Name: César Martinez   :  1945  MRN:  [id number]    [facility] date:  2021  Discharge date:  ***    Code Status Order: Full Code   Advance Directives:      Admitting Physician:  Irene Dye MD  PCP: Norberto Leslie MD    Discharging Nurse: [facility] Unit/Room#: 2045/7806-65  Discharging Unit Phone Number: ***    Emergency Contact:   Extended Emergency Contact Information  Primary Emergency Contact: Lyssa Myers  Address: [place] [address] Phone: [phone]  Mobile Phone: [phone]  Relation: Child  Secondary Emergency Contact: Maeve Carvalho  Address: [address] Phone: [phone]  Mobile Phone: [phone]  Relation: Spouse    Past Surgical History:  Past Surgical History:   Procedure Laterality Date    ABDOMEN SURGERY  2016    PERITONEAL CATHETER    ABDOMEN SURGERY  2016    pd catherter removed from abdomen    BLADDER SURGERY  Spring 2014    dilatated     CAROTID ENDARTERECTOMY  2015    left    COLONOSCOPY  2008    polypectomy, diverticulosis    COLONOSCOPY  2015    COLONOSCOPY  5/10/2018    COLONOSCOPY performed by Larisa Schulte MD at [facility]    CYSTOSCOPY  2008    bladder tumor removal    DIALYSIS FISTULA CREATION Right 2016    INGUINAL HERNIA REPAIR Right     KIDNEY BIOPSY  May 2013    SINUS SURGERY      TUNNELED VENOUS PORT PLACEMENT Right 10/2015    dialysis catheter- chest     UPPER GASTROINTESTINAL ENDOSCOPY  2015    UPPER GASTROINTESTINAL ENDOSCOPY N/A 2018    EGD BIOPSY performed by Vivienne Atkinson DO at [facility]       Immunization History:   Immunization History   Administered Date(s) Administered    Hepatitis B (Recombivax HB) 2015, 2015, 2016    Hepatitis B vaccine 2015, 2015, 2016    Influenza 2012    Influenza Virus Vaccine 01/15/2014, 2014, 2015, 09/15/2017 Influenza, High Dose (Fluzone 65 yrs and older) 10/08/2018, 10/12/2020    Influenza, Quadv, IM, (6 mo and older Fluzone, Flulaval, Fluarix and 3 yrs and older Afluria) 12/15/2016    Pneumococcal Conjugate 13-valent ([id number]) 10/13/2015    Pneumococcal Conjugate 7-valent (Kathleene Macho) 01/15/2009    Pneumococcal Polysaccharide ([id number]) 06/20/2017       Active Problems:  Patient Active Problem List   Diagnosis Code    BPH (benign prostatic hyperplasia) N40.0    CAD (coronary artery disease) I25.10    Anemia D64.9    CVA (cerebrovascular accident due to intracerebral hemorrhage) ([facility] I61.9    Seizure disorder, secondary ([facility] G40.909    Seizures ([facility] R56.9    Cerebral artery occlusion with cerebral infarction ([facility] I63.50    Hypertension I10    Hyperlipidemia E78.5    ESRD on hemodialysis ([facility] N18.6, Z99.2    Carotid stenosis, asymptomatic I65.29    Seizure disorder as sequela of cerebrovascular accident ([facility] I69.398, G40.909    Chronic ischemic left middle cerebral artery (MCA) stroke I69.30    Folate deficiency E53.8    Bronchitis J40    Sigmoid diverticulosis K57.30    Redundant colon Q43.8    Nonsustained supraventricular tachycardia (HCC) I47.1    NSVT (nonsustained ventricular tachycardia) (HCC) I47.2    Tinea corporis B35.4    Valvular heart disease I38    Palpitations R00.2       Isolation/Infection:   Isolation            No Isolation          Patient Infection Status       None to display            Nurse Assessment:  Last Vital Signs: BP (!) 147/87   Pulse 63   Temp 98.2 °F (36.8 °C) (Oral)   Resp 20   Ht 6' 1\" (1.854 m)   Wt 227 lb 15.3 oz (103.4 kg)   SpO2 96%   BMI 30.08 kg/m²     Last documented pain score (0-10 scale): Pain Level: 0  Last Weight:   Wt Readings from Last 1 Encounters:   07/21/21 227 lb 15.3 oz (103.4 kg)     Mental Status:  {IP PT MENTAL STATUS:20030:::0}    IV Access:  {MH ALFREDO IV ACCESS:023421291:::0}    Nursing Mobility/ADLs:  Walking   {[facility] DME SECTION    Inpatient Status Date: ***    Readmission Risk Assessment Score:  Readmission Risk              Risk of Unplanned Readmission:  18           Discharging to Facility/ Agency   Name:   Address:  Phone:  Fax:    Dialysis Facility (if applicable)   Name:  Address:  Dialysis Schedule:  Phone:  Fax:    / signature: {Esignature:199145193:::0}    PHYSICIAN SECTION    Prognosis: {Prognosis:2700207304:::0}    Condition at Discharge: [address] [place] [address] Patient Condition:931455953:::0}    Rehab Potential (if transferring to Rehab): {Prognosis:8278845789:::0}    Recommended Labs or Other Treatments After Discharge: ***    Physician Certification: I certify the above information and transfer of Joel Tyson  is necessary for the continuing treatment of the diagnosis listed and that he requires {Admit to Appropriate Level of Care:18756:::0} for {GREATER/LESS:252472041} 30 days.      Update Admission H&P: {CHP DME Changes in HandP:679808246:::0}    PHYSICIAN SIGNATURE:  Electronically signed by Freda Schneider MD on 7/22/21 at 2:53 PM EDT

## 2021-07-22 NOTE — PROGRESS NOTES
[facility]   Progress Note                   Date:   7/22/2021  Patient name: Aries Archuleta  Date of admission:  7/19/2021 11:49 AM  MRN:   [id number]  YOB: 1945  PCP: Leann Mo MD    Reason for Admission: Nonsustained supraventricular tachycardia ([address] [I47.1]    Subjective:       Clinical Changes / Abnormalities: seen & examined in room after discussing with RN. no acute CV issues/concerns. Echo completed, awaiting read. Medications:   Scheduled Meds:   metoprolol succinate  25 mg Oral Daily    aspirin  325 mg Oral Daily    miconazole   Topical BID    guaiFENesin  600 mg Oral BID    sodium chloride flush  5-40 mL Intravenous 2 times per day    heparin (porcine)  5,000 Units Subcutaneous 3 times per day    doxercalciferol  2 mcg Intravenous Q MWF    iron sucrose  50 mg Intravenous Weekly    epoetin behzad-epbx  8,000 Units Intravenous Once per day on Mon Wed Fri    cinacalcet  120 mg Oral Q MWF     Continuous Infusions:   sodium chloride       CBC:   Recent Labs     07/20/21  0324 07/21/21  0648 07/22/21  0633   WBC 3.8 4.3 4.5   HGB 8.6* 9.7* 9.7*   * See Reflexed IPF Result See Reflexed IPF Result     BMP:    Recent Labs     07/20/21  0324 07/21/21  0648 07/22/21  0633    134* 134*   K 3.6* 4.7 4.5   CL 96* 94* 93*   CO2 23 22 21   BUN 38* 53* 39*   CREATININE 9.26* 10.45* 9.30*   GLUCOSE 109* 92 78     Hepatic: No results for input(s): AST, ALT, ALB, BILITOT, ALKPHOS in the last 72 hours. Troponin:   Recent Labs     07/21/21  1919 07/22/21  0117 07/22/21  0633   TROPHS 238* 218* 222*     BNP: No results for input(s): BNP in the last 72 hours. Lipids: No results for input(s): CHOL, HDL in the last 72 hours. Invalid input(s): LDLCALCU  INR: No results for input(s): INR in the last 72 hours.     Objective:   Vitals: BP (!) 147/87   Pulse 63   Temp 98.2 °F (36.8 °C) (Oral)   Resp 20   Ht 6' 1\" (1.854 m)   Wt 227 lb 15.3 oz (103.4 kg)   SpO2 96%   BMI 30.08 kg/m²   General appearance: alert and cooperative with exam  HEENT: Head: Normocephalic, no lesions, without obvious abnormality. Neck: no JVD, trachea midline, no adenopathy  Lungs: dim throughout to auscultation  Heart: Regular rate and rhythm, s1/s2 auscultated, no murmurs. SR  Abdomen: soft, non-tender, bowel sounds active  Extremities: no edema  Neurologic: not done        Assessment / Acute Cardiac Problems:      DATA:    Diagnostics:  . ECHO: 10/2/2015:  CONCLUSIONS     Summary  Left ventricle is normal in size. Concentric left ventricular hypertrophy. Hyperdynamic left ventricular function. Evidence of diastolic dysfunction. ( E/A .4)  No significant pericardial effusion is seen. No significant valvular regurgitation or stenosis seen. .     IMPRESSION:    1.  New onset tachycardia which was asymptomatic during hemodialysis, likely SVT vs a flutter . 2. Troponin elevation with a flat trend with no acute EKG changes likely demand ischemia. 3.  Previous echo in 2015 with normal LVEF. 4.  ESRD on hemodialysis. 5.  Left ICA stenosis status post CEA in 2015. 6.  HTN/HLD. Patient Active Problem List:     BPH (benign prostatic hyperplasia)     CAD (coronary artery disease)     Anemia     CVA (cerebrovascular accident due to intracerebral hemorrhage) ([address]     Seizure disorder, secondary ([address]     Seizures ([address]     Cerebral artery occlusion with cerebral infarction ([address]     Hypertension     Hyperlipidemia     ESRD on hemodialysis ([address]     Carotid stenosis, asymptomatic     Seizure disorder as sequela of cerebrovascular accident ([address]     Chronic ischemic left middle cerebral artery (MCA) stroke     Folate deficiency     Bronchitis     Sigmoid diverticulosis     Redundant colon     Nonsustained supraventricular tachycardia (HCC)     NSVT (nonsustained ventricular tachycardia) (HCC)     Tinea corporis     Valvular heart disease     Palpitations      Plan of Treatment:   1.  Elevated trops type II MI secondary to demand ischemia/CKD. Continue BB. Continue ASA. 2. Per Dr. Wanda Urban, tele reviewed and likely Aflutter 2:1. Asymptomatic and rate stable now - SR. Resolved. Continue BB  And Eliquis 2.5mg PO BID on discharge  3. Echo completed. If preserved LVEF will be OK for discharge from CV standpoint with stress test as OP. 4. Please call with questions/concerns.     Electronically signed by SYLVIE Rojas CNP on 7/22/2021 at 12:19 PM  [address]  [phone]

## 2021-07-23 NOTE — PROGRESS NOTES
Patient requesting to leave AMA and wants to sign \"papers\" to leave. Tells me his ride will be here in 10 minutes. I explained all the risks of not staying and treating current diagnosis. Informed resident on call. Will have patient sign AMA paper.

## 2021-07-24 ENCOUNTER — HOSPITAL ENCOUNTER (INPATIENT)
Age: 76
LOS: 3 days | Discharge: HOME | DRG: 314 | End: 2021-07-27
Attending: EMERGENCY MEDICINE
Payer: MEDICARE

## 2021-07-24 DIAGNOSIS — I47.1 PAROXYSMAL SUPRAVENTRICULAR TACHYCARDIA (HCC): ICD-10-CM

## 2021-07-24 DIAGNOSIS — R58 BLEEDING FROM VENIPUNCTURE SITE: Primary | ICD-10-CM

## 2021-07-24 DIAGNOSIS — I47.1 PAROXYSMAL SVT (SUPRAVENTRICULAR TACHYCARDIA) (HCC): ICD-10-CM

## 2021-07-24 DIAGNOSIS — R00.2 PALPITATIONS: ICD-10-CM

## 2021-07-24 DIAGNOSIS — I47.29 NSVT (NONSUSTAINED VENTRICULAR TACHYCARDIA): ICD-10-CM

## 2021-07-24 PROBLEM — I47.10 PAROXYSMAL SVT (SUPRAVENTRICULAR TACHYCARDIA): Status: ACTIVE | Noted: 2021-07-24

## 2021-07-24 LAB
ABSOLUTE EOS #: 0.22 K/UL (ref 0–0.4)
ABSOLUTE IMMATURE GRANULOCYTE: 0 K/UL (ref 0–0.3)
ABSOLUTE LYMPH #: 1.18 K/UL (ref 1–4.8)
ABSOLUTE MONO #: 0.45 K/UL (ref 0.1–0.8)
ALBUMIN SERPL-MCNC: 3.9 G/DL (ref 3.5–5.2)
ALBUMIN/GLOBULIN RATIO: 1.3 (ref 1–2.5)
ALP BLD-CCNC: 64 U/L (ref 40–129)
ALT SERPL-CCNC: 20 U/L (ref 5–41)
ANION GAP SERPL CALCULATED.3IONS-SCNC: 15 MMOL/L (ref 9–17)
AST SERPL-CCNC: 14 U/L
BASOPHILS # BLD: 4 % (ref 0–2)
BASOPHILS ABSOLUTE: 0.22 K/UL (ref 0–0.2)
BILIRUB SERPL-MCNC: 0.3 MG/DL (ref 0.3–1.2)
BUN BLDV-MCNC: 39 MG/DL (ref 8–23)
BUN/CREAT BLD: ABNORMAL (ref 9–20)
CALCIUM SERPL-MCNC: 8.8 MG/DL (ref 8.6–10.4)
CELLS COUNTED: 200
CHLORIDE BLD-SCNC: 97 MMOL/L (ref 98–107)
CO2: 26 MMOL/L (ref 20–31)
CREAT SERPL-MCNC: 9.37 MG/DL (ref 0.7–1.2)
DIFFERENTIAL TYPE: ABNORMAL
EOSINOPHILS RELATIVE PERCENT: 4 % (ref 1–4)
GFR AFRICAN AMERICAN: 7 ML/MIN
GFR NON-AFRICAN AMERICAN: 5 ML/MIN
GFR SERPL CREATININE-BSD FRML MDRD: ABNORMAL ML/MIN/{1.73_M2}
GFR SERPL CREATININE-BSD FRML MDRD: ABNORMAL ML/MIN/{1.73_M2}
GLUCOSE BLD-MCNC: 77 MG/DL (ref 70–99)
HCT VFR BLD CALC: 32.3 % (ref 40.7–50.3)
HEMOGLOBIN: 9.8 G/DL (ref 13–17)
IMMATURE GRANULOCYTES: 0 %
INR BLD: 1.1
LYMPHOCYTES # BLD: 21 % (ref 24–44)
MCH RBC QN AUTO: 27.2 PG (ref 25.2–33.5)
MCHC RBC AUTO-ENTMCNC: 30.3 G/DL (ref 28.4–34.8)
MCV RBC AUTO: 89.7 FL (ref 82.6–102.9)
MONOCYTES # BLD: 8 % (ref 1–7)
MORPHOLOGY: ABNORMAL
NRBC AUTOMATED: 0 PER 100 WBC
NUCLEATED RED BLOOD CELLS: 1 PER 100 WBC
PARTIAL THROMBOPLASTIN TIME: 19.5 SEC (ref 20.5–30.5)
PDW BLD-RTO: 21.9 % (ref 11.8–14.4)
PLATELET # BLD: 196 K/UL (ref 138–453)
PLATELET ESTIMATE: ABNORMAL
PMV BLD AUTO: 12.2 FL (ref 8.1–13.5)
POTASSIUM SERPL-SCNC: 4.7 MMOL/L (ref 3.7–5.3)
PROTHROMBIN TIME: 11.9 SEC (ref 9.1–12.3)
RBC # BLD: 3.6 M/UL (ref 4.21–5.77)
RBC # BLD: ABNORMAL 10*6/UL
SEG NEUTROPHILS: 63 % (ref 36–66)
SEGMENTED NEUTROPHILS ABSOLUTE COUNT: 3.53 K/UL (ref 1.8–7.7)
SODIUM BLD-SCNC: 138 MMOL/L (ref 135–144)
TOTAL PROTEIN: 6.8 G/DL (ref 6.4–8.3)
TROPONIN INTERP: ABNORMAL
TROPONIN INTERP: ABNORMAL
TROPONIN T: ABNORMAL NG/ML
TROPONIN T: ABNORMAL NG/ML
TROPONIN, HIGH SENSITIVITY: 271 NG/L (ref 0–22)
TROPONIN, HIGH SENSITIVITY: 279 NG/L (ref 0–22)
WBC # BLD: 5.6 K/UL (ref 3.5–11.3)
WBC # BLD: ABNORMAL 10*3/UL

## 2021-07-24 PROCEDURE — 99284 EMERGENCY DEPT VISIT MOD MDM: CPT

## 2021-07-24 PROCEDURE — 85610 PROTHROMBIN TIME: CPT

## 2021-07-24 PROCEDURE — 99223 1ST HOSP IP/OBS HIGH 75: CPT | Performed by: INTERNAL MEDICINE

## 2021-07-24 PROCEDURE — 80053 COMPREHEN METABOLIC PANEL: CPT

## 2021-07-24 PROCEDURE — 85025 COMPLETE CBC W/AUTO DIFF WBC: CPT

## 2021-07-24 PROCEDURE — 84484 ASSAY OF TROPONIN QUANT: CPT

## 2021-07-24 PROCEDURE — 6370000000 HC RX 637 (ALT 250 FOR IP): Performed by: STUDENT IN AN ORGANIZED HEALTH CARE EDUCATION/TRAINING PROGRAM

## 2021-07-24 PROCEDURE — 93005 ELECTROCARDIOGRAM TRACING: CPT | Performed by: STUDENT IN AN ORGANIZED HEALTH CARE EDUCATION/TRAINING PROGRAM

## 2021-07-24 PROCEDURE — 96374 THER/PROPH/DIAG INJ IV PUSH: CPT

## 2021-07-24 PROCEDURE — 2500000003 HC RX 250 WO HCPCS: Performed by: STUDENT IN AN ORGANIZED HEALTH CARE EDUCATION/TRAINING PROGRAM

## 2021-07-24 PROCEDURE — 2060000000 HC ICU INTERMEDIATE R&B

## 2021-07-24 PROCEDURE — 85730 THROMBOPLASTIN TIME PARTIAL: CPT

## 2021-07-24 RX ORDER — POLYETHYLENE GLYCOL 3350 17 G/17G
17 POWDER, FOR SOLUTION ORAL DAILY PRN
Status: DISCONTINUED | OUTPATIENT
Start: 2021-07-24 | End: 2021-07-27 | Stop reason: HOSPADM

## 2021-07-24 RX ORDER — SODIUM CHLORIDE 0.9 % (FLUSH) 0.9 %
5-40 SYRINGE (ML) INJECTION PRN
Status: DISCONTINUED | OUTPATIENT
Start: 2021-07-24 | End: 2021-07-27 | Stop reason: HOSPADM

## 2021-07-24 RX ORDER — METOPROLOL TARTRATE 5 MG/5ML
5 INJECTION INTRAVENOUS EVERY 6 HOURS
Status: DISCONTINUED | OUTPATIENT
Start: 2021-07-24 | End: 2021-07-24

## 2021-07-24 RX ORDER — METOPROLOL TARTRATE 5 MG/5ML
5 INJECTION INTRAVENOUS ONCE
Status: COMPLETED | OUTPATIENT
Start: 2021-07-24 | End: 2021-07-24

## 2021-07-24 RX ORDER — ACETAMINOPHEN 650 MG/1
650 SUPPOSITORY RECTAL EVERY 6 HOURS PRN
Status: DISCONTINUED | OUTPATIENT
Start: 2021-07-24 | End: 2021-07-27 | Stop reason: HOSPADM

## 2021-07-24 RX ORDER — ESMOLOL HYDROCHLORIDE 10 MG/ML
50-300 INJECTION, SOLUTION INTRAVENOUS CONTINUOUS
Status: DISCONTINUED | OUTPATIENT
Start: 2021-07-24 | End: 2021-07-25

## 2021-07-24 RX ORDER — ATORVASTATIN CALCIUM 80 MG/1
40 TABLET, FILM COATED ORAL NIGHTLY
Status: DISCONTINUED | OUTPATIENT
Start: 2021-07-24 | End: 2021-07-27 | Stop reason: HOSPADM

## 2021-07-24 RX ORDER — SODIUM CHLORIDE 9 MG/ML
25 INJECTION, SOLUTION INTRAVENOUS PRN
Status: DISCONTINUED | OUTPATIENT
Start: 2021-07-24 | End: 2021-07-27 | Stop reason: HOSPADM

## 2021-07-24 RX ORDER — METOPROLOL TARTRATE 5 MG/5ML
2.5 INJECTION INTRAVENOUS EVERY 6 HOURS PRN
Status: DISCONTINUED | OUTPATIENT
Start: 2021-07-24 | End: 2021-07-27 | Stop reason: HOSPADM

## 2021-07-24 RX ORDER — ONDANSETRON 2 MG/ML
4 INJECTION INTRAMUSCULAR; INTRAVENOUS EVERY 6 HOURS PRN
Status: DISCONTINUED | OUTPATIENT
Start: 2021-07-24 | End: 2021-07-27 | Stop reason: HOSPADM

## 2021-07-24 RX ORDER — SODIUM CHLORIDE 0.9 % (FLUSH) 0.9 %
5-40 SYRINGE (ML) INJECTION EVERY 12 HOURS SCHEDULED
Status: DISCONTINUED | OUTPATIENT
Start: 2021-07-24 | End: 2021-07-27 | Stop reason: HOSPADM

## 2021-07-24 RX ORDER — ONDANSETRON 4 MG/1
4 TABLET, ORALLY DISINTEGRATING ORAL EVERY 8 HOURS PRN
Status: DISCONTINUED | OUTPATIENT
Start: 2021-07-24 | End: 2021-07-27 | Stop reason: HOSPADM

## 2021-07-24 RX ORDER — THROMBIN/CAL/CMC/GEL/DRESS,HEM 40 SQ CM
1 PADS, MEDICATED (EA) TOPICAL ONCE
Status: COMPLETED | OUTPATIENT
Start: 2021-07-24 | End: 2021-07-24

## 2021-07-24 RX ORDER — ACETAMINOPHEN 325 MG/1
650 TABLET ORAL EVERY 6 HOURS PRN
Status: DISCONTINUED | OUTPATIENT
Start: 2021-07-24 | End: 2021-07-27 | Stop reason: HOSPADM

## 2021-07-24 RX ADMIN — Medication 1 DOSE: at 14:37

## 2021-07-24 RX ADMIN — ATORVASTATIN CALCIUM 40 MG: 80 TABLET, FILM COATED ORAL at 21:34

## 2021-07-24 RX ADMIN — METOPROLOL TARTRATE 5 MG: 1 INJECTION, SOLUTION INTRAVENOUS at 15:47

## 2021-07-24 ASSESSMENT — ENCOUNTER SYMPTOMS
ABDOMINAL DISTENTION: 0
RHINORRHEA: 0
NAUSEA: 0
ABDOMINAL PAIN: 0
SORE THROAT: 0
DIARRHEA: 0
WHEEZING: 0
VOMITING: 0
CHEST TIGHTNESS: 0
SHORTNESS OF BREATH: 0
CONSTIPATION: 0

## 2021-07-24 NOTE — ED NOTES
Pt to the ER with c/o bleeding from dialysis fistula. Pt has dialysis M, W, F  Pt stated the fistula began bleeding last night after dialysis and was using a pressure bandage on the site but was unable to control the bleeding.  Dr. Wong Fearing to the bedside for thromi-pad placement with a pressure dressing     Saurabh Harry RN  07/24/21 9650

## 2021-07-24 NOTE — ED PROVIDER NOTES
[facility]     Emergency Department     Faculty Attestation    I performed a history and physical examination of the patient and discussed management with the resident. I reviewed the resident´s note and agree with the documented findings and plan of care. Any areas of disagreement are noted on the chart. I was personally present for the key portions of any procedures. I have documented in the chart those procedures where I was not present during the key portions. I have reviewed the emergency nurses triage note. I agree with the chief complaint, past medical history, past surgical history, allergies, medications, social and family history as documented unless otherwise noted below. For Physician Assistant/ Nurse Practitioner cases/documentation I have personally evaluated this patient and have completed at least one if not all key elements of the E/M (history, physical exam, and MDM). Additional findings are as noted. Slight oozing from AV fistula distally in the right forearm. Few rales at the bases, bilateral trace pretibial pitting without calf pain. Heart is slightly irregular with grade 2/6 systolic murmur heard best at the lower left sternal border. Heart was not tachycardic when I listened to it. Oneyda Young MD  07/24/21 1424       EKG Interpretation    Interpreted by emergency department physician    Rhythm: normal sinus   Rate: normal/73  Axis: normal  Ectopy: none  Conduction: normal  ST Segments: no acute change  Nonspecific ST and T wave changes  LVH by voltage    Clinical Impression:  abnormal EKG    JOYCE Dill MD  07/24/21 1447    CRITICAL CARE: There was a high probability of clinically significant/life threatening deterioration in this patient's condition which required my urgent intervention. Total critical care time was 30 minutes. This excludes any time for separately reportable procedures.    Patient is in and out of a tachycardia and which was not captured on the twelve-lead EKG. Patient was recently treated for nonsustained ventricular tachycardia.        Chucho Camarena MD  07/24/21 Elyse Goodman MD  07/24/21 [id number]

## 2021-07-24 NOTE — ED TRIAGE NOTES
Pulled tape off of fistula, which it then started bleeding and has not stopped. He states that the bleeding started at 2 in the morning and has been up because of the bleeding. States that he has been bleeding a moderate amount. Velia just put kelsey over it in triage.

## 2021-07-24 NOTE — H&P
[address]     Department of Internal Medicine - Staff Internal Medicine Teaching Service          ADMISSION NOTE/HISTORY AND PHYSICAL EXAMINATION   Date: 7/24/2021  Patient Name: Kulwinder Crowley  Date of admission: 7/24/2021  2:07 PM  YOB: 1945  PCP: Hansa Mohan MD  History Obtained From:  patient    CHIEF COMPLAINT     Chief complaint: bleeding from dialysis site     HISTORY OF PRESENTING ILLNESS     The patient is a pleasant 66 y.o. male PMH ESRD, CAD, CVA (2013) , T2DM, PVD, HTN, HLD, presents with a chief complaint of bleeding from dialysis site. Patient reports that he currently receives dialysis Monday Wednesday Friday. After he received dialysis yesterday and noticed today that his right aVF was still bleeding. Upon arrival in the ED thrombogenic agents and dressing was placed on the insertion site and bleeding currently is controlled. Patient notably left AMA on July 22 from this facility after being admitted for SVT. Cardiology was consulted with the intent discharge patient with beta-blockers and eliquis. Patient states that he has not taken any of the medications since last admission because he did not pick them up. Denies any smoking hx, alcohol hx or illicit drug hx. at bedside patient will go intermittently into SVT. Patient denies any lightheadedness shortness of breath or chest pain nausea vomiting. Vitals:    07/24/21 1600   BP:    Pulse: 132   Resp: 14   Temp:    SpO2: 98%       On my evaluation,  Vitals:    07/24/21 1600   BP:    Pulse: 132   Resp: 14   Temp:    SpO2: 98%         Review of Systems:  Review of Systems   Constitutional: Negative for fatigue and fever. Respiratory: Negative for chest tightness, shortness of breath and wheezing. Cardiovascular: Negative for chest pain and palpitations. Gastrointestinal: Negative for abdominal distention, abdominal pain, constipation, nausea and vomiting.    Neurological: Negative for dizziness, weakness and headaches. Psychiatric/Behavioral: Negative. PAST MEDICAL HISTORY     Past Medical History:   Diagnosis Date    Allergic rhinitis     Anemia     BPH (benign prostatic hyperplasia)     CAD (coronary artery disease)     Carotid artery stenosis 2013    ulcerated plaque in left ICA 60 - 70% on carotid angiogram    Cerebrovascular disease 2013    left frontal hemorrhage    Diabetes mellitus ([address] 2013    Eczema     ESRD (end stage renal disease) on dialysis ([address]     Full dentures     Upper / Lower    GERD (gastroesophageal reflux disease)     Gout     Hemodialysis patient ([address] 10/2015    [facility] [address] [address]  M,W F    Hyperlipidemia     Hypertension     Neuropathy     Osteoarthritis     Peripheral vascular disease ([address]     Seizures ([address] 2013    after stroke, LAST [id number]/20/2016    Stroke [facility]) 2013    Unspecified cerebral artery occlusion with cerebral infarction 4-    speech short term and lt side       PAST SURGICAL HISTORY     Past Surgical History:   Procedure Laterality Date    ABDOMEN SURGERY  2016    PERITONEAL CATHETER    ABDOMEN SURGERY  05/26/2016    pd catherter removed from abdomen    BLADDER SURGERY  Spring 2014    dilatated     CAROTID ENDARTERECTOMY  2015    left    COLONOSCOPY  2008    polypectomy, diverticulosis    COLONOSCOPY  1/28/2015    COLONOSCOPY  5/10/2018    COLONOSCOPY performed by Bam Boyer MD at [address] [place] [address]  2008    bladder tumor removal    DIALYSIS FISTULA CREATION Right 08/25/2016    INGUINAL HERNIA REPAIR Right     KIDNEY BIOPSY  May 2013    SINUS SURGERY      TUNNELED VENOUS PORT PLACEMENT Right 10/2015    dialysis catheter- chest     UPPER GASTROINTESTINAL ENDOSCOPY  1/28/2015    UPPER GASTROINTESTINAL ENDOSCOPY N/A 11/1/2018    EGD BIOPSY performed by Jass Lovell DO at [place] [id number]     Patient has no known allergies.     MEDICATIONS PRIOR TO ADMISSION     Prior to Admission medications    Medication Sig Start Date End Date Taking? Authorizing Provider   Ciclopirox (LOPROX) 0.77 % gel 2/day to affected areas 21   Saman Rader MD   amLODIPine (NORVASC) 10 MG tablet Take 1 tablet by mouth daily 21   Saman Rader MD   folic acid (FOLVITE) 1 MG tablet Take 1 tablet by mouth daily 21   Saman Rader MD   RENVELA 800 MG tablet Take 1 tablet by mouth 3 times daily 21   Saman Rader MD   atorvastatin (LIPITOR) 40 MG tablet Take 1 tablet by mouth nightly 21   Saman Rader MD   fluticasone [facility]) 50 MCG/ACT nasal spray 1 spray by Each Nostril route daily 1 Spray in each nostril 19   Saman Rader MD   Multiple Vitamins-Minerals (RENAPLEX-D PO) Take by mouth    Historical Provider, MD   allopurinol (ZYLOPRIM) 100 MG tablet take 1 tablet by mouth once daily  Patient not taking: Reported on 2021   Saman Rader MD   cinacalcet (SENSIPAR) 30 MG tablet Take 30 mg by mouth daily    Historical Provider, MD   calcium acetate (PHOSLO) 667 MG capsule Take 3 capsules by mouth 3 times daily (with meals) 18   Saman Rader MD   hydrocortisone (ANUSOL-HC) 2.5 % rectal cream Place rectally 2 times daily.   Patient not taking: Reported on 2021   Saman Rader MD       SOCIAL HISTORY     Tobacco: none  Alcohol: none  Illicits: none    FAMILY HISTORY     Family History   Problem Relation Age of Onset    Diabetes Mother     Kidney Disease Mother         Dialysis    Cancer Mother         KIDNEY    Anesth Problems Mother         delayed awakening     Other Father         Priscilla London    Diabetes Sister     High Blood Pressure Sister     Cancer Brother        PHYSICAL EXAM     Vitals: /70   Pulse 132   Temp 98.8 °F (37.1 °C) (Oral)   Resp 14   Ht 6' (1.829 m)   Wt 220 lb (99.8 kg)   SpO2 98%   BMI 29.84 kg/m²   Tmax: Temp (24hrs), Av.8 °F (37.1 °C), Min:98.8 °F (37.1 °C), Max:98.8 °F (37.1 °C)    Last Body weight:   Wt Readings from Last 3 Encounters:   07/24/21 220 lb (99.8 kg)   07/21/21 227 lb 15.3 oz (103.4 kg)   10/01/20 220 lb (99.8 kg)     Body Mass Index : Body mass index is 29.84 kg/m². PHYSICAL EXAMINATION:  Physical Exam  Constitutional:       Appearance: Normal appearance. HENT:      Head: Normocephalic and atraumatic. Nose: Nose normal.   Cardiovascular:      Rate and Rhythm: Tachycardia present. Rhythm irregular. Pulses: Normal pulses. Heart sounds: Normal heart sounds. Pulmonary:      Effort: Pulmonary effort is normal.      Breath sounds: Normal breath sounds. Abdominal:      General: There is no distension. Palpations: Abdomen is soft. Tenderness: There is no abdominal tenderness. Musculoskeletal:      Right lower leg: No edema. Left lower leg: No edema. Comments: No pedal edema noted   Skin:     General: Skin is warm and dry. Neurological:      Mental Status: He is alert and oriented to person, place, and time. Psychiatric:         Mood and Affect: Mood normal.         Behavior: Behavior normal.         Thought Content:  Thought content normal.           INVESTIGATIONS     Laboratory Testing:     Recent Results (from the past 24 hour(s))   EKG 12 Lead    Collection Time: 07/24/21  2:31 PM   Result Value Ref Range    Ventricular Rate 73 BPM    Atrial Rate 73 BPM    P-R Interval 190 ms    QRS Duration 94 ms    Q-T Interval 402 ms    QTc Calculation (Bazett) 442 ms    P Axis 55 degrees    R Axis -14 degrees    T Axis 51 degrees   APTT    Collection Time: 07/24/21  3:25 PM   Result Value Ref Range    PTT 19.5 (L) 20.5 - 30.5 sec   PROTIME-INR    Collection Time: 07/24/21  3:25 PM   Result Value Ref Range    Protime 11.9 9.1 - 12.3 sec    INR 1.1    CBC WITH AUTO DIFFERENTIAL    Collection Time: 07/24/21  3:25 PM   Result Value Ref Range    WBC 5.6 3.5 - 11.3 k/uL    RBC 3.60 (L) 4.21 - 5.77 m/uL    Hemoglobin 9.8 (L) 13.0 - 17.0 g/dL Hematocrit 32.3 (L) 40.7 - 50.3 %    MCV 89.7 82.6 - 102.9 fL    MCH 27.2 25.2 - 33.5 pg    MCHC 30.3 28.4 - 34.8 g/dL    RDW 21.9 (H) 11.8 - 14.4 %    Platelets 871 460 - 024 k/uL    MPV 12.2 8.1 - 13.5 fL    NRBC Automated 0.0 0.0 per 100 WBC    Differential Type NOT REPORTED     WBC Morphology NOT REPORTED     RBC Morphology NOT REPORTED     Platelet Estimate NOT REPORTED     Immature Granulocytes 0 0 %    Seg Neutrophils 63 36 - 66 %    Lymphocytes 21 (L) 24 - 44 %    Monocytes 8 (H) 1 - 7 %    Eosinophils % 4 1 - 4 %    Basophils 4 (H) 0 - 2 %    nRBC 1 (H) 0 per 100 WBC    Absolute Immature Granulocyte 0.00 0.00 - 0.30 k/uL    Segs Absolute 3.53 1.8 - 7.7 k/uL    Absolute Lymph # 1.18 1.0 - 4.8 k/uL    Absolute Mono # 0.45 0.1 - 0.8 k/uL    Absolute Eos # 0.22 0.0 - 0.4 k/uL    Basophils Absolute 0.22 (H) 0.0 - 0.2 k/uL    Cells Counted 200     Morphology ANISOCYTOSIS PRESENT    Comprehensive Metabolic Panel    Collection Time: 07/24/21  3:25 PM   Result Value Ref Range    Glucose 77 70 - 99 mg/dL    BUN 39 (H) 8 - 23 mg/dL    CREATININE 9.37 (HH) 0.70 - 1.20 mg/dL    Bun/Cre Ratio NOT REPORTED 9 - 20    Calcium 8.8 8.6 - 10.4 mg/dL    Sodium 138 135 - 144 mmol/L    Potassium 4.7 3.7 - 5.3 mmol/L    Chloride 97 (L) 98 - 107 mmol/L    CO2 26 20 - 31 mmol/L    Anion Gap 15 9 - 17 mmol/L    Alkaline Phosphatase 64 40 - 129 U/L    ALT 20 5 - 41 U/L    AST 14 <40 U/L    Total Bilirubin 0.30 0.3 - 1.2 mg/dL    Total Protein 6.8 6.4 - 8.3 g/dL    Albumin 3.9 3.5 - 5.2 g/dL    Albumin/Globulin Ratio 1.3 1.0 - 2.5    GFR Non-African American 5 (L) >60 mL/min    GFR  7 (L) >60 mL/min    GFR Comment          GFR Staging NOT REPORTED    Troponin    Collection Time: 07/24/21  3:25 PM   Result Value Ref Range    Troponin, High Sensitivity 271 (HH) 0 - 22 ng/L    Troponin T NOT REPORTED <0.03 ng/mL    Troponin Interp NOT REPORTED    Troponin    Collection Time: 07/24/21  5:04 PM   Result Value Ref Range Troponin, High Sensitivity 279 (HH) 0 - 22 ng/L    Troponin T NOT REPORTED <0.03 ng/mL    Troponin Interp NOT REPORTED        Imaging:   XR CHEST PORTABLE    Result Date: 7/19/2021  No radiographic evidence of acute cardiopulmonary disease. Moderate cardiomegaly. ASSESSMENT & PLAN     ASSESSMENT:     Primary Problem  Paroxysmal SVT (supraventricular tachycardia) [facility])    Active Hospital Problems    Diagnosis Date Noted    Paroxysmal SVT [facility]supraventricular tachycardia) ([facility]) [I47.1] 07/24/2021    ESRD on hemodialysis ([facility] [N18.6, Z99.2] 10/13/2015    Hypertension [I10]     Hyperlipidemia [E78.5]     CAD (coronary artery disease) [I25.10]        PLAN:     IMPRESSION  This is a 66 y.o. male who presented with above mentioned complaints and was admitted to inpatient service for further management as follows:     Principal Problem:    Paroxysmal SVT (supraventricular tachycardia) ([facility]  Active Problems:    CAD (coronary artery disease)    Hypertension    Hyperlipidemia    ESRD on hemodialysis ([facility]  Resolved Problems:    * No resolved hospital problems. *    Paroxysmal SVT  -Recent admission for same issue. Patient had a \"fluttering\" sensation in his chest.  Last admission cardio was consulted echocardiogram was performed but not read. Patient was to be discharged with beta-blockers and Eliquis.  -Unfortunately patient left AMA  -will consult cardiology  -esmolol infusion for rate control   -Eliquis 2.5 twice daily  - will monitor hemodynamically    ESRD   -Patient gets dialysis Monday Wednesday Friday  -Baseline creatinine is about 9  -We will consult nephrology for dialysis  -Replace electrolytes as needed  -Low-carb low-sodium diet  -Serial BMPs  -Monitor fluid status  -Continue to monitor dialysis site for further bleeding.  Dressings as needed    Hypertension  -Patient currently has amlodipine listed as a home med unsure if patient actually takes  -We will discuss resuming    Hyperlipidemia  -We will

## 2021-07-24 NOTE — ED NOTES
Pt suddenly in a non-sustained V.tach rhythm, has h/o irregular rhythm. EKG done at the bedside. Working on IV access for IV Lopressor. Pt denies chest pain, SOB. Will continue to monitor.       Sumit Moore RN  07/24/21 [id number]

## 2021-07-24 NOTE — ED PROVIDER NOTES
rhinitis, Anemia, BPH (benign prostatic hyperplasia), CAD (coronary artery disease), Carotid artery stenosis, Cerebrovascular disease, Diabetes mellitus ([facility] [address] Eczema, ESRD (end stage renal disease) on dialysis ([facility] [address] Full dentures, GERD (gastroesophageal reflux disease), Gout, Hemodialysis patient ([facility] [address] Hyperlipidemia, Hypertension, Neuropathy, Osteoarthritis, Peripheral vascular disease ([facility] [address] Seizures ([facility] [address] Stroke ([facility] [address] and Unspecified cerebral artery occlusion with cerebral infarction. has a past surgical history that includes Bladder surgery (Spring 2014); sinus surgery; Cystocopy (2008); Inguinal hernia repair (Right); Kidney biopsy (May 2013); Upper gastrointestinal endoscopy (1/28/2015); Colonoscopy (2008); Colonoscopy (1/28/2015); Carotid endarterectomy (2015); Abdomen surgery (2016); Dialysis fistula creation (Right, 08/25/2016); Tunneled venous port placement (Right, 10/2015); Abdomen surgery (05/26/2016); Colonoscopy (5/10/2018); and Upper gastrointestinal endoscopy (N/A, 11/1/2018).       Social History     Socioeconomic History    Marital status:      Spouse name: Humaira Puente Number of children: 1    Years of education: Not on file    Highest education level: Not on file   Occupational History    Not on file   Tobacco Use    Smoking status: Never Smoker    Smokeless tobacco: Never Used   Vaping Use    Vaping Use: Never used   Substance and Sexual Activity    Alcohol use: No     Comment: none    Drug use: No    Sexual activity: Never   Other Topics Concern    Not on file   Social History Narrative    ** Merged History Encounter **         ** Merged History Encounter **          Social Determinants of Health     Financial Resource Strain: Low Risk     Difficulty of Paying Living Expenses: Not hard at all   Food Insecurity: No Food Insecurity    Worried About Running Out of Food in the Last Year: Never true    Ming of Food in the Last Year: Never true   Transportation Needs:     Lack of Transportation (Medical):  Lack of Transportation (Non-Medical):    Physical Activity:     Days of Exercise per Week:     Minutes of Exercise per Session:    Stress:     Feeling of Stress :    Social Connections:     Frequency of Communication with Friends and Family:     Frequency of Social Gatherings with Friends and Family:     Attends Islam Services:     Active Member of Clubs or Organizations:     Attends Club or Organization Meetings:     Marital Status:    Intimate Partner Violence:     Fear of Current or Ex-Partner:     Emotionally Abused:     Physically Abused:     Sexually Abused:        Family History   Problem Relation Age of Onset    Diabetes Mother     Kidney Disease Mother         Dialysis    Cancer Mother         KIDNEY    Anesth Problems Mother         delayed awakening     Other Father         Axel Churchill    Diabetes Sister     High Blood Pressure Sister     Cancer Brother        Allergies:  Patient has no known allergies. Home Medications:  Prior to Admission medications    Medication Sig Start Date End Date Taking?  Authorizing Provider   Ciclopirox (LOPROX) 0.77 % gel 2/day to affected areas 2/2/21   Jonathan Hoffman MD   amLODIPine (NORVASC) 10 MG tablet Take 1 tablet by mouth daily 2/2/21   Jonathan Hoffman MD   folic acid (FOLVITE) 1 MG tablet Take 1 tablet by mouth daily 2/2/21   Jonathan Hoffman MD   RENVELA 800 MG tablet Take 1 tablet by mouth 3 times daily 2/2/21   Jonathan Hoffman MD   atorvastatin (LIPITOR) 40 MG tablet Take 1 tablet by mouth nightly 2/2/21   Jonathan Hoffman MD   fluticasone [facility]) 50 MCG/ACT nasal spray 1 spray by Each Nostril route daily 1 Spray in each nostril 9/5/19   Jonathan Hoffman MD   Multiple Vitamins-Minerals (RENAPLEX-D PO) Take by mouth    Historical Provider, MD   allopurinol (ZYLOPRIM) 100 MG tablet take 1 tablet by mouth once daily  Patient not taking: Reported on 2/2/2021 4/26/18   [facility] Shaylee Lares MD   cinacalcet (SENSIPAR) 30 MG tablet Take 30 mg by mouth daily    Historical Provider, MD   calcium acetate (PHOSLO) 667 MG capsule Take 3 capsules by mouth 3 times daily (with meals) 1/26/18   Scottie Nazario MD   hydrocortisone (ANUSOL-HC) 2.5 % rectal cream Place rectally 2 times daily. Patient not taking: Reported on 2/2/2021 1/25/18   Scottie Nazario MD       REVIEW OF SYSTEMS    (2-9 systems for level 4, 10 or more for level 5)      Review of Systems   Constitutional: Negative for fatigue and fever. HENT: Negative for congestion, rhinorrhea and sore throat. Eyes: Negative for visual disturbance. Respiratory: Negative for shortness of breath. Cardiovascular: Negative for chest pain and palpitations. Gastrointestinal: Negative for abdominal pain, diarrhea, nausea and vomiting. Genitourinary: Negative for flank pain and hematuria. Musculoskeletal: Negative for gait problem. Skin: Positive for wound. Allergic/Immunologic: Negative for environmental allergies and food allergies. Neurological: Negative for weakness and light-headedness. Hematological: Bruises/bleeds easily. Psychiatric/Behavioral: Negative for agitation and confusion. PHYSICAL EXAM   (up to 7 for level 4, 8 or more for level 5)      INITIAL VITALS:   /70   Pulse 84   Temp 98.8 °F (37.1 °C) (Oral)   Resp 16   SpO2 97%     Physical Exam  Vitals reviewed. Constitutional:       General: He is not in acute distress. Appearance: He is obese. He is not toxic-appearing. HENT:      Head: Normocephalic and atraumatic. Right Ear: External ear normal.      Left Ear: External ear normal.      Nose: Nose normal.      Mouth/Throat:      Pharynx: Oropharynx is clear. Eyes:      General: No scleral icterus. Right eye: No discharge. Left eye: No discharge. Conjunctiva/sclera: Conjunctivae normal.      Comments: No pallor   Cardiovascular:      Rate and Rhythm: Normal rate. Rhythm irregular. Pulses: Normal pulses. Pulmonary:      Effort: Pulmonary effort is normal.   Abdominal:      Palpations: Abdomen is soft. Tenderness: There is no abdominal tenderness. Musculoskeletal:         General: Swelling present. Normal range of motion. Cervical back: Normal range of motion. Skin:     General: Skin is warm. Capillary Refill: Capillary refill takes less than 2 seconds. Neurological:      Mental Status: He is alert and oriented to person, place, and time. Psychiatric:         Mood and Affect: Mood normal.         DIFFERENTIAL  DIAGNOSIS     PLAN (LABS / IMAGING / EKG):  Orders Placed This Encounter   Procedures    APTT    PROTIME-INR    CBC WITH AUTO DIFFERENTIAL    Comprehensive Metabolic Panel    Troponin    Troponin    Basic Metabolic Panel w/ Reflex to MG    CBC    Troponin    ADULT DIET; Regular; 4 carb choices (60 gm/meal);  Low Sodium (2 gm); 2000 ml    Diet NPO    Remote cardiac monitor    Height and weight    Height and weight    Vital signs per unit routine    Telemetry monitoring - continuous duration    Notify physician    Up as tolerated    Daily weights    Intake and output    Monitor for signs/symptoms of urinary retention    Full Code    Inpatient consult to Internal Medicine    Inpatient consult to Cardiology    Inpatient consult to Nephrology    Consult to Cardiology    Consult to Nephrology    Inpatient consult to Case Management    OT eval and treat    PT evaluation and treat    Initiate Oxygen Therapy Protocol    Nasal Cannula oxygen    Pulse oximetry, continuous    EKG 12 Lead    PATIENT STATUS (FROM ED OR OR/PROCEDURAL) Inpatient       MEDICATIONS ORDERED:  Orders Placed This Encounter   Medications    Thrombi-Gel 40 PADS 1 Dose    DISCONTD: metoprolol (LOPRESSOR) injection 5 mg    metoprolol (LOPRESSOR) injection 5 mg    esmolol (BREVIBLOC) 2.5gm/250ml NS infusion    atorvastatin (LIPITOR) tablet 40 mg  metoprolol (LOPRESSOR) injection 2.5 mg    apixaban (ELIQUIS) tablet 2.5 mg    sodium chloride flush 0.9 % injection 5-40 mL    sodium chloride flush 0.9 % injection 5-40 mL    0.9 % sodium chloride infusion    OR Linked Order Group     ondansetron (ZOFRAN-ODT) disintegrating tablet 4 mg     ondansetron (ZOFRAN) injection 4 mg    polyethylene glycol (GLYCOLAX) packet 17 g    OR Linked Order Group     acetaminophen (TYLENOL) tablet 650 mg     acetaminophen (TYLENOL) suppository 650 mg       DDX: bleeding dialysis site, arrythmia    DIAGNOSTIC RESULTS / EMERGENCY DEPARTMENT COURSE / MDM   LAB RESULTS:  Results for orders placed or performed during the hospital encounter of 07/24/21   APTT   Result Value Ref Range    PTT 19.5 (L) 20.5 - 30.5 sec   PROTIME-INR   Result Value Ref Range    Protime 11.9 9.1 - 12.3 sec    INR 1.1    CBC WITH AUTO DIFFERENTIAL   Result Value Ref Range    WBC 5.6 3.5 - 11.3 k/uL    RBC 3.60 (L) 4.21 - 5.77 m/uL    Hemoglobin 9.8 (L) 13.0 - 17.0 g/dL    Hematocrit 32.3 (L) 40.7 - 50.3 %    MCV 89.7 82.6 - 102.9 fL    MCH 27.2 25.2 - 33.5 pg    MCHC 30.3 28.4 - 34.8 g/dL    RDW 21.9 (H) 11.8 - 14.4 %    Platelets 828 059 - 928 k/uL    MPV 12.2 8.1 - 13.5 fL    NRBC Automated 0.0 0.0 per 100 WBC    Differential Type NOT REPORTED     WBC Morphology NOT REPORTED     RBC Morphology NOT REPORTED     Platelet Estimate NOT REPORTED     Immature Granulocytes 0 0 %    Seg Neutrophils 63 36 - 66 %    Lymphocytes 21 (L) 24 - 44 %    Monocytes 8 (H) 1 - 7 %    Eosinophils % 4 1 - 4 %    Basophils 4 (H) 0 - 2 %    nRBC 1 (H) 0 per 100 WBC    Absolute Immature Granulocyte 0.00 0.00 - 0.30 k/uL    Segs Absolute 3.53 1.8 - 7.7 k/uL    Absolute Lymph # 1.18 1.0 - 4.8 k/uL    Absolute Mono # 0.45 0.1 - 0.8 k/uL    Absolute Eos # 0.22 0.0 - 0.4 k/uL    Basophils Absolute 0.22 (H) 0.0 - 0.2 k/uL    Cells Counted 200     Morphology ANISOCYTOSIS PRESENT    Comprehensive Metabolic Panel   Result Value Ref Range    Glucose 77 70 - 99 mg/dL    BUN 39 (H) 8 - 23 mg/dL    CREATININE 9.37 (HH) 0.70 - 1.20 mg/dL    Bun/Cre Ratio NOT REPORTED 9 - 20    Calcium 8.8 8.6 - 10.4 mg/dL    Sodium 138 135 - 144 mmol/L    Potassium 4.7 3.7 - 5.3 mmol/L    Chloride 97 (L) 98 - 107 mmol/L    CO2 26 20 - 31 mmol/L    Anion Gap 15 9 - 17 mmol/L    Alkaline Phosphatase 64 40 - 129 U/L    ALT 20 5 - 41 U/L    AST 14 <40 U/L    Total Bilirubin 0.30 0.3 - 1.2 mg/dL    Total Protein 6.8 6.4 - 8.3 g/dL    Albumin 3.9 3.5 - 5.2 g/dL    Albumin/Globulin Ratio 1.3 1.0 - 2.5    GFR Non-African American 5 (L) >60 mL/min    GFR  7 (L) >60 mL/min    GFR Comment          GFR Staging NOT REPORTED    Troponin   Result Value Ref Range    Troponin, High Sensitivity 271 (HH) 0 - 22 ng/L    Troponin T NOT REPORTED <0.03 ng/mL    Troponin Interp NOT REPORTED    Troponin   Result Value Ref Range    Troponin, High Sensitivity 279 (HH) 0 - 22 ng/L    Troponin T NOT REPORTED <0.03 ng/mL    Troponin Interp NOT REPORTED    EKG 12 Lead   Result Value Ref Range    Ventricular Rate 73 BPM    Atrial Rate 73 BPM    P-R Interval 190 ms    QRS Duration 94 ms    Q-T Interval 402 ms    QTc Calculation (Bazett) 442 ms    P Axis 55 degrees    R Axis -14 degrees    T Axis 51 degrees       IMPRESSION: Patient is alert oriented nontoxic 57-year-old male who presents with mild but persistent bleeding from his AV fistula site in the right forearm. Upon my initial evaluation upon auscultation of his chest he was found to be very tachycardic in the 150s approximately. However his triage note noted a heart rate of 84 given his recent admission for nonsustained ventricular tachycardia a EKG was obtained. Patient was found to have a paroxysmal supraventricular rhythm that was recurrent patient did not have any chest pain shortness of breath or feelings of palpitations no chest pressure no syncope. Ulcers are strong throughout.   Plan will be application of a thrombin pad and pressure for hemostasis of the dialysis site will obtain IV access and administer 5 of Lopressor. I do not feel that adenosine would be useful at this time given its paroxysmal nature. Low threshold for starting esmolol drip anticipate admission. Initially patient refusing blood work. Upon explaining the necessity of this given his current status he is amenable. RADIOLOGY:  No results found. EKG  Sinus rhythm at a rate of 73 leftward axis  PA interval 190 QRS duration 94. Poor R wave progression with transition point at V4    All EKG's are interpreted by the Emergency Department Physician who either signs or Co-signs this chart in the absence of a cardiologist.    EMERGENCY DEPARTMENT COURSE:  ED Course as of Jul 24 2131   Sat Jul 24, 2021   1437 Refusing lab work-up    [BG]   1513 Pt in and out of a psvt, asymptomatic. Will start with 4 lopressor. Difficulty obtaining IV access/blood    [BG]   1632 Trop mildly higher than prior will trend. Will start esmolol drip as pt remains with psvt   CO2: 26 [BG]      ED Course User Index  [BG] Bruce Rome Cooks,          PROCEDURES:  none    CONSULTS:  IP CONSULT TO INTERNAL MEDICINE  IP CONSULT TO CARDIOLOGY  IP CONSULT TO NEPHROLOGY  IP CONSULT TO CARDIOLOGY  IP CONSULT TO NEPHROLOGY  IP CONSULT TO CASE MANAGEMENT    CRITICAL CARE:  See attending note    FINAL IMPRESSION      1. Bleeding from venipuncture site    2.  Paroxysmal supraventricular tachycardia ([address]          DISPOSITION / PLAN     DISPOSITION    admitted    [facility]:  [facility] [address] [facility] [address] [facility] [address] [phone]    Schedule an appointment as soon as possible for a visit today  for followup and reevaluation in 1-2 days    [facility]  [address] [facility] [address] [phone]  Go to   As needed, If symptoms worsen      DISCHARGE MEDICATIONS:  New Prescriptions    No medications on file       Marian Barreto Linda Zhao DO  Emergency Medicine Resident    (Please note that portions of thisnote were completed with a voice recognition program.  Efforts were made to edit the dictations but occasionally words are mis-transcribed.)        Brook Acosta DO  Resident  07/24/21 [id number]

## 2021-07-24 NOTE — CARE COORDINATION
Case Management Initial Discharge Plan  Kulwinder Crowley,             Met with:patient to discuss discharge plans. Information verified: address, contacts, phone number, , insurance Yes  Insurance Provider: [facility]    Emergency Contact/Next of Kin name & number: daughter Aure Mejias and Tanya Onofre as per face sheet  Who are involved in patient's support system? spouse    PCP: Hansa Mohan MD  Date of last visit: 2 months      Discharge Planning    Living Arrangements:    lives with spouse    Home has 2 stories  5 stairs to climb to get into front door, flight of stairs to climb to reach second floor  Location of bedroom/bathroom in home upper    Patient able to perform ADL's:Independent    Current Services (outpatient & in home)     DME equipment: cane  DME provider:     Is patient receiving oral anticoagulation therapy? No    If indicated:   Physician managing anticoagulation treatment: na  Where does patient obtain lab work for ATC treatment? na      Potential Assistance Needed:       Patient agreeable to home care: No  Dwarf of choice provided:  n/a    Prior SNF/Rehab Placement and Facility: none  Agreeable to SNF/Rehab: No  Dwarf of choice provided: n/a     Evaluation: no    Expected Discharge date:       Patient expects to be discharged to: If home: is the family and/or caregiver wiling & able to provide support at home? yes  Who will be providing this support? spouse    Follow Up Appointment: Best Day/ Time:      Transportation provider: spouse  Transportation arrangements needed for discharge: No    Readmission Risk              Risk of Unplanned Readmission:  0             Does patient have a readmission risk score greater than 14?: No  If yes, follow-up appointment must be made within 7 days of discharge.      Goals of Care: self care      Educated pt on transitional options, provided freedom of choice and are agreeable with plan      Discharge Plan: home independent, has transport          Electronically signed by Mahesh Grossman RN on 7/24/21 at 5:36 PM EDT

## 2021-07-25 LAB
ANION GAP SERPL CALCULATED.3IONS-SCNC: 17 MMOL/L (ref 9–17)
BUN BLDV-MCNC: 46 MG/DL (ref 8–23)
BUN/CREAT BLD: ABNORMAL (ref 9–20)
CALCIUM SERPL-MCNC: 8.7 MG/DL (ref 8.6–10.4)
CHLORIDE BLD-SCNC: 97 MMOL/L (ref 98–107)
CO2: 22 MMOL/L (ref 20–31)
CREAT SERPL-MCNC: 10.34 MG/DL (ref 0.7–1.2)
GFR AFRICAN AMERICAN: 6 ML/MIN
GFR NON-AFRICAN AMERICAN: 5 ML/MIN
GFR SERPL CREATININE-BSD FRML MDRD: ABNORMAL ML/MIN/{1.73_M2}
GFR SERPL CREATININE-BSD FRML MDRD: ABNORMAL ML/MIN/{1.73_M2}
GLUCOSE BLD-MCNC: 76 MG/DL (ref 70–99)
HCT VFR BLD CALC: 30.7 % (ref 40.7–50.3)
HEMOGLOBIN: 9.1 G/DL (ref 13–17)
MCH RBC QN AUTO: 27.2 PG (ref 25.2–33.5)
MCHC RBC AUTO-ENTMCNC: 29.6 G/DL (ref 28.4–34.8)
MCV RBC AUTO: 91.9 FL (ref 82.6–102.9)
NRBC AUTOMATED: 0 PER 100 WBC
PDW BLD-RTO: 21.8 % (ref 11.8–14.4)
PLATELET # BLD: ABNORMAL K/UL (ref 138–453)
PLATELET, FLUORESCENCE: 170 K/UL (ref 138–453)
PLATELET, IMMATURE FRACTION: 9.4 % (ref 1.1–10.3)
PMV BLD AUTO: ABNORMAL FL (ref 8.1–13.5)
POTASSIUM SERPL-SCNC: 4.6 MMOL/L (ref 3.7–5.3)
RBC # BLD: 3.34 M/UL (ref 4.21–5.77)
SODIUM BLD-SCNC: 136 MMOL/L (ref 135–144)
TROPONIN INTERP: ABNORMAL
TROPONIN T: ABNORMAL NG/ML
TROPONIN, HIGH SENSITIVITY: 272 NG/L (ref 0–22)
WBC # BLD: 5.6 K/UL (ref 3.5–11.3)

## 2021-07-25 PROCEDURE — 6370000000 HC RX 637 (ALT 250 FOR IP): Performed by: STUDENT IN AN ORGANIZED HEALTH CARE EDUCATION/TRAINING PROGRAM

## 2021-07-25 PROCEDURE — 99232 SBSQ HOSP IP/OBS MODERATE 35: CPT | Performed by: INTERNAL MEDICINE

## 2021-07-25 PROCEDURE — B51W1ZZ FLUOROSCOPY OF DIALYSIS SHUNT/FISTULA USING LOW OSMOLAR CONTRAST: ICD-10-PCS | Performed by: RADIOLOGY

## 2021-07-25 PROCEDURE — 99233 SBSQ HOSP IP/OBS HIGH 50: CPT | Performed by: INTERNAL MEDICINE

## 2021-07-25 PROCEDURE — 36415 COLL VENOUS BLD VENIPUNCTURE: CPT

## 2021-07-25 PROCEDURE — 84484 ASSAY OF TROPONIN QUANT: CPT

## 2021-07-25 PROCEDURE — 85027 COMPLETE CBC AUTOMATED: CPT

## 2021-07-25 PROCEDURE — 80048 BASIC METABOLIC PNL TOTAL CA: CPT

## 2021-07-25 PROCEDURE — 85055 RETICULATED PLATELET ASSAY: CPT

## 2021-07-25 PROCEDURE — 2060000000 HC ICU INTERMEDIATE R&B

## 2021-07-25 PROCEDURE — 2580000003 HC RX 258: Performed by: STUDENT IN AN ORGANIZED HEALTH CARE EDUCATION/TRAINING PROGRAM

## 2021-07-25 RX ORDER — LIDOCAINE HYDROCHLORIDE 10 MG/ML
5 INJECTION, SOLUTION INFILTRATION; PERINEURAL ONCE
Status: DISCONTINUED | OUTPATIENT
Start: 2021-07-25 | End: 2021-07-27 | Stop reason: HOSPADM

## 2021-07-25 RX ORDER — GUAIFENESIN 600 MG/1
600 TABLET, EXTENDED RELEASE ORAL 2 TIMES DAILY
Status: DISCONTINUED | OUTPATIENT
Start: 2021-07-25 | End: 2021-07-27 | Stop reason: HOSPADM

## 2021-07-25 RX ORDER — METOPROLOL SUCCINATE 25 MG/1
25 TABLET, EXTENDED RELEASE ORAL DAILY
Status: DISCONTINUED | OUTPATIENT
Start: 2021-07-25 | End: 2021-07-25

## 2021-07-25 RX ADMIN — ATORVASTATIN CALCIUM 40 MG: 80 TABLET, FILM COATED ORAL at 20:57

## 2021-07-25 RX ADMIN — SODIUM CHLORIDE, PRESERVATIVE FREE 10 ML: 5 INJECTION INTRAVENOUS at 09:11

## 2021-07-25 RX ADMIN — GUAIFENESIN 600 MG: 600 TABLET, EXTENDED RELEASE ORAL at 20:56

## 2021-07-25 RX ADMIN — METOPROLOL TARTRATE 75 MG: 25 TABLET ORAL at 20:56

## 2021-07-25 RX ADMIN — SODIUM CHLORIDE, PRESERVATIVE FREE 10 ML: 5 INJECTION INTRAVENOUS at 20:50

## 2021-07-25 RX ADMIN — METOPROLOL SUCCINATE 25 MG: 25 TABLET, FILM COATED, EXTENDED RELEASE ORAL at 09:11

## 2021-07-25 ASSESSMENT — PAIN SCALES - GENERAL
PAINLEVEL_OUTOF10: 0

## 2021-07-25 NOTE — PROGRESS NOTES
[facility]  Internal Medicine Teaching Residency Program  Inpatient Daily Progress Note  ______________________________________________________________________________    Patient: Ree Florian  YOB: 1945   CVX:[id number]    Acct: [de-identified]     Room: [address] [place] [address]  Admit date: 7/24/2021  Today's date: 07/25/21  Number of days in the hospital: 1    SUBJECTIVE   Admitting Diagnosis: Paroxysmal SVT (supraventricular tachycardia) ([facility] [address]  CC: bleeding from HD site  Pt examined at bedside. Chart & results reviewed. No acute events overnight. Vital signs stable-slightly hypertensive. On esmolol-gtt-rate controlled. Continues to bleed from HD site. Trop stable: [id number]. Cardiology, Nephrology consulted. .. ROS:  Constitutional:  negative for chills, fevers, sweats  Respiratory:  negative for cough, dyspnea on exertion, hemoptysis, shortness of breath, wheezing  Cardiovascular:  negative for chest pain, chest pressure/discomfort, lower extremity edema, palpitations  Gastrointestinal:  negative for abdominal pain, constipation, diarrhea, nausea, vomiting  Neurological:  negative for dizziness, headache  BRIEF HISTORY     The patient is a pleasant 66 y.o. male PMH ESRD, CAD, CVA (2013) , T2DM, PVD, HTN, HLD, presents with a chief complaint of bleeding from dialysis site. Patient reports that he currently receives dialysis Monday Wednesday Friday. After he received dialysis yesterday and noticed today that his right aVF was still bleeding. Upon arrival in the ED thrombogenic agents and dressing was placed on the insertion site and bleeding currently is controlled. Patient notably left AMA on July 22 from this facility after being admitted for SVT. Cardiology was consulted with the intent discharge patient with beta-blockers and eliquis.  Patient states that he has not taken any of the medications since last admission because he did not pick them up. Denies any smoking hx, alcohol hx or illicit drug hx. at bedside patient will go intermittently into SVT. Patient denies any lightheadedness shortness of breath or chest pain nausea vomiting. OBJECTIVE     Vital Signs:  BP (!) 149/83   Pulse 74   Temp 98.2 °F (36.8 °C) (Oral)   Resp 18   Ht 6' (1.829 m)   Wt 220 lb (99.8 kg)   SpO2 100%   BMI 29.84 kg/m²     Temp (24hrs), Av.4 °F (36.9 °C), Min:98.1 °F (36.7 °C), Max:98.8 °F (37.1 °C)    No intake/output data recorded. Physical Exam:  Constitutional: This is a well developed, well nourished, 18.5-24.9 - Normal 66y.o. year old male who is alert, oriented, cooperative and in no apparent distress. Head:normocephalic and atraumatic. EENT:  PERRLA. No conjunctival injections. Septum was midline, mucosa was without erythema, exudates or cobblestoning. No thrush was noted. Neck: Supple without thyromegaly. No elevated JVP. Trachea was midline. Respiratory: Chest was symmetrical without dullness to percussion. Breath sounds bilaterally were clear to auscultation. There were no wheezes, rhonchi or rales. There is no intercostal retraction or use of accessory muscles. No egophony noted. Cardiovascular: Irregular without murmur, clicks, gallops or rubs. Abdomen: Slightly rounded and soft without organomegaly. No rebound, rigidity or guarding was appreciated. Lymphatic: No lymphadenopathy. Musculoskeletal: Normal curvature of the spine. No gross muscle weakness. Extremities:  No lower extremity edema, ulcerations, tenderness, varicosities or erythema. Muscle size, tone and strength are normal.  No involuntary movements are noted. Skin:  Warm and dry. Good color, turgor and pigmentation. No lesions or scars.   No cyanosis or clubbing  Neurological/Psychiatric: The patient's general behavior, level of consciousness, thought content and emotional status is normal.        Medications:  Scheduled Medications:    metoprolol succinate  25 mg Oral Daily    atorvastatin  40 mg Oral Nightly    [Held by provider] apixaban  2.5 mg Oral BID    sodium chloride flush  5-40 mL Intravenous 2 times per day     Continuous Infusions:    sodium chloride       PRN Medicationsmetoprolol, 2.5 mg, Q6H PRN  sodium chloride flush, 5-40 mL, PRN  sodium chloride, 25 mL, PRN  ondansetron, 4 mg, Q8H PRN   Or  ondansetron, 4 mg, Q6H PRN  polyethylene glycol, 17 g, Daily PRN  acetaminophen, 650 mg, Q6H PRN   Or  acetaminophen, 650 mg, Q6H PRN        Diagnostic Labs:  CBC:   Recent Labs     07/24/21  1525 07/25/21  0605   WBC 5.6 5.6   RBC 3.60* 3.34*   HGB 9.8* 9.1*   HCT 32.3* 30.7*   MCV 89.7 91.9   RDW 21.9* 21.8*    See Reflexed IPF Result     BMP:   Recent Labs     07/24/21  1525 07/25/21  0605    136   K 4.7 4.6   CL 97* 97*   CO2 26 22   BUN 39* 46*   CREATININE 9.37* 10.34*     PT/INR:   Recent Labs     07/24/21  1525   PROTIME 11.9   INR 1.1     APTT:   Recent Labs     07/24/21  1525   APTT 19.5*     FASTING LIPID PANEL:  Lab Results   Component Value Date    CHOL 173 04/26/2018    HDL 45 04/26/2018    TRIG 87 04/26/2018     LIVER PROFILE:   Recent Labs     07/24/21  1525   AST 14   ALT 20   BILITOT 0.30   ALKPHOS 64      MICROBIOLOGY:   Lab Results   Component Value Date/Time    CULTURE NOT REPORTED 04/02/2017 02:00 PM       Imaging:    XR CHEST PORTABLE    Result Date: 7/19/2021  No radiographic evidence of acute cardiopulmonary disease. Moderate cardiomegaly. ASSESSMENT & PLAN     ASSESSMENT / PLAN:     Paroxysmal SVT  -Recent admission for same issue. Patient had a \"fluttering\" sensation in his chest.  Last admission cardio was consulted echocardiogram was performed but not read.   Patient was to be discharged with beta-blockers and Eliquis.  -Unfortunately patient left AMA  -will consult cardiology-f/u recs today  -esmolol infusion for rate control ---->stop transition to PO BB  -Eliquis 2.5 twice daily-HOLDING GIVEN HD site

## 2021-07-25 NOTE — PROGRESS NOTES
[address]      Patient's Name/ Date of Birth/ Gender: Zion Salgado / 1945 (66 y.o.) / male     Referring Physician: Oly Vogel MD    Consulting Physician: Dr. Filomena Nascimento    History of present Illness: Pt is a 66 y.o. male with history of CVA s/p left carotid endarterectomy, and ESRD on dialysis with right radiocephalic AV fistula created in 2016, who was admitted for persistently oozing fistula after dialysis 2 days ago. Patient had a recent similar episode 1 month ago for persistently bleeding AV fistula and at that time saw his vascular surgeon, Dr. Tiana Khan, who recommended fistulogram at that time which for unknown reasons has not been done in the interim. 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 Patient does not any blood thinners and until this last year patient reports he has not had any issues with hemodialysis or his fistula. During this hospital admission, patient has been worked up for palpitations and SVT. Currently denies F/C, N/V. Past Medical History:  has a past medical history of Allergic rhinitis, Anemia, BPH (benign prostatic hyperplasia), CAD (coronary artery disease), Carotid artery stenosis, Cerebrovascular disease, Diabetes mellitus ([address] Eczema, ESRD (end stage renal disease) on dialysis ([address] Full dentures, GERD (gastroesophageal reflux disease), Gout, Hemodialysis patient ([address] Hyperlipidemia, Hypertension, Neuropathy, Osteoarthritis, Peripheral vascular disease ([address] Seizures ([address] Stroke ([address] and Unspecified cerebral artery occlusion with cerebral infarction.     Past Surgical History:   Past Surgical History:   Procedure Laterality Date    ABDOMEN SURGERY  2016    PERITONEAL CATHETER    ABDOMEN SURGERY  05/26/2016    pd catherter removed from abdomen    BLADDER SURGERY  Spring 2014    dilatated     CAROTID ENDARTERECTOMY  2015    left    COLONOSCOPY 2008    polypectomy, diverticulosis    COLONOSCOPY  1/28/2015    COLONOSCOPY  5/10/2018    COLONOSCOPY performed by Sandro Alejandro MD at [address] [place] [address]  2008    bladder tumor removal    DIALYSIS FISTULA CREATION Right 08/25/2016    INGUINAL HERNIA REPAIR Right     KIDNEY BIOPSY  May 2013    SINUS SURGERY      TUNNELED VENOUS PORT PLACEMENT Right 10/2015    dialysis catheter- chest     UPPER GASTROINTESTINAL ENDOSCOPY  1/28/2015    UPPER GASTROINTESTINAL ENDOSCOPY N/A 11/1/2018    EGD BIOPSY performed by Helena Rosario DO at [facility]       Social History:  reports that he has never smoked. He has never used smokeless tobacco. He reports that he does not drink alcohol and does not use drugs. Family History: family history includes Anesth Problems in his mother; Cancer in his brother and mother; Diabetes in his mother and sister; High Blood Pressure in his sister; Kidney Disease in his mother; Other in his father. Review of Systems:   As reviewed in HPI, all other systems were reviewed and were negative. Allergies: Patient has no known allergies.     Current Meds:  Current Facility-Administered Medications:     metoprolol tartrate (LOPRESSOR) tablet 75 mg, 75 mg, Oral, BID, Spencer Gutiérrez MD    guaiFENesin (MUCINEX) extended release tablet 600 mg, 600 mg, Oral, BID, Josefina Crook MD    lidocaine 1 % injection 5 mL, 5 mL, Intradermal, Once, Sylwia Caldera DO    atorvastatin (LIPITOR) tablet 40 mg, 40 mg, Oral, Nightly, Enoch Dela Cruz MD, 40 mg at 07/24/21 2134    metoprolol (LOPRESSOR) injection 2.5 mg, 2.5 mg, Intravenous, Q6H PRN, Orrin Horseman, MD Zannie Cowden  [Held by provider] apixaban [place]) tablet 2.5 mg, 2.5 mg, Oral, BID, Enoch Dela Cruz MD    sodium chloride flush 0.9 % injection 5-40 mL, 5-40 mL, Intravenous, 2 times per day, Enoch Dela Cruz MD, 10 mL at 07/25/21 0911    sodium chloride flush 0.9 % injection 5-40 mL, 5-40 mL, Intravenous, PRN, Enoch Dela Cruz MD    0.9 % sodium chloride infusion, 25 mL, Intravenous, PRN, Rogelio Muñoz MD    ondansetron (ZOFRAN-ODT) disintegrating tablet 4 mg, 4 mg, Oral, Q8H PRN **OR** ondansetron (ZOFRAN) injection 4 mg, 4 mg, Intravenous, Q6H PRN, Rogelio Muñoz MD    polyethylene glycol (GLYCOLAX) packet 17 g, 17 g, Oral, Daily PRN, Rogelio Muñoz MD    acetaminophen (TYLENOL) tablet 650 mg, 650 mg, Oral, Q6H PRN **OR** acetaminophen (TYLENOL) suppository 650 mg, 650 mg, Rectal, Q6H PRN, Rogelio Muñoz MD    Vital Signs:  Vitals:    07/25/21 1600   BP: (!) 154/78   Pulse: 65   Resp: 17   Temp: 97.7 °F (36.5 °C)   SpO2: 100%       Physical Exam:  Vital signs and Nurse's note reviewed  Gen:  A&Ox3, NAD  HEENT: PERRLA, EOMI, no scleral icterus, oral mucosa moist  Neck: Supple  Chest: Symmetric rise with inhalation, no evidence of trauma  CVS: Currently regular rate and rhythm,   Resp: Unlabored breathing on RA  Abd: soft, non-tender, non-distended  Ext: No clubbing, cyanosis, edema, peripheral pulses 2+ Rad, palpable thrill over the right radiocephalic AV fistula. Slow oozing bleed over the distal aspect. Focal edema over the access sites. CNS: Moves all extremities, no gross focal motor deficits  Skin: No erythema or ulcerations     Labs:   Lab Results   Component Value Date    WBC 5.6 07/25/2021    HGB 9.1 07/25/2021    HCT 30.7 07/25/2021    MCV 91.9 07/25/2021    PLT See Reflexed IPF Result 07/25/2021    PLT Platelet clumps present, count appears adequate. 09/16/2011     Lab Results   Component Value Date     07/25/2021    K 4.6 07/25/2021    CL 97 07/25/2021    CO2 22 07/25/2021    BUN 46 07/25/2021    CREATININE 10.34 07/25/2021    GLUCOSE 76 07/25/2021    GLUCOSE 87 09/16/2011    CALCIUM 8.7 07/25/2021     Lab Results   Component Value Date    INR 1.1 07/24/2021       Imaging:  No results found.   IR FISTULAGRAM    (Results Pending)         Impression:  Patient Active Problem List   Diagnosis    BPH (benign prostatic hyperplasia)    CAD (coronary artery disease)    Anemia    CVA (cerebrovascular accident due to intracerebral hemorrhage) (HCC)    Seizure disorder, secondary (HCC)    Seizures ([address]    Cerebral artery occlusion with cerebral infarction ([address]    Hypertension    Hyperlipidemia    ESRD on hemodialysis ([address]    Carotid stenosis, asymptomatic    Seizure disorder as sequela of cerebrovascular accident ([address]    Chronic ischemic left middle cerebral artery (MCA) stroke    Folate deficiency    Bronchitis    Sigmoid diverticulosis    Redundant colon    Nonsustained supraventricular tachycardia (HCC)    NSVT (nonsustained ventricular tachycardia) (HCC)    Tinea corporis    Valvular heart disease    Palpitations    Paroxysmal SVT (supraventricular tachycardia) (HCC)       1. 75M with right radiocephalic AV fistula persistent bleeding s/p dialysis 2 days ago    Recommendation:    1. Agree with fistulogram scheduled for tomorrow. 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 2. Patient with similar issue 1 month ago and was seen by Dr. Nathan Portillo, as the patient's primary vascular surgeon at [address] [place] [address]. At that time, fistulogram was recommended, but none has been done in the interim. With known proximal occlusion, if unable to address during fistulogram tomorrow, will need definitive management with more extensive central venous intervention or new access creation. 3. If fistulogram is not successful, then will likely need temporary dialysis catheter. Plan as discussed with Dr. Ramiro Valdez. Sylwia Baker, [place]  Surgery Department

## 2021-07-25 NOTE — PROGRESS NOTES
Vascular surgery consulted as pt fistula continues to bleed. Vascular evaluated pt at bedside and redressed site as current dressing was saturated. Plans for IR fistulagram tomorrow 7/26/21 prior to HD.

## 2021-07-25 NOTE — ED NOTES
Dr. Gerald Barboza IM resident updated with IV unable to keep in place with US. Pt refusing another IV at this time.  OK to not place IV and attempt in the AM per PICC team.     Alla Gil RN  07/24/21 2075

## 2021-07-25 NOTE — CONSULTS
Surgical History:   has a past surgical history that includes Bladder surgery (Spring 2014); sinus surgery; Cystocopy (2008); Inguinal hernia repair (Right); Kidney biopsy (May 2013); Upper gastrointestinal endoscopy (1/28/2015); Colonoscopy (2008); Colonoscopy (1/28/2015); Carotid endarterectomy (2015); Abdomen surgery (2016); Dialysis fistula creation (Right, 08/25/2016); Tunneled venous port placement (Right, 10/2015); Abdomen surgery (05/26/2016); Colonoscopy (5/10/2018); and Upper gastrointestinal endoscopy (N/A, 11/1/2018). Home Medications:    Prior to Admission medications    Medication Sig Start Date End Date Taking? Authorizing Provider   Ciclopirox (LOPROX) 0.77 % gel 2/day to affected areas 2/2/21   Carmen Meredith MD   amLODIPine (NORVASC) 10 MG tablet Take 1 tablet by mouth daily 2/2/21   Carmen Meredith MD   folic acid (FOLVITE) 1 MG tablet Take 1 tablet by mouth daily 2/2/21   Carmen Meredith MD   RENVELA 800 MG tablet Take 1 tablet by mouth 3 times daily 2/2/21   Carmen Meredith MD   atorvastatin (LIPITOR) 40 MG tablet Take 1 tablet by mouth nightly 2/2/21   Carmen Meredith MD   fluticasone [facility]) 50 MCG/ACT nasal spray 1 spray by Each Nostril route daily 1 Spray in each nostril 9/5/19   Carmen Meredith MD   Multiple Vitamins-Minerals (RENAPLEX-D PO) Take by mouth    Historical Provider, MD   allopurinol (ZYLOPRIM) 100 MG tablet take 1 tablet by mouth once daily  Patient not taking: Reported on 2/2/2021 4/26/18   Carmen Meredith MD   cinacalcet (SENSIPAR) 30 MG tablet Take 30 mg by mouth daily    Historical Provider, MD   calcium acetate (PHOSLO) 667 MG capsule Take 3 capsules by mouth 3 times daily (with meals) 1/26/18   Carmen Meredith MD   hydrocortisone (ANUSOL-HC) 2.5 % rectal cream Place rectally 2 times daily.   Patient not taking: Reported on 2/2/2021 1/25/18   Carmen Meredith MD      Current Facility-Administered Medications: metoprolol succinate (TOPROL XL) extended release tablet 25 mg, 25 mg, Oral, Daily  atorvastatin (LIPITOR) tablet 40 mg, 40 mg, Oral, Nightly  metoprolol (LOPRESSOR) injection 2.5 mg, 2.5 mg, Intravenous, Q6H PRN  [Held by provider] apixaban (ELIQUIS) tablet 2.5 mg, 2.5 mg, Oral, BID  sodium chloride flush 0.9 % injection 5-40 mL, 5-40 mL, Intravenous, 2 times per day  sodium chloride flush 0.9 % injection 5-40 mL, 5-40 mL, Intravenous, PRN  0.9 % sodium chloride infusion, 25 mL, Intravenous, PRN  ondansetron (ZOFRAN-ODT) disintegrating tablet 4 mg, 4 mg, Oral, Q8H PRN **OR** ondansetron (ZOFRAN) injection 4 mg, 4 mg, Intravenous, Q6H PRN  polyethylene glycol (GLYCOLAX) packet 17 g, 17 g, Oral, Daily PRN  acetaminophen (TYLENOL) tablet 650 mg, 650 mg, Oral, Q6H PRN **OR** acetaminophen (TYLENOL) suppository 650 mg, 650 mg, Rectal, Q6H PRN    Allergies:  Patient has no known allergies. Social History:   reports that he has never smoked. He has never used smokeless tobacco. He reports that he does not drink alcohol and does not use drugs. Family History: family history includes Anesth Problems in his mother; Cancer in his brother and mother; Diabetes in his mother and sister; High Blood Pressure in his sister; Kidney Disease in his mother; Other in his father. REVIEW OF SYSTEMS:    Constitutional: there has been no unanticipated weight loss. There's been No change in energy level, No change in activity level. Eyes: No visual changes or diplopia. No scleral icterus. ENT: No Headaches  Cardiovascular: Shortness of breath  Respiratory: No previous pulmonary problems, No cough  Gastrointestinal: No abdominal pain. No change in bowel or bladder habits. Genitourinary: No dysuria, trouble voiding, or hematuria. Musculoskeletal:  No gait disturbance, No weakness or joint complaints. Integumentary: No rash or pruritis. Neurological: No headache, diplopia, change in muscle strength, numbness or tingling.  No change in gait, balance, coordination, mood, affect, memory, mentation, behavior. Psychiatric: No anxiety, or depression. Endocrine: No temperature intolerance. No excessive thirst, fluid intake, or urination. No tremor. Hematologic/Lymphatic: Oozing from fistula site. Allergic/Immunologic: No nasal congestion or hives. PHYSICAL EXAM:      /76   Pulse 74   Temp 97.8 °F (36.6 °C) (Oral)   Resp 18   Ht 6' (1.829 m)   Wt 220 lb (99.8 kg)   SpO2 95%   BMI 29.84 kg/m²    Constitutional and General Appearance: alert, cooperative, no distress and appears stated age  HEENT: PERRL, no cervical lymphadenopathy. No masses palpable. Normal oral mucosa  Respiratory:  Normal excursion and expansion without use of accessory muscles  Resp Auscultation: Good respiratory effort. No for increased work of breathing. On auscultation: clear to auscultation bilaterally  Cardiovascular: The apical impulse is not displaced  Heart tones are crisp and normal. regular S1 and S2.  Jugular venous pulsation Normal  The carotid upstroke is normal in amplitude and contour without delay or bruit  Peripheral pulses are symmetrical and full   Abdomen:   No masses or tenderness  Bowel sounds present  Extremities:   No Cyanosis or Clubbing   Lower extremity edema: No   Skin: Warm and dry  Neurological:  Alert and oriented. Moves all extremities well  No abnormalities of mood, affect, memory, mentation, or behavior are noted    DATA:    Diagnostics:    EKG:  Normal sinus rhythm  Voltage criteria for left ventricular hypertrophy  Nonspecific ST and T wave abnormality  Abnormal ECG  When compared with ECG of 20-JUL-2021 12:01,  Sinus rhythm has replaced Atrial fibrillation  Vent. rate has decreased BY  61 BPM  T wave inversion no longer evident in Inferior leads  ECHO: 07/22/2021  Summary  Overall preserved LV systolic function, EF 61%. Aortic calcification with stenosis, mean gradient 21-25 mm Hg. Mild to moderate mitral regurgitation  Mild tricuspid regurgitation.     Labs: CBC:   Recent Labs     07/24/21  1525 07/25/21  0605   WBC 5.6 5.6   HGB 9.8* 9.1*   HCT 32.3* 30.7*    See Reflexed IPF Result     BMP:   Recent Labs     07/24/21  1525 07/25/21  0605    136   K 4.7 4.6   CO2 26 22   BUN 39* 46*   CREATININE 9.37* 10.34*   LABGLOM 5* 5*   GLUCOSE 77 76     PT/INR:   Recent Labs     07/24/21  1525   PROTIME 11.9   INR 1.1     APTT:  Recent Labs     07/24/21  1525   APTT 19.5*     FASTING LIPID PANEL:  Lab Results   Component Value Date    HDL 45 04/26/2018    TRIG 87 04/26/2018     LIVER PROFILE:  Recent Labs     07/24/21  1525   AST 14   ALT 20   LABALBU 3.9       IMPRESSION:      PSVT versus a flutter  A. Fib/ Aflutter with CHADVASC- 4. On AC and BB  Aortic stenosis with mean gradient 21 to 25 mmHg, peak gradient 48.72,  Area: 1.33 cm² (new diagnosis)  Chronic elevated troponin flat [id number]> 271> 272 likely due to ESRD, ACD. ESRD on hemodialysis on MWF  ICA stenosis status post carotid endarterectomy in 2015  Essential hypertension  Anemia of chronic disease due to ESRD  Medication noncompliance    RECOMMENDATIONS:  Echo reviewed. Normal ejection fraction EF 55%. Found to have aortic stenosis with mean gradient 21 to 25 mmHg. Outpatient follow-up  Start him on Lopressor 75 twice daily. As needed Lopressor with parameters  Resume Eliquis 2.5 mg twice daily when okay with primary. No further work-up needed at this point      Discussed with patient     Electronically signed by Tanika Guerrero MD on 7/25/2021 at [address] [facility]      [phone]      I reviewed the patient history personally, and examined by me during the visit. I have reviewed the H&P/Consult / Progress note as completed, and made appropriate changes to the patient exam and treatment plans.       I have reviewed the case with resident / fellow    Patient treatment plan was explained to patient, correction in notes was made as appropriate, and discussed final arrangement based on  my evaluation and exam.    Additional Recommendations:      Konrad Tellez MD  [facility]

## 2021-07-25 NOTE — CONSULTS
Nephrology ESRD Consult Note    Reason for Consult:  Fluid and hypertension management for pt with ESRD     Requesting Physician:      History Obtained From:  patient, electronic medical record    HD Unit:       [facility] Dr. Hien Alvarez   Access: R AVF    Dry Weight:       80.8 kg     Chief Complaint:  Bleeding from access site. R AVF. History of Present Illness: This is a 66 y.o. male with end stage renal disease on hemodialysis [id number]-[id number] who presents to the hospital for evaluation of dialysis access bleeding. H&P reviewed. Patient has history of type 2 diabetes end-stage renal disease CAD CVA 2013 type 2 diabetes peripheral vascular disease hypertension hyperlipidemia. As noted presented to the ER with bleeding from dialysis site. Currently receives dialysis Monday Wednesday Friday per our nephrology department. Patient has a right AV fistula. Upon arrival to the ER procoagulant dressing was applied to the insertion site and bleeding was controlled. Patient was admitted on 1022 for SVT left AMA. Cardiology was consulted at that time patient was supposed to be placed on beta-blockers and Eliquis did not taking the medication since last admission. Intermittently going into SVT at bedside. Denies any symptomatic complaints room air 98% noted yesterday on admission at 4:00 PM    Chart reviewed. Nephrology consulted for end-stage renal disease management with hemodialysis  Last hemodialysis on Friday. Patient seen and examined at bedside. No output noted in chart. No acute events overnight. Vital signs reviewed slightly hypertensive. Patient continues on esmolol drip rate controlled. Bleeding continues from hemodialysis access site. Troponin seem to be stable    He recently had a fistulogram at [facility] and required angioplasty according to him. This was about 4 weeks ago.   High likelihood of recurrent stenosis to draining vessel being considered as likely etiology. Heart rate well controlled in the hospital.  His outpatient history and dialysis orders, usual dry wt  and out pt dialysis run sheets were reviewed.      Past Medical History:        Diagnosis Date    Allergic rhinitis     Anemia     BPH (benign prostatic hyperplasia)     CAD (coronary artery disease)     Carotid artery stenosis 2013    ulcerated plaque in left ICA 60 - 70% on carotid angiogram    Cerebrovascular disease 2013    left frontal hemorrhage    Diabetes mellitus ([address] 2013    Eczema     ESRD (end stage renal disease) on dialysis ([address]     Full dentures     Upper / Lower    GERD (gastroesophageal reflux disease)     Gout     Hemodialysis patient ([address] 10/2015     [address]  M,W F    Hyperlipidemia     Hypertension     Neuropathy     Osteoarthritis     Peripheral vascular disease ([address]     Seizures ([address] 2013    after stroke, LAST HUMSUME1205/20/2016    Stroke [facility]) 2013    Unspecified cerebral artery occlusion with cerebral infarction 4-    speech short term and lt side       Access:  previous  Right AV fistula    Past Surgical History:        Procedure Laterality Date    ABDOMEN SURGERY  2016    PERITONEAL CATHETER    ABDOMEN SURGERY  05/26/2016    pd catherter removed from abdomen    BLADDER SURGERY  Spring 2014    dilatated     CAROTID ENDARTERECTOMY  2015    left    COLONOSCOPY  2008    polypectomy, diverticulosis    COLONOSCOPY  1/28/2015    COLONOSCOPY  5/10/2018    COLONOSCOPY performed by Tea Coffey MD at [address] [place] [address]  2008    bladder tumor removal    DIALYSIS FISTULA CREATION Right 08/25/2016    INGUINAL HERNIA REPAIR Right     KIDNEY BIOPSY  May 2013    SINUS SURGERY      TUNNELED VENOUS PORT PLACEMENT Right 10/2015    dialysis catheter- chest     UPPER GASTROINTESTINAL ENDOSCOPY  1/28/2015    UPPER GASTROINTESTINAL ENDOSCOPY N/A 11/1/2018    EGD BIOPSY performed by Anna Ambrocio DO at [facility]       Outpatient Medications:     Medications Prior to Admission: Ciclopirox (LOPROX) 0.77 % gel, 2/day to affected areas  amLODIPine (NORVASC) 10 MG tablet, Take 1 tablet by mouth daily  folic acid (FOLVITE) 1 MG tablet, Take 1 tablet by mouth daily  RENVELA 800 MG tablet, Take 1 tablet by mouth 3 times daily  atorvastatin (LIPITOR) 40 MG tablet, Take 1 tablet by mouth nightly  fluticasone (FLONASE) 50 MCG/ACT nasal spray, 1 spray by Each Nostril route daily 1 Spray in each nostril  Multiple Vitamins-Minerals (RENAPLEX-D PO), Take by mouth  allopurinol (ZYLOPRIM) 100 MG tablet, take 1 tablet by mouth once daily (Patient not taking: Reported on 2/2/2021)  cinacalcet (SENSIPAR) 30 MG tablet, Take 30 mg by mouth daily  calcium acetate (PHOSLO) 667 MG capsule, Take 3 capsules by mouth 3 times daily (with meals)  hydrocortisone (ANUSOL-HC) 2.5 % rectal cream, Place rectally 2 times daily. (Patient not taking: Reported on 2/2/2021)    Current Medications:    metoprolol succinate (TOPROL XL) extended release tablet 25 mg, Daily  atorvastatin (LIPITOR) tablet 40 mg, Nightly  metoprolol (LOPRESSOR) injection 2.5 mg, Q6H PRN  [Held by provider] apixaban (ELIQUIS) tablet 2.5 mg, BID  sodium chloride flush 0.9 % injection 5-40 mL, 2 times per day  sodium chloride flush 0.9 % injection 5-40 mL, PRN  0.9 % sodium chloride infusion, PRN  ondansetron (ZOFRAN-ODT) disintegrating tablet 4 mg, Q8H PRN   Or  ondansetron (ZOFRAN) injection 4 mg, Q6H PRN  polyethylene glycol (GLYCOLAX) packet 17 g, Daily PRN  acetaminophen (TYLENOL) tablet 650 mg, Q6H PRN   Or  acetaminophen (TYLENOL) suppository 650 mg, Q6H PRN        Allergies:  Patient has no known allergies.     Social History:    Social History     Socioeconomic History    Marital status:      Spouse name: Omar Chester Number of children: 1    Years of education: Not on file    Highest education level: Not on file   Occupational History    Not on file   Tobacco Use    Smoking status: Never Smoker    Smokeless tobacco: Never Used   Vaping Use    Vaping Use: Never used   Substance and Sexual Activity    Alcohol use: No     Comment: none    Drug use: No    Sexual activity: Never   Other Topics Concern    Not on file   Social History Narrative    ** Merged History Encounter **         ** Merged History Encounter **          Social Determinants of Health     Financial Resource Strain: Low Risk     Difficulty of Paying Living Expenses: Not hard at all   Food Insecurity: No Food Insecurity    Worried About Running Out of Food in the Last Year: Never true    Ming of Food in the Last Year: Never true   Transportation Needs:     Lack of Transportation (Medical):  Lack of Transportation (Non-Medical):    Physical Activity:     Days of Exercise per Week:     Minutes of Exercise per Session:    Stress:     Feeling of Stress :    Social Connections:     Frequency of Communication with Friends and Family:     Frequency of Social Gatherings with Friends and Family:     Attends Congregation Services:     Active Member of Clubs or Organizations:     Attends Club or Organization Meetings:     Marital Status:    Intimate Partner Violence:     Fear of Current or Ex-Partner:     Emotionally Abused:     Physically Abused:     Sexually Abused:        Family History:   Family History   Problem Relation Age of Onset    Diabetes Mother     Kidney Disease Mother         Dialysis    Cancer Mother         KIDNEY    Anesth Problems Mother         delayed awakening     Other Father         Zuri Sanderson    Diabetes Sister     High Blood Pressure Sister     Cancer Brother        Review of Systems:    Constitutional: No fever, no chills, no lethargy, no weakness. HEENT:  No headache, otalgia, itchy eyes, nasal discharge or sore throat. Cardiac:  No chest pain, dyspnea, orthopnea or PND. Chest:              No cough, phlegm or wheezing.   Abdomen:  No abdominal pain, nausea or reviewed the key elements of all parts of the encounter with the resident/fellow. I have seen and examined the patient with the resident/fellow. I agree with the assessment and plan and status of the problem list as documented.       .  Electronically signed by Serg Holt MD on 7/25/2021 at 3:43 PM

## 2021-07-25 NOTE — PLAN OF CARE
Problem: Bleeding:  Goal: Will show no signs and symptoms of excessive bleeding  Description: Will show no signs and symptoms of excessive bleeding  Outcome: Ongoing     Problem:  Activity:  Goal: Fatigue will decrease  Description: Fatigue will decrease  Outcome: Ongoing  Goal: Risk for activity intolerance will decrease  Description: Risk for activity intolerance will decrease  Outcome: Ongoing     Problem: Fluid Volume:  Goal: Will show no signs or symptoms of fluid imbalance  Description: Will show no signs or symptoms of fluid imbalance  Outcome: Ongoing     Problem: Health Behavior:  Goal: Ability to manage health-related needs will improve  Description: Ability to manage health-related needs will improve  Outcome: Ongoing  Goal: Identification of resources available to assist in meeting health care needs will improve  Description: Identification of resources available to assist in meeting health care needs will improve  Outcome: Ongoing     Problem: Nutritional:  Goal: Ability to identify appropriate dietary choices will improve  Description: Ability to identify appropriate dietary choices will improve  Outcome: Ongoing     Problem: Physical Regulation:  Goal: Ability to maintain clinical measurements within normal limits will improve  Description: Ability to maintain clinical measurements within normal limits will improve  Outcome: Ongoing  Goal: Complications related to the disease process, condition or treatment will be avoided or minimized  Description: Complications related to the disease process, condition or treatment will be avoided or minimized  Outcome: Ongoing     Problem: Sensory:  Goal: General experience of comfort will improve  Description: General experience of comfort will improve  Outcome: Ongoing     Problem: Skin Integrity:  Goal: Status of oral mucous membranes will improve  Description: Status of oral mucous membranes will improve  Outcome: Ongoing  Goal: Skin integrity will be maintained  Description: Skin integrity will be maintained  Outcome: Ongoing     Problem: Falls - Risk of:  Goal: Will remain free from falls  Description: Will remain free from falls  Outcome: Ongoing  Goal: Absence of physical injury  Description: Absence of physical injury  Outcome: Ongoing     Problem: ABCDS Injury Assessment  Goal: Absence of physical injury  Outcome: Ongoing

## 2021-07-26 ENCOUNTER — TELEPHONE (OUTPATIENT)
Dept: INTERNAL MEDICINE | Age: 76
End: 2021-07-26

## 2021-07-26 ENCOUNTER — APPOINTMENT (OUTPATIENT)
Dept: INTERVENTIONAL RADIOLOGY/VASCULAR | Age: 76
DRG: 314 | End: 2021-07-26
Payer: MEDICARE

## 2021-07-26 PROBLEM — T82.838A HEMORRHAGE OF ARTERIOVENOUS FISTULA (HCC): Status: ACTIVE | Noted: 2021-07-26

## 2021-07-26 LAB
ANION GAP SERPL CALCULATED.3IONS-SCNC: 18 MMOL/L (ref 9–17)
BUN BLDV-MCNC: 62 MG/DL (ref 8–23)
BUN/CREAT BLD: ABNORMAL (ref 9–20)
CALCIUM SERPL-MCNC: 8.6 MG/DL (ref 8.6–10.4)
CHLORIDE BLD-SCNC: 97 MMOL/L (ref 98–107)
CO2: 21 MMOL/L (ref 20–31)
CREAT SERPL-MCNC: 12.91 MG/DL (ref 0.7–1.2)
EKG ATRIAL RATE: 73 BPM
EKG P AXIS: 58 DEGREES
EKG P-R INTERVAL: 190 MS
EKG Q-T INTERVAL: 388 MS
EKG QRS DURATION: 102 MS
EKG QTC CALCULATION (BAZETT): 427 MS
EKG R AXIS: -15 DEGREES
EKG T AXIS: 18 DEGREES
EKG VENTRICULAR RATE: 73 BPM
GFR AFRICAN AMERICAN: 5 ML/MIN
GFR NON-AFRICAN AMERICAN: 4 ML/MIN
GFR SERPL CREATININE-BSD FRML MDRD: ABNORMAL ML/MIN/{1.73_M2}
GFR SERPL CREATININE-BSD FRML MDRD: ABNORMAL ML/MIN/{1.73_M2}
GLUCOSE BLD-MCNC: 76 MG/DL (ref 70–99)
HCT VFR BLD CALC: 30.2 % (ref 40.7–50.3)
HEMOGLOBIN: 8.9 G/DL (ref 13–17)
MCH RBC QN AUTO: 27.4 PG (ref 25.2–33.5)
MCHC RBC AUTO-ENTMCNC: 29.5 G/DL (ref 28.4–34.8)
MCV RBC AUTO: 92.9 FL (ref 82.6–102.9)
NRBC AUTOMATED: 0 PER 100 WBC
PDW BLD-RTO: 22.1 % (ref 11.8–14.4)
PLATELET # BLD: 188 K/UL (ref 138–453)
PMV BLD AUTO: 12.7 FL (ref 8.1–13.5)
POTASSIUM SERPL-SCNC: 5.1 MMOL/L (ref 3.7–5.3)
RBC # BLD: 3.25 M/UL (ref 4.21–5.77)
SODIUM BLD-SCNC: 136 MMOL/L (ref 135–144)
WBC # BLD: 5.2 K/UL (ref 3.5–11.3)

## 2021-07-26 PROCEDURE — 5A1D70Z PERFORMANCE OF URINARY FILTRATION, INTERMITTENT, LESS THAN 6 HOURS PER DAY: ICD-10-PCS | Performed by: INTERNAL MEDICINE

## 2021-07-26 PROCEDURE — 93010 ELECTROCARDIOGRAM REPORT: CPT | Performed by: INTERNAL MEDICINE

## 2021-07-26 PROCEDURE — 2060000000 HC ICU INTERMEDIATE R&B

## 2021-07-26 PROCEDURE — 85027 COMPLETE CBC AUTOMATED: CPT

## 2021-07-26 PROCEDURE — C1769 GUIDE WIRE: HCPCS

## 2021-07-26 PROCEDURE — 2709999900 HC NON-CHARGEABLE SUPPLY

## 2021-07-26 PROCEDURE — 36901 INTRO CATH DIALYSIS CIRCUIT: CPT | Performed by: RADIOLOGY

## 2021-07-26 PROCEDURE — 6370000000 HC RX 637 (ALT 250 FOR IP): Performed by: STUDENT IN AN ORGANIZED HEALTH CARE EDUCATION/TRAINING PROGRAM

## 2021-07-26 PROCEDURE — 76937 US GUIDE VASCULAR ACCESS: CPT | Performed by: RADIOLOGY

## 2021-07-26 PROCEDURE — 90935 HEMODIALYSIS ONE EVALUATION: CPT | Performed by: INTERNAL MEDICINE

## 2021-07-26 PROCEDURE — C1894 INTRO/SHEATH, NON-LASER: HCPCS

## 2021-07-26 PROCEDURE — C1881 DIALYSIS ACCESS SYSTEM: HCPCS

## 2021-07-26 PROCEDURE — 77001 FLUOROGUIDE FOR VEIN DEVICE: CPT | Performed by: RADIOLOGY

## 2021-07-26 PROCEDURE — 90935 HEMODIALYSIS ONE EVALUATION: CPT

## 2021-07-26 PROCEDURE — 6360000002 HC RX W HCPCS: Performed by: RADIOLOGY

## 2021-07-26 PROCEDURE — 80048 BASIC METABOLIC PNL TOTAL CA: CPT

## 2021-07-26 PROCEDURE — 2580000003 HC RX 258: Performed by: STUDENT IN AN ORGANIZED HEALTH CARE EDUCATION/TRAINING PROGRAM

## 2021-07-26 PROCEDURE — 36415 COLL VENOUS BLD VENIPUNCTURE: CPT

## 2021-07-26 PROCEDURE — 02H633Z INSERTION OF INFUSION DEVICE INTO RIGHT ATRIUM, PERCUTANEOUS APPROACH: ICD-10-PCS | Performed by: RADIOLOGY

## 2021-07-26 PROCEDURE — C1750 CATH, HEMODIALYSIS,LONG-TERM: HCPCS

## 2021-07-26 PROCEDURE — 6360000004 HC RX CONTRAST MEDICATION: Performed by: INTERNAL MEDICINE

## 2021-07-26 PROCEDURE — 6360000002 HC RX W HCPCS: Performed by: INTERNAL MEDICINE

## 2021-07-26 PROCEDURE — 36558 INSERT TUNNELED CV CATH: CPT | Performed by: RADIOLOGY

## 2021-07-26 PROCEDURE — 0JH63XZ INSERTION OF TUNNELED VASCULAR ACCESS DEVICE INTO CHEST SUBCUTANEOUS TISSUE AND FASCIA, PERCUTANEOUS APPROACH: ICD-10-PCS | Performed by: RADIOLOGY

## 2021-07-26 RX ORDER — HEPARIN SODIUM (PORCINE) LOCK FLUSH IV SOLN 100 UNIT/ML 100 UNIT/ML
SOLUTION INTRAVENOUS
Status: COMPLETED | OUTPATIENT
Start: 2021-07-26 | End: 2021-07-26

## 2021-07-26 RX ORDER — FENTANYL CITRATE 50 UG/ML
INJECTION, SOLUTION INTRAMUSCULAR; INTRAVENOUS
Status: COMPLETED | OUTPATIENT
Start: 2021-07-26 | End: 2021-07-26

## 2021-07-26 RX ORDER — HEPARIN SODIUM 1000 [USP'U]/ML
2300 INJECTION, SOLUTION INTRAVENOUS; SUBCUTANEOUS PRN
Status: DISCONTINUED | OUTPATIENT
Start: 2021-07-26 | End: 2021-07-27 | Stop reason: HOSPADM

## 2021-07-26 RX ADMIN — SODIUM CHLORIDE, PRESERVATIVE FREE 10 ML: 5 INJECTION INTRAVENOUS at 20:25

## 2021-07-26 RX ADMIN — IOPAMIDOL 24 ML: 755 INJECTION, SOLUTION INTRAVENOUS at 10:08

## 2021-07-26 RX ADMIN — FENTANYL CITRATE 50 MCG: 50 INJECTION, SOLUTION INTRAMUSCULAR; INTRAVENOUS at 10:16

## 2021-07-26 RX ADMIN — HEPARIN SODIUM 2300 UNITS: 1000 INJECTION INTRAVENOUS; SUBCUTANEOUS at 19:12

## 2021-07-26 RX ADMIN — Medication 2300 UNITS: at 11:06

## 2021-07-26 RX ADMIN — METOPROLOL TARTRATE 75 MG: 25 TABLET ORAL at 20:10

## 2021-07-26 RX ADMIN — ATORVASTATIN CALCIUM 40 MG: 80 TABLET, FILM COATED ORAL at 20:10

## 2021-07-26 RX ADMIN — GUAIFENESIN 600 MG: 600 TABLET, EXTENDED RELEASE ORAL at 08:59

## 2021-07-26 RX ADMIN — GUAIFENESIN 600 MG: 600 TABLET, EXTENDED RELEASE ORAL at 20:10

## 2021-07-26 RX ADMIN — METOPROLOL TARTRATE 75 MG: 25 TABLET ORAL at 08:59

## 2021-07-26 ASSESSMENT — PAIN SCALES - GENERAL
PAINLEVEL_OUTOF10: 0

## 2021-07-26 NOTE — TELEPHONE ENCOUNTER
CONCHAI:Jeanette lombardo calling office to let you know that her father was admitted to [facility] last week in vasular unit and he just was admitted on Saturday to the regular hospital

## 2021-07-26 NOTE — PROGRESS NOTES
Nephrology Progress Note        Subjective: The patient is seen and evaluated on dialysis. Patient is stable on dialysis. Access is a tunneled dialysis catheter used without problems. No new issues overnite. Stable hemodynamics. The patient has a clotted AV fistula which is not able to be used for dialysis anymore. Time is 3 hours 15 minutes scheduled for dialysis today. Labs and fluid removal and dialysis prescription reviewed with the dialysis nurses at the bedside. Weight removal goal is 2.5 kg. This will get the patient about to his usual outpatient dry weight. Patient states he has been on dialysis for about 5 years. He currently runs at [employer]. He said he had the fistula for about 3.5 years.         Objective:  CURRENT TEMPERATURE:  Temp: 97.9 °F (36.6 °C)  MAXIMUM TEMPERATURE OVER 24HRS:  Temp (24hrs), Av.5 °F (36.9 °C), Min:97.9 °F (36.6 °C), Max:99.1 °F (37.3 °C)    CURRENT RESPIRATORY RATE:  Resp: 16  CURRENT PULSE:  Pulse: 61  CURRENT BLOOD PRESSURE:  BP: (!) 176/80  24HR BLOOD PRESSURE RANGE:  Systolic (64IJI), IQQ:296 , Min:124 , TVH:896   ; Diastolic (47EVC), FDO:14, Min:72, Max:108    24HR INTAKE/OUTPUT:      Intake/Output Summary (Last 24 hours) at 2021 1634  Last data filed at 2021 0030  Gross per 24 hour   Intake 270 ml   Output --   Net 270 ml     Patient Vitals for the past 96 hrs (Last 3 readings):   Weight   21 0600 227 lb 1.2 oz (103 kg)   21 1846 220 lb (99.8 kg)         Physical Exam:  General appearance:Awake, alert, in no acute distress  Skin: warm and dry, no rash or erythema  Eyes: conjunctivae normal and sclera anicteric  ENT: no thrush, moist mucous membrane  Neck:  No JVD, trachea is midline and no accessory muscle use  Pulmonary: clear to auscultation bilaterally and no wheezing or rhonchi   Cardiovascular: Regular rate and rhythm positive S1 and S2 and no rubs  Abdomen: soft nontender, bowel sounds present, no ascites  Extremities: no cyanosis, clubbing or edema     Access:  previous permacath    Current Medications:    metoprolol tartrate (LOPRESSOR) tablet 75 mg, BID  guaiFENesin (MUCINEX) extended release tablet 600 mg, BID  lidocaine 1 % injection 5 mL, Once  atorvastatin (LIPITOR) tablet 40 mg, Nightly  metoprolol (LOPRESSOR) injection 2.5 mg, Q6H PRN  [Held by provider] apixaban (ELIQUIS) tablet 2.5 mg, BID  sodium chloride flush 0.9 % injection 5-40 mL, 2 times per day  sodium chloride flush 0.9 % injection 5-40 mL, PRN  0.9 % sodium chloride infusion, PRN  ondansetron (ZOFRAN-ODT) disintegrating tablet 4 mg, Q8H PRN   Or  ondansetron (ZOFRAN) injection 4 mg, Q6H PRN  polyethylene glycol (GLYCOLAX) packet 17 g, Daily PRN  acetaminophen (TYLENOL) tablet 650 mg, Q6H PRN   Or  acetaminophen (TYLENOL) suppository 650 mg, Q6H PRN      Labs:   CBC:   Recent Labs     07/24/21  1525 07/25/21  0605 07/26/21  0436   WBC 5.6 5.6 5.2   RBC 3.60* 3.34* 3.25*   HGB 9.8* 9.1* 8.9*   HCT 32.3* 30.7* 30.2*    See Reflexed IPF Result 188   MPV 12.2 NOT REPORTED 12.7      BMP:   Recent Labs     07/24/21  1525 07/25/21  0605 07/26/21  0436    136 136   K 4.7 4.6 5.1   CL 97* 97* 97*   CO2 26 22 21   BUN 39* 46* 62*   CREATININE 9.37* 10.34* 12.91*   GLUCOSE 77 76 76   CALCIUM 8.8 8.7 8.6        Phosphorus:  No results for input(s): PHOS in the last 72 hours. Magnesium: No results for input(s): MG in the last 72 hours. Albumin:   Recent Labs     07/24/21  1525   LABALBU 3.9       Dialysis bath:      Radiology:  Reviewed as available. Assessment:  1 ESRD:dialysis bath reviewed with nurse. 2.HTN:  3. Clotted AV fistula, now with a dialysis catheter in place  4. No significant fluid overload  5.anemia of chronic disease and blood loss  6. SECONDARY HYPERPARATHYROIDISM:     Plan:  1. Weight removal and dialysis orders reviewed.     2.  Goal is to continue the patient on his usual dialysis schedule Monday and Wednesday and Friday  3. Monitor labs  4. Goal is discharge soon      Please do not hesitate to call with questions.     Electronically signed by Lisa Petersen MD on 7/26/2021 at 4:34 PM

## 2021-07-26 NOTE — PROGRESS NOTES
Writer perfect serve message Dr. Isaías Russell with Internal Medicine to notify pt c/o right knee discomfort and swelling. Dr. Isaías Russell message back writer and state she will come to bedside to evaluate pt. At this time waiting for physician.

## 2021-07-26 NOTE — PROGRESS NOTES
MD Ni Cervatnes notified of tunneled catheter dressing saturated. IR called and spoke with FAIZAN pascal. Patient was transporter back to IR.  Faizan Pascal redressed tunneled catheter and applied pressure dressing

## 2021-07-26 NOTE — PROGRESS NOTES
Dialysis Post Treatment Note  Vitals:    07/26/21 1934   BP: (!) 161/70   Pulse: 61   Resp: 15   Temp: 98 °F (36.7 °C)   SpO2:      Pre-Weight = 104.9kg  Post-weight = Weight: 225 lb 12 oz (102.4 kg)  Total Liters Processed = Total Liters Processed (l/min): 65.7 l/min  Rinseback Volume (mL) = Rinseback Volume (ml): 300 ml  Net Removal (mL) = NET Removed (ml): 2300 ml  Patient's dry weight=102.5kg  Type of access used=Perm catheter  Length of treatment=195    Pt tolerated well with no complaints.

## 2021-07-26 NOTE — PROGRESS NOTES
Vascular Surgery   Progress Note    7/26/2021 6:52 AM  Subjective:   Admit Date: 7/24/2021  PCP: Hazel Jason MD  Interval History: Resting in bed comfortably, had a short 5 beat run of V. tach earlier this morning, patient had no complaints regarding his fistula today    Diet: Diet NPO Exceptions are: Sips of Water with Meds    Medications:   Scheduled Meds:   metoprolol tartrate  75 mg Oral BID    guaiFENesin  600 mg Oral BID    lidocaine  5 mL Intradermal Once    atorvastatin  40 mg Oral Nightly    [Held by provider] apixaban  2.5 mg Oral BID    sodium chloride flush  5-40 mL Intravenous 2 times per day     Continuous Infusions:   sodium chloride           Labs:   CBC:   Recent Labs     07/24/21  1525 07/25/21  0605 07/26/21  0436   WBC 5.6 5.6 5.2   HGB 9.8* 9.1* 8.9*    See Reflexed IPF Result 188     BMP:    Recent Labs     07/24/21  1525 07/25/21  0605 07/26/21  0436    136 136   K 4.7 4.6 5.1   CL 97* 97* 97*   CO2 26 22 21   BUN 39* 46* 62*   CREATININE 9.37* 10.34* 12.91*   GLUCOSE 77 76 76     Hepatic:   Recent Labs     07/24/21  1525   AST 14   ALT 20   BILITOT 0.30   ALKPHOS 64     Troponin: Invalid input(s): TROPONIN  BNP: No results for input(s): BNP in the last 72 hours. Lipids: No results for input(s): CHOL, HDL in the last 72 hours.     Invalid input(s): LDLCALCU  INR:   Recent Labs     07/24/21  1525   INR 1.1       Objective:   Vitals: BP (!) 142/74   Pulse 62   Temp 98.2 °F (36.8 °C) (Oral)   Resp 18   Ht 6' (1.829 m)   Wt 227 lb 1.2 oz (103 kg)   SpO2 96%   BMI 30.80 kg/m²   Vital signs and Nurse's note reviewed  Gen:  A&Ox3, NAD  HEENT: PERRLA, EOMI, no scleral icterus, oral mucosa moist  Neck: Supple  Chest: Symmetric rise with inhalation, no evidence of trauma  CVS: Currently regular rate and rhythm,   Resp: Unlabored breathing on RA  Abd: soft, non-tender, non-distended  Ext: No clubbing, cyanosis, edema, peripheral pulses 2+ Rad, palpable thrill over the right radiocephalic AV fistula. Hemostatic. CNS: Moves all extremities, no gross focal motor deficits  Skin: No erythema or ulcerations     Assessment:   1. 75M with right radiocephalic AV fistula persistent bleeding s/p dialysis    Patient Active Problem List:     BPH (benign prostatic hyperplasia)     CAD (coronary artery disease)     Anemia     CVA (cerebrovascular accident due to intracerebral hemorrhage) (HCC)     Seizure disorder, secondary (HCC)     Seizures ([address]     Cerebral artery occlusion with cerebral infarction ([address]     Hypertension     Hyperlipidemia     ESRD on hemodialysis ([address]     Carotid stenosis, asymptomatic     Seizure disorder as sequela of cerebrovascular accident ([address]     Chronic ischemic left middle cerebral artery (MCA) stroke     Folate deficiency     Bronchitis     Sigmoid diverticulosis     Redundant colon     Nonsustained supraventricular tachycardia (HCC)     NSVT (nonsustained ventricular tachycardia) (HCC)     Tinea corporis     Valvular heart disease     Palpitations     Paroxysmal SVT (supraventricular tachycardia) ([address]      Plan:   1. Continue medical management per primary team  2. Agree with fistulogram later today  3.  Patient has known proximal occlusion if unable to be opened today during fistulogram may need more extensive central venous intervention or new access creation if that were the case he would likely need a tunneled dialysis catheter until this could be completed    Electronically signed by Froy Olsen DO on 7/26/2021 at 6:52 AM

## 2021-07-26 NOTE — PROGRESS NOTES
Patients AV fistula has stenosed tot he point that it is not able to be used. IR placed a tunneled line for dialysis. Patient should follow up with Dr. Sam Silverio as an outpatient for dialysis access revision. Vascular to sign off at this time. Please call with any further questions.     Tito Brown, DO PGY 3  General Surgery Resident  07/26/21 6:42 PM

## 2021-07-26 NOTE — BRIEF OP NOTE
Brief Postoperative Note    Nisha Gamble  YOB: 1945  [id number]    Pre-operative Diagnosis: Poorly Functioning right AVF/prolonged bleeding; CKD    Post-operative Diagnosis: Same    Procedure: Percutaneous Access right AVF; Tunneled dialysis cath placement    Anesthesia: fentanyl    Surgeons/Assistants: MD Yogi    Estimated Blood Loss: Minimal    Complications: none    Specimens: were not obtained    Fistulagram preformed and showed complete occlusion of fistula centrally. DO NOT USE FISTULA. Unable to preform angioplasty due to complete occlusion. Successful placement of 14.5 fr x 33 cm tip to cuff palindrome hemodialysis catheter in the left internal jugular vein. Both ports aspirated and flushed appropriately. Catheter was sutured to the skin and sterile dressings were applied. Tunneled dialysis catheter ready for use.      FAIZAN Abreu-C7/26/2021 10:54 AM

## 2021-07-26 NOTE — PROGRESS NOTES
[facility]  Internal Medicine Teaching Residency Program  Inpatient Daily Progress Note  ______________________________________________________________________________    Patient: César Martinez  YOB: 1945   ND    Acct: [de-identified]     Room: [address] [place] [address]  Admit date: 2021  Today's date: 21  Number of days in the hospital: 2    SUBJECTIVE   Admitting Diagnosis: Hemorrhage of arteriovenous fistula ([address]  CC: Bleeding from HD site  Pt examined at bedside. Chart & results reviewed. No apparent distress. Had episodes of V-tach (5 beats) over night. Monitoring bleeding from dialysis site. New dressing last night. No bleeding since then. Getting a fistulogram today  Cardiology recommended he starts lopressor 75mg daily and Eliquis 2.5mg bid.        BRIEF HISTORY      66 y.o.  male with history of ESRD on dialysis on MWF schedule, hypertension, type 2 diabetes mellitus, seizure sent to emergency department with complaint of bleeding from dialysis site. patient was recently seen on 2021 for SVT and was recommended beta-blocker and Eliquis 2.5 twice daily. Patient reported not taking his prescription. He left AMA on 2021. Patient denies any chest pain, lightheadedness, shortness of breath, fever, chills, nausea, vomiting. In ER he was found to be tachycardic with heart rate in 140s and was given 1 dose of Lopressor 5 mg       OBJECTIVE     Vital Signs:  BP (!) 142/74   Pulse 62   Temp 98.2 °F (36.8 °C) (Oral)   Resp 18   Ht 6' (1.829 m)   Wt 227 lb 1.2 oz (103 kg)   SpO2 96%   BMI 30.80 kg/m²     Temp (24hrs), Av.2 °F (36.8 °C), Min:97.7 °F (36.5 °C), Max:99.1 °F (37.3 °C)    In: 270   Out: -     Physical Exam:  Constitutional: This is a well developed, well nourished, 30-34.9 - Obesity Grade I 66y.o. year old male who is alert, oriented, cooperative and in no apparent distress.   Head:normocephalic and atraumatic. EENT:  PERRLA. No conjunctival injections. Septum was midline, mucosa was without erythema, exudates or cobblestoning. No thrush was noted. Neck: Supple without thyromegaly. No elevated JVP. Trachea was midline. Respiratory: Chest was symmetrical without dullness to percussion. Breath sounds bilaterally were clear to auscultation. There were no wheezes, rhonchi or rales. There is no intercostal retraction or use of accessory muscles. No egophony noted. Cardiovascular: Regular without murmur, clicks, gallops or rubs. Abdomen: Slightly rounded and soft without organomegaly. No rebound, rigidity or guarding was appreciated. Lymphatic: No lymphadenopathy. Musculoskeletal: Mild swelling of right knee. Normal curvature of the spine. No gross muscle weakness. Extremities:  No lower extremity edema, ulcerations, tenderness, varicosities or erythema. Muscle size, tone and strength are normal.  No involuntary movements are noted. Skin:  Warm and dry. Good color, turgor and pigmentation. No lesions or scars.   No cyanosis or clubbing  Neurological/Psychiatric: The patient's general behavior, level of consciousness, thought content and emotional status is normal.        Medications:  Scheduled Medications:    metoprolol tartrate  75 mg Oral BID    guaiFENesin  600 mg Oral BID    lidocaine  5 mL Intradermal Once    atorvastatin  40 mg Oral Nightly    [Held by provider] apixaban  2.5 mg Oral BID    sodium chloride flush  5-40 mL Intravenous 2 times per day     Continuous Infusions:    sodium chloride       PRN Medicationsmetoprolol, 2.5 mg, Q6H PRN  sodium chloride flush, 5-40 mL, PRN  sodium chloride, 25 mL, PRN  ondansetron, 4 mg, Q8H PRN   Or  ondansetron, 4 mg, Q6H PRN  polyethylene glycol, 17 g, Daily PRN  acetaminophen, 650 mg, Q6H PRN   Or  acetaminophen, 650 mg, Q6H PRN        Diagnostic Labs:  CBC:   Recent Labs     07/24/21  1525 07/25/21  0605 07/26/21  0436   WBC 5.6 5.6 5.2   RBC 3.60* 3.34* 3.25*   HGB 9.8* 9.1* 8.9*   HCT 32.3* 30.7* 30.2*   MCV 89.7 91.9 92.9   RDW 21.9* 21.8* 22.1*    See Reflexed IPF Result 188     BMP:   Recent Labs     07/24/21  1525 07/25/21  0605 07/26/21  0436    136 136   K 4.7 4.6 5.1   CL 97* 97* 97*   CO2 26 22 21   BUN 39* 46* 62*   CREATININE 9.37* 10.34* 12.91*     BNP: No results for input(s): BNP in the last 72 hours. PT/INR:   Recent Labs     07/24/21  1525   PROTIME 11.9   INR 1.1     APTT:   Recent Labs     07/24/21  1525   APTT 19.5*     CARDIAC ENZYMES: No results for input(s): CKMB, CKMBINDEX, TROPONINI in the last 72 hours. Invalid input(s): CKTOTAL;3  FASTING LIPID PANEL:  Lab Results   Component Value Date    CHOL 173 04/26/2018    HDL 45 04/26/2018    TRIG 87 04/26/2018     LIVER PROFILE:   Recent Labs     07/24/21  1525   AST 14   ALT 20   BILITOT 0.30   ALKPHOS 64      MICROBIOLOGY:   Lab Results   Component Value Date/Time    CULTURE NOT REPORTED 04/02/2017 02:00 PM       Imaging:    XR CHEST PORTABLE    Result Date: 7/19/2021  No radiographic evidence of acute cardiopulmonary disease. Moderate cardiomegaly. ASSESSMENT & PLAN     ASSESSMENT / PLAN:     Principal Problem:     Hemorrhage of arteriovenous fistula ([address]): Bleeding from right innominate fistula over 1 week. No bleeding today. Plan: Fistulogram today  Active Problems:  Paroxysmal SVT (supraventricular tachycardia) ([address] 5 beats of V-tach last night. Plan: Cardiology recommended he starts lopressor 75mg daily and Eliquis 2.5mg bid (held pending fistulogram). Hypertension  Plan: Amlodipine,       Hyperlipidemia  Plan: Lipitor      ESRD on hemodialysis ([address]  Plan: Continue HD per schedule   Nephrology following      DVT ppx : on anti coagulants  GI ppx:     PT/OT: Consulted  Discharge Planning / SW: Case management    Te Wei MD  Internal Medicine Resident, PGY-1  [facility];  [facility]  7/26/2021, 7:41 AM   Attending Physician Statement  I have discussed the care of César Martinez, including pertinent history and exam findings,  with the resident. I have seen and examined the patient and the key elements of all parts of the encounter have been performed by me. I agree with the assessment, plan and orders as documented by the resident with additions . Treatment plan Discussed with nursing staff in detail , all questions answered . Electronically signed by Irene Dye MD on   7/26/21 at 12:30 PM EDT    Please note that this chart was generated using voice recognition Dragon dictation software. Although every effort was made to ensure the accuracy of this automated transcription, some errors in transcription may have occurred.

## 2021-07-26 NOTE — PROGRESS NOTES
Dr. Catherine Gaxiola with Internal Medicine at the bedside and assess right knee. Noted right knee swelling and pt denies pain. Dr. Catherine Gaxiola notify writer she will notify attending in the morning and request imaging. At this time no new orders.

## 2021-07-26 NOTE — PROGRESS NOTES
Dialysis Time Out  To be done by RN and tech or 2 RNs  Staff Names: Funmi Batres RN  [x]  Identity of the patient using 2 patient identifiers  [x]  Consent for treatment  [x]  Equipment-proper machine and dialyzer  [x]  B-Hep B status  [x]  Orders- to include bath, blood flow, dialyzer, time and fluid removal  [x]  Access-Correct site and in working order  [x]  Time for patient to ask questions.

## 2021-07-26 NOTE — PROGRESS NOTES
Writer notified Dr. Jomar Cohn with Internal Medicine pt did have a 5 beat run of v-tach and /74 and HR 67 bpm. At this time no new orders and will continue to monitor.

## 2021-07-26 NOTE — PLAN OF CARE
maintained  Description: Skin integrity will be maintained  Outcome: Ongoing     Problem: Falls - Risk of:  Goal: Will remain free from falls  Description: Will remain free from falls  Outcome: Ongoing  Goal: Absence of physical injury  Description: Absence of physical injury  Outcome: Ongoing     Problem: ABCDS Injury Assessment  Goal: Absence of physical injury  Outcome: Ongoing

## 2021-07-26 NOTE — PROGRESS NOTES
Physical Therapy        Physical Therapy Cancel Note      DATE: 2021    NAME: Noé Arredondo  MRN: [id number]   : 1945      Patient not seen this date for Physical Therapy due to:    Surgery/Procedure: pt off unit at IR upon writer's arrival, PT will check back 21.        Electronically signed by Enrico Davenport PT on 2021 at 11:10 AM

## 2021-07-26 NOTE — PLAN OF CARE
Problem: Bleeding:  Goal: Will show no signs and symptoms of excessive bleeding  Description: Will show no signs and symptoms of excessive bleeding  7/26/2021 1023 by Kole Guerrero RN  Outcome: Ongoing  7/26/2021 0134 by Lexx Rico RN  Outcome: Ongoing     Problem:  Activity:  Goal: Fatigue will decrease  Description: Fatigue will decrease  7/26/2021 1023 by Kole Guerrero RN  Outcome: Ongoing  7/26/2021 0134 by Lexx Rico RN  Outcome: Ongoing  Goal: Risk for activity intolerance will decrease  Description: Risk for activity intolerance will decrease  7/26/2021 1023 by Kole Guerrero RN  Outcome: Ongoing  7/26/2021 0134 by Lexx Rico RN  Outcome: Ongoing     Problem: Fluid Volume:  Goal: Will show no signs or symptoms of fluid imbalance  Description: Will show no signs or symptoms of fluid imbalance  7/26/2021 1023 by Kole Guerrero RN  Outcome: Ongoing  7/26/2021 0134 by Lexx Rico RN  Outcome: Ongoing     Problem: Health Behavior:  Goal: Ability to manage health-related needs will improve  Description: Ability to manage health-related needs will improve  7/26/2021 1023 by Kole Guerrero RN  Outcome: Ongoing  7/26/2021 0134 by Lexx Rico RN  Outcome: Ongoing  Goal: Identification of resources available to assist in meeting health care needs will improve  Description: Identification of resources available to assist in meeting health care needs will improve  7/26/2021 1023 by Kole Guerrero RN  Outcome: Ongoing  7/26/2021 0134 by Lexx Rico RN  Outcome: Ongoing     Problem: Nutritional:  Goal: Ability to identify appropriate dietary choices will improve  Description: Ability to identify appropriate dietary choices will improve  7/26/2021 1023 by Kole Guerrero RN  Outcome: Ongoing  7/26/2021 0134 by Lexx Rico RN  Outcome: Ongoing     Problem: Physical Regulation:  Goal: Ability to maintain clinical measurements within normal limits will improve  Description: Ability to maintain clinical measurements within normal limits will improve  7/26/2021 1023 by Shelley Trevino RN  Outcome: Ongoing  7/26/2021 0134 by Elissa Rosado RN  Outcome: Ongoing  Goal: Complications related to the disease process, condition or treatment will be avoided or minimized  Description: Complications related to the disease process, condition or treatment will be avoided or minimized  7/26/2021 1023 by Shelley Trevino RN  Outcome: Ongoing  7/26/2021 0134 by Elissa Rosado RN  Outcome: Ongoing     Problem: Sensory:  Goal: General experience of comfort will improve  Description: General experience of comfort will improve  7/26/2021 1023 by Shelley Trevino RN  Outcome: Ongoing  7/26/2021 0134 by Elissa Rosado RN  Outcome: Ongoing     Problem: Skin Integrity:  Goal: Status of oral mucous membranes will improve  Description: Status of oral mucous membranes will improve  7/26/2021 1023 by Shelley Trevino RN  Outcome: Ongoing  7/26/2021 0134 by Elissa Rosado RN  Outcome: Ongoing  Goal: Skin integrity will be maintained  Description: Skin integrity will be maintained  7/26/2021 1023 by Shelley Trevino RN  Outcome: Ongoing  7/26/2021 0134 by Elissa Rosado RN  Outcome: Ongoing     Problem: Falls - Risk of:  Goal: Will remain free from falls  Description: Will remain free from falls  7/26/2021 1023 by Shelley Trevino RN  Outcome: Ongoing  7/26/2021 0134 by Elissa Rosado RN  Outcome: Ongoing  Goal: Absence of physical injury  Description: Absence of physical injury  7/26/2021 1023 by Shelley Trevino RN  Outcome: Ongoing  7/26/2021 0134 by Elissa Rosado RN  Outcome: Ongoing     Problem: ABCDS Injury Assessment  Goal: Absence of physical injury  7/26/2021 1023 by Shelley Trevino RN  Outcome: Ongoing  7/26/2021 0134 by Elissa Rosado RN  Outcome: Ongoing

## 2021-07-27 VITALS
TEMPERATURE: 97.8 F | DIASTOLIC BLOOD PRESSURE: 68 MMHG | SYSTOLIC BLOOD PRESSURE: 129 MMHG | WEIGHT: 225.75 LBS | HEART RATE: 61 BPM | HEIGHT: 72 IN | OXYGEN SATURATION: 95 % | RESPIRATION RATE: 17 BRPM | BODY MASS INDEX: 30.58 KG/M2

## 2021-07-27 LAB
ANION GAP SERPL CALCULATED.3IONS-SCNC: 17 MMOL/L (ref 9–17)
BUN BLDV-MCNC: 53 MG/DL (ref 8–23)
BUN/CREAT BLD: ABNORMAL (ref 9–20)
CALCIUM SERPL-MCNC: 8.6 MG/DL (ref 8.6–10.4)
CHLORIDE BLD-SCNC: 97 MMOL/L (ref 98–107)
CO2: 23 MMOL/L (ref 20–31)
CREAT SERPL-MCNC: 11.41 MG/DL (ref 0.7–1.2)
EKG ATRIAL RATE: 138 BPM
EKG Q-T INTERVAL: 354 MS
EKG QRS DURATION: 98 MS
EKG QTC CALCULATION (BAZETT): 536 MS
EKG R AXIS: -11 DEGREES
EKG T AXIS: -121 DEGREES
EKG VENTRICULAR RATE: 138 BPM
GFR AFRICAN AMERICAN: 5 ML/MIN
GFR NON-AFRICAN AMERICAN: 4 ML/MIN
GFR SERPL CREATININE-BSD FRML MDRD: ABNORMAL ML/MIN/{1.73_M2}
GFR SERPL CREATININE-BSD FRML MDRD: ABNORMAL ML/MIN/{1.73_M2}
GLUCOSE BLD-MCNC: 86 MG/DL (ref 70–99)
HCT VFR BLD CALC: 29 % (ref 40.7–50.3)
HEMOGLOBIN: 8.6 G/DL (ref 13–17)
MCH RBC QN AUTO: 27 PG (ref 25.2–33.5)
MCHC RBC AUTO-ENTMCNC: 29.7 G/DL (ref 28.4–34.8)
MCV RBC AUTO: 91.2 FL (ref 82.6–102.9)
NRBC AUTOMATED: 0 PER 100 WBC
PDW BLD-RTO: 22.3 % (ref 11.8–14.4)
PLATELET # BLD: ABNORMAL K/UL (ref 138–453)
PLATELET, FLUORESCENCE: 169 K/UL (ref 138–453)
PLATELET, IMMATURE FRACTION: 8.2 % (ref 1.1–10.3)
PMV BLD AUTO: ABNORMAL FL (ref 8.1–13.5)
POTASSIUM SERPL-SCNC: 4.9 MMOL/L (ref 3.7–5.3)
RBC # BLD: 3.18 M/UL (ref 4.21–5.77)
SODIUM BLD-SCNC: 137 MMOL/L (ref 135–144)
WBC # BLD: 6 K/UL (ref 3.5–11.3)

## 2021-07-27 PROCEDURE — 80048 BASIC METABOLIC PNL TOTAL CA: CPT

## 2021-07-27 PROCEDURE — 99238 HOSP IP/OBS DSCHRG MGMT 30/<: CPT | Performed by: INTERNAL MEDICINE

## 2021-07-27 PROCEDURE — 85027 COMPLETE CBC AUTOMATED: CPT

## 2021-07-27 PROCEDURE — 2580000003 HC RX 258: Performed by: STUDENT IN AN ORGANIZED HEALTH CARE EDUCATION/TRAINING PROGRAM

## 2021-07-27 PROCEDURE — 97535 SELF CARE MNGMENT TRAINING: CPT

## 2021-07-27 PROCEDURE — 6370000000 HC RX 637 (ALT 250 FOR IP): Performed by: STUDENT IN AN ORGANIZED HEALTH CARE EDUCATION/TRAINING PROGRAM

## 2021-07-27 PROCEDURE — 85055 RETICULATED PLATELET ASSAY: CPT

## 2021-07-27 PROCEDURE — 94761 N-INVAS EAR/PLS OXIMETRY MLT: CPT

## 2021-07-27 PROCEDURE — 97166 OT EVAL MOD COMPLEX 45 MIN: CPT

## 2021-07-27 PROCEDURE — 99231 SBSQ HOSP IP/OBS SF/LOW 25: CPT | Performed by: INTERNAL MEDICINE

## 2021-07-27 PROCEDURE — 36415 COLL VENOUS BLD VENIPUNCTURE: CPT

## 2021-07-27 PROCEDURE — 97162 PT EVAL MOD COMPLEX 30 MIN: CPT

## 2021-07-27 PROCEDURE — 97530 THERAPEUTIC ACTIVITIES: CPT

## 2021-07-27 RX ORDER — METOPROLOL TARTRATE 75 MG/1
75 TABLET, FILM COATED ORAL 2 TIMES DAILY
Qty: 60 TABLET | Refills: 3 | Status: SHIPPED | OUTPATIENT
Start: 2021-07-27

## 2021-07-27 RX ADMIN — SODIUM CHLORIDE, PRESERVATIVE FREE 5 ML: 5 INJECTION INTRAVENOUS at 09:49

## 2021-07-27 RX ADMIN — APIXABAN 2.5 MG: 5 TABLET, FILM COATED ORAL at 09:37

## 2021-07-27 RX ADMIN — METOPROLOL TARTRATE 75 MG: 25 TABLET ORAL at 08:21

## 2021-07-27 RX ADMIN — GUAIFENESIN 600 MG: 600 TABLET, EXTENDED RELEASE ORAL at 08:21

## 2021-07-27 ASSESSMENT — PAIN SCALES - GENERAL
PAINLEVEL_OUTOF10: 0
PAINLEVEL_OUTOF10: 7
PAINLEVEL_OUTOF10: 0
PAINLEVEL_OUTOF10: 7
PAINLEVEL_OUTOF10: 0

## 2021-07-27 ASSESSMENT — PAIN DESCRIPTION - DESCRIPTORS
DESCRIPTORS: ACHING
DESCRIPTORS: ACHING

## 2021-07-27 ASSESSMENT — PAIN DESCRIPTION - PROGRESSION: CLINICAL_PROGRESSION: NOT CHANGED

## 2021-07-27 ASSESSMENT — PAIN DESCRIPTION - ONSET: ONSET: ON-GOING

## 2021-07-27 ASSESSMENT — PAIN - FUNCTIONAL ASSESSMENT: PAIN_FUNCTIONAL_ASSESSMENT: PREVENTS OR INTERFERES SOME ACTIVE ACTIVITIES AND ADLS

## 2021-07-27 ASSESSMENT — PAIN DESCRIPTION - LOCATION
LOCATION: KNEE
LOCATION: KNEE

## 2021-07-27 ASSESSMENT — PAIN DESCRIPTION - PAIN TYPE
TYPE: CHRONIC PAIN
TYPE: CHRONIC PAIN

## 2021-07-27 ASSESSMENT — PAIN DESCRIPTION - FREQUENCY
FREQUENCY: INTERMITTENT
FREQUENCY: INTERMITTENT

## 2021-07-27 ASSESSMENT — PAIN DESCRIPTION - ORIENTATION
ORIENTATION: RIGHT;LEFT
ORIENTATION: RIGHT;LEFT

## 2021-07-27 NOTE — PROGRESS NOTES
Patient left unit at 1425 no distress noted. Last set of vitals documented Patient read and explained discharge instructions. All patients questions answered. Patient left with all belongings including shirt, pants and shoes. Iv removed and documented. Patient left hospital with daughter and wheeled down to car by .

## 2021-07-27 NOTE — PLAN OF CARE
Problem: Bleeding:  Goal: Will show no signs and symptoms of excessive bleeding  Description: Will show no signs and symptoms of excessive bleeding  Outcome: Met This Shift     Problem:  Activity:  Goal: Fatigue will decrease  Description: Fatigue will decrease  Outcome: Met This Shift  Goal: Risk for activity intolerance will decrease  Description: Risk for activity intolerance will decrease  Outcome: Met This Shift     Problem: Fluid Volume:  Goal: Will show no signs or symptoms of fluid imbalance  Description: Will show no signs or symptoms of fluid imbalance  Outcome: Met This Shift     Problem: Health Behavior:  Goal: Ability to manage health-related needs will improve  Description: Ability to manage health-related needs will improve  Outcome: Met This Shift  Goal: Identification of resources available to assist in meeting health care needs will improve  Description: Identification of resources available to assist in meeting health care needs will improve  Outcome: Met This Shift     Problem: Nutritional:  Goal: Ability to identify appropriate dietary choices will improve  Description: Ability to identify appropriate dietary choices will improve  Outcome: Met This Shift     Problem: Physical Regulation:  Goal: Ability to maintain clinical measurements within normal limits will improve  Description: Ability to maintain clinical measurements within normal limits will improve  Outcome: Met This Shift  Goal: Complications related to the disease process, condition or treatment will be avoided or minimized  Description: Complications related to the disease process, condition or treatment will be avoided or minimized  Outcome: Met This Shift     Problem: Sensory:  Goal: General experience of comfort will improve  Description: General experience of comfort will improve  Outcome: Met This Shift     Problem: Skin Integrity:  Goal: Status of oral mucous membranes will improve  Description: Status of oral mucous membranes

## 2021-07-27 NOTE — PROGRESS NOTES
Nephrology Progress Note        Subjective: Patient is seen and examined. Patient was thankful for his efficient dialysis yesterday. Next dialysis is tomorrow. He now has a tunneled dialysis catheter in his left internal jugular vein. He has a clotted right forearm AV fistula. Patient is on a Monday and Wednesday and Friday dialysis schedule. He may be able to be discharged later today. I am in agreement with discharge if possible. Patient's outpatient dialysis unit is [employer] [facility] and his physician is Dr. Krystal Posadas.     Objective:  CURRENT TEMPERATURE:  Temp: 98.4 °F (36.9 °C)  MAXIMUM TEMPERATURE OVER 24HRS:  Temp (24hrs), Av.1 °F (36.7 °C), Min:97.9 °F (36.6 °C), Max:98.4 °F (36.9 °C)    CURRENT RESPIRATORY RATE:  Resp: 13  CURRENT PULSE:  Pulse: 70  CURRENT BLOOD PRESSURE:  BP: (!) 146/69  24HR BLOOD PRESSURE RANGE:  Systolic (87FZB), ZIZ:473 , Min:122 , WTH:728   ; Diastolic (51VPO), YESENIA:47, Min:49, Max:98    24HR INTAKE/OUTPUT:      Intake/Output Summary (Last 24 hours) at 2021 1134  Last data filed at 2021 1934  Gross per 24 hour   Intake 300 ml   Output 2600 ml   Net -2300 ml     Weight:      Physical Exam:  General appearance:Awake, alert, in no acute distress  Skin: warm and dry, no rash or erythema  Eyes: conjunctivae pale and sclera anicteric  ENT:no thrush, moist mucous membranes  Neck: Midline trachea, no accessory muscle use  Pulmonary: Good bilateral air entry and clear to auscultation bilaterally no wheezes or rales or rhonchi  Cardiovascular: Regular rate and rhythm positive S1 and S2 and no rubs   Abdomen: Soft and not tender not distended with active bowel sounds Extremities: Clotted right upper extremity AV fistula, no lower extremity edema appreciated    Access:  previous permacath    Current Medications:    heparin (porcine) injection 2,300 Units, PRN  heparin (porcine) injection 2,300 Units, PRN  metoprolol tartrate (LOPRESSOR) tablet 75 mg, BID  guaiFENesin [facility]) extended release tablet 600 mg, BID  lidocaine 1 % injection 5 mL, Once  atorvastatin (LIPITOR) tablet 40 mg, Nightly  metoprolol (LOPRESSOR) injection 2.5 mg, Q6H PRN  apixaban (ELIQUIS) tablet 2.5 mg, BID  sodium chloride flush 0.9 % injection 5-40 mL, 2 times per day  sodium chloride flush 0.9 % injection 5-40 mL, PRN  0.9 % sodium chloride infusion, PRN  ondansetron (ZOFRAN-ODT) disintegrating tablet 4 mg, Q8H PRN   Or  ondansetron (ZOFRAN) injection 4 mg, Q6H PRN  polyethylene glycol (GLYCOLAX) packet 17 g, Daily PRN  acetaminophen (TYLENOL) tablet 650 mg, Q6H PRN   Or  acetaminophen (TYLENOL) suppository 650 mg, Q6H PRN      Labs:   CBC:   Recent Labs     07/25/21  0605 07/26/21  0436 07/27/21  0716   WBC 5.6 5.2 6.0   RBC 3.34* 3.25* 3.18*   HGB 9.1* 8.9* 8.6*   HCT 30.7* 30.2* 29.0*   MCV 91.9 92.9 91.2   MCH 27.2 27.4 27.0   MCHC 29.6 29.5 29.7   RDW 21.8* 22.1* 22.3*   PLT See Reflexed IPF Result 188 See Reflexed IPF Result   MPV NOT REPORTED 12.7 NOT REPORTED      BMP:   Recent Labs     07/25/21  0605 07/26/21  0436 07/27/21  0716    136 137   K 4.6 5.1 4.9   CL 97* 97* 97*   CO2 22 21 23   BUN 46* 62* 53*   CREATININE 10.34* 12.91* 11.41*   GLUCOSE 76 76 86   CALCIUM 8.7 8.6 8.6        Phosphorus:  No results for input(s): PHOS in the last 72 hours. Magnesium: No results for input(s): MG in the last 72 hours. Albumin:   Recent Labs     07/24/21  1525   LABALBU 3.9       Dialysis bath: Dialysis Bath  K+ (Potassium): 2  Ca+ (Calcium): 2.25  Na+ (Sodium): 138  HCO3 (Bicarb): 35    Radiology:  Reviewed as available. Assessment:  1. ESRD  2. Status post permacath placement yesterday  3. AV fistula bleeding with eventual clotting of the fistula and can no longer be used  4. Anemia of ESRD and blood loss       Plan:  1. Stable for discharge today   2. Next scheduled dialysis is tomorrow, 7/28/2021, Wednesday      Please do not hesitate to call with questions.     Electronically signed by Irving Rolle MD on 7/27/2021 at 11:34 AM

## 2021-07-27 NOTE — PROGRESS NOTES
[facility]  Internal Medicine Teaching Residency Program  Inpatient Daily Progress Note  ______________________________________________________________________________    Patient: Wilhemena Bud  YOB: 1945   SOW:[id number]    Acct: [de-identified]     Room: [address] [place] [address]  Admit date: 7/24/2021  Today's date: 07/27/21  Number of days in the hospital: 3    SUBJECTIVE   Admitting Diagnosis: Hemorrhage of arteriovenous fistula ([address]  CC: Bleeding from HD site  Pt examined at bedside. Chart & results reviewed. No apparent distress. Complete occlusion of innominate AV-fistula. Had tunneled dialysis catheter placed yesterday at left internal jugular vein. Had issues with bleeding from the site, which was addressed. Continue monitoring bleeding. Had dialysis yesterday.   For discharge    ROS:  Constitutional:  negative for chills, fevers, sweats  Respiratory:  negative for cough, dyspnea on exertion, hemoptysis, shortness of breath, wheezing  Cardiovascular:  negative for chest pain, chest pressure/discomfort, lower extremity edema, palpitations  Gastrointestinal:  negative for abdominal pain, constipation, diarrhea, nausea, vomiting  Neurological:  negative for dizziness, headache  BRIEF HISTORY     75 y.o.  male with history of ESRD on dialysis on MWF schedule, hypertension, type 2 diabetes mellitus, seizure sent to emergency department with complaint of bleeding from dialysis site.  patient was recently seen on 7/20/2021 for SVT and was recommended beta-blocker and Eliquis 2.5 twice daily.  Patient reported not taking his prescription. Viktor Quispe left AMA on 7/21/2021. Percell Bliss denies any chest pain, lightheadedness, shortness of breath, fever, chills, nausea, vomiting.  In ER he was found to be tachycardic with heart rate in 140s and was given 1 dose of Lopressor 5 mg    OBJECTIVE     Vital Signs:  /68   Pulse 69   Temp 98.2 °F (36.8 °C) (Oral)   Resp 25   Ht 6' (1.829 m)   Wt 225 lb 12 oz (102.4 kg)   SpO2 97%   BMI 30.62 kg/m²     Temp (24hrs), Av.1 °F (36.7 °C), Min:97.9 °F (36.6 °C), Max:98.6 °F (37 °C)    In: 300   Out: 2600     Physical Exam:  Constitutional: This is a well developed, well nourished, 30-34.9 - Obesity Grade I 66y.o. year old male who is alert, oriented, cooperative and in no apparent distress. Head:normocephalic and atraumatic. EENT:  PERRLA. No conjunctival injections. Septum was midline, mucosa was without erythema, exudates or cobblestoning. No thrush was noted. Neck: Supple without thyromegaly. No elevated JVP. Trachea was midline. Respiratory: Chest was symmetrical without dullness to percussion. Breath sounds bilaterally were clear to auscultation. There were no wheezes, rhonchi or rales. There is no intercostal retraction or use of accessory muscles. No egophony noted. Cardiovascular: Regular without murmur, clicks, gallops or rubs. Abdomen: Slightly rounded and soft without organomegaly. No rebound, rigidity or guarding was appreciated. Lymphatic: No lymphadenopathy. Musculoskeletal: Normal curvature of the spine. No gross muscle weakness. Extremities:  No lower extremity edema, ulcerations, tenderness, varicosities or erythema. Muscle size, tone and strength are normal.  No involuntary movements are noted. Skin:  Warm and dry. Good color, turgor and pigmentation. No lesions or scars.   No cyanosis or clubbing  Neurological/Psychiatric: The patient's general behavior, level of consciousness, thought content and emotional status is normal.        Medications:  Scheduled Medications:    metoprolol tartrate  75 mg Oral BID    guaiFENesin  600 mg Oral BID    lidocaine  5 mL Intradermal Once    atorvastatin  40 mg Oral Nightly    [Held by provider] apixaban  2.5 mg Oral BID    sodium chloride flush  5-40 mL Intravenous 2 times per day     Continuous Infusions:    sodium chloride PRN Medicationsheparin (porcine), 2,300 Units, PRN  heparin (porcine), 2,300 Units, PRN  metoprolol, 2.5 mg, Q6H PRN  sodium chloride flush, 5-40 mL, PRN  sodium chloride, 25 mL, PRN  ondansetron, 4 mg, Q8H PRN   Or  ondansetron, 4 mg, Q6H PRN  polyethylene glycol, 17 g, Daily PRN  acetaminophen, 650 mg, Q6H PRN   Or  acetaminophen, 650 mg, Q6H PRN        Diagnostic Labs:  CBC:   Recent Labs     07/24/21  1525 07/25/21  0605 07/26/21  0436   WBC 5.6 5.6 5.2   RBC 3.60* 3.34* 3.25*   HGB 9.8* 9.1* 8.9*   HCT 32.3* 30.7* 30.2*   MCV 89.7 91.9 92.9   RDW 21.9* 21.8* 22.1*    See Reflexed IPF Result 188     BMP:   Recent Labs     07/24/21  1525 07/25/21  0605 07/26/21  0436    136 136   K 4.7 4.6 5.1   CL 97* 97* 97*   CO2 26 22 21   BUN 39* 46* 62*   CREATININE 9.37* 10.34* 12.91*     BNP: No results for input(s): BNP in the last 72 hours. PT/INR:   Recent Labs     07/24/21  1525   PROTIME 11.9   INR 1.1     APTT:   Recent Labs     07/24/21  1525   APTT 19.5*     CARDIAC ENZYMES: No results for input(s): CKMB, CKMBINDEX, TROPONINI in the last 72 hours. Invalid input(s): CKTOTAL;3  FASTING LIPID PANEL:  Lab Results   Component Value Date    CHOL 173 04/26/2018    HDL 45 04/26/2018    TRIG 87 04/26/2018     LIVER PROFILE:   Recent Labs     07/24/21  1525   AST 14   ALT 20   BILITOT 0.30   ALKPHOS 64      MICROBIOLOGY:   Lab Results   Component Value Date/Time    CULTURE NOT REPORTED 04/02/2017 02:00 PM       Imaging:    IR TUNNELED CVC PLACE WO SQ PORT/PUMP > 5 YEARS    Result Date: 7/26/2021  Successful ultrasound and fluoroscopy guided left internal jugular vein 14.5 Thai by 33 cm tip to cuff tunneled palindrome dialysis catheter placement. Ready for use. 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 This is the reason for the patient's right arm swelling and prolonged bleeding.   The patient will require surgical consultation for new fistula/graft creation in the left or more lower extremity. A tunneled dialysis catheter will be placed for dialysis until new fistula/graft has been created. ASSESSMENT & PLAN     ASSESSMENT / PLAN:     Principal Problem:    Hemorrhage of arteriovenous fistula ([address]): Bleeding controlled. Had complete occlusion of AV fistular. Tunneled dialysis catheter placed in left internal jugular vein. Had some bleeding at the IV site, which is resolved. Vitals stable  Plan: Monitor bleeding   Resume Eliquis if no bleeding   Discharge  Active Problems:    Paroxysmal SVT (supraventricular tachycardia) ([address] 5 beats of V-tach last night. Plan: Cardiology recommended he starts lopressor 75mg daily and Eliquis 2.5mg bid (held pending fistulogram).        Hypertension  Plan: Amlodipine,       Hyperlipidemia  Plan: Lipitor      ESRD on hemodialysis ([address]  Plan: Continue HD per schedule              Nephrology following        DVT ppx : on anti coagulants  GI ppx:      PT/OT: Consulted  Discharge Planning / SW: Case management    Lily Velasco MD  Internal Medicine Resident, PGY-1  [address], [place]  7/27/2021, 7:08 AM   Attending Physician Statement  I have discussed the care of Aries Archuleta, including pertinent history and exam findings,  with the resident. I have seen and examined the patient and the key elements of all parts of the encounter have been performed by me. I agree with the assessment, plan and orders as documented by the resident with additions . Treatment plan Discussed with nursing staff in detail , all questions answered . Electronically signed by Hafsa Fuller MD on   7/27/21 at 11:27 AM EDT    Please note that this chart was generated using voice recognition Dragon dictation software. Although every effort was made to ensure the accuracy of this automated transcription, some errors in transcription may have occurred.

## 2021-07-27 NOTE — DISCHARGE INSTR - COC
Continuity of Care Form    Patient Name: Daniel Mccollum   :  1945  MRN:  [id number]    Admit date:  2021  Discharge date:  ***    Code Status Order: Full Code   Advance Directives:     Admitting Physician:  No admitting provider for patient encounter.   PCP: Jonathan Hoffman MD    Discharging Nurse: [facility] Unit/Room#: 3248/6745-62  Discharging Unit Phone Number: ***    Emergency Contact:   Extended Emergency Contact Information  Primary Emergency Contact: Bea Walker  Address: [place] [address] Phone: [phone]  Mobile Phone: [phone]  Relation: Child  Secondary Emergency Contact: Boo Bone  Address: [place], [place] Phone: [phone]  Mobile Phone: [phone]  Relation: Spouse    Past Surgical History:  Past Surgical History:   Procedure Laterality Date    ABDOMEN SURGERY  2016    PERITONEAL CATHETER    ABDOMEN SURGERY  2016    pd catherter removed from abdomen    BLADDER SURGERY  Spring 2014    dilatated     CAROTID ENDARTERECTOMY  2015    left    COLONOSCOPY  2008    polypectomy, diverticulosis    COLONOSCOPY  2015    COLONOSCOPY  5/10/2018    COLONOSCOPY performed by Caryl Kilgore MD at [address] [place] [address]      bladder tumor removal    DIALYSIS FISTULA CREATION Right 2016    INGUINAL HERNIA REPAIR Right     IR TUNNELED CATHETER PLACEMENT GREATER THAN 5 YEARS  2021    IR TUNNELED CATHETER PLACEMENT GREATER THAN 5 YEARS 2021 [facility]    KIDNEY BIOPSY  May 2013    SINUS SURGERY      TUNNELED VENOUS PORT PLACEMENT Right 10/2015    dialysis catheter- chest     UPPER GASTROINTESTINAL ENDOSCOPY  2015    UPPER GASTROINTESTINAL ENDOSCOPY N/A 2018    EGD BIOPSY performed by Dary Muir DO at \[facility]\"" Endoscopy       Immunization History:   Immunization History   Administered Date(s) Administered    Hepatitis B (Recombivax HB) 11/11/2015, 12/02/2015, 05/27/2016    Hepatitis B vaccine 11/11/2015, 12/02/2015, 05/27/2016    Influenza 09/25/2012    Influenza Virus Vaccine 01/15/2014, 12/03/2014, 09/23/2015, 09/15/2017    Influenza, High Dose (Fluzone 65 yrs and older) 10/08/2018, 10/12/2020    Influenza, Jose Ramon Moyer, IM, (6 mo and older Fluzone, Flulaval, Fluarix and 3 yrs and older Afluria) 12/15/2016    Pneumococcal Conjugate 13-valent (Ekxlhrs24) 10/13/2015    Pneumococcal Conjugate 7-valent (Prevnar7) 01/15/2009    Pneumococcal Polysaccharide (Engczygco81) 06/20/2017       Active Problems:  Patient Active Problem List   Diagnosis Code    BPH (benign prostatic hyperplasia) N40.0    CAD (coronary artery disease) I25.10    Anemia D64.9    CVA (cerebrovascular accident due to intracerebral hemorrhage) ([address] I61.9    Seizure disorder, secondary ([address] G40.909    Seizures ([address] R56.9    Cerebral artery occlusion with cerebral infarction ([address] I63.50    Hypertension I10    Hyperlipidemia E78.5    ESRD on hemodialysis ([address] N18.6, Z99.2    Carotid stenosis, asymptomatic I65.29    Seizure disorder as sequela of cerebrovascular accident ([address] I69.398, G40.909    Chronic ischemic left middle cerebral artery (MCA) stroke I69.30    Folate deficiency E53.8    Bronchitis J40    Sigmoid diverticulosis K57.30    Redundant colon Q43.8    Nonsustained supraventricular tachycardia (HCC) I47.1    NSVT (nonsustained ventricular tachycardia) (HCC) I47.2    Tinea corporis B35.4    Valvular heart disease I38    Palpitations R00.2    Paroxysmal SVT (supraventricular tachycardia) (HCC) I47.1    Hemorrhage of arteriovenous fistula (HCC) T82.838A       Isolation/Infection:   Isolation          No Isolation        Patient Infection Status     None to display          Nurse Assessment:  Last Vital Signs: /63   Pulse 63   Temp 97.6 °F (36.4 °C) (Axillary)   Resp 17   Ht 6' (1.829 m)   Wt 225 lb 12 oz (102.4 kg)   SpO2 95%   BMI 30.62 kg/m²     Last documented pain score (0-10 scale): Pain Level: 7  Last Weight:   Wt Readings from Last 1 Encounters:   21 225 lb 12 oz (102.4 kg)     Mental Status:  {IP PT MENTAL STATUS:}    IV Access:  { ALFREDO IV ACCESS:108238029}    Nursing Mobility/ADLs:  Walking   {CHP DME YBKL:529599470}  Transfer  {CHP DME NVAH:722188973}  Bathing  {CHP DME OTOB:513495738}  Dressing  {CHP DME NBKL:137431196}  Toileting  {CHP DME PFPJ:626221389}  Feeding  {CHP DME EZXC:302638918}  Med Admin  {CHP DME TV}  Med Delivery   { ALFREDO MED Delivery:819671580}    Wound Care Documentation and Therapy:  Incision 12/15/15 Neck Left (Active)   Sharon-wound Assessment Dry 21 0800   Drainage Amount None 21 0800   Dressing Changed Changed/New 21 1300   Dressing Status Clean;Dry; Intact 21 0800   Number of days:        Incision 16 Wrist Lower (Active)   Number of days: 6144        Elimination:  Continence:   · Bowel: {YES / DB:05536}  · Bladder: {YES / BB:33510}  Urinary Catheter: {Urinary Catheter:843801460}   Colostomy/Ileostomy/Ileal Conduit: {YES / F}       Date of Last BM: ***    Intake/Output Summary (Last 24 hours) at 2021 1404  Last data filed at 2021 1934  Gross per 24 hour   Intake 300 ml   Output 2600 ml   Net -2300 ml     I/O last 3 completed shifts:   In: 300   Out: 2600     Safety Concerns:     [address] Safety Concerns:501308109}    Impairments/Disabilities:      [address] Impairments/Disabilities:061546283}    Nutrition Therapy:  Current Nutrition Therapy:   [address] Diet List:571409160}    Routes of Feeding: {CHP DME Other Feedings:196268790}  Liquids: {Slp liquid thickness:03484}  Daily Fluid Restriction: {CHP DME Yes amt example:659073273}  Last Modified Barium Swallow with Video (Video Swallowing Test): {Done Not Done CBIN:520113424}    Treatments at the Time of Hospital Discharge:   Respiratory Treatments: ***  Oxygen Therapy:  {Therapy; copd oxygen:84056}  Ventilator:    { CC Vent UXBQ:014186428}    Rehab Therapies: {THERAPEUTIC INTERVENTION:9672909541}  Weight Bearing Status/Restrictions: { CC Weight Bearin}  Other Medical Equipment (for information only, NOT a DME order):  {EQUIPMENT:040867712}  Other Treatments: ***    Patient's personal belongings (please select all that are sent with patient):  {[facility] DME Belongings:450065552}    RN SIGNATURE:  {Esignature:353157366}    CASE MANAGEMENT/SOCIAL WORK SECTION    Inpatient Status Date: ***    Readmission Risk Assessment Score:  Readmission Risk              Risk of Unplanned Readmission:  17           Discharging to Facility/ Agency   · Name:   · Address:  · Phone:  · Fax:    Dialysis Facility (if applicable)   · Name:  · Address:  · Dialysis Schedule:  · Phone:  · Fax:    / signature: {Esignature:683323663}    PHYSICIAN SECTION    Prognosis: {Prognosis:2844207849}    Condition at Discharge: [address] [place] [address] Patient Condition:810373639}    Rehab Potential (if transferring to Rehab): {Prognosis:4640539774}    Recommended Labs or Other Treatments After Discharge: ***    Physician Certification: I certify the above information and transfer of Ghanshyam Gonzalez  is necessary for the continuing treatment of the diagnosis listed and that he requires {Admit to Appropriate Level of Care:34273} for {GREATER/LESS:735213374} 30 days.      Update Admission H&P: {CHP DME Changes in ZZNB:894708721}    PHYSICIAN SIGNATURE:  {Esignature:915889314}

## 2021-07-27 NOTE — PROGRESS NOTES
Occupational Therapy   Occupational Therapy Initial Assessment  Date: 2021   Patient Name: Kulwinder Crowley  MRN: [id number]     : 1945    Date of Service: 2021     Chief Complaint   Patient presents with    Other     Bleeding from diaylsis       Per chart:  The patient is a pleasant 66 y.o. male PMH ESRD, CAD, CVA () , T2DM, PVD, HTN, HLD, presents with a chief complaint of bleeding from dialysis site. Patient reports that he currently receives dialysis . After he received dialysis yesterday and noticed today that his right aVF was still bleeding. Upon arrival in the ED thrombogenic agents and dressing was placed on the insertion site and bleeding currently is controlled. Patient notably left AMA on  from this facility after being admitted for SVT. Cardiology was consulted with the intent discharge patient with beta-blockers and eliquis. Patient states that he has not taken any of the medications since last admission because he did not pick them up. Denies any smoking hx, alcohol hx or illicit drug hx. at bedside patient will go intermittently into SVT. Patient denies any lightheadedness shortness of breath or chest pain nausea vomiting.       Discharge Recommendations:  Patient would benefit from continued therapy after discharge  OT Equipment Recommendations  Equipment Needed: Yes  Mobility Devices: Mardee Shallow; ADL Assistive Devices  Walker: Rolling  ADL Assistive Devices: Shower Chair with back;Grab Bars - shower    Assessment   Performance deficits / Impairments: Decreased functional mobility ; Decreased ADL status; Decreased endurance;Decreased high-level IADLs  Assessment: Pt demonstrated functional transfers with SBA, functional mobility with CGA and dynamic standing with SBA. Pt completed LB dressing with Mod I sitting in recliner. Stood sinkside and completed grooming tasks with Mod I. Pt limited by decreased endurance from pt's reported baseline.  Pt is expected to benefit from skilled OT services during his acute hospitilization stay to maximize safety and increase independence in ADLs, IADLs and functional mobility tasks. Pt is expected to be safe to return to prior living arragements with assist PRN from family. Prognosis: Good  Decision Making: Medium Complexity  OT Education: Energy Conservation;OT Role;Plan of Care  Patient Education: use of rest breaks, pursed lip breathing tech- good return  REQUIRES OT FOLLOW UP: Yes  Activity Tolerance  Activity Tolerance: Patient Tolerated treatment well  Safety Devices  Safety Devices in place: Yes  Type of devices: Gait belt;Nurse notified;Call light within reach; Left in chair  Restraints  Initially in place: No           Patient Diagnosis(es): The primary encounter diagnosis was Bleeding from venipuncture site. Diagnoses of Paroxysmal supraventricular tachycardia ([address] Paroxysmal SVT (supraventricular tachycardia) ([address] Palpitations, and NSVT (nonsustained ventricular tachycardia) ([address] were also pertinent to this visit. has a past medical history of Allergic rhinitis, Anemia, BPH (benign prostatic hyperplasia), CAD (coronary artery disease), Carotid artery stenosis, Cerebrovascular disease, Diabetes mellitus ([address] Eczema, ESRD (end stage renal disease) on dialysis ([address] Full dentures, GERD (gastroesophageal reflux disease), Gout, Hemodialysis patient ([address] Hyperlipidemia, Hypertension, Neuropathy, Osteoarthritis, Peripheral vascular disease ([address] Seizures ([address] Stroke ([address] and Unspecified cerebral artery occlusion with cerebral infarction. has a past surgical history that includes Bladder surgery (Spring 2014); sinus surgery; Cystocopy (2008); Inguinal hernia repair (Right); Kidney biopsy (May 2013); Upper gastrointestinal endoscopy (1/28/2015); Colonoscopy (2008); Colonoscopy (1/28/2015); Carotid endarterectomy (2015); Abdomen surgery (2016); Dialysis fistula creation (Right, 08/25/2016);  Tunneled venous port placement (Right, 10/2015); Abdomen surgery (05/26/2016); Colonoscopy (5/10/2018); Upper gastrointestinal endoscopy (N/A, 11/1/2018); and IR TUNNELED CVC PLACE WO SQ PORT/PUMP > 5 YEARS (7/26/2021). Restrictions  Restrictions/Precautions  Restrictions/Precautions: General Precautions, Up as Tolerated  Required Braces or Orthoses?: No    Subjective   General  Patient assessed for rehabilitation services?: Yes  Family / Caregiver Present: No  Diagnosis: Hemorrhage of arteriovenouse fistula  General Comment  Comments: RN ok'd for OT eval this AM. Pt agreeable to session, pleasent/cooperative throughout. Patient Currently in Pain: Yes  Pain Assessment  Pain Assessment: 0-10  Pain Level: 7  Pain Type: Chronic pain  Pain Location: Knee  Pain Orientation: Right;Left  Pain Descriptors: Aching  Pain Frequency: Intermittent  Non-Pharmaceutical Pain Intervention(s): Ambulation/Increased Activity; Distraction  Vital Signs  Patient Currently in Pain: Yes  Social/Functional History  Social/Functional History  Lives With: Spouse (Daughter and 8year old great granddaughter)  Type of Home: House  Home Layout: Two level, Bed/Bath upstairs, Laundry in basement  Home Access: Stairs to enter with rails  Entrance Stairs - Number of Steps: 5  Entrance Stairs - Rails: Both  Bathroom Shower/Tub: Tub/Shower unit  Bathroom Toilet: Standard  Bathroom Equipment:  (No equiptment)  Home Equipment: Cane ([facility] uses around as needed most of the time (states about 75% of the time). )  ADL Assistance: Independent  Homemaking Assistance:  (Wife completes)  Homemaking Responsibilities: No (Wife completes)  Meal Prep Responsibility: Secondary  Laundry Responsibility: Secondary  Cleaning Responsibility: Secondary  Shopping Responsibility: Secondary  Ambulation Assistance: Independent  Transfer Assistance: Independent  Active : Yes  Mode of Transportation: Car  Occupation: Retired  Additional Comments: Reports family will be able to assist as needed upon discharge. Objective   Vision: Within Functional Limits  Hearing: Within functional limits    Orientation  Overall Orientation Status: Within Functional Limits  Observation/Palpation  Posture: Fair  Balance  Sitting Balance: Supervision (Sitting EOB unsupported for LB dressing ~6 minutes)  Standing Balance: Stand by assistance  Standing Balance  Time: ~13 minutes  Activity: static standing, dynamic standing sinkside for grooming tasks  Comment: Intermittent 1 UE support on sink PRN  Functional Mobility  Functional - Mobility Device: Rolling Walker  Activity: To/from bathroom (x2)  Assist Level: Contact guard assistance  Functional Mobility Comments: Pt completed functional mobility from EOB <>bathroom 2 x d/t deciding he would like to shave once back at recliner. Use of RW. Slow mobility, but no LOB or unsteadiness throughout. ADL  Feeding: Independent (IND began to eat lunch at end of session)  Grooming: Modified independent ;Setup (Standing sinkside pt washed face, completed oral hygiene and shaved with intermittent 1 UE prop on sink)  UE Bathing: Modified independent ;Setup  LE Bathing: Stand by assistance  UE Dressing: Modified independent   LE Dressing: Stand by assistance (Pt doffed/donned socks sitting at recliner with SUP)  Toileting: Stand by assistance  Tone RUE  RUE Tone: Normotonic  Tone LUE  LUE Tone: Normotonic  Coordination  Movements Are Fluid And Coordinated: Yes     Bed mobility  Comment: Pt in recliner upon arrival and retired to recliner at end of session.   Transfers  Sit to stand: Stand by assistance  Stand to sit: Stand by assistance  Transfer Comments: Use of RW     Cognition  Overall Cognitive Status: WFL        Sensation  Overall Sensation Status: WFL (Denies any numbness or tingling)        LUE AROM (degrees)  LUE AROM : WFL  Left Hand AROM (degrees)  Left Hand AROM: WFL  RUE AROM (degrees)  RUE AROM : WFL  Right Hand AROM (degrees)  Right Hand AROM: WFL  LUE Strength  Gross LUE Strength: WFL  L Hand General: 4+/5  LUE Strength Comment: Grossly 4+/5  RUE Strength  Gross RUE Strength: WFL  R Hand General: 4+/5  RUE Strength Comment: Grossly 4+/5                   Plan   Plan  Times per week: 2-3x/week  Current Treatment Recommendations: Safety Education & Training, Patient/Caregiver Education & Training, Self-Care / ADL, Functional Mobility Training, Endurance Training, Home Management Training      AM-PAC Score        AM-PAC Inpatient Daily Activity Raw Score: 21 (07/27/21 [facility]9)  AM-PAC Inpatient ADL T-Scale Score : 44.27 (07/27/21 [facility])  ADL Inpatient CMS 0-100% Score: 32.79 (07/27/21 [facility]9)  ADL Inpatient CMS G-Code Modifier : Rajat Ruiz (07/27/21 [facility]9)    Goals  Short term goals  Time Frame for Short term goals: By discharge, pt will:  Short term goal 1: Demo functional trasnfers and functional mobility with Mod I, using LRD PRN  Short term goal 2: Demo +30 minutes of activity tolerance throughout functional tasks with SUP to increase participation in ADLs/IADLs  Short term goal 3: Demo LB ADLs with IND  Short term goal 4: Demo UB bathing/dressing IND  Short term goal 5: Demo use of energy conservation tech PRN throughout all functional mobility/ADL tasks with 0 VCs for initiation       Therapy Time   Individual Concurrent Group Co-treatment   Time In 1103         Time Out 1135         Minutes 32         Timed Code Treatment Minutes: 25 Minutes       RIC Hoang/L

## 2021-07-27 NOTE — CARE COORDINATION
Readmission Assessment  Number of Days since last admission?: (P) 1-7 days  Previous Disposition: (P) Home Alone (signed out AMA)  Who is being Interviewed: (P) Patient  What was the patient's/caregiver's perception as to why they think they needed to return back to the hospital?: (P) Other (Comment) (new problem (bleeding from fistula site and unable to get it to stop))  Did you visit your Primary Care Physician after you left the hospital, before you returned this time?: (P) No  Why weren't you able to visit your PCP?: (P) Other (Comment) (no time between appointments)  Did you see a specialist, such as Cardiac, Pulmonary, Orthopedic Physician, etc. after you left the hospital?: (P) No  Who advised the patient to return to the hospital?: (P) Self-referral  Does the patient report anything that got in the way of taking their medications?: (P) Yes (Pt did not  medications from pharmacy, did not take)  In our efforts to provide the best possible care to you and others like you, can you think of anything that we could have done to help you after you left the hospital the first time, so that you might not have needed to return so soon?: (P) Other (Comment) (Was a new problem.  Pt was initially seen for a fib/ second visit was due to fistula bleeding and unable to get it to stop)

## 2021-07-27 NOTE — PROGRESS NOTES
CLINICAL PHARMACY NOTE: MEDS TO BEDS    Total # of Prescriptions Filled: 2   The following medications were delivered to the patient:  · eliquis 2.5mg tablet  · Metoprolol tartrate 75mg tablet    Additional Documentation: medications delivered to the pt in room 404 on 07.27.21 at 14:20, co pay was $7.75, paid with a card

## 2021-07-27 NOTE — PROGRESS NOTES
Physical Therapy    Facility/Department: [facility] 4A STEPDOWN  Initial Assessment    NAME: Noé Arredondo  : 1945  MRN: [id number]    Date of Service: 2021  History copied and pasted from H+P:  The patient is a pleasant 66 y.o. male PMH ESRD, CAD, CVA () , T2DM, PVD, HTN, HLD, presents with a chief complaint of bleeding from dialysis site. Patient reports that he currently receives dialysis . After he received dialysis yesterday and noticed today that his right aVF was still bleeding. Upon arrival in the ED thrombogenic agents and dressing was placed on the insertion site and bleeding currently is controlled. Patient notably left AMA on  from this facility after being admitted for SVT. Cardiology was consulted with the intent discharge patient with beta-blockers and eliquis. Patient states that he has not taken any of the medications since last admission because he did not pick them up. Denies any smoking hx, alcohol hx or illicit drug hx. at bedside patient will go intermittently into SVT. Patient denies any lightheadedness shortness of breath or chest pain nausea vomiting. Discharge Recommendations:  No further therapy required at discharge. Pt is talking about having B TKA d/t knee pain and wanting to be able to be more active, shahida re: tennis coaching. PT Equipment Recommendations  Equipment Needed: No    Assessment    Pt cooperative, motivated, c/o B knee pain (chronic) with thoughts of B TKA in the future per pt. He has difficulty coming to stand, but once up and moving does well with straight cane. Body structures, Functions, Activity limitations: Decreased functional mobility ; Decreased balance; Increased pain;Decreased posture  Prognosis: Good  Decision Making: Medium Complexity  PT Education: Goals;PT Role;Plan of Care  REQUIRES PT FOLLOW UP: Yes  Activity Tolerance  Activity Tolerance: Patient Tolerated treatment well       Patient Diagnosis(es): 0-10  Pain Level: 7  Patient's Stated Pain Goal: No pain  Pain Type: Chronic pain  Pain Location: Knee  Pain Orientation: Right;Left  Pain Descriptors: Aching  Pain Frequency: Intermittent  Pain Onset: On-going  Clinical Progression: Not changed  Functional Pain Assessment: Prevents or interferes some active activities and ADLs  Vital Signs  Patient Currently in Pain: Yes  Pre Treatment Pain Screening  Intervention List: Patient able to continue with treatment    Orientation  Orientation  Overall Orientation Status: Within Normal Limits  Social/Functional History  Social/Functional History  Lives With: Spouse  Type of Home: House  Home Layout: Two level, Bed/Bath upstairs  Home Access: Stairs to enter with rails  Entrance Stairs - Number of Steps: 5  Entrance Stairs - Rails: Both (but can only reach one at a time)  Home Equipment: [employer]  ADL Assistance: Independent  Ambulation Assistance: Independent  Transfer Assistance: Independent  Active :  Yes  Additional Comments:  (coaches tennis)    Objective     Observation/Palpation  Posture: Fair    AROM RLE (degrees)  RLE AROM: WFL  AROM LLE (degrees)  LLE AROM : WFL  AROM RUE (degrees)  RUE AROM : WFL  AROM LUE (degrees)  LUE AROM : WFL  Strength RLE  Strength RLE: WFL  Strength LLE  Strength LLE: WFL  Strength RUE  Strength RUE: WFL  Strength LUE  Strength LUE: WFL  Tone RLE  RLE Tone: Normotonic  Tone LLE  LLE Tone: Normotonic  Motor Control  Gross Motor?: WFL  Sensation  Overall Sensation Status: WFL (denies N/T)  Bed mobility  Rolling to Left: Independent  Supine to Sit: Independent  Scooting: Independent  Transfers  Sit to Stand: Supervision  Stand to sit: Supervision  Bed to Chair: Supervision  Stand Pivot Transfers: Supervision  Ambulation  Ambulation?: Yes  Ambulation 1  Surface: level tile  Device: Single point cane  Assistance: Supervision  Quality of Gait: tends to keep head forward/looks at the ground; able to correct with frequent verbal cues  Gait Deviations: Slow B[place]; Increased YESENIA; Decreased arm swing;Decreased head and trunk rotation  Distance: 140'x2  Stairs/Curb  Stairs?: Yes  Stairs  # Steps : 5  Rails: Right ascending  Device: Single pt cane  Assistance: Supervision  Comment: marks time up and down stairs     Balance  Posture: Fair  Sitting - Static: Good  Sitting - Dynamic: Good  Standing - Static: Good  Standing - Dynamic: [address]  Times per week: 5 visits weekly  Times per day: Daily  Current Treatment Recommendations: Strengthening, ROM, Balance Training, Functional Mobility Training, Transfer Training, Endurance Training, Gait Training, Stair training, Pain Management, Safety Education & Training, Patient/Caregiver Education & Training  Safety Devices  Type of devices: Call light within reach, Gait belt, Patient at risk for falls, Left in chair, Nurse notified  Restraints  Initially in place: No    AM-PAC Score  AM-PAC Inpatient Mobility Raw Score : 21 (07/27/21 1120)  AM-PAC Inpatient T-Scale Score : 50.25 (07/27/21 1120)  Mobility Inpatient CMS 0-100% Score: 28.97 (07/27/21 1120)  Mobility Inpatient CMS G-Code Modifier : CJ (07/27/21 1120)  Tinetti score: 19/28   moderate risk of falls   Goals  Short term goals  Time Frame for Short term goals: 8 visits  Short term goal 1: independent transfers  Short term goal 2: independent gait with appropriate device x 200'  Short term goal 3: independent stair ambulation x 5 steps with 1 HR and straight cane  Short term goal 4: prevent loss of strength/mobility/independence while pt is in the hospital  Patient Goals   Patient goals : return home       Therapy Time   Individual Concurrent Group Co-treatment   Time In 1025         Time Out 1117         Minutes 52                 Verlee Socks, PT

## 2021-07-27 NOTE — PLAN OF CARE
Problem: Bleeding:  Goal: Will show no signs and symptoms of excessive bleeding  Description: Will show no signs and symptoms of excessive bleeding  7/27/2021 0958 by Bibi Clark RN  Outcome: Ongoing  7/27/2021 0343 by Ludwig Kumar RN  Outcome: Met This Shift     Problem:  Activity:  Goal: Fatigue will decrease  Description: Fatigue will decrease  7/27/2021 0958 by Bibi Clark RN  Outcome: Ongoing  7/27/2021 0343 by Ludwig Kumar RN  Outcome: Met This Shift  Goal: Risk for activity intolerance will decrease  Description: Risk for activity intolerance will decrease  7/27/2021 0958 by Bibi Clark RN  Outcome: Ongoing  7/27/2021 0343 by Ludwig Kumar RN  Outcome: Met This Shift     Problem: Fluid Volume:  Goal: Will show no signs or symptoms of fluid imbalance  Description: Will show no signs or symptoms of fluid imbalance  7/27/2021 0958 by Bibi Clark RN  Outcome: Ongoing  7/27/2021 0343 by Ludwig Kumar RN  Outcome: Met This Shift     Problem: Health Behavior:  Goal: Ability to manage health-related needs will improve  Description: Ability to manage health-related needs will improve  7/27/2021 0958 by Bibi Clark RN  Outcome: Ongoing  7/27/2021 0343 by Ludwig Kumar RN  Outcome: Met This Shift  Goal: Identification of resources available to assist in meeting health care needs will improve  Description: Identification of resources available to assist in meeting health care needs will improve  7/27/2021 0958 by Bibi Clark RN  Outcome: Ongoing  7/27/2021 0343 by Ludwig Kumar RN  Outcome: Met This Shift     Problem: Nutritional:  Goal: Ability to identify appropriate dietary choices will improve  Description: Ability to identify appropriate dietary choices will improve  7/27/2021 0958 by Bibi Clark RN  Outcome: Ongoing  7/27/2021 0343 by Ludwig Kumar RN  Outcome: Met This Shift     Problem: Physical Regulation:  Goal: Ability to maintain clinical measurements within normal limits will improve  Description: Ability to maintain clinical measurements within normal limits will improve  7/27/2021 0958 by Michael Miguel RN  Outcome: Ongoing  7/27/2021 0343 by Tarik Farris RN  Outcome: Met This Shift  Goal: Complications related to the disease process, condition or treatment will be avoided or minimized  Description: Complications related to the disease process, condition or treatment will be avoided or minimized  7/27/2021 0958 by Michael Miguel RN  Outcome: Ongoing  7/27/2021 0343 by Tarik Farris RN  Outcome: Met This Shift     Problem: Sensory:  Goal: General experience of comfort will improve  Description: General experience of comfort will improve  7/27/2021 0958 by Michael Miguel RN  Outcome: Ongoing  7/27/2021 0343 by Tarik Farris RN  Outcome: Met This Shift     Problem: Skin Integrity:  Goal: Status of oral mucous membranes will improve  Description: Status of oral mucous membranes will improve  7/27/2021 0958 by Michael Miguel RN  Outcome: Ongoing  7/27/2021 0343 by Tarik Farris RN  Outcome: Met This Shift  Goal: Skin integrity will be maintained  Description: Skin integrity will be maintained  7/27/2021 0958 by Michael Miguel RN  Outcome: Ongoing  7/27/2021 0343 by Tarik Farris RN  Outcome: Met This Shift     Problem: Falls - Risk of:  Goal: Will remain free from falls  Description: Will remain free from falls  7/27/2021 0958 by Michael Miguel RN  Outcome: Ongoing  7/27/2021 0343 by Tarik Farris RN  Outcome: Met This Shift  Goal: Absence of physical injury  Description: Absence of physical injury  7/27/2021 0958 by Michael Miguel RN  Outcome: Ongoing  7/27/2021 0343 by Tarik Farris RN  Outcome: Met This Shift     Problem: ABCDS Injury Assessment  Goal: Absence of physical injury  7/27/2021 0958 by Michael Miguel RN  Outcome: Ongoing  7/27/2021 0343 by Tarik Farris RN  Outcome: Met This Shift

## 2021-07-27 NOTE — DISCHARGE SUMMARY
[facility]     Department of Internal Medicine - Staff Internal Medicine Teaching Service    INPATIENT DISCHARGE SUMMARY      Patient Identification:  Reinaldo Alexander is a 66 y.o. male. :  1945  MRN: [id number]     Acct: [de-identified]   PCP: Mike Greene MD  Admit Date:  2021  Discharge date and time: 2021  7:07 PM   Attending Provider: No att. providers found                                     [address] Problem Lists:  Principal Problem:    NSVT (nonsustained ventricular tachycardia) ([facility] [address]  Active Problems:    Hypertension    Hyperlipidemia    ESRD on hemodialysis (HCC)    Bronchitis    Nonsustained supraventricular tachycardia (HCC)    Tinea corporis    Valvular heart disease    Palpitations  Resolved Problems:    * No resolved hospital problems. *      HOSPITAL STAY     Brief Inpatient course:   Reinaldo Alexander is a 66 y.o. male who was admitted for the management of NSVT (nonsustained ventricular tachycardia) ([facility] [address] presented to the emergency department from dialysis, where they found that he had intermittent SVT's. He admitted to having some palpitations. During admission he had SVTs with rates up to 143, lasting 9 about 90 seconds. HR stabilized with metoprolol. Cardiology ordered ECHO, but he left AMA.    Also saw the nephrologist for management of his ESRD      Procedures/ Significant Interventions:    none    Consults:     Consults:     Final Specialist Recommendations/Findings:   IP CONSULT TO IV TEAM  IP CONSULT TO NEPHROLOGY  IP CONSULT TO INTERNAL MEDICINE  IP CONSULT TO CASE MANAGEMENT  IP CONSULT TO CARDIOLOGY      Any Hospital Acquired Infections: none    Discharge Functional Status:  stable    DISCHARGE PLAN     Disposition: home    Patient Instructions:   Discharge Medication List as of 2021 10:05 AM      CONTINUE these medications which have NOT CHANGED    Details   Ciclopirox (LOPROX) 0.77 % gel 2/day to affected areas, Disp-30 g, R-3, Normal      folic acid (FOLVITE) 1 MG tablet Take 1 tablet by mouth daily, Disp-30 tablet, R-3Normal      atorvastatin (LIPITOR) 40 MG tablet Take 1 tablet by mouth nightly, Disp-30 tablet, R-3Normal      amLODIPine (NORVASC) 10 MG tablet Take 1 tablet by mouth daily, Disp-30 tablet, R-3Normal      RENVELA 800 MG tablet Take 1 tablet by mouth 3 times daily, Disp-90 tablet, R-1, DAWNormal      fluticasone (FLONASE) 50 MCG/ACT nasal spray 1 spray by Each Nostril route daily 1 Spray in each nostril, Disp-2 Bottle, R-1Normal      Multiple Vitamins-Minerals (RENAPLEX-D PO) Take by mouthHistorical Med      allopurinol (ZYLOPRIM) 100 MG tablet take 1 tablet by mouth once daily, Disp-30 tablet, R-5Normal      cinacalcet (SENSIPAR) 30 MG tablet Take 30 mg by mouth dailyHistorical Med      calcium acetate (PHOSLO) 667 MG capsule Take 3 capsules by mouth 3 times daily (with meals), Disp-60 capsule, R-0Historical Med      hydrocortisone (ANUSOL-HC) 2.5 % rectal cream Place rectally 2 times daily. , Disp-15 g, R-2, Normal             Activity: activity as tolerated    Diet: cardiac diet, low fat, low cholesterol diet and renal diet    Follow-up:    Mike Greene MD  [facility] [address] [facility] [address] [phone]    Schedule an appointment as soon as possible for a visit in 1 week  ESRD and NSVT with 2:1 atrial flutter s/p ECHO.     [facility]  [address] [facility] [address] [phone]  Go to  As needed    Melecio Shanks MD  [address] [facility] 6  [facility] [address] [phone]    Schedule an appointment as soon as possible for a visit in 2 weeks  OP stress test and admiited for  NSVT with 2:1 Kelsy Odell MD  [address] 400 [facility] [address] [phone]    Schedule an appointment as soon as possible for a visit in 1 day  ESRD on HD       Patient Instructions:   Please continue your regular Dialysis on  ,  and  at [facility] hemodialysis facility. Do your stress test and follow-up with the cardiologist.   Follow-up with the nephrologist  Follow-up with your PCP  Continue taking your blood pressure and anticoagulation medications as prescribed. Follow up labs: none  Follow up imaging: none    Note that over 30 minutes was spent in preparing discharge papers, discussing discharge with patient, medication review, etc.      Kaitlynn Cooper MD, MD  Internal Medicine Resident, PGY-1  [address] [place], [place]  7/27/2021, 10:17 AM

## 2021-07-29 ENCOUNTER — TELEPHONE (OUTPATIENT)
Dept: INTERNAL MEDICINE | Age: 76
End: 2021-07-29

## 2021-07-29 NOTE — DISCHARGE SUMMARY
[address]     Department of Internal Medicine - Staff Internal Medicine Teaching Service    INPATIENT DISCHARGE SUMMARY      Patient Identification:  Mariana Hudson is a 66 y.o. male. :  1945  MRN: [id number]     Acct: [de-identified]   PCP: Naeem Teixeira MD  Admit Date:  2021  Discharge date and time: 2021  2:00 PM   Attending Provider: No att. providers found                                     [address] [facility] [address] Problem Lists:  Principal Problem:    Hemorrhage of arteriovenous fistula ([address]  Active Problems:    CAD (coronary artery disease)    Hypertension    Hyperlipidemia    ESRD on hemodialysis (HCC)    Paroxysmal SVT (supraventricular tachycardia) ([address]  Resolved Problems:    * No resolved hospital problems. *      HOSPITAL STAY     Brief Inpatient course:   Mariana Hudson is a 66 y.o. male who was admitted for the management of Hemorrhage of arteriovenous fistula ([address] presented to the emergency department with bleeding from his Hemodialysis site at the right forearm. IR fistulogram revealed complete occlusion of innominate AV-fistula. He had a tunneld dialysis catheter placed for dialysis. Site was monitored for Hemostasis. He underwent dialysis with no issues. Also managed for his NSVTs with beta-blockers and Eliquis      Procedures/ Significant Interventions:    Percutaneous Access right AVF;    Tunneled dialysis cath placement    Consults:     Consults:     Final Specialist Recommendations/Findings:   IP CONSULT TO INTERNAL MEDICINE  IP CONSULT TO CARDIOLOGY  IP CONSULT TO NEPHROLOGY  IP CONSULT TO CARDIOLOGY  IP CONSULT TO NEPHROLOGY  IP CONSULT TO CASE MANAGEMENT  IP CONSULT TO IV TEAM      Any Hospital Acquired Infections: none    Discharge Functional Status:  stable    DISCHARGE PLAN     Disposition: home    Patient Instructions:   Discharge Medication List as of 2021  4:39 PM      START taking these medications    Details apixaban (ELIQUIS) 2.5 MG TABS tablet Take 1 tablet by mouth 2 times daily, Disp-60 tablet, R-1Normal      metoprolol tartrate 75 MG TABS Take 75 mg by mouth 2 times daily, Disp-60 tablet, R-3Normal         CONTINUE these medications which have NOT CHANGED    Details   Ciclopirox (LOPROX) 0.77 % gel 2/day to affected areas, Disp-30 g, R-3, Normal      folic acid (FOLVITE) 1 MG tablet Take 1 tablet by mouth daily, Disp-30 tablet, R-3Normal      atorvastatin (LIPITOR) 40 MG tablet Take 1 tablet by mouth nightly, Disp-30 tablet, R-3Normal      fluticasone (FLONASE) 50 MCG/ACT nasal spray 1 spray by Each Nostril route daily 1 Spray in each nostril, Disp-2 Bottle, R-1Normal      Multiple Vitamins-Minerals (RENAPLEX-D PO) Take by mouthHistorical Med         STOP taking these medications       amLODIPine (NORVASC) 10 MG tablet Comments:   Reason for Stopping:         RENVELA 800 MG tablet Comments:   Reason for Stopping:         allopurinol (ZYLOPRIM) 100 MG tablet Comments:   Reason for Stopping:         cinacalcet (SENSIPAR) 30 MG tablet Comments:   Reason for Stopping:         calcium acetate (PHOSLO) 667 MG capsule Comments:   Reason for Stopping:         hydrocortisone (ANUSOL-HC) 2.5 % rectal cream Comments:   Reason for Stopping:               Activity: activity as tolerated    Diet: cardiac diet, low fat, low cholesterol diet and renal diet    Follow-up:    Leann Mo MD  [facility] [address] [phone]    Schedule an appointment as soon as possible for a visit today  for followup and reevaluation in 1-2 days    [facility]  [address] [facility] [address] [phone]  Go to  As needed, If symptoms worsen    MD Alfred Piper [address]  [facility]  [phone]    Schedule an appointment as soon as possible for a visit  dialysis access revision    Leann Mo MD  [address] [address]  [phone]    In 1 week  General post-hospital discharge follow-up    [facility] CARDIO  [address] [address]  [phone]  Schedule an appointment as soon as possible for a visit  for arrhythmia    [facility] NEPHRO  [address]  Schedule an appointment as soon as possible for a visit  For CKD management      Patient Instructions: Please take medications as prescribed. Please follow-up per the \"Follow-Up\" section of the discharge instructions. Please continue to attend your weekly dialysis sessions. We have restarted your Eliquis-If you develop bleeding please call your primary care provider and/or return to the Emergency Department for evaluation. Please get vaccinated against COVID-19 it is free and it saves lives. You were seen and evaluated in the emergency department with a complaint of bleeding. You did not want further evaluation/workup. Our evaluation showed no acute abnormalities requiring emergent intervention nor admission to the hospital. Please follow-up with your primary care physician. Call for an appointment to be seen within the next 1 to 2 days. If you do not have a primary care physician we have given you information on obtaining one. Please continue taking all prescribed medications. Please continue to socially distance, wear a mask, wash your hands frequently. Please return to the emergency department immediately with any new or concerning symptoms occluding but not limited to any chest pain any fevers any inability to tolerate oral intake any severe abdominal pain any difficulty breathing any new rashes any sudden onset of any weakness severe fever unresponsive to home medications or anything else that is new or concerning to you. If you continue to have worsening of your current symptoms return to the emergency department immediately for reevaluation. Follow up labs:    Follow up imaging:     Note that over 30 minutes was spent in preparing discharge papers, discussing discharge with patient, medication review, etc.      Cristiane Muniz MD, MD  Internal Medicine Resident, PGY-1  [facility];  [place], [place]  7/29/2021, 12:42 PM

## 2021-09-16 ENCOUNTER — OFFICE VISIT (OUTPATIENT)
Dept: INTERNAL MEDICINE | Age: 76
End: 2021-09-16
Payer: MEDICARE

## 2021-09-16 VITALS
WEIGHT: 224 LBS | BODY MASS INDEX: 29.69 KG/M2 | SYSTOLIC BLOOD PRESSURE: 137 MMHG | TEMPERATURE: 97.9 F | DIASTOLIC BLOOD PRESSURE: 72 MMHG | OXYGEN SATURATION: 90 % | HEIGHT: 73 IN | HEART RATE: 75 BPM

## 2021-09-16 DIAGNOSIS — I47.1 PAROXYSMAL SVT (SUPRAVENTRICULAR TACHYCARDIA) (HCC): ICD-10-CM

## 2021-09-16 DIAGNOSIS — Z23 NEED FOR DIPHTHERIA-TETANUS-PERTUSSIS (TDAP) VACCINE: ICD-10-CM

## 2021-09-16 DIAGNOSIS — L20.84 INTRINSIC ECZEMA: Primary | ICD-10-CM

## 2021-09-16 DIAGNOSIS — Z09 HOSPITAL DISCHARGE FOLLOW-UP: ICD-10-CM

## 2021-09-16 DIAGNOSIS — Z13.220 NEED FOR LIPID SCREENING: ICD-10-CM

## 2021-09-16 DIAGNOSIS — I25.10 CORONARY ARTERY DISEASE INVOLVING NATIVE CORONARY ARTERY OF NATIVE HEART WITHOUT ANGINA PECTORIS: Chronic | ICD-10-CM

## 2021-09-16 DIAGNOSIS — N52.9 VASCULOGENIC ERECTILE DYSFUNCTION, UNSPECIFIED VASCULOGENIC ERECTILE DYSFUNCTION TYPE: ICD-10-CM

## 2021-09-16 PROCEDURE — 99211 OFF/OP EST MAY X REQ PHY/QHP: CPT | Performed by: INTERNAL MEDICINE

## 2021-09-16 PROCEDURE — 1111F DSCHRG MED/CURRENT MED MERGE: CPT | Performed by: STUDENT IN AN ORGANIZED HEALTH CARE EDUCATION/TRAINING PROGRAM

## 2021-09-16 PROCEDURE — 99214 OFFICE O/P EST MOD 30 MIN: CPT | Performed by: STUDENT IN AN ORGANIZED HEALTH CARE EDUCATION/TRAINING PROGRAM

## 2021-09-16 RX ORDER — ATORVASTATIN CALCIUM 40 MG/1
40 TABLET, FILM COATED ORAL DAILY
Qty: 30 TABLET | Refills: 3 | Status: SHIPPED | OUTPATIENT
Start: 2021-09-16 | End: 2022-08-18 | Stop reason: SDUPTHER

## 2021-09-16 RX ORDER — SILDENAFIL CITRATE 20 MG/1
20 TABLET ORAL DAILY PRN
Qty: 60 TABLET | Refills: 0 | Status: SHIPPED | OUTPATIENT
Start: 2021-09-16 | End: 2021-12-30 | Stop reason: ALTCHOICE

## 2021-09-16 RX ORDER — TRIAMCINOLONE ACETONIDE 1 MG/ML
LOTION TOPICAL
Qty: 1 EACH | Refills: 1 | Status: SHIPPED | OUTPATIENT
Start: 2021-09-16 | End: 2021-09-23

## 2021-09-16 ASSESSMENT — ENCOUNTER SYMPTOMS
EYE DISCHARGE: 0
VOMITING: 0
CHEST TIGHTNESS: 0
DIARRHEA: 0
ABDOMINAL PAIN: 0
NAUSEA: 0
SHORTNESS OF BREATH: 0
BACK PAIN: 0
PHOTOPHOBIA: 0
COUGH: 0
RHINORRHEA: 0

## 2021-09-16 NOTE — PROGRESS NOTES
Attending Physician Statement  I have discussed the care of Ying Cardoza including pertinent history and exam findings,  with the resident. I have reviewed the key elements of all parts of the encounter with the resident. I agree with the assessment, plan and orders as documented by the resident.   (GE Modifier)    MD BAYLEE Kilpatrick  Attending Physician, [address] [place] [address], Internal Medicine Residency Program  [address] [place] [address]  9/16/2021, 4:29 PM

## 2021-09-16 NOTE — PROGRESS NOTES
[facility], [place] [address] [address]  Dept: [phone]  Dept Fax: [phone]    Office Progress/Follow Up Note  Date of patient's visit: 9/16/2021  Patient's Name:  Candelaria Benedict YOB: 1945            Patient Care Team:  Scottie Nazario MD as PCP - Anyi Jane MD as PCP - [facility] MD Esther as Consulting Physician (Urology)  Tapan Damon MD as Consulting Physician (Gastroenterology)  Scottie Nazario MD (Internal Medicine)  [address] [address] Dialysis (Dialysis)  Brittney Greenwood MD as Surgeon (General Surgery)  ________________________________________________________________________      Reason for Visit: Post hospital  ________________________________________________________________________  Chief Complaint:  Follow-Up from Hospital ([facility] 7/19-7/22, 7/24-7/27 palpitations), Health Maintenance (lab pended, pt declined vaccines, awv scheduled), and Cough (x1 yr on and off)    ________________________________________________________________________  History of Presenting Illness:  History was obtained from: patient, electronic medical record. Candelaria Benedict is a 64 y.o. is here for a follow up visit. He was admitted to [facility] in July for SVT that occurred during dialysis. During the workup he was found to have aortic stenosis. He left A at the time. On this visit he reports he is taking Eliquis and Metoprolol. He has not been taking any other medications. On Chart review it appears that he was supposed to continue his statins as well. Discussed this with him and he thought he was supposed to stop everything else. He also has complains of a dry skin rash that he has had for a year and half. Appears keratotic, with wide distribution over his arms and torso. Looks like eczema. He reports using Vaseline for moisturizer.  He states that he has a dermatologist in [employer] that he wants to go to. He also complains of knee pain but plans to follow up with a specialist that one of his friend recommends. He also complains of decreased erections and would like medication for this. Patient Active Problem List   Diagnosis    BPH (benign prostatic hyperplasia)    CAD (coronary artery disease)    Anemia    CVA (cerebrovascular accident due to intracerebral hemorrhage) ([facility]    Seizure disorder, secondary (HCC)    [facility]    Cerebral artery occlusion with cerebral infarction [facility]    Hypertension    Hyperlipidemia    ESRD on hemodialysis ([facility]    Carotid stenosis, asymptomatic    Seizure disorder as sequela of cerebrovascular accident ([facility]    Chronic ischemic left middle cerebral artery (MCA) stroke    Folate deficiency    Bronchitis    Sigmoid diverticulosis    Redundant colon    Nonsustained supraventricular tachycardia (HCC)    [facility]ust[facility]    Tinea corporis    Valvular heart disease    Palpitations    Paroxysmal SVT (supraventricular tachycardia) ([facility])    Hemorrhage of arteriovenous fistula ([facility])       No Known Allergies      Current Outpatient Medications   Medication Sig Dispense Refill    apixaban (ELIQUIS) 2.5 MG TABS tablet Take 1 tablet by mouth 2 times daily 60 tablet 1    metoprolol tartrate 75 MG TABS Take 75 mg by mouth 2 times daily 60 tablet 3     No current facility-administered medications for this visit.        Social History     Tobacco Use    Smoking status: Never Smoker    Smokeless tobacco: Never Used   Vaping Use    Vaping Use: Never used   Substance Use Topics    Alcohol use: No     Comment: none    Drug use: No       Family History   Problem Relation Age of Onset    Diabetes Mother     Kidney Disease Mother         Dialysis    Cancer Mother         KIDNEY    Anesth Problems Mother         delayed awakening     Other Father         Marveen Check    Diabetes Sister     High Blood Pressure Sister     Cancer Brother       ________________________________________________________________________  Review of Systems:  Review of Systems   Constitutional: Negative for activity change, appetite change, chills, diaphoresis, fever and unexpected weight change. HENT: Negative for ear discharge, ear pain, rhinorrhea and tinnitus. Eyes: Negative for photophobia and discharge. Respiratory: Negative for cough, chest tightness and shortness of breath. Cardiovascular: Negative for chest pain. Gastrointestinal: Negative for abdominal pain, diarrhea, nausea and vomiting. Genitourinary: Positive for decreased urine volume. Negative for dysuria and frequency. Musculoskeletal: Positive for arthralgias (knee pain). Negative for back pain and neck pain. Skin: Negative for rash and wound. Neurological: Negative for seizures, weakness and headaches. Psychiatric/Behavioral: Negative for agitation and confusion. ________________________________________________________________________  Physical Exam:  Vitals:    09/16/21 1541   BP: 137/72   Site: Left Upper Arm   Position: Sitting   Cuff Size: Medium Adult   Pulse: 75   Temp: 97.9 °F (36.6 °C)   TempSrc: Infrared   SpO2: 90%   Weight: 224 lb (101.6 kg)   Height: 6' 1\" (1.854 m)     BP Readings from Last 3 Encounters:   09/16/21 137/72   07/27/21 129/68   07/22/21 (!) 144/69      Physical Exam -  Constitutional:  Alert, cooperative and no distress. Mental Status:  Oriented to person, place and time and normal affect. Lungs:  Bilateral air entry present, lung fields clear. Normal effort. Heart:  Regular rate and rhythm, systolic murmur present. Abdomen:  Soft, nontender, nondistended, normal bowel sounds. Extremities:  No edema, redness, tenderness in the calves. Skin:  Warm, dry.  Widely distributed hyperkeratotic non erythematous rash with dry skin present.    ________________________________________________________________________  Diagnostic findings:  CBC:  Lab Results   Component Value Date    WBC 6.0 07/27/2021    HGB 8.6 07/27/2021    PLT See Reflexed IPF Result 07/27/2021    PLT Platelet clumps present, count appears adequate. 09/16/2011       BMP:    Lab Results   Component Value Date     07/27/2021    K 4.9 07/27/2021    CL 97 07/27/2021    CO2 23 07/27/2021    BUN 53 07/27/2021    CREATININE 11.41 07/27/2021    GLUCOSE 86 07/27/2021    GLUCOSE 87 09/16/2011       HEMOGLOBIN A1C:   Lab Results   Component Value Date    LABA1C 5.1 04/11/2019       FASTING LIPID PANEL:  Lab Results   Component Value Date    CHOL 173 04/26/2018    HDL 45 04/26/2018    TRIG 87 04/26/2018     ________________________________________________________________________  Health Maintenance:  Health Maintenance Due   Topic Date Due    DTaP/Tdap/Td vaccine (1 - Tdap) Never done    Lipid screen  04/26/2019    Annual Wellness Visit (AWV)  Never done    Flu vaccine (1) 09/01/2021     Ordering Tdap  ________________________________________________________________________  Assessment and Plan:  Carla Leal was seen today for follow-up from hospital, health maintenance and cough. Diagnoses and all orders for this visit:      1. Hospital discharge follow-up  - Medications reconciled. Restarted statins. 2. Intrinsic eczema  - States he will follow up with a dermatologist of his choice. - triamcinolone (KENALOG) 0.1 % lotion; Apply topically 3 times daily. Dispense: 1 each; Refill: 1    3. Vasculogenic erectile dysfunction, unspecified vasculogenic erectile dysfunction type  - Reports poor erections but with adequate arousal.  - sildenafil (REVATIO) 20 MG tablet; Take 1 tablet by mouth daily as needed (erectile dysfunction)  Dispense: 60 tablet; Refill: 0    4. Need for lipid screening  - Lipid Panel; Future    5.  Coronary artery disease involving native coronary artery of

## 2021-09-16 NOTE — PATIENT INSTRUCTIONS
DO NOT TAKE Sildenafil before dialysis on dialysis days as it will cause low blood pressure interfering with your dialysis. Return To Clinic 9/21/2021 . After Visit Summary  mailed to patient. It is very important for your care that you keep your appointment. If for some reason you are unable to keep your appointment it is equally important that you call our office at [phone] to cancel your appointment and reschedule.  Failure to do so may result in your termination from our practice.     -Bloodwork orders given to patient, they will have them done before their next visit.     -Francesca Resendez

## 2021-09-24 ENCOUNTER — HOSPITAL ENCOUNTER (EMERGENCY)
Age: 76
Discharge: HOME | End: 2021-09-24
Attending: EMERGENCY MEDICINE
Payer: MEDICARE

## 2021-09-24 ENCOUNTER — APPOINTMENT (OUTPATIENT)
Dept: GENERAL RADIOLOGY | Age: 76
End: 2021-09-24
Payer: MEDICARE

## 2021-09-24 VITALS
HEART RATE: 82 BPM | OXYGEN SATURATION: 100 % | DIASTOLIC BLOOD PRESSURE: 85 MMHG | RESPIRATION RATE: 16 BRPM | HEIGHT: 73 IN | WEIGHT: 266.1 LBS | SYSTOLIC BLOOD PRESSURE: 169 MMHG | BODY MASS INDEX: 35.27 KG/M2 | TEMPERATURE: 98.2 F

## 2021-09-24 DIAGNOSIS — I47.1 PAROXYSMAL SUPRAVENTRICULAR TACHYCARDIA (HCC): Primary | ICD-10-CM

## 2021-09-24 PROCEDURE — 93005 ELECTROCARDIOGRAM TRACING: CPT | Performed by: STUDENT IN AN ORGANIZED HEALTH CARE EDUCATION/TRAINING PROGRAM

## 2021-09-24 PROCEDURE — 99285 EMERGENCY DEPT VISIT HI MDM: CPT

## 2021-09-24 PROCEDURE — 71045 X-RAY EXAM CHEST 1 VIEW: CPT

## 2021-09-24 NOTE — ED NOTES
Bed: 17  Expected date:   Expected time:   Means of arrival:   Comments:  DELMA 906 Rogelio Huddleston RN  09/24/21 0993

## 2021-09-24 NOTE — ED NOTES
Patient needs ride home. Attempted to call wife, phone call went straight to voicemail. Patient's daughter out of town and unable to . [employer] cab scheduled.      Kriss Willoughby, BILL  09/24/21 6889

## 2021-09-24 NOTE — ED PROVIDER NOTES
[facility] ED  Emergency Department Encounter  Emergency Medicine Resident     Pt Name: Pavithra Live  MRN: [id number]  [place] 1945  Date of evaluation: 9/24/21  PCP:  Susan Storm MD    [address] [place] [address]       Chief Complaint   Patient presents with    Dizziness    Nausea       HISTORY OFPRESENT ILLNESS  (Location/Symptom, Timing/Onset, Context/Setting, Quality, Duration, Modifying Factors,Severity.)      Pavithra Live is a 64 y. o.yo male who presents from his dialysis center due to a short run of tachycardia. Patient has history of CAD, end-stage renal disease on Monday Wednesday Friday dialysis, paroxysmal SVT, history of stroke and multiple medical comorbidities. Patient was at dialysis today when he felt like he needed to have a bowel movements, he subsequently was hooked up to the dialysis machine and had a short run of SVT. Patient was asymptomatic, denies any chest pain shortness of breath lightheadedness or dizziness. Despite chief complaint saying dizziness and nausea patient is denying those concerns for me today. Patient denies any symptoms or complaints at this time    PAST MEDICAL / SURGICAL / SOCIAL / FAMILY HISTORY      has a past medical history of Allergic rhinitis, Anemia, BPH (benign prostatic hyperplasia), CAD (coronary artery disease), Carotid artery stenosis, Cerebrovascular disease, Diabetes mellitus ([address] Eczema, ESRD (end stage renal disease) on dialysis ([address] Full dentures, GERD (gastroesophageal reflux disease), Gout, Hemodialysis patient ([address] Hyperlipidemia, Hypertension, Neuropathy, Osteoarthritis, Peripheral vascular disease ([address] Seizures ([address] Stroke ([address] and Unspecified cerebral artery occlusion with cerebral infarction. has a past surgical history that includes Bladder surgery (Spring 2014); sinus surgery; Cystocopy (2008); Inguinal hernia repair (Right); Kidney biopsy (May 2013);  Upper gastrointestinal endoscopy (1/28/2015); Colonoscopy (2008); Colonoscopy (1/28/2015); Carotid endarterectomy (2015); Abdomen surgery (2016); Dialysis fistula creation (Right, 08/25/2016); Tunneled venous port placement (Right, 10/2015); Abdomen surgery (05/26/2016); Colonoscopy (5/10/2018); Upper gastrointestinal endoscopy (N/A, 11/1/2018); and IR TUNNELED CVC PLACE WO SQ PORT/PUMP > 5 YEARS (7/26/2021). Social History     Socioeconomic History    Marital status:      Spouse name: Pratik Urbina Number of children: 1    Years of education: Not on file    Highest education level: Not on file   Occupational History    Not on file   Tobacco Use    Smoking status: Never Smoker    Smokeless tobacco: Never Used   Vaping Use    Vaping Use: Never used   Substance and Sexual Activity    Alcohol use: No     Comment: none    Drug use: No    Sexual activity: Never   Other Topics Concern    Not on file   Social History Narrative    ** Merged History Encounter **         ** Merged History Encounter **          Social Determinants of Health     Financial Resource Strain: Low Risk     Difficulty of Paying Living Expenses: Not hard at all   Food Insecurity: No Food Insecurity    Worried About Running Out of Food in the Last Year: Never true    Ming of Food in the Last Year: Never true   Transportation Needs:     Lack of Transportation (Medical):      Lack of Transportation (Non-Medical):    Physical Activity:     Days of Exercise per Week:     Minutes of Exercise per Session:    Stress:     Feeling of Stress :    Social Connections:     Frequency of Communication with Friends and Family:     Frequency of Social Gatherings with Friends and Family:     Attends Orthodox Services:     Active Member of Clubs or Organizations:     Attends Club or Organization Meetings:     Marital Status:    Intimate Partner Violence:     Fear of Current or Ex-Partner:     Emotionally Abused:     Physically Abused:     Sexually Abused:        Family History   Problem Relation Age of Onset    Diabetes Mother     Kidney Disease Mother         Dialysis    Cancer Mother         KIDNEY    Anesth Problems Mother         delayed awakening     Other Father         Cresencio Arciniega    Diabetes Sister     High Blood Pressure Sister     Cancer Brother         Allergies:  Patient has no known allergies. Home Medications:  Prior to Admission medications    Medication Sig Start Date End Date Taking? Authorizing Provider   sildenafil (REVATIO) 20 MG tablet Take 1 tablet by mouth daily as needed (erectile dysfunction) 9/16/21   Lorin Watts MD   apixaban (ELIQUIS) 2.5 MG TABS tablet Take 1 tablet by mouth 2 times daily 9/16/21   Lorin Watts MD   atorvastatin (LIPITOR) 40 MG tablet Take 1 tablet by mouth daily 9/16/21   Lorin Watts MD   metoprolol tartrate 75 MG TABS Take 75 mg by mouth 2 times daily 7/27/21   Ludwin iVdales MD       REVIEW OFSYSTEMS    (2-9 systems for level 4, 10 or more for level 5)      Review of Systems   Constitutional: Negative for chills, diaphoresis, fatigue and fever. HENT: Negative for facial swelling, nosebleeds, sinus pressure, sinus pain, sore throat and trouble swallowing. Respiratory: Negative for cough, chest tightness, shortness of breath and wheezing. Cardiovascular: Negative for chest pain, palpitations and leg swelling. Gastrointestinal: Negative for abdominal pain, constipation, diarrhea, nausea and vomiting. Genitourinary: Negative for dysuria, flank pain and hematuria. Musculoskeletal: Negative for arthralgias, gait problem, neck pain and neck stiffness. Skin: Negative for color change, rash and wound. Neurological: Negative for dizziness, syncope, weakness, light-headedness and headaches.        PHYSICAL EXAM   (up to 7 for level 4, 8 or more forlevel 5)      INITIAL VITALS:   ED Triage Vitals   BP Temp Temp src Pulse Resp SpO2 Height Weight   169/85 98.2 -- 82 16 100% -- --       Physical Exam  Constitutional:       General: He is not in acute distress. Appearance: He is obese. He is ill-appearing (chronically ). HENT:      Head: Normocephalic and atraumatic. Right Ear: External ear normal.      Left Ear: External ear normal.      Nose: Nose normal.   Eyes:      General: No scleral icterus. Extraocular Movements: Extraocular movements intact. Conjunctiva/sclera: Conjunctivae normal.      Pupils: Pupils are equal, round, and reactive to light. Cardiovascular:      Rate and Rhythm: Normal rate and regular rhythm. Pulses: Normal pulses. Heart sounds: Normal heart sounds. Pulmonary:      Effort: Pulmonary effort is normal. No respiratory distress. Breath sounds: Normal breath sounds. Abdominal:      General: Abdomen is flat. Bowel sounds are normal. There is no distension. Palpations: Abdomen is soft. Tenderness: There is no abdominal tenderness. Musculoskeletal:         General: Normal range of motion. Cervical back: Normal range of motion. No muscular tenderness. Comments: Fistula present in RUE    Skin:     General: Skin is warm and dry. Neurological:      General: No focal deficit present. Mental Status: He is alert and oriented to person, place, and time. Cranial Nerves: No cranial nerve deficit. DIFFERENTIAL  DIAGNOSIS     PLAN (LABS / IMAGING / EKG):  Orders Placed This Encounter   Procedures    XR CHEST PORTABLE    Inpatient consult to IV Team    EKG 12 Lead       MEDICATIONS ORDERED:  No orders of the defined types were placed in this encounter. DDX: ACS, atypical chest pain, SVT, electrolyte abnormality, dehydration    Initial MDM/Plan: 64 y.o. male who presents with EMS from his dialysis center due to a run of SVT. Patient reports he has history of SVT and denies any symptoms at this point. Given patient's multiple medical comorbidities we will plan for lab work-up chest x-ray and EKG.   Patient is a difficult stick and will consult IV team    DIAGNOSTIC RESULTS / EMERGENCYDEPARTMENT COURSE / MDM     LABS:  Labs Reviewed - No data to display      RADIOLOGY:  XR CHEST PORTABLE    Result Date: 9/24/2021  EXAMINATION: ONE XRAY VIEW OF THE CHEST 9/24/2021 11:27 am COMPARISON: July 19, 2021 HISTORY: ORDERING SYSTEM PROVIDED HISTORY: SVT TECHNOLOGIST PROVIDED HISTORY: SVT FINDINGS: Prominence of the cardiac silhouette. Dual lumen catheter over left hemithorax. No evidence for infiltrates or effusions. No  acute abnormality in the chest.         EKG      All EKG's are interpreted by the Emergency Department Physicianwho either signs or Co-signs this chart in the absence of a cardiologist.    EMERGENCY DEPARTMENT COURSE:  ED Course as of Sep 25 0724   Fri Sep 24, 2021   1111 Patient seen and evaluated. [CD]   8007 CSJNNDAD IV team for access      [CD]   0820 Patient does not want labs at this time. Patient states that he feels fine does not need to be here. Patient's vital signs have remained stable, does have history of paroxysmal SVT. Patient given informed discharge regarding bypassing lab work. Patient is agreeable to follow-up outpatient and return to dialysis session. [CD]      ED Course User Index  [CD] Simone Jarquin DO          PROCEDURES:  None    CONSULTS:  IP CONSULT TO IV TEAM    CRITICAL CARE:  Please see attending documentation    FINAL IMPRESSION      1.  Paroxysmal supraventricular tachycardia ([address]          DISPOSITION / PLAN     DISPOSITION Decision To Discharge 09/24/2021 12:31:56 PM      PAT[facility]:  [facility] [address] [facility] [address] [facility] [address] [phone]    Call today  For ED follow up    [facility] ED  [address] [place] [address]  [phone]  Go to   If symptoms worsen      DISCHARGE MEDICATIONS:  Discharge Medication List as of 9/24/2021 12:35 PM          Simone Jarquin DO  Emergency Medicine Resident    (Please note that portions of this note were completed with a voice recognition program.Efforts were made to edit the dictations but occasionally words are mis-transcribed.)        Mony Saunders,   Resident  09/25/21 5141

## 2021-09-24 NOTE — ED PROVIDER NOTES
[facility] ED     Emergency Department     Faculty Attestation    I performed a history and physical examination of the patient and discussed management with the resident. I reviewed the residents note and agree with the documented findings and plan of care. Any areas of disagreement are noted on the chart. I was personally present for the key portions of any procedures. I have documented in the chart those procedures where I was not present during the key portions. I have reviewed the emergency nurses triage note. I agree with the chief complaint, past medical history, past surgical history, allergies, medications, social and family history as documented unless otherwise noted below. For Physician Assistant/ Nurse Practitioner cases/documentation I have personally evaluated this patient and have completed at least one if not all key elements of the E/M (history, physical exam, and MDM). Additional findings are as noted. This patient was evaluated in the Emergency Department for symptoms described in the history of present illness. He/she was evaluated in the context of the global COVID-19 pandemic, which necessitated consideration that the patient might be at risk for infection with the SARS-CoV-2 virus that causes COVID-19. Institutional protocols and algorithms that pertain to the evaluation of patients at risk for COVID-19 are in a state of rapid change based on information released by regulatory bodies including the CDC and federal and state organizations. These policies and algorithms were followed during the patient's care in the ED. Patient with an episode of palpitations near syncope occurred as getting ready to start dialysis this morning. Patient has a history of proximals of SVT. No rhythm strips obtain but was tachycardic. Patient Valsalva self converted. Had nausea with this that has resolved no complaints.  Did not wish to come to the emergency room but dialysis said he needed to be checked out. Patient has no complaints resting comfortably watching TV. Specifically denies any chest pain lightheadedness shortness of breath abdominal pain back pain. Patient is refused any work-up at this time and wants to be discharged so he go to dialysis.       Critical Care     none    Araseli Lora MD, Chucho Sauer  Attending Emergency  Physician             Araseli Lora MD  09/24/21 2886

## 2021-09-24 NOTE — ED NOTES
Awaiting PICC team for IV access and labs. Writer to bedside, pt is pulling off his monitoring equipment, does not want writer to attempt lab drawl, is ready to go home. Dr. Alex Butler notified.       Kirt Davidson RN  09/24/21 7827

## 2021-09-24 NOTE — ED NOTES
Pt to ed via LS 1 from dialysis. Pt states he is a M-W-F dialysis pt. Pt states he was 1 minutes into a treatment when he felt dizzy, light headed, nausea. EMS reports dialysis center states pt HR increased to 140 and as soon as treatment was stopped HR normalized and symptoms resolved. Pt states he did have an episode and was worked up here for SVT and has follow up appointment with cardiology. Pt is alert, oriented, speaking in full, complete sentences. Pt denies any pain at this time. EMS administered 324 mg asa pta.       Jm Butts, MARCEL  09/24/21 0183

## 2021-09-25 LAB
EKG ATRIAL RATE: 81 BPM
EKG P AXIS: 54 DEGREES
EKG P-R INTERVAL: 200 MS
EKG Q-T INTERVAL: 384 MS
EKG QRS DURATION: 100 MS
EKG QTC CALCULATION (BAZETT): 446 MS
EKG R AXIS: -11 DEGREES
EKG T AXIS: -53 DEGREES
EKG VENTRICULAR RATE: 81 BPM

## 2021-09-25 ASSESSMENT — ENCOUNTER SYMPTOMS
CHEST TIGHTNESS: 0
COLOR CHANGE: 0
ABDOMINAL PAIN: 0
WHEEZING: 0
SORE THROAT: 0
CONSTIPATION: 0
SHORTNESS OF BREATH: 0
COUGH: 0
DIARRHEA: 0
FACIAL SWELLING: 0
SINUS PAIN: 0
SINUS PRESSURE: 0
VOMITING: 0
NAUSEA: 0
TROUBLE SWALLOWING: 0

## 2021-10-03 ENCOUNTER — APPOINTMENT (OUTPATIENT)
Dept: GENERAL RADIOLOGY | Age: 76
End: 2021-10-03
Payer: MEDICARE

## 2021-10-03 ENCOUNTER — HOSPITAL ENCOUNTER (EMERGENCY)
Age: 76
Discharge: HOME | End: 2021-10-03
Attending: EMERGENCY MEDICINE
Payer: MEDICARE

## 2021-10-03 VITALS
OXYGEN SATURATION: 94 % | RESPIRATION RATE: 20 BRPM | SYSTOLIC BLOOD PRESSURE: 170 MMHG | TEMPERATURE: 98.2 F | DIASTOLIC BLOOD PRESSURE: 98 MMHG | HEART RATE: 90 BPM

## 2021-10-03 DIAGNOSIS — J20.9 ACUTE BRONCHITIS, UNSPECIFIED ORGANISM: Primary | ICD-10-CM

## 2021-10-03 PROCEDURE — U0005 INFEC AGEN DETEC AMPLI PROBE: HCPCS

## 2021-10-03 PROCEDURE — U0003 INFECTIOUS AGENT DETECTION BY NUCLEIC ACID (DNA OR RNA); SEVERE ACUTE RESPIRATORY SYNDROME CORONAVIRUS 2 (SARS-COV-2) (CORONAVIRUS DISEASE [COVID-19]), AMPLIFIED PROBE TECHNIQUE, MAKING USE OF HIGH THROUGHPUT TECHNOLOGIES AS DESCRIBED BY CMS-2020-01-R: HCPCS

## 2021-10-03 PROCEDURE — 71045 X-RAY EXAM CHEST 1 VIEW: CPT

## 2021-10-03 PROCEDURE — 99285 EMERGENCY DEPT VISIT HI MDM: CPT

## 2021-10-03 PROCEDURE — 6370000000 HC RX 637 (ALT 250 FOR IP): Performed by: STUDENT IN AN ORGANIZED HEALTH CARE EDUCATION/TRAINING PROGRAM

## 2021-10-03 RX ORDER — GUAIFENESIN 600 MG/1
600 TABLET, EXTENDED RELEASE ORAL 2 TIMES DAILY
Qty: 10 TABLET | Refills: 0 | Status: SHIPPED | OUTPATIENT
Start: 2021-10-03 | End: 2021-10-08

## 2021-10-03 RX ORDER — BENZONATATE 100 MG/1
100 CAPSULE ORAL 3 TIMES DAILY PRN
Qty: 30 CAPSULE | Refills: 0 | Status: SHIPPED | OUTPATIENT
Start: 2021-10-03 | End: 2021-10-10

## 2021-10-03 RX ORDER — BENZONATATE 100 MG/1
100 CAPSULE ORAL ONCE
Status: COMPLETED | OUTPATIENT
Start: 2021-10-03 | End: 2021-10-03

## 2021-10-03 RX ORDER — GUAIFENESIN 600 MG/1
600 TABLET, EXTENDED RELEASE ORAL ONCE
Status: COMPLETED | OUTPATIENT
Start: 2021-10-03 | End: 2021-10-03

## 2021-10-03 RX ADMIN — BENZONATATE 100 MG: 100 CAPSULE ORAL at 19:56

## 2021-10-03 RX ADMIN — GUAIFENESIN 600 MG: 600 TABLET, EXTENDED RELEASE ORAL at 19:56

## 2021-10-03 ASSESSMENT — ENCOUNTER SYMPTOMS
DIARRHEA: 0
CONSTIPATION: 0
ABDOMINAL PAIN: 0
SHORTNESS OF BREATH: 1
COUGH: 1

## 2021-10-03 NOTE — ED PROVIDER NOTES
[facility]  ED  EMERGENCY DEPARTMENT ENCOUNTER      Pt Name: Gonzales Hyman  MRN: [id number]  [place] 1945  Date of evaluation: 10/3/2021  Provider: Teodora Alvarado MD    [address] [place] [address]       Chief Complaint   Patient presents with    Cough    Congestion         HISTORY OF PRESENT ILLNESS   (Location/Symptom, Timing/Onset, Context/Setting, Quality, Duration, Modifying Factors, Severity)  Note limiting factors. Gonzales Hyman is a 64 y.o. male  With pmh of esrd on HD MWF, HTN,DIABETES, ICA s/p carotid enarterectomy,who presents to the emergency department  With 1 day history of cough . It was associated with whitish phlegm which according to patient is very high in quantity ,more then 2-3 teaspoon at a time. Patient denies any missed dialysis session/medications,chest pain  ,flu like symproms ,fever, hemoptysis, orthopnea,pedal edema . Patient just had a covid booster shot  Yesterday. In the ED patient was afebrile,nontachycardiac . /98  CXR was unchanged from sep -24 . No acute distress . We will discharge patient on antitussive . patient is supposed to have regular dialysis tomorrow . Follow up with PCP in a week       Nursing Notes were reviewed. REVIEW OF SYSTEMS    (2-9 systems for level 4, 10 or more for level 5)     Review of Systems   Respiratory: Positive for cough and shortness of breath. Cardiovascular: Negative for chest pain and leg swelling. Gastrointestinal: Negative for abdominal pain, constipation and diarrhea. Psychiatric/Behavioral: Negative for agitation, behavioral problems, confusion and decreased concentration. The patient is not nervous/anxious. Except as noted above the remainder of the review of systems was reviewed and negative.        PAST MEDICAL HISTORY     Past Medical History:   Diagnosis Date    Allergic rhinitis     Anemia     BPH (benign prostatic hyperplasia)     CAD (coronary artery disease)     Carotid artery stenosis 2013    ulcerated plaque in left ICA 60 - 70% on carotid angiogram    Cerebrovascular disease 2013    left frontal hemorrhage    Diabetes mellitus ([facility] 2013    Eczema     ESRD (end stage renal disease) on dialysis ([facility]     Full dentures     Upper / Lower    GERD (gastroesophageal reflux disease)     Gout     Hemodialysis patient ([address] 10/2015    [facility] [address]  M,W F    Hyperlipidemia     Hypertension     Neuropathy     Osteoarthritis     Peripheral vascular disease ([facility]     Seizures ([facility] 2013    after stroke, LAST [id number]/20/2016    Stroke [facility]) 2013    Unspecified cerebral artery occlusion with cerebral infarction 4-    speech short term and lt side         SURGICAL HISTORY       Past Surgical History:   Procedure Laterality Date    ABDOMEN SURGERY  2016    PERITONEAL CATHETER    ABDOMEN SURGERY  05/26/2016    pd catherter removed from abdomen    BLADDER SURGERY  Spring 2014    dilatated     CAROTID ENDARTERECTOMY  2015    left    COLONOSCOPY  2008    polypectomy, diverticulosis    COLONOSCOPY  1/28/2015    COLONOSCOPY  5/10/2018    COLONOSCOPY performed by Keyla King MD at [address] [place] [address]  2008    bladder tumor removal    DIALYSIS FISTULA CREATION Right 08/25/2016    INGUINAL HERNIA REPAIR Right     IR TUNNELED CATHETER PLACEMENT GREATER THAN 5 YEARS  7/26/2021    IR TUNNELED CATHETER PLACEMENT GREATER THAN 5 YEARS 7/26/2021 [facility] PROCEDURES    KIDNEY BIOPSY  May 2013    SINUS SURGERY      TUNNELED VENOUS PORT PLACEMENT Right 10/2015    dialysis catheter- chest     UPPER GASTROINTESTINAL ENDOSCOPY  1/28/2015    UPPER GASTROINTESTINAL ENDOSCOPY N/A 11/1/2018    EGD BIOPSY performed by Faith Fabian DO at [facility]         CURRENT MEDICATIONS       Previous Medications    APIXABAN (ELIQUIS) 2.5 MG TABS TABLET    Take 1 tablet by mouth 2 times daily    ATORVASTATIN (LIPITOR) 40 MG TABLET    Take 1 tablet by mouth daily    METOPROLOL TARTRATE 75 MG TABS    Take 75 mg by mouth 2 times daily    SILDENAFIL (REVATIO) 20 MG TABLET    Take 1 tablet by mouth daily as needed (erectile dysfunction)       ALLERGIES     Patient has no known allergies. FAMILY HISTORY       Family History   Problem Relation Age of Onset    Diabetes Mother     Kidney Disease Mother         Dialysis    Cancer Mother         KIDNEY    Anesth Problems Mother         delayed awakening     Other Father         [employer]    Diabetes Sister     High Blood Pressure Sister     Cancer Brother           SOCIAL HISTORY       Social History     Socioeconomic History    Marital status:      Spouse name: Elvia Greenfield Number of children: 1    Years of education: Not on file    Highest education level: Not on file   Occupational History    Not on file   Tobacco Use    Smoking status: Never Smoker    Smokeless tobacco: Never Used   Vaping Use    Vaping Use: Never used   Substance and Sexual Activity    Alcohol use: No     Comment: none    Drug use: No    Sexual activity: Never   Other Topics Concern    Not on file   Social History Narrative    ** Merged History Encounter **         ** Merged History Encounter **          Social Determinants of Health     Financial Resource Strain: Low Risk     Difficulty of Paying Living Expenses: Not hard at all   Food Insecurity: No Food Insecurity    Worried About Running Out of Food in the Last Year: Never true    [address] in the Last Year: Never true   Transportation Needs:     Lack of Transportation (Medical):      Lack of Transportation (Non-Medical):    Physical Activity:     Days of Exercise per Week:     Minutes of Exercise per Session:    Stress:     Feeling of Stress :    Social Connections:     Frequency of Communication with Friends and Family:     Frequency of Social Gatherings with Friends and Family:     Attends Mandaeism Services:     Active Member of Clubs or Organizations:     Attends Club or Organization Meetings:     Marital Status:    Intimate Partner Violence:     Fear of Current or Ex-Partner:     Emotionally Abused:     Physically Abused:     Sexually Abused:        SCREENINGS        Blake Coma Scale  Eye Opening: Spontaneous  Best Verbal Response: Oriented  Best Motor Response: Obeys commands  Blake Coma Scale Score: 15               PHYSICAL EXAM    (up to 7 for level 4, 8 or more for level 5)     ED Triage Vitals [10/03/21 1800]   BP Temp Temp src Pulse Resp SpO2 Height Weight   (!) 170/98 98.2 °F (36.8 °C) -- 90 20 94 % -- --       Physical Exam  Constitutional:       Appearance: He is obese. HENT:      Head: Normocephalic and atraumatic. Comments: permacath   Eyes:      Extraocular Movements: Extraocular movements intact. Cardiovascular:      Rate and Rhythm: Normal rate. Heart sounds: No murmur heard. No friction rub. Pulmonary:      Effort: Respiratory distress present. Breath sounds: Wheezing present. Abdominal:      Palpations: Abdomen is soft. There is no mass. Hernia: No hernia is present. Musculoskeletal:         General: No swelling. Comments: Rt arm av fistula . Neurological:      Mental Status: He is oriented to person, place, and time. Psychiatric:         Mood and Affect: Mood normal.         Behavior: Behavior normal.         DIAGNOSTIC RESULTS     EKG: none     RADIOLOGY:   cxr unchanged from previous cxr     Interpretation per the Radiologist below, if available at the time of this note:    XR CHEST PORTABLE    (Results Pending)         ED BEDSIDE ULTRASOUND:   Performed by ED Physician - none    LABS:  Labs Reviewed   COVID-19       All other labs were within normal range or not returned as of this dictation.     EMERGENCY DEPARTMENT COURSE and DIFFERENTIAL DIAGNOSIS/MDM:   Vitals:    Vitals:    10/03/21 1800   BP: (!) 170/98   Pulse: 90   Resp: 20   Temp: 98.2 °F (36.8 °C)   SpO2: 94%           REASSESSMENT

## 2021-10-03 NOTE — ED PROVIDER NOTES
[facility] ED     Emergency Department     Faculty Attestation    I performed a history and physical examination of the patient and discussed management with the resident. I reviewed the residents note and agree with the documented findings and plan of care. Any areas of disagreement are noted on the chart. I was personally present for the key portions of any procedures. I have documented in the chart those procedures where I was not present during the key portions. I have reviewed the emergency nurses triage note. I agree with the chief complaint, past medical history, past surgical history, allergies, medications, social and family history as documented unless otherwise noted below. For Physician Assistant/ Nurse Practitioner cases/documentation I have personally evaluated this patient and have completed at least one if not all key elements of the E/M (history, physical exam, and MDM). Additional findings are as noted. Patient with history of end-stage renal disease on hemodialysis presents with productive cough that he has had for 1 day. He denies feeling short of breath or having any chest pain with it. He says he has had some nasal congestion. He says he was dialyzed on Friday and is scheduled to have it again tomorrow. He denies fever, abdominal pain, vomiting or diarrhea. Patient denies any body aches, shaking chills, diaphoresis. Patient has been vaccinated for Covid and just got his booster shot yesterday. On exam, patient is resting comfortably in the bed. He is not in respiratory distress and patient appears well. He does have a coarse cough. Lungs otherwise are clear to auscultation bilaterally and heart sounds are normal.  Abdomen is soft and nontender. The bilateral calves are nontender nonswollen. Will get a chest x-ray and Covid PCR and reassess.       Vane Lopez MD  Attending Emergency  Physician              Edwin Cool MD  10/03/21 [facility] Shira Hernandez, MD  10/03/21 2929

## 2021-10-04 LAB
SARS-COV-2: NORMAL
SARS-COV-2: NOT DETECTED
SOURCE: NORMAL

## 2021-10-04 NOTE — PROGRESS NOTES
I was assigned to this patient in error. I did not evaluate or treat this patient during this emergency department encounter.     Jeffy Cook DO, PGY-3  Emergency Medicine Resident  10/3/2021     9:10 PM

## 2021-11-01 ENCOUNTER — APPOINTMENT (OUTPATIENT)
Dept: GENERAL RADIOLOGY | Age: 76
End: 2021-11-01
Payer: MEDICARE

## 2021-11-01 ENCOUNTER — HOSPITAL ENCOUNTER (EMERGENCY)
Age: 76
Discharge: HOME | End: 2021-11-01
Attending: EMERGENCY MEDICINE
Payer: MEDICARE

## 2021-11-01 VITALS
OXYGEN SATURATION: 95 % | TEMPERATURE: 97 F | HEART RATE: 83 BPM | BODY MASS INDEX: 29.16 KG/M2 | WEIGHT: 220 LBS | HEIGHT: 73 IN | RESPIRATION RATE: 21 BRPM | DIASTOLIC BLOOD PRESSURE: 60 MMHG | SYSTOLIC BLOOD PRESSURE: 137 MMHG

## 2021-11-01 DIAGNOSIS — M79.89 LEG SWELLING: Primary | ICD-10-CM

## 2021-11-01 LAB
ABSOLUTE EOS #: 0.18 K/UL (ref 0–0.4)
ABSOLUTE IMMATURE GRANULOCYTE: 0 K/UL (ref 0–0.3)
ABSOLUTE LYMPH #: 1.23 K/UL (ref 1–4.8)
ABSOLUTE MONO #: 0.35 K/UL (ref 0.1–0.8)
ALBUMIN SERPL-MCNC: 4 G/DL (ref 3.5–5.2)
ALBUMIN/GLOBULIN RATIO: 1.5 (ref 1–2.5)
ALP BLD-CCNC: 65 U/L (ref 40–129)
ALT SERPL-CCNC: 6 U/L (ref 5–41)
ANION GAP SERPL CALCULATED.3IONS-SCNC: 13 MMOL/L (ref 9–17)
AST SERPL-CCNC: 9 U/L
BASOPHILS # BLD: 1 % (ref 0–2)
BASOPHILS ABSOLUTE: 0.04 K/UL (ref 0–0.2)
BILIRUB SERPL-MCNC: 0.36 MG/DL (ref 0.3–1.2)
BNP INTERPRETATION: ABNORMAL
BUN BLDV-MCNC: 31 MG/DL (ref 8–23)
BUN/CREAT BLD: ABNORMAL (ref 9–20)
CALCIUM SERPL-MCNC: 8.9 MG/DL (ref 8.6–10.4)
CHLORIDE BLD-SCNC: 98 MMOL/L (ref 98–107)
CO2: 28 MMOL/L (ref 20–31)
CREAT SERPL-MCNC: 8.36 MG/DL (ref 0.7–1.2)
DIFFERENTIAL TYPE: ABNORMAL
EOSINOPHILS RELATIVE PERCENT: 4 % (ref 1–4)
GFR AFRICAN AMERICAN: 8 ML/MIN
GFR NON-AFRICAN AMERICAN: 6 ML/MIN
GFR SERPL CREATININE-BSD FRML MDRD: ABNORMAL ML/MIN/{1.73_M2}
GFR SERPL CREATININE-BSD FRML MDRD: ABNORMAL ML/MIN/{1.73_M2}
GLUCOSE BLD-MCNC: 71 MG/DL (ref 70–99)
HCT VFR BLD CALC: 29.4 % (ref 40.7–50.3)
HEMOGLOBIN: 8.7 G/DL (ref 13–17)
IMMATURE GRANULOCYTES: 0 %
LYMPHOCYTES # BLD: 28 % (ref 24–44)
MCH RBC QN AUTO: 27.9 PG (ref 25.2–33.5)
MCHC RBC AUTO-ENTMCNC: 29.6 G/DL (ref 28.4–34.8)
MCV RBC AUTO: 94.2 FL (ref 82.6–102.9)
MONOCYTES # BLD: 8 % (ref 1–7)
MORPHOLOGY: ABNORMAL
NRBC AUTOMATED: 0 PER 100 WBC
PDW BLD-RTO: 22.4 % (ref 11.8–14.4)
PLATELET # BLD: ABNORMAL K/UL (ref 138–453)
PLATELET ESTIMATE: ABNORMAL
PLATELET, FLUORESCENCE: 144 K/UL (ref 138–453)
PLATELET, IMMATURE FRACTION: 8.8 % (ref 1.1–10.3)
PMV BLD AUTO: ABNORMAL FL (ref 8.1–13.5)
POTASSIUM SERPL-SCNC: 4.1 MMOL/L (ref 3.7–5.3)
PRO-BNP: 5853 PG/ML
RBC # BLD: 3.12 M/UL (ref 4.21–5.77)
RBC # BLD: ABNORMAL 10*6/UL
SEG NEUTROPHILS: 59 % (ref 36–66)
SEGMENTED NEUTROPHILS ABSOLUTE COUNT: 2.6 K/UL (ref 1.8–7.7)
SODIUM BLD-SCNC: 139 MMOL/L (ref 135–144)
TOTAL PROTEIN: 6.6 G/DL (ref 6.4–8.3)
TROPONIN INTERP: ABNORMAL
TROPONIN INTERP: ABNORMAL
TROPONIN T: ABNORMAL NG/ML
TROPONIN T: ABNORMAL NG/ML
TROPONIN, HIGH SENSITIVITY: 244 NG/L (ref 0–22)
TROPONIN, HIGH SENSITIVITY: 259 NG/L (ref 0–22)
WBC # BLD: 4.4 K/UL (ref 3.5–11.3)
WBC # BLD: ABNORMAL 10*3/UL

## 2021-11-01 PROCEDURE — 83880 ASSAY OF NATRIURETIC PEPTIDE: CPT

## 2021-11-01 PROCEDURE — 80053 COMPREHEN METABOLIC PANEL: CPT

## 2021-11-01 PROCEDURE — 99283 EMERGENCY DEPT VISIT LOW MDM: CPT

## 2021-11-01 PROCEDURE — 85025 COMPLETE CBC W/AUTO DIFF WBC: CPT

## 2021-11-01 PROCEDURE — 85055 RETICULATED PLATELET ASSAY: CPT

## 2021-11-01 PROCEDURE — 73610 X-RAY EXAM OF ANKLE: CPT

## 2021-11-01 PROCEDURE — 71045 X-RAY EXAM CHEST 1 VIEW: CPT

## 2021-11-01 PROCEDURE — 93005 ELECTROCARDIOGRAM TRACING: CPT | Performed by: STUDENT IN AN ORGANIZED HEALTH CARE EDUCATION/TRAINING PROGRAM

## 2021-11-01 PROCEDURE — 84484 ASSAY OF TROPONIN QUANT: CPT

## 2021-11-01 PROCEDURE — 93971 EXTREMITY STUDY: CPT

## 2021-11-01 PROCEDURE — 73562 X-RAY EXAM OF KNEE 3: CPT

## 2021-11-01 ASSESSMENT — ENCOUNTER SYMPTOMS
COUGH: 0
RHINORRHEA: 0
DIARRHEA: 0
VOMITING: 0
CONSTIPATION: 0
NAUSEA: 0
ABDOMINAL PAIN: 0
BACK PAIN: 0
SORE THROAT: 0
SHORTNESS OF BREATH: 0
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
## 2021-11-01 NOTE — ED NOTES
Pt presents to the ed via private auto with family c/o of RLE edema that started Friday. Upon arrival to the ed pt rates his pain 6/10 with ambulation. Pulses are equal and strong pt denies any numbness or tingling denies CP or SOB. Pt also reports that he fell yesterday because of his leg pain. Pt receives dialysis every M/W/F pt states he did complete his whole treatment this morning. Vital signs are stable at this time will continue to monitor.      Johan Martins RN  11/01/21 [id number]

## 2021-11-01 NOTE — ED PROVIDER NOTES
[facility] ED  Emergency Department Encounter  Emergency Medicine Resident     Pt Name: Elisabet Grey  MRN: [id number]  [place] 1945  Date of evaluation: 11/1/21  PCP:  Antonio Maxwell MD    [address] [place] [address]       Chief Complaint   Patient presents with    Leg Swelling     VLADIMIR legs, was sent by dialysis, noticed the swelling on Friday morning, denies SOB or chest pain       HISTORY OFPRESENT ILLNESS  (Location/Symptom, Timing/Onset, Context/Setting, Quality, Duration, Modifying Azam Postin.)      Elisabet Grey is a 64 y.o. male with past medical history of coronary artery disease, CVA, diabetes, end-stage renal disease on dialysis Monday, Wednesday, Friday, gout, hypertension, hyperlipidemia, seizures who presents with right lower extremity swelling for the past 4 days. Patient denies any trauma or falls prior to his swelling, however he does report that he recently had a fall at [employer] yesterday due to both of his legs giving out on him. He states he landed on his knees and was able to get himself up off the ground. He states his leg swelling has not been worse since this fall. He states he normally ambulates with a cane but due to the swelling he has been having difficulty ambulating. He denies significant pain to the right lower extremity, chest pain, shortness of breath, cough, numbness, tingling or weakness. Patient states he has not missed any dialysis appointments and did get a full treatment today. He does report some swelling in his right upper extremity which is chronic and he attributes this to \"a blockage\" in his dialysis fistula. He states they are no longer using this fistula for dialysis and he now has a port in his left chest.  He states the swelling in his right upper extremity does not seem associated with the swelling in his right lower extremity. Patient also reports that he takes Eliquis 2.5 mg twice daily for history of stroke.   Patient states he has been compliant with this medication but has not yet taken it today. He denies fever, chills, rhinorrhea, congestion, abdominal pain, nausea, vomiting, diarrhea or urinary symptoms. Patient does not make any urine. Patient does follow with a cardiologist and states he was actually in his office on Friday when he noticed the lower extremity swelling. Patient denies any changes to his medications or additional work-up at that time. PAST MEDICAL / SURGICAL / SOCIAL / FAMILY HISTORY      has a past medical history of Allergic rhinitis, Anemia, BPH (benign prostatic hyperplasia), CAD (coronary artery disease), Carotid artery stenosis, Cerebrovascular disease, Diabetes mellitus ([address] Eczema, ESRD (end stage renal disease) on dialysis ([address] Full dentures, GERD (gastroesophageal reflux disease), Gout, Hemodialysis patient ([address] Hyperlipidemia, Hypertension, Neuropathy, Osteoarthritis, Peripheral vascular disease ([address] Seizures ([address] Stroke ([address] and Unspecified cerebral artery occlusion with cerebral infarction. has a past surgical history that includes Bladder surgery (Spring 2014); sinus surgery; Cystocopy (2008); Inguinal hernia repair (Right); Kidney biopsy (May 2013); Upper gastrointestinal endoscopy (1/28/2015); Colonoscopy (2008); Colonoscopy (1/28/2015); Carotid endarterectomy (2015); Abdomen surgery (2016); Dialysis fistula creation (Right, 08/25/2016); Tunneled venous port placement (Right, 10/2015); Abdomen surgery (05/26/2016); Colonoscopy (5/10/2018); Upper gastrointestinal endoscopy (N/A, 11/1/2018); and IR TUNNELED CVC PLACE WO SQ PORT/PUMP > 5 YEARS (7/26/2021). Social:  reports that he has never smoked. He has never used smokeless tobacco. He reports that he does not drink alcohol and does not use drugs.     Family Hx:   Family History   Problem Relation Age of Onset    Diabetes Mother     Kidney Disease Mother         Dialysis    Cancer Mother         KIDNEY    Anesth Problems Mother not in acute distress. Appearance: He is not toxic-appearing. Comments: Elderly male lying in stretcher in no acute distress. He speaks in full sentences and answers questions appropriately. HENT:      Head: Normocephalic and atraumatic. Nose: Nose normal.      Mouth/Throat:      Mouth: Mucous membranes are moist.      Pharynx: Oropharynx is clear. Eyes:      Conjunctiva/sclera: Conjunctivae normal.      Pupils: Pupils are equal, round, and reactive to light. Cardiovascular:      Rate and Rhythm: Normal rate and regular rhythm. Pulses: Normal pulses. Heart sounds: No murmur heard. No friction rub. No gallop. Pulmonary:      Effort: Pulmonary effort is normal. No respiratory distress. Breath sounds: Normal breath sounds. No wheezing, rhonchi or rales. Abdominal:      General: There is no distension. Palpations: Abdomen is soft. Tenderness: There is no abdominal tenderness. Musculoskeletal:      Cervical back: Neck supple. No rigidity. Right lower leg: Edema present. Left lower leg: No edema. Comments: 3+ pitting edema to the right foot and ankle. Swelling extends up the right lower extremity until the proximal knee. Swelling is nonpitting over the tibia. Skin appears slightly tense but is not erythematous or warm. There is no posterior calf tenderness. 2+ DP pulses on the left. Pulses were obtained with Doppler on the right. Skin:     General: Skin is warm and dry. Capillary Refill: Capillary refill takes less than 2 seconds. Findings: No rash. Comments: Fistula to the right forearm. Dialysis port in the left chest   Neurological:      General: No focal deficit present. Mental Status: He is alert.    Psychiatric:         Mood and Affect: Mood normal.         Behavior: Behavior normal.         DIFFERENTIAL  DIAGNOSIS     DDX: DVT, CHF exacerbation, end-stage renal disease, cellulitis, lymphedema, arthritis, gout, dependent edema, liver disease, deconditioning, arthritis, electrolyte abnormality    Initial MDM/Plan: 64 y.o. male with past medical history of coronary artery disease, CVA, diabetes, end-stage renal disease on dialysis Monday, Wednesday, Friday, gout, hypertension, hyperlipidemia, seizures who presents with right lower extremity swelling for the past 4 days. No known injury. Patient denies other associated symptoms. He was able to complete his full dialysis today and states that he has not missed any dialysis treatments and has been compliant with all of his medications including Eliquis. On physical exam, patient is nontoxic-appearing. His vitals are unremarkable. He is afebrile. Cardiopulmonary exam is benign. He does have significant edema to the right lower extremity. He has 3+ pitting edema to the right foot and ankle and nonpitting edema of the entire tibia up to his proximal knee. The extremity is not overtly warm or erythematous compared to the left. Pulses are intact though decreased due to the swelling. Suspect DVT, though this would be less likely given that he is on Eliquis. Cellulitis also a consideration but extremities not significantly warm, red or painful. End-stage renal disease and CHF also considered. Will obtain labs, chest x-ray, EKG and venous duplex with right lower extremity to evaluate.     DIAGNOSTIC RESULTS / EMERGENCYDEPARTMENT COURSE / MDM     LABS:  Results for orders placed or performed during the hospital encounter of 11/01/21   Comprehensive Metabolic Panel w/ Reflex to MG   Result Value Ref Range    Glucose 71 70 - 99 mg/dL    BUN 31 (H) 8 - 23 mg/dL    CREATININE 8.36 (HH) 0.70 - 1.20 mg/dL    Bun/Cre Ratio NOT REPORTED 9 - 20    Calcium 8.9 8.6 - 10.4 mg/dL    Sodium 139 135 - 144 mmol/L    Potassium 4.1 3.7 - 5.3 mmol/L    Chloride 98 98 - 107 mmol/L    CO2 28 20 - 31 mmol/L    Anion Gap 13 9 - 17 mmol/L    Alkaline Phosphatase 65 40 - 129 U/L    ALT 6 5 - 41 U/L    AST 9 <40 U/L    Total Bilirubin 0.36 0.3 - 1.2 mg/dL    Total Protein 6.6 6.4 - 8.3 g/dL    Albumin 4.0 3.5 - 5.2 g/dL    Albumin/Globulin Ratio 1.5 1.0 - 2.5    GFR Non-African American 6 (L) >60 mL/min    GFR  8 (L) >60 mL/min    GFR Comment          GFR Staging NOT REPORTED    CBC Auto Differential   Result Value Ref Range    WBC 4.4 3.5 - 11.3 k/uL    RBC 3.12 (L) 4.21 - 5.77 m/uL    Hemoglobin 8.7 (L) 13.0 - 17.0 g/dL    Hematocrit 29.4 (L) 40.7 - 50.3 %    MCV 94.2 82.6 - 102.9 fL    MCH 27.9 25.2 - 33.5 pg    MCHC 29.6 28.4 - 34.8 g/dL    RDW 22.4 (H) 11.8 - 14.4 %    Platelets See Reflexed IPF Result 138 - 453 k/uL    MPV NOT REPORTED 8.1 - 13.5 fL    NRBC Automated 0.0 0.0 per 100 WBC    Differential Type NOT REPORTED     WBC Morphology NOT REPORTED     RBC Morphology NOT REPORTED     Platelet Estimate NOT REPORTED     Immature Granulocytes 0 0 %    Seg Neutrophils 59 36 - 66 %    Lymphocytes 28 24 - 44 %    Monocytes 8 (H) 1 - 7 %    Eosinophils % 4 1 - 4 %    Basophils 1 0 - 2 %    Absolute Immature Granulocyte 0.00 0.00 - 0.30 k/uL    Segs Absolute 2.60 1.8 - 7.7 k/uL    Absolute Lymph # 1.23 1.0 - 4.8 k/uL    Absolute Mono # 0.35 0.1 - 0.8 k/uL    Absolute Eos # 0.18 0.0 - 0.4 k/uL    Basophils Absolute 0.04 0.0 - 0.2 k/uL    Morphology ANISOCYTOSIS PRESENT     Morphology HYPOCHROMIA PRESENT     Morphology 1+ POLYCHROMASIA     Morphology 1+ ELLIPTOCYTES    Troponin   Result Value Ref Range    Troponin, High Sensitivity 259 (HH) 0 - 22 ng/L    Troponin T NOT REPORTED <0.03 ng/mL    Troponin Interp NOT REPORTED    Brain Natriuretic Peptide   Result Value Ref Range    Pro-BNP 5,853 (H) <300 pg/mL    BNP Interpretation NOT REPORTED    Immature Platelet Fraction   Result Value Ref Range    Platelet, Immature Fraction 8.8 1.1 - 10.3 %    Platelet, Fluorescence 144 138 - 453 k/uL   Troponin   Result Value Ref Range    Troponin, High Sensitivity 244 (HH) 0 - 22 ng/L    Troponin T NOT REPORTED <0.03 ng/mL    Troponin Interp NOT REPORTED    EKG 12 Lead   Result Value Ref Range    Ventricular Rate 79 BPM    Atrial Rate 79 BPM    P-R Interval 180 ms    QRS Duration 88 ms    Q-T Interval 402 ms    QTc Calculation (Bazett) 460 ms    P Axis 61 degrees    R Axis -4 degrees    T Axis 52 degrees         RADIOLOGY:  XR KNEE RIGHT (3 VIEWS)    Result Date: 11/1/2021  EXAMINATION: THREE XRAY VIEWS OF THE RIGHT KNEE; THREE XRAY VIEWS OF THE RIGHT ANKLE 11/1/2021 4:12 pm COMPARISON: Right knee of 4 December 2014 HISTORY: ORDERING SYSTEM PROVIDED HISTORY: right knee swelling, hx of fall TECHNOLOGIST PROVIDED HISTORY: right knee swelling, hx of fall FINDINGS: Right knee: The patient has severe end-stage arthritic change in the knee, tricompartmental with almost complete obliteration of the tibia femoral space. Medial subluxation of the distal femur in relation to the tibia is noted by 1 cm. No acute fracture or dislocation is noted but the patient has a moderate suprapatellar effusion and extensive vascular calcification. Arthritic changes have advanced since prior study. Right ankle: Diffuse significant soft tissue swelling is noted. Patient has mild arthritic change in the ankle without acute fracture or dislocation. Plantar arch is flattened. Extensive vascular calcification is noted. Plantar calcaneal spur is seen. Moderate arthritic changes are present in the midfoot. Right knee: Severe end-stage arthritic change with interval advancement since 2014 on bone tibia femoral appearance. Moderate suprapatellar effusion. No acute fracture. Vascular calcification. Right ankle: Diffuse significant soft tissue swelling mild arthritic change and vascular calcification without acute fracture or dislocation.      XR ANKLE RIGHT (MIN 3 VIEWS)    Result Date: 11/1/2021  EXAMINATION: THREE XRAY VIEWS OF THE RIGHT KNEE; THREE XRAY VIEWS OF THE RIGHT ANKLE 11/1/2021 4:12 pm COMPARISON: Right knee of 4 December 2014 HISTORY: ORDERING SYSTEM PROVIDED HISTORY: right knee swelling, hx of fall TECHNOLOGIST PROVIDED HISTORY: right knee swelling, hx of fall FINDINGS: Right knee: The patient has severe end-stage arthritic change in the knee, tricompartmental with almost complete obliteration of the tibia femoral space. Medial subluxation of the distal femur in relation to the tibia is noted by 1 cm. No acute fracture or dislocation is noted but the patient has a moderate suprapatellar effusion and extensive vascular calcification. Arthritic changes have advanced since prior study. Right ankle: Diffuse significant soft tissue swelling is noted. Patient has mild arthritic change in the ankle without acute fracture or dislocation. Plantar arch is flattened. Extensive vascular calcification is noted. Plantar calcaneal spur is seen. Moderate arthritic changes are present in the midfoot. Right knee: Severe end-stage arthritic change with interval advancement since 2014 on bone tibia femoral appearance. Moderate suprapatellar effusion. No acute fracture. Vascular calcification. Right ankle: Diffuse significant soft tissue swelling mild arthritic change and vascular calcification without acute fracture or dislocation. XR CHEST PORTABLE    Result Date: 11/1/2021  EXAMINATION: ONE XRAY VIEW OF THE CHEST 11/1/2021 3:35 pm COMPARISON: AP chest from 10/03/2021 HISTORY: ORDERING SYSTEM PROVIDED HISTORY: leg swelling TECHNOLOGIST PROVIDED HISTORY: leg swelling Reason for Exam: portable upright History of GERD, CAD, diabetes, and CKD on hemodialysis. FINDINGS: Left IJ PermCath with tip position in the lower right atrium. Overlying ECG monitor leads and gown snaps. Mildly enlarged but stable appearing cardiac silhouette. Mediastinal structures midline unchanged, again with calcification thoracic aorta, especially knob. No localized pulmonary opacity or blunting of the costophrenic angles. Moderate DJD spine.      No acute cardiopulmonary disease. Vascular lower extremity DVT study right lower extremity  Right impression: The common femoral, femoral, popliteal and tibial veins demonstrate normal compressibility and augmentation. Normal compressibility of the great saphenous and small saphenous vein. Possible dilation in the popliteal artery measuring 2.01 cm x 2.35 cm  Left impression: A hypoechoic structure in the popliteal fossa measuring 2.23 cm x 4.38 cm      EKG  Normal sinus rhythm, rate: 77  NJ: 176  QRS: 92  QT/QTc: 400/452  No ST elevation or depression  No T wave abnormality  Left axis deviation    All EKG's are interpreted by the Emergency Department Physician who either signs or Co-signs this chart in the absence of a cardiologist.      MEDICATIONS ORDERED:  No orders of the defined types were placed in this encounter. PROCEDURES:  None      CONSULTS:  IP CONSULT TO VASCULAR SURGERY      EMERGENCY DEPARTMENT COURSE:  ED Course as of Nov 01 2251   Mon Nov 01, 2021   1630 Patient's x-rays are consistent with arthritic changes, both of the knee and ankle. The changes within the knee are severe and are consistent with bone on bone. No acute fractures. [KA]     1659 Troponin, High Sensitivity(!!): 259  Consistent with baseline. Will repeat  [KA]     1700 Pro-BNP(!): 5,853   Slightly elevated from patient's baseline. Patient does not make any urine so do not think Lasix will provide him with any relief. He is not short of breath and chest x-ray does not show any significant effusions I do not think BiPAP would be of any use here. He likely just needs to continue his dialysis. [KA]     1705   BUN(!): 31 Creatinine(!!): 8.36   Significantly improved from patient's [id number][LB]     2256   Patient's work-up is overall unremarkable and consistent with his baseline. Pending repeat troponin, however his initial troponin is within range of normal for him.   His venous duplex ultrasound shows normal compressibility of the veins from the common femoral down. However, there is a possible dilation within the popliteal artery which when speaking to the tech they thought this could be an aneurysm. I doubt that this small dilation could be causing all of his symptoms, however will discuss the patient with vascular surgery. [KA]     1827 Troponin, High Sensitivity(!!): 244 [KA]     1900 Discussed the patient with vascular surgery who has evaluated the patient at bedside. They requested ABIs to be performed. They have also ordered arterial ultrasound studies. Will call vascular tech to have these be performed. Patient may need admission to the observation unit to get the studies performed. If patient does get admitted to the observation unit, he could benefit from orthopedic surgery consult. Did discuss the patient with orthopedic surgery, given concern that this could be Charcot foot/knee. They recommended outpatient follow-up at this time, as if this was true Charcot's, patient would need casting. They did not recommend any emergent interventions at this time. However if patient did go to the observation unit that would give orthopedic surgery and chance to evaluate him and expedite his care. [KA]     2000 2026  ABIs performed. Left leg 0.92. Right leg 0.89. Vascular surgery updated. [KA]    Long discussion was had with the patient regarding plan of care. I recommended that patient be admitted to the observation unit for arterial ultrasound in the morning, as well as further vascular surgery and orthopedic surgery consult. Patient may also benefit from PT/OT. However, patient adamantly declines this, stating that he needs to take care of his wife and disabled daughter. He states that under no circumstances is he willing to stay at this time if it is just for studies which could be done outpatient. Did rediscuss this with vascular surgery who would like the patient to stay if possible.   Informed patient that

## 2021-11-02 LAB
EKG ATRIAL RATE: 79 BPM
EKG P AXIS: 61 DEGREES
EKG P-R INTERVAL: 180 MS
EKG Q-T INTERVAL: 402 MS
EKG QRS DURATION: 88 MS
EKG QTC CALCULATION (BAZETT): 460 MS
EKG R AXIS: -4 DEGREES
EKG T AXIS: 52 DEGREES
EKG VENTRICULAR RATE: 79 BPM

## 2021-11-02 PROCEDURE — 93010 ELECTROCARDIOGRAM REPORT: CPT | Performed by: INTERNAL MEDICINE

## 2021-11-03 ENCOUNTER — TELEPHONE (OUTPATIENT)
Dept: ORTHOPEDIC SURGERY | Age: 76
End: 2021-11-03

## 2021-11-10 ENCOUNTER — TELEPHONE (OUTPATIENT)
Dept: INTERNAL MEDICINE | Age: 76
End: 2021-11-10

## 2021-11-10 NOTE — TELEPHONE ENCOUNTER
Adriana Archer from [facility] called to report that she was doing carotid artery and came across DVT left jugular vein . And she had a consult with Doctor about  the findings. and was informed pt.  Is on meds and results will be faxed over

## 2021-12-30 ENCOUNTER — OFFICE VISIT (OUTPATIENT)
Dept: INTERNAL MEDICINE | Age: 76
End: 2021-12-30
Payer: MEDICARE

## 2021-12-30 VITALS
DIASTOLIC BLOOD PRESSURE: 67 MMHG | HEIGHT: 73 IN | WEIGHT: 224 LBS | SYSTOLIC BLOOD PRESSURE: 118 MMHG | TEMPERATURE: 97.2 F | HEART RATE: 94 BPM | OXYGEN SATURATION: 95 % | BODY MASS INDEX: 29.69 KG/M2

## 2021-12-30 DIAGNOSIS — I48.92 ATRIAL FLUTTER, UNSPECIFIED TYPE (HCC): ICD-10-CM

## 2021-12-30 DIAGNOSIS — I69.30 CHRONIC ISCHEMIC LEFT MIDDLE CEREBRAL ARTERY (MCA) STROKE: ICD-10-CM

## 2021-12-30 DIAGNOSIS — I25.10 CORONARY ARTERY DISEASE INVOLVING NATIVE CORONARY ARTERY OF NATIVE HEART WITHOUT ANGINA PECTORIS: ICD-10-CM

## 2021-12-30 DIAGNOSIS — G40.909 SEIZURE DISORDER AS SEQUELA OF CEREBROVASCULAR ACCIDENT (HCC): ICD-10-CM

## 2021-12-30 DIAGNOSIS — Z99.2 ESRD ON HEMODIALYSIS (HCC): ICD-10-CM

## 2021-12-30 DIAGNOSIS — I35.0 AORTIC STENOSIS, MODERATE: ICD-10-CM

## 2021-12-30 DIAGNOSIS — D63.8 ANEMIA OF CHRONIC DISEASE: ICD-10-CM

## 2021-12-30 DIAGNOSIS — N18.6 ESRD ON HEMODIALYSIS (HCC): ICD-10-CM

## 2021-12-30 DIAGNOSIS — I69.398 SEIZURE DISORDER AS SEQUELA OF CEREBROVASCULAR ACCIDENT (HCC): ICD-10-CM

## 2021-12-30 DIAGNOSIS — I10 ESSENTIAL HYPERTENSION: Primary | ICD-10-CM

## 2021-12-30 DIAGNOSIS — Z23 NEED FOR PROPHYLACTIC VACCINATION AGAINST DIPHTHERIA-TETANUS-PERTUSSIS (DTP): ICD-10-CM

## 2021-12-30 PROCEDURE — 99211 OFF/OP EST MAY X REQ PHY/QHP: CPT | Performed by: INTERNAL MEDICINE

## 2021-12-30 PROCEDURE — 99214 OFFICE O/P EST MOD 30 MIN: CPT | Performed by: INTERNAL MEDICINE

## 2021-12-30 RX ORDER — FOLIC ACID 1 MG/1
1 TABLET ORAL DAILY
Status: ON HOLD | COMMUNITY
Start: 2021-02-02 | End: 2022-08-02 | Stop reason: HOSPADM

## 2021-12-30 RX ORDER — BENZONATATE 100 MG/1
100 CAPSULE ORAL 2 TIMES DAILY PRN
Qty: 20 CAPSULE | Refills: 0 | Status: SHIPPED | OUTPATIENT
Start: 2021-12-30 | End: 2022-01-06

## 2021-12-30 RX ORDER — SEVELAMER CARBONATE 800 MG/1
3 TABLET, FILM COATED ORAL 3 TIMES DAILY
COMMUNITY
Start: 2021-12-27

## 2021-12-30 ASSESSMENT — ENCOUNTER SYMPTOMS
COUGH: 1
PHOTOPHOBIA: 0
CHEST TIGHTNESS: 0
WHEEZING: 0
STRIDOR: 0

## 2021-12-30 NOTE — PROGRESS NOTES
[facility]    Progress Note    Date of patient's visit: 12/30/2021    Patient's Name:  Chelle Ojeda    YOB: 1945            Patient Care Team:  Иван Enrique MD as PCP - Dayanara Jay MD as PCP - [facility]  Meylssa Gallegos MD as Consulting Physician (Urology)  Edith Daniels MD as Consulting Physician (Gastroenterology)  Иван Enrique MD (Internal Medicine)  [address] [facility] (Dialysis)  Marilyn Nichole MD as Surgeon (General Surgery)    REASON FOR VISIT: Routine outpatient follow     Chief Complaint   Patient presents with    Hypertension     3 month f/u    Cough     x3 months, pt states he has had a cough with congestion for 3 months that wont go away    Cold Extremity     pt states his right upper part of his body gets really cold randomly   [facility] Maintenance     pt wants tdap script-pended, awv scheduled         HISTORY OF PRESENT ILLNESS:    History was obtained from the patient. Chelle Ojeda is a 64 y.o. is here for follow-up. He is alone. He has a history of memory impairment and I have always advised his family to come with him but nobody is here. Apparently his wife dropped him off. He brings his bubble pack. They are absolutely brand-new. He had not been taking medications for some time and looks like these were restarted recently again by nephrology. He says he is taking Eliquis but it was again restarted recently. He was diagnosed with possible atrial flutter versus paroxysmal SVT earlier this summer when he was admitted to the hospital.  At that time he was started on a beta-blocker and Eliquis. Unclear if he ever followed up with cardiology. He has a prior history of CVA secondary to carotid stenosis. He follows up with vascular surgeon. He also has peripheral artery disease.   His main concern and complaint is his left chest wall pain from the tunneled catheter on the left subclavian. He has a right upper extremity AV fistula which has not been functioning and he was supposed to have surgery on it but has missed appointments per vascular notes. He was also recently being investigated from for pseudoaneurysm of popliteal area and possible left IJ venous thrombosis. He has had DVT scans done recently and vascular surgery is following all these results. Currently is advised to maintain Eliquis. He was also diagnosed with moderate mitral regurgitation and aortic stenosis earlier this summer. He is denying dizziness or syncope. Will get cardiology notes. He goes to dialysis 3 times a week. He is complaining of some cough and nasal drainage. Is been going on for months. A chest x-ray last month was normal.  No fever or chills. He had seizures following his stroke but has not seen [facility] with neurology in 2 years and stopped all his medications. Echo 2021  CONCLUSIONS     Summary  Overall preserved LV systolic function, EF 71%. Aortic calcification with stenosis, mean gradient 21-25 mm Hg.   Mild to moderate mitral regurgitation  Mild tricuspid regurgitation.     CXR      FINDINGS:   Left IJ PermCath with tip position in the lower right atrium.  Overlying ECG   monitor leads and gown snaps.       Mildly enlarged but stable appearing cardiac silhouette.  Mediastinal   structures midline unchanged, again with calcification thoracic aorta,   especially knob.       No localized pulmonary opacity or blunting of the costophrenic angles.       Moderate DJD spine.           Impression   No acute cardiopulmonary disease.           Past Medical History:   Diagnosis Date    Allergic rhinitis     Anemia     BPH (benign prostatic hyperplasia)     CAD (coronary artery disease)     Carotid artery stenosis 2013    ulcerated plaque in left ICA 60 - 70% on carotid angiogram    Cerebrovascular disease 2013    left frontal hemorrhage    Diabetes mellitus ([address] 2013    Eczema     ESRD (end stage renal disease) [facility]     Full dentures     Upper / Lower    GERD (gastroesophageal reflux disease)     Gout     Hemodialysis patient ([facility] 10/2015    US [address]  M,W F    Hyperlipidemia     Hypertension     Neuropathy     Osteoarthritis     Peripheral vascular disease ([facility]     Seizures ([facility] 2013    after stroke, LAST [id number]/20/2016    Stroke [facility]) 2013    Unspecified cerebral artery occlusion with cerebral infarction 4-    speech short term and lt side       Past Surgical History:   Procedure Laterality Date    ABDOMEN SURGERY  2016    PERITONEAL CATHETER    ABDOMEN SURGERY  05/26/2016    pd catherter removed from abdomen    BLADDER SURGERY  Spring 2014    dilatated     CAROTID ENDARTERECTOMY  2015    left    COLONOSCOPY  2008    polypectomy, diverticulosis    COLONOSCOPY  1/28/2015    COLONOSCOPY  5/10/2018    COLONOSCOPY performed by Rosa Acosta MD at [address] [place] [address]  2008    bladder tumor removal    DIALYSIS FISTULA CREATION Right 08/25/2016    INGUINAL HERNIA REPAIR Right     IR TUNNELED CATHETER PLACEMENT GREATER THAN 5 YEARS  7/26/2021    IR TUNNELED CATHETER PLACEMENT GREATER THAN 5 YEARS 7/26/2021 [facility] SPECIAL PROCEDURES    KIDNEY BIOPSY  May 2013    SINUS SURGERY      TUNNELED VENOUS PORT PLACEMENT Right 10/2015    dialysis catheter- chest     UPPER GASTROINTESTINAL ENDOSCOPY  1/28/2015    UPPER GASTROINTESTINAL ENDOSCOPY N/A 11/1/2018    EGD BIOPSY performed by Lise Chow DO at [facility]         ALLERGIES    No Known Allergies    MEDICATIONS:      Current Outpatient Medications on File Prior to Visit   Medication Sig Dispense Refill    sevelamer (RENVELA) 800 MG tablet Take 3 tablets by mouth 3 times daily      folic acid (FOLVITE) 1 MG tablet Take 1 mg by mouth daily      apixaban (ELIQUIS) 2.5 MG TABS tablet Take 1 tablet by mouth 2 times daily 60 tablet 1    atorvastatin (LIPITOR) 40 MG tablet Take 1 tablet mass index is 29.55 kg/m². BP Readings from Last 3 Encounters:   12/30/21 118/67   11/01/21 137/60   10/03/21 (!) 170/98        Wt Readings from Last 3 Encounters:   12/30/21 224 lb (101.6 kg)   11/01/21 220 lb (99.8 kg)   09/24/21 266 lb 1.5 oz (120.7 kg)       Physical Exam    Vitals signs and nursing note reviewed. Constitutional:       Appearance: Normal appearance. HENT:      Head: Normocephalic and atraumatic. Eyes:      General: No scleral icterus. Extraocular Movements: Extraocular movements intact. Conjunctiva/sclera: Conjunctivae normal.      Pupils: Pupils are equal, round, and reactive to light. Neck:      Musculoskeletal: Normal range of motion and neck supple. Cardiovascular:   Left sided tunnel cathter. No erythema or drainage     Rate and Rhythm: Normal rate and regular rhythm. Comments: Varicosities on right chest wall and right upper arm. Engorged AV fistula. Bruit and thrill present  Pulmonary:      Effort: Pulmonary effort is normal.      Breath sounds: Normal breath sounds. No wheezing. Musculoskeletal:      Right lower leg: No edema. Left lower leg: No edema. Comments: Lipoma on right upper back   Skin:     Findings: Rash (tinea versicolor on arm and back) present. Neurological:      General: No focal deficit present.       Mental Status: He is alert and oriented to person, place, and time.         LABORATORY FINDINGS:    CBC:  Lab Results   Component Value Date    WBC 4.4 11/01/2021    HGB 8.7 11/01/2021    PLT See Reflexed IPF Result 11/01/2021    PLT Platelet clumps present, count appears adequate. 09/16/2011     BMP:    Lab Results   Component Value Date     11/01/2021    K 4.1 11/01/2021    CL 98 11/01/2021    CO2 28 11/01/2021    BUN 31 11/01/2021    CREATININE 8.36 11/01/2021    GLUCOSE 71 11/01/2021    GLUCOSE 87 09/16/2011     HEMOGLOBIN A1C:   Lab Results   Component Value Date    LABA1C 5.1 04/11/2019     MICROALBUMIN URINE:   Lab Results Component Value Date    MICROALBUR 971 09/29/2015     FASTING LIPID PANEL:  Lab Results   Component Value Date    CHOL 173 04/26/2018    HDL 45 04/26/2018    TRIG 87 04/26/2018     Lab Results   Component Value Date    LDLCHOLESTEROL 111 04/26/2018       LIVER PROFILE:  Lab Results   Component Value Date    ALT 6 11/01/2021    AST 9 11/01/2021    PROT 6.6 11/01/2021    PROT 7.6 10/17/2012    BILITOT 0.36 11/01/2021    BILIDIR 0.09 10/05/2018    LABALBU 4.0 11/01/2021    LABALBU 4.5 09/16/2011      THYROID FUNCTION:   Lab Results   Component Value Date    TSH 2.34 04/26/2018      URINEANALYSIS: No results found for: LABURIN  ASSESSMENT AND PLAN:    1. Essential hypertension  Continue BB    2. Coronary artery disease involving native coronary artery of native heart without angina pectoris  Statin, BB    3. ESRD on hemodialysis ([facility]  Labs reviewed     4. Anemia of chronic disease  Monitor      5. Aortic stenosis, moderate  Get cardiology notes  Yearly echo    6. Atrial flutter, unspecified type ([facility]    - apixaban (ELIQUIS) 2.5 MG TABS tablet; Take 1 tablet by mouth 2 times daily  Dispense: 60 tablet; Refill: 3    7. Chronic ischemic left middle cerebral artery (MCA) stroke  Statin      8. Need for prophylactic vaccination against diphtheria-tetanus-pertussis (DTP)    - Tdap (ADACEL) 5-2-15.5 LF-MCG/0.5 injection; Inject 0.5 mLs into the muscle once for 1 dose  Dispense: 0.5 mL; Refill: 0    9. Seizure disorder as sequela of cerebrovascular accident ([facility]  Stopped meds   Does not drive          FOLLOW UP AND INSTRUCTIONS:   1. Return in about 6 months (around 6/30/2022). 2. Benjamin Mariscal received counseling on the following healthy behaviors: nutrition, exercise and medication adherence    3. Reviewed prior labs and health maintenance. 4. Discussed use, benefit, and side effects of prescribed medications. Barriers to medication compliance addressed. All patient questions answered. Pt voiced understanding.

## 2021-12-30 NOTE — PATIENT INSTRUCTIONS
Medications e-scribe to pharmacy of pt's choice. Patient was given a printed script for the TDAP vaccination. Please take the prescription to any major pharmacy such as: [facility], [facility], [facility], or [email or web address] to have the vaccination administered. AWV is scheduled on 1/4/2022 over the phone. Please bring all of your medications to this upcoming appointment. After Visit Summary  given and reviewed. It is very important for your care that you keep your appointment. If for some reason you are unable to keep your appointment it is equally important that you call our office at [phone] to cancel your appointment and reschedule. Failure to do so may result in your termination from our practice.          [facility] records requested

## 2022-01-04 ENCOUNTER — VIRTUAL VISIT (OUTPATIENT)
Dept: INTERNAL MEDICINE | Age: 77
End: 2022-01-04
Payer: MEDICARE

## 2022-01-04 DIAGNOSIS — Z00.00 ROUTINE GENERAL MEDICAL EXAMINATION AT A HEALTH CARE FACILITY: ICD-10-CM

## 2022-01-04 DIAGNOSIS — Z13.31 POSITIVE DEPRESSION SCREENING: Primary | ICD-10-CM

## 2022-01-04 PROCEDURE — G0438 PPPS, INITIAL VISIT: HCPCS | Performed by: INTERNAL MEDICINE

## 2022-01-04 ASSESSMENT — PATIENT HEALTH QUESTIONNAIRE - PHQ9
2. FEELING DOWN, DEPRESSED OR HOPELESS: 2
SUM OF ALL RESPONSES TO PHQ QUESTIONS 1-9: 11
1. LITTLE INTEREST OR PLEASURE IN DOING THINGS: 1
9. THOUGHTS THAT YOU WOULD BE BETTER OFF DEAD, OR OF HURTING YOURSELF: 0
10. IF YOU CHECKED OFF ANY PROBLEMS, HOW DIFFICULT HAVE THESE PROBLEMS MADE IT FOR YOU TO DO YOUR WORK, TAKE CARE OF THINGS AT HOME, OR GET ALONG WITH OTHER PEOPLE: 3
SUM OF ALL RESPONSES TO PHQ QUESTIONS 1-9: 11
4. FEELING TIRED OR HAVING LITTLE ENERGY: 3
5. POOR APPETITE OR OVEREATING: 0
SUM OF ALL RESPONSES TO PHQ9 QUESTIONS 1 & 2: 3
6. FEELING BAD ABOUT YOURSELF - OR THAT YOU ARE A FAILURE OR HAVE LET YOURSELF OR YOUR FAMILY DOWN: 2
SUM OF ALL RESPONSES TO PHQ QUESTIONS 1-9: 11
SUM OF ALL RESPONSES TO PHQ QUESTIONS 1-9: 11
7. TROUBLE CONCENTRATING ON THINGS, SUCH AS READING THE NEWSPAPER OR WATCHING TELEVISION: 0
3. TROUBLE FALLING OR STAYING ASLEEP: 3
8. MOVING OR SPEAKING SO SLOWLY THAT OTHER PEOPLE COULD HAVE NOTICED. OR THE OPPOSITE, BEING SO FIGETY OR RESTLESS THAT YOU HAVE BEEN MOVING AROUND A LOT MORE THAN USUAL: 0

## 2022-01-04 ASSESSMENT — LIFESTYLE VARIABLES: HOW OFTEN DO YOU HAVE A DRINK CONTAINING ALCOHOL: 0

## 2022-01-04 NOTE — PROGRESS NOTES
Medicare Annual Wellness Visit  Name: Brent Theodore Date: 2022   MRN: [id number] Sex: Male   Age: 64 y.o. Ethnicity: Non- / Non    : 1945 Race: Black /       Kleber Wiley is here for Medicare AWV    Screenings for behavioral, psychosocial and functional/safety risks, and cognitive dysfunction are all negative except as indicated below. These results, as well as other patient data from the [address] [facility] [address] form, are documented in Flowsheets linked to this Encounter. No Known Allergies    Prior to Visit Medications    Medication Sig Taking?  Authorizing Provider   sevelamer (RENVELA) 800 MG tablet Take 3 tablets by mouth 3 times daily Yes Historical Provider, MD   folic acid (FOLVITE) 1 MG tablet Take 1 mg by mouth daily Yes Historical Provider, MD   apixaban (ELIQUIS) 2.5 MG TABS tablet Take 1 tablet by mouth 2 times daily Yes Albina Levy MD   benzonatate (TESSALON) 100 MG capsule Take 1 capsule by mouth 2 times daily as needed for Cough Yes Albina Levy MD   atorvastatin (LIPITOR) 40 MG tablet Take 1 tablet by mouth daily Yes Alison Morrison MD   metoprolol tartrate 75 MG TABS Take 75 mg by mouth 2 times daily Yes Teena Smart MD       Past Medical History:   Diagnosis Date    Allergic rhinitis     Anemia     BPH (benign prostatic hyperplasia)     CAD (coronary artery disease)     Carotid artery stenosis     ulcerated plaque in left ICA 60 - 70% on carotid angiogram    Cerebrovascular disease     left frontal hemorrhage    Diabetes mellitus ([address] 2013    Eczema     ESRD (end stage renal disease) on dialysis ([address]     Full dentures     Upper / Lower    GERD (gastroesophageal reflux disease)     Gout     Hemodialysis patient ([address] 10/2015     [address]NAL [address] RD  M,W F    Hyperlipidemia     Hypertension     Neuropathy     Osteoarthritis     Peripheral vascular disease ([address]     Seizures ([address] 2013    after stroke, LAST [id number]/20/2016    Stroke ([facility] 2013    Unspecified cerebral artery occlusion with cerebral infarction 4-    speech short term and lt side       Past Surgical History:   Procedure Laterality Date    ABDOMEN SURGERY  2016    PERITONEAL CATHETER    ABDOMEN SURGERY  05/26/2016    pd catherter removed from abdomen    BLADDER SURGERY  Spring 2014    dilatated     CAROTID ENDARTERECTOMY  2015    left    COLONOSCOPY  2008    polypectomy, diverticulosis    COLONOSCOPY  1/28/2015    COLONOSCOPY  5/10/2018    COLONOSCOPY performed by Maddie Agee MD at [address] [place] [address]  2008    bladder tumor removal    DIALYSIS FISTULA CREATION Right 08/25/2016    INGUINAL HERNIA REPAIR Right     IR TUNNELED CATHETER PLACEMENT GREATER THAN 5 YEARS  7/26/2021    IR TUNNELED CATHETER PLACEMENT GREATER THAN 5 YEARS 7/26/2021 [facility] SPECIAL PROCEDURES    KIDNEY BIOPSY  May 2013    SINUS SURGERY      TUNNELED VENOUS PORT PLACEMENT Right 10/2015    dialysis catheter- chest     UPPER GASTROINTESTINAL ENDOSCOPY  1/28/2015    UPPER GASTROINTESTINAL ENDOSCOPY N/A 11/1/2018    EGD BIOPSY performed by Sheree Adan DO at [facility]       Family History   Problem Relation Age of Onset    Diabetes Mother     Kidney Disease Mother         Dialysis    Cancer Mother         KIDNEY    Anesth Problems Mother         delayed awakening     Other Father         Debbora Fried    Diabetes Sister     High Blood Pressure Sister     Cancer Brother        CareTeam (Including outside providers/suppliers regularly involved in providing care):   Patient Care Team:  Henry Rebolledo MD as PCP - Chacho Paulson MD as PCP - [facility] Provider  Maxwell Ferrera MD as Consulting Physician (Urology)  Mayur Mohan MD as Consulting Physician (Gastroenterology)  Henry Rebolledo MD (Internal Medicine)  [address] D[facility] (Dialysis)  Krishna Mckeon MD as Surgeon (General Surgery)    Wt Readings from Last 3 Encounters:   12/30/21 224 lb (101.6 kg)   11/01/21 220 lb (99.8 kg)   09/24/21 266 lb 1.5 oz (120.7 kg)     There were no vitals filed for this visit. There is no height or weight on file to calculate BMI. Based upon direct observation of the patient, evaluation of cognition reveals remote memory intact, recent memory impaired. Patient's complete Health Risk Assessment and screening values have been reviewed and are found in Flowsheets. The following problems were reviewed today and where indicated follow up appointments were made and/or referrals ordered. Positive Risk Factor Screenings with Interventions:      Cognitive: Words recalled: 1 Word Recalled  Total Score Interpretation: Positive Mini-Cog  Cognitive Impairment Interventions:  · Patient declines any further evaluation/treatment for cognitive impairment. Pt educated to contact office for referral to neurology if he becomes concerned. States he has a lot going on right now and was distracted for test.    Depression:  PHQ-2 Score: 3  PHQ-9 Total Score: 11    Severity:1-4 = minimal depression, 5-9 = mild depression, 10-14 = moderate depression, 15-19 = moderately severe depression, 20-27 = severe depression  Depression Interventions:  · Psychiatry referral ordered for further evaluation/treatment. Pt open to counseling, is feeling very frustrated with his quality of life and medical issues. General Health and ACP:  General  In general, how would you say your health is?: (!) Poor (bilateral knee pain, dialysis issues--port placement)  In the past 7 days, have you experienced any of the following?  New or Increased Pain, New or Increased Fatigue, Loneliness, Social Isolation, Stress or Anger?: (!) Loneliness,Stress  Do you get the social and emotional support that you need?: Yes  Do you have a Living Will?: Yes  Advance Directives     Power of  Living Will ACP-Advance Directive ACP-Power of     Not on File Not on File Not on File Filed      General Health Risk Interventions:  · Poor self-assessment of health status: Pt wants to pursue kidney transplant, but thinks his age will exclude him from being eligible. Pt wants to look into second opinion at [facility].  Writer to send info, also encouraged pt to contact insurance company to see if he would need a referral.    Health Habits/Nutrition:  Health Habits/Nutrition  Do you exercise for at least 20 minutes 2-3 times per week?: (!) No (poor health, will send educational materials for seated exercises)  Have you lost any weight without trying in the past 3 months?: No  Do you eat only one meal per day?: No  Have you seen the dentist within the past year?: Yes     Health Habits/Nutrition Interventions:  · Inadequate physical activity:  educational materials provided to promote increased physical activity    Hearing/Vision:  No exam data present  Hearing/Vision  Do you or your family notice any trouble with your hearing that hasn't been managed with hearing aids?: No  Do you have difficulty driving, watching TV, or doing any of your daily activities because of your eyesight?: No  Have you had an eye exam within the past year?: (!) No (list providers sent)  Hearing/Vision Interventions:  · Vision concerns:  patient declines any further evaluation/treatment for this issue    Safety:  Safety  Do you have working smoke detectors?: Yes  Have all throw rugs been removed or fastened?: Yes  Do you have non-slip mats or surfaces in all bathtubs/showers?: (!) No (says he sits on side of tub and swings legs over easily)  Do all of your stairways have a railing or banister?: Yes  Are your doorways, halls and stairs free of clutter?: Yes  Do you always fasten your seatbelt when you are in a car?:  (education given)  Safety Interventions:  · Home safety tips provided     Personalized Preventive Plan   Current Health Maintenance Status  Immunization History   Administered Date(s) Administered    COVID-19, ELLE Holman, 30mcg/0.3mL 02/25/2021, 03/18/2021    Hepatitis B (Recombivax HB) 11/11/2015, 12/02/2015, 05/27/2016    Hepatitis B vaccine 11/11/2015, 12/02/2015, 05/27/2016    Influenza 09/25/2012    Influenza Virus Vaccine 01/15/2014, 12/03/2014, 09/23/2015, 09/15/2017    Influenza, High Dose (Fluzone 65 yrs and older) 10/08/2018, 10/12/2020    Influenza, Quadv, IM, (6 mo and older Fluzone, Flulaval, Fluarix and 3 yrs and older Afluria) 12/15/2016    Pneumococcal Conjugate 13-valent ([id number]) 10/13/2015    Pneumococcal Conjugate 7-valent (Prevnar7) 01/15/2009    Pneumococcal Polysaccharide ([id number]) 06/20/2017        Health Maintenance   Topic Date Due    DTaP/Tdap/Td vaccine (1 - Tdap) Never done    Lipid screen  04/26/2019    Annual Wellness Visit (AWV)  Never done    COVID-19 Vaccine (3 - Booster for Pfizer series) 09/18/2021    Shingles Vaccine (1 of 2) 02/02/2022 (Originally 9/9/1993)    Depression Screen  02/02/2022    Flu vaccine  Completed    Pneumococcal 65+ yrs at Risk Vaccine  Completed    Hepatitis C screen  Completed    Hepatitis A vaccine  Aged Out    Hib vaccine  Aged Out    Meningococcal (ACWY) vaccine  Aged Out     Recommendations for [employer] Due: see orders and patient instructions/AVS.  . Recommended screening schedule for the next 5-10 years is provided to the patient in written form: see Patient Instructions/AVS.    I, Nroeen Meyer RN, 1/4/2022, performed the documented evaluation under the direct supervision of the attending physician.

## 2022-02-24 ENCOUNTER — INITIAL CONSULT (OUTPATIENT)
Dept: VASCULAR SURGERY | Age: 77
End: 2022-02-24
Payer: MEDICARE

## 2022-02-24 VITALS
OXYGEN SATURATION: 98 % | RESPIRATION RATE: 17 BRPM | TEMPERATURE: 97.4 F | WEIGHT: 224 LBS | SYSTOLIC BLOOD PRESSURE: 135 MMHG | HEART RATE: 74 BPM | BODY MASS INDEX: 29.69 KG/M2 | DIASTOLIC BLOOD PRESSURE: 70 MMHG | HEIGHT: 73 IN

## 2022-02-24 DIAGNOSIS — T82.590A DIALYSIS AV FISTULA MALFUNCTION, INITIAL ENCOUNTER (HCC): Primary | ICD-10-CM

## 2022-02-24 DIAGNOSIS — Z99.2 ENCOUNTER REGARDING VASCULAR ACCESS FOR DIALYSIS FOR END-STAGE RENAL DISEASE (HCC): ICD-10-CM

## 2022-02-24 DIAGNOSIS — N18.6 ENCOUNTER REGARDING VASCULAR ACCESS FOR DIALYSIS FOR END-STAGE RENAL DISEASE (HCC): ICD-10-CM

## 2022-02-24 PROCEDURE — 99204 OFFICE O/P NEW MOD 45 MIN: CPT | Performed by: SURGERY

## 2022-02-27 PROBLEM — T82.590A DIALYSIS AV FISTULA MALFUNCTION, INITIAL ENCOUNTER (HCC): Status: ACTIVE | Noted: 2022-02-27

## 2022-02-27 PROBLEM — N18.6 ENCOUNTER REGARDING VASCULAR ACCESS FOR DIALYSIS FOR END-STAGE RENAL DISEASE (HCC): Status: ACTIVE | Noted: 2022-02-27

## 2022-02-27 PROBLEM — Z99.2 ENCOUNTER REGARDING VASCULAR ACCESS FOR DIALYSIS FOR END-STAGE RENAL DISEASE (HCC): Status: ACTIVE | Noted: 2022-02-27

## 2022-02-27 ASSESSMENT — ENCOUNTER SYMPTOMS
CHEST TIGHTNESS: 0
SHORTNESS OF BREATH: 0
COLOR CHANGE: 0
ALLERGIC/IMMUNOLOGIC NEGATIVE: 1
ABDOMINAL PAIN: 0

## 2022-02-27 NOTE — PROGRESS NOTES
 Stroke [facility]) 2013    Unspecified cerebral artery occlusion with cerebral infarction 4-    speech short term and lt side       Surgical History:     Past Surgical History:   Procedure Laterality Date    ABDOMEN SURGERY  2016    PERITONEAL CATHETER    ABDOMEN SURGERY  05/26/2016    pd catherter removed from abdomen    BLADDER SURGERY  Spring 2014    dilatated     CAROTID ENDARTERECTOMY  2015    left    COLONOSCOPY  2008    polypectomy, diverticulosis    COLONOSCOPY  1/28/2015    COLONOSCOPY  5/10/2018    COLONOSCOPY performed by John Funes MD at [address]  2008    bladder tumor removal    DIALYSIS FISTULA CREATION Right 08/25/2016    INGUINAL HERNIA REPAIR Right     IR TUNNELED CATHETER PLACEMENT GREATER THAN 5 YEARS  7/26/2021    IR TUNNELED CATHETER PLACEMENT GREATER THAN 5 YEARS 7/26/2021 [facility] SPECIAL PROCEDURES    KIDNEY BIOPSY  May 2013    SINUS SURGERY      TUNNELED VENOUS PORT PLACEMENT Right 10/2015    dialysis catheter- chest     UPPER GASTROINTESTINAL ENDOSCOPY  1/28/2015    UPPER GASTROINTESTINAL ENDOSCOPY N/A 11/1/2018    EGD BIOPSY performed by Gabrielle Strickland DO at [facility] Endoscopy       Family History:     Family History   Problem Relation Age of Onset    Diabetes Mother     Kidney Disease Mother         Dialysis    Cancer Mother         KIDNEY    Anesth Problems Mother         delayed awakening     Other Father         Darrell Loser    Diabetes Sister     High Blood Pressure Sister     Cancer Brother        Allergies:       Patient has no known allergies.     Medications:      Current Outpatient Medications   Medication Sig Dispense Refill    sevelamer (RENVELA) 800 MG tablet Take 3 tablets by mouth 3 times daily      folic acid (FOLVITE) 1 MG tablet Take 1 mg by mouth daily      apixaban (ELIQUIS) 2.5 MG TABS tablet Take 1 tablet by mouth 2 times daily 60 tablet 3    atorvastatin (LIPITOR) 40 MG tablet Take 1 tablet by mouth daily 30 tablet 3    metoprolol tartrate 75 MG TABS Take 75 mg by mouth 2 times daily 60 tablet 3     No current facility-administered medications for this visit. Social History:     Tobacco:    reports that he has never smoked. He has never used smokeless tobacco.  Alcohol:      reports no history of alcohol use. Drug Use:  reports no history of drug use. Review of Systems:     Review of Systems   Constitutional: Negative for chills and fever. HENT: Negative for congestion. Eyes: Negative for visual disturbance. Respiratory: Negative for chest tightness and shortness of breath. Cardiovascular: Positive for leg swelling. Negative for chest pain. Gastrointestinal: Negative for abdominal pain. Endocrine: Negative. Genitourinary: Negative. Musculoskeletal: Positive for arthralgias. Skin: Negative for color change and wound. Allergic/Immunologic: Negative. Neurological: Negative for facial asymmetry, speech difficulty, weakness and numbness. Hematological: Negative. Psychiatric/Behavioral: Negative. Physical Exam:     Vitals:  /70 (Site: Left Upper Arm, Position: Sitting, Cuff Size: Medium Adult)   Pulse 74   Temp 97.4 °F (36.3 °C) (Temporal)   Resp 17   Ht 6' 1\" (1.854 m)   Wt 224 lb (101.6 kg)   SpO2 98%   BMI 29.55 kg/m²     Physical Exam  Constitutional:       Appearance: He is well-developed and well-groomed. Eyes:      Extraocular Movements: Extraocular movements intact. Conjunctiva/sclera: Conjunctivae normal.   Neck:      Vascular: No carotid bruit. Cardiovascular:      Rate and Rhythm: Normal rate and regular rhythm. Pulses:           Radial pulses are 2+ on the right side and 2+ on the left side. Arteriovenous access: right arteriovenous access is present. Comments: Pulsatile thrill over AV fistula  Pulmonary:      Effort: Pulmonary effort is normal. No respiratory distress.    Chest:      Comments: Tunneled hemodialysis catheter in place  Abdominal: Palpations: Abdomen is soft. Tenderness: There is no abdominal tenderness. Musculoskeletal:      Right upper arm: Swelling present. No tenderness. Left upper arm: No swelling or tenderness. Right forearm: Swelling present. No tenderness. Left forearm: No swelling or tenderness. Right hand: Swelling present. No tenderness. Normal strength. Normal sensation. Normal capillary refill. Left hand: No swelling or tenderness. Normal strength. Normal sensation. Normal capillary refill. Cervical back: Full passive range of motion without pain. Right lower leg: No edema. Left lower leg: No edema. Skin:     General: Skin is warm. Capillary Refill: Capillary refill takes less than 2 seconds. Neurological:      Mental Status: He is alert and oriented to person, place, and time. GCS: GCS eye subscore is 4. GCS verbal subscore is 5. GCS motor subscore is 6. Sensory: Sensation is intact. Motor: Motor function is intact.    Psychiatric:         Mood and Affect: Mood normal.         Speech: Speech normal.         Behavior: Behavior normal.                Imaging/Labs:     Ultrasound performed in the office reveals good size cephalic vein fistula in the forearm with stenosis/occlusion of the cephalic vein at antecubital fossa with drainage into deep system    Assessment and Plan:     ESRD on hemodialysis, malfunctioning AV fistula, central venous stenosis  · Will need fistulagram to evaluate anatomy and outflow of fistula  · Based on this I can present options for him to try to salvage fistula in his right arm and maybe also be able to help with the chronic swelling  · He understands that there are no guarantees and that he may ultimately need to have his fistula ligated and consideration for new access on his left arm   · Continue to use tunneled catheter in the interm    Electronically signed by Hanh Rios MD on 2/27/22 at 4:24 PM [facility] [address] [facility]  Office: [phone]  Cell: [phone]  Email: [email or web address]

## 2022-02-28 ENCOUNTER — TELEPHONE (OUTPATIENT)
Dept: VASCULAR SURGERY | Age: 77
End: 2022-02-28

## 2022-02-28 NOTE — TELEPHONE ENCOUNTER
----- Message from Maksim Arreguin MA sent at 2/28/2022 10:35 AM EST -----  Done all moved around.  ----- Message -----  From: Maksim Arreguin MA  Sent: 2/28/2022   8:30 AM EST  To: Jayashree Carey MD, #    His fistula is scheduled at 9, then there is a fistulagram at 10:30 and the fistuala creation following.   ----- Message -----  From: Jayashree Carey MD  Sent: 2/27/2022   4:46 PM EST  To: Keisha Varghese MA, #    Wanted to make sure he was scheduled for fistulagram didn't see it on the media tab    Also looks like I have a carotid on that day at 8 am and fistula creation at 1130    So lets move fistula creation up to 1030    And the fistulagrams to start at noon

## 2022-03-08 ENCOUNTER — HOSPITAL ENCOUNTER (OUTPATIENT)
Dept: CARDIAC CATH/INVASIVE PROCEDURES | Age: 77
Discharge: HOME | End: 2022-03-08
Payer: MEDICARE

## 2022-03-08 ENCOUNTER — TELEPHONE (OUTPATIENT)
Dept: VASCULAR SURGERY | Age: 77
End: 2022-03-08

## 2022-03-08 VITALS
DIASTOLIC BLOOD PRESSURE: 82 MMHG | SYSTOLIC BLOOD PRESSURE: 176 MMHG | HEART RATE: 77 BPM | RESPIRATION RATE: 20 BRPM | TEMPERATURE: 98.2 F | OXYGEN SATURATION: 96 % | BODY MASS INDEX: 28.86 KG/M2 | WEIGHT: 217.8 LBS | HEIGHT: 73 IN

## 2022-03-08 PROCEDURE — 36901 INTRO CATH DIALYSIS CIRCUIT: CPT

## 2022-03-08 PROCEDURE — C1894 INTRO/SHEATH, NON-LASER: HCPCS

## 2022-03-08 PROCEDURE — 2709999900 HC NON-CHARGEABLE SUPPLY

## 2022-03-08 PROCEDURE — 6360000004 HC RX CONTRAST MEDICATION

## 2022-03-08 PROCEDURE — 7100000000 HC PACU RECOVERY - FIRST 15 MIN

## 2022-03-08 PROCEDURE — 76937 US GUIDE VASCULAR ACCESS: CPT | Performed by: SURGERY

## 2022-03-08 PROCEDURE — 7100000001 HC PACU RECOVERY - ADDTL 15 MIN

## 2022-03-08 PROCEDURE — 36901 INTRO CATH DIALYSIS CIRCUIT: CPT | Performed by: SURGERY

## 2022-03-08 PROCEDURE — C1760 CLOSURE DEV, VASC: HCPCS

## 2022-03-08 NOTE — PROGRESS NOTES
Signature on consent verified with patient and wife. Rt arm with palpable thrill and audible bruit. Sterile dressing applied to lt chest tunnel cath. Patient stated his fell out when showering.

## 2022-03-08 NOTE — PROGRESS NOTES
Patient admitted, consent signed and questions answered. Patient ready for procedure. Call light to reach with side rails up 2 of 2. Family  bedside with patient.   2% Chlorhexidine cloths used to prep site

## 2022-03-08 NOTE — PROGRESS NOTES
All discharge instructions reviewed, questions answered, paper signed and given copy. Patient discharged per W/C with wife and belongings.

## 2022-03-08 NOTE — PROGRESS NOTES
Received post procedure to [address] [place] [address] to room 6. Assessment obtained. Restrictions reviewed with patient. Post procedure pathway initiated. Rt arm bandaid dry and intact. site soft ,. No hematoma noted. Family at side. Patient without complaints.   Rt arm thrill /bruit present

## 2022-03-08 NOTE — OP NOTE
Operative Note      Patient: Douglas Barone  YOB: 1945  MRN: [id number]    Date of Procedure: 3/8/22    Pre-Op Diagnosis: malfunctioning av fistula    Post-Op Diagnosis: Same       Procedure: Fistulagram    Surgeon: Dr. Malika Hardy    Assistant: Dr. Jose Diaz    Anesthesia: local    Estimated Blood Loss (mL): 3 ml    Complications: None    Specimens: none    Implants: none      Drains: none    Findings: This is a right radial cephalic AV fistula. 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 There is severe stenosis of the cephalic vein near antecubital fossa with collateral drainage into brachial vein. Brachial, axillary and subclavian veins are patent. The right innominate vein is occluded with significant collaterals draining into the heart. Plan: Will need open revision of AV fistula in his forearm and then consideration to recanalize central occlusion. Detailed Description of Procedure:     Mr. Rajwinder Leon was brought to the catheterization suite for evaluation of right upper extremity AV fistula. After appropriate timeout performed the right upper extremity was prepped and draped in standard sterile fashion. I began by evaluating the AV fistula with ultrasound, it was patent and compressible. Local anesthesia delivered, under ultrasound guidance using a micropuncture needle the AV fistula was accessed, permanent ultrasound images saved. A 4-Hungarian micropuncture sheath inserted and flushed. Fistulagram performed. No interventions needed. The sheath was removed and manual pressure held for hemostasis. Band aid applied, patient tolerated procedure well.       Electronically signed by Adriel Corral MD on 3/8/2022 at 3:32 PM

## 2022-03-08 NOTE — H&P
Update History & Physical    The patient's History and Physical of February 27, 2022 was reviewed with the patient and I examined the patient. There was no change. The surgical site was confirmed by the patient and me. Plan: The risks, benefits, expected outcome, and alternative to the recommended procedure have been discussed with the patient. Patient understands and wants to proceed with the procedure. Electronically signed by Sadie Espinosa DO on 3/8/2022 at 12:54 PM             Division of Vascular Surgery          H&P        Physician Requesting Consult:  Dr. John Henson     Reason for Consult:   Evaluation of dialysis access, second opinion     Chief Complaint:      Right arm swelling, malfunctioning AV fistula     History of Present Illness:      Aneudy Sanchez is a 64 y. o.left handed gentleman who presents with chronic swelling of his right arm and malfunctioning AV fistula. He had a right radial cephalic fistula created on his wrist a few years ago ( Dr. Sapna Grant 2016). It was being utilized up until about a year ago when he started to develop significant swelling and elevated pressures during treatments. He is currently undergoing dialysis through a tunneled catheter in his chest.  He has gone through a series of them over the past few years. He denies prior DVT or swelling in his arms, denies symptoms suggestive of ischemic steal.  He has noticed increased swelling in his right leg with negative DVT workup last fall. He was advised by his vascular surgery team to have his fistula ligated due to issues with outflow vein and central stenosis, along with creation of new access in his left arm.   He comes in today for second opinion.     Medical History:      Past Medical History        Past Medical History:   Diagnosis Date    Allergic rhinitis      Anemia      BPH (benign prostatic hyperplasia)      CAD (coronary artery disease)      Carotid artery stenosis 2013     ulcerated plaque in left ICA 60 - 70% on carotid angiogram    Cerebrovascular disease 2013     left frontal hemorrhage    Diabetes mellitus ([facility]) 2013    Eczema      ESRD (end stage renal disease) on dialysis ([facility])      Full dentures       Upper / Lower    GERD (gastroesophageal reflux disease)      Gout      Hemodialysis patient ([facility]) 10/2015     [facility] [address]  M,W F    Hyperlipidemia      Hypertension      Neuropathy      Osteoarthritis      Peripheral vascular disease ([facility])      Seizures ([facility]) 2013     after stroke, LAST WOTZIZI1205/20/2016    Stroke [facility]) 2013    Unspecified cerebral artery occlusion with cerebral infarction 4-     speech short term and lt side            Surgical History:      Past Surgical History         Past Surgical History:   Procedure Laterality Date    ABDOMEN SURGERY   2016     PERITONEAL CATHETER    ABDOMEN SURGERY   05/26/2016     pd catherter removed from abdomen    BLADDER SURGERY   Spring 2014     dilatated     CAROTID ENDARTERECTOMY   2015     left    COLONOSCOPY   2008     polypectomy, diverticulosis    COLONOSCOPY   1/28/2015    COLONOSCOPY   5/10/2018     COLONOSCOPY performed by Melvi Alberto MD at [address] [place] [address]   2008     bladder tumor removal    DIALYSIS FISTULA CREATION Right 08/25/2016    INGUINAL HERNIA REPAIR Right      IR TUNNELED CATHETER PLACEMENT GREATER THAN 5 YEARS   7/26/2021     IR TUNNELED CATHETER PLACEMENT GREATER THAN 5 YEARS 7/26/2021 [facility] SPECIAL PROCEDURES    KIDNEY BIOPSY   May 2013    SINUS SURGERY        TUNNELED VENOUS PORT PLACEMENT Right 10/2015     dialysis catheter- chest     UPPER GASTROINTESTINAL ENDOSCOPY   1/28/2015    UPPER GASTROINTESTINAL ENDOSCOPY N/A 11/1/2018     EGD BIOPSY performed by Arnulfo Fung DO at [facility] Endoscopy            Family History:      Family History         Family History   Problem Relation Age of Onset    Diabetes Mother      Kidney Disease Mother           Dialysis    Cancer Mother         KIDNEY    Anesth Problems Mother           delayed awakening     Other Father           BUNNY VALLEJO    Diabetes Sister      High Blood Pressure Sister      Cancer Brother              Allergies:       Patient has no known allergies.     Medications:      Current Facility-Administered Medications          Current Outpatient Medications   Medication Sig Dispense Refill    sevelamer (RENVELA) 800 MG tablet Take 3 tablets by mouth 3 times daily        folic acid (FOLVITE) 1 MG tablet Take 1 mg by mouth daily        apixaban (ELIQUIS) 2.5 MG TABS tablet Take 1 tablet by mouth 2 times daily 60 tablet 3    atorvastatin (LIPITOR) 40 MG tablet Take 1 tablet by mouth daily 30 tablet 3    metoprolol tartrate 75 MG TABS Take 75 mg by mouth 2 times daily 60 tablet 3      No current facility-administered medications for this visit.         Social History:      Tobacco:    reports that he has never smoked. He has never used smokeless tobacco.  Alcohol:      reports no history of alcohol use. Drug Use:  reports no history of drug use.     Review of Systems:      Review of Systems   Constitutional: Negative for chills and fever. HENT: Negative for congestion. Eyes: Negative for visual disturbance. Respiratory: Negative for chest tightness and shortness of breath. Cardiovascular: Positive for leg swelling. Negative for chest pain. Gastrointestinal: Negative for abdominal pain. Endocrine: Negative. Genitourinary: Negative. Musculoskeletal: Positive for arthralgias. Skin: Negative for color change and wound. Allergic/Immunologic: Negative. Neurological: Negative for facial asymmetry, speech difficulty, weakness and numbness. Hematological: Negative.     Psychiatric/Behavioral: Negative.       Physical Exam:      Vitals:  /70 (Site: Left Upper Arm, Position: Sitting, Cuff Size: Medium Adult)   Pulse 74   Temp 97.4 °F (36.3 °C) (Temporal)   Resp 17   Ht 6' 1\" (1.854 m)   Wt 224 lb (101.6 kg)   SpO2 98%   BMI 29.55 kg/m²      Physical Exam  Constitutional:       Appearance: He is well-developed and well-groomed. Eyes:      Extraocular Movements: Extraocular movements intact. Conjunctiva/sclera: Conjunctivae normal.   Neck:      Vascular: No carotid bruit. Cardiovascular:      Rate and Rhythm: Normal rate and regular rhythm. Pulses:           Radial pulses are 2+ on the right side and 2+ on the left side. Arteriovenous access: right arteriovenous access is present. Comments: Pulsatile thrill over AV fistula  Pulmonary:      Effort: Pulmonary effort is normal. No respiratory distress. Chest:      Comments: Tunneled hemodialysis catheter in place  Abdominal:      Palpations: Abdomen is soft. Tenderness: There is no abdominal tenderness. Musculoskeletal:      Right upper arm: Swelling present. No tenderness. Left upper arm: No swelling or tenderness. Right forearm: Swelling present. No tenderness. Left forearm: No swelling or tenderness. Right hand: Swelling present. No tenderness. Normal strength. Normal sensation. Normal capillary refill. Left hand: No swelling or tenderness. Normal strength. Normal sensation. Normal capillary refill. Cervical back: Full passive range of motion without pain. Right lower leg: No edema. Left lower leg: No edema. Skin:     General: Skin is warm. Capillary Refill: Capillary refill takes less than 2 seconds. Neurological:      Mental Status: He is alert and oriented to person, place, and time. GCS: GCS eye subscore is 4. GCS verbal subscore is 5. GCS motor subscore is 6. Sensory: Sensation is intact. Motor: Motor function is intact.    Psychiatric:         Mood and Affect: Mood normal.         Speech: Speech normal.         Behavior: Behavior normal.                   Imaging/Labs:      Ultrasound performed in the office reveals good size cephalic vein fistula in the forearm with stenosis/occlusion of the cephalic vein at antecubital fossa with drainage into deep system     Assessment and Plan:      ESRD on hemodialysis, malfunctioning AV fistula, central venous stenosis  ? Will need fistulagram to evaluate anatomy and outflow of fistula  ? Based on this I can present options for him to try to salvage fistula in his right arm and maybe also be able to help with the chronic swelling  ? He understands that there are no guarantees and that he may ultimately need to have his fistula ligated and consideration for new access on his left arm   ? Continue to use tunneled catheter in the interm     Electronically signed by Bhupinder Altman MD on 2/27/22 at 4:24 PM [address] [facility]  Office: [phone]  Cell: [phone]  Email: [email or web address]

## 2022-03-09 ENCOUNTER — TELEPHONE (OUTPATIENT)
Dept: VASCULAR SURGERY | Age: 77
End: 2022-03-09

## 2022-03-09 NOTE — TELEPHONE ENCOUNTER
----- Message from Nena Juares MA sent at 3/9/2022  8:25 AM EST -----  Spoke to Haim Strickland at dialysis and she said to fax over all information and she will review with the pt.  She verbalized understanding.   ----- Message -----  From: Abilio Velez MD  Sent: 3/8/2022   4:51 PM EST  To: [facility]    Will need to be scheduled for right AV fistula revision  CVOR  MAC anesthesia    Next Tuesday 3/15 at 1030 am

## 2022-03-14 ENCOUNTER — TELEPHONE (OUTPATIENT)
Dept: VASCULAR SURGERY | Age: 77
End: 2022-03-14

## 2022-03-14 NOTE — TELEPHONE ENCOUNTER
Spoke with the patients wife letting her know that at this time we have to cancel the patients surgery, she expressed understanding. She was informed that we would call her back when we have a new time.

## 2022-04-11 ENCOUNTER — TELEPHONE (OUTPATIENT)
Dept: INTERNAL MEDICINE | Age: 77
End: 2022-04-11

## 2022-04-11 NOTE — LETTER
[address] [address]  Phone: [phone]  Fax: [phone]    Gerald Moe MD        April 11, 2022    [address] [place] [address]      Dear Luis Enrique Wiggins: This letter is a reminder that you may have diagnostic testing that has not been completed. It is important to your well-being that these test(s) are performed. Please find the outstanding test(s) attached. If you could please have these completed before your next appointment. You can have the test completed at any [address] [place] [address] facility or Lab. Please see the order for scheduling instructions. Any testing that needs completed other than blood work or xray's please call [phone] to schedule an appointment. Otherwise can be done at any [facility]. Please call our office at Dept: [phone] for additional information on the outstanding tests or let us know if they have been completed so we may update your chart. If you have any questions or concerns, please don't hesitate to call.     Sincerely,        Gerald Moe MD

## 2022-06-16 ENCOUNTER — OFFICE VISIT (OUTPATIENT)
Dept: VASCULAR SURGERY | Age: 77
End: 2022-06-16
Payer: MEDICARE

## 2022-06-16 VITALS
HEIGHT: 73 IN | OXYGEN SATURATION: 96 % | BODY MASS INDEX: 27.83 KG/M2 | TEMPERATURE: 98 F | RESPIRATION RATE: 17 BRPM | WEIGHT: 210 LBS | SYSTOLIC BLOOD PRESSURE: 154 MMHG | HEART RATE: 69 BPM | DIASTOLIC BLOOD PRESSURE: 89 MMHG

## 2022-06-16 DIAGNOSIS — N18.6 ENCOUNTER REGARDING VASCULAR ACCESS FOR DIALYSIS FOR END-STAGE RENAL DISEASE (HCC): Primary | ICD-10-CM

## 2022-06-16 DIAGNOSIS — Z99.2 ENCOUNTER REGARDING VASCULAR ACCESS FOR DIALYSIS FOR END-STAGE RENAL DISEASE (HCC): Primary | ICD-10-CM

## 2022-06-16 PROBLEM — E87.20 ACIDOSIS: Status: ACTIVE | Noted: 2021-10-25

## 2022-06-16 PROBLEM — R52 PAIN, UNSPECIFIED: Status: ACTIVE | Noted: 2020-04-01

## 2022-06-16 PROBLEM — J15.29: Status: ACTIVE | Noted: 2018-06-22

## 2022-06-16 PROBLEM — N25.81 SECONDARY HYPERPARATHYROIDISM OF RENAL ORIGIN (HCC): Status: ACTIVE | Noted: 2018-06-22

## 2022-06-16 PROBLEM — I20.9 ANGINA PECTORIS, UNSPECIFIED (HCC): Status: ACTIVE | Noted: 2018-06-22

## 2022-06-16 PROBLEM — D68.9 COAGULATION DEFECT, UNSPECIFIED (HCC): Status: ACTIVE | Noted: 2019-11-13

## 2022-06-16 PROBLEM — E83.52 HYPERCALCEMIA: Status: ACTIVE | Noted: 2019-02-24

## 2022-06-16 PROBLEM — R53.83 OTHER FATIGUE: Status: ACTIVE | Noted: 2018-06-22

## 2022-06-16 PROBLEM — D50.9 IRON DEFICIENCY ANEMIA, UNSPECIFIED: Status: ACTIVE | Noted: 2018-06-22

## 2022-06-16 PROBLEM — R25.2 CRAMP AND SPASM: Status: ACTIVE | Noted: 2018-06-22

## 2022-06-16 PROBLEM — I65.23 BILATERAL CAROTID ARTERY STENOSIS: Status: ACTIVE | Noted: 2018-05-15

## 2022-06-16 PROBLEM — N18.9 ANEMIA IN CHRONIC KIDNEY DISEASE: Status: ACTIVE | Noted: 2018-06-22

## 2022-06-16 PROBLEM — E83.30 DISORDER OF PHOSPHORUS METABOLISM, UNSPECIFIED: Status: ACTIVE | Noted: 2018-06-22

## 2022-06-16 PROBLEM — L29.9 PRURITUS, UNSPECIFIED: Status: ACTIVE | Noted: 2021-12-01

## 2022-06-16 PROBLEM — T80.219A: Status: ACTIVE | Noted: 2021-10-29

## 2022-06-16 PROBLEM — D63.1 ANEMIA IN CHRONIC KIDNEY DISEASE: Status: ACTIVE | Noted: 2018-06-22

## 2022-06-16 PROBLEM — R50.9 FEVER, UNSPECIFIED: Status: ACTIVE | Noted: 2018-06-22

## 2022-06-16 PROBLEM — E87.70 FLUID OVERLOAD, UNSPECIFIED: Status: ACTIVE | Noted: 2021-06-28

## 2022-06-16 PROCEDURE — 1124F ACP DISCUSS-NO DSCNMKR DOCD: CPT | Performed by: SURGERY

## 2022-06-16 PROCEDURE — 99214 OFFICE O/P EST MOD 30 MIN: CPT | Performed by: SURGERY

## 2022-06-16 RX ORDER — SUCROFERRIC OXYHYDROXIDE 500 MG/1
1 TABLET, CHEWABLE ORAL
COMMUNITY
Start: 2022-05-27

## 2022-06-16 RX ORDER — HYDROCODONE BITARTRATE AND ACETAMINOPHEN 5; 325 MG/1; MG/1
TABLET ORAL
COMMUNITY
Start: 2022-05-24

## 2022-06-16 RX ORDER — IBUPROFEN 800 MG/1
TABLET ORAL
COMMUNITY
Start: 2022-05-24

## 2022-06-16 RX ORDER — PENICILLIN V POTASSIUM 500 MG/1
TABLET ORAL
COMMUNITY
Start: 2022-05-24 | End: 2022-08-18 | Stop reason: ALTCHOICE

## 2022-06-16 ASSESSMENT — ENCOUNTER SYMPTOMS
ALLERGIC/IMMUNOLOGIC NEGATIVE: 1
SHORTNESS OF BREATH: 0
CHEST TIGHTNESS: 0
ABDOMINAL PAIN: 0
COLOR CHANGE: 0

## 2022-06-16 NOTE — PROGRESS NOTES
Division of Vascular Surgery        Follow Up      Fistulagram (3/8/22)    Chief Complaint:      Arm swelling, malfunctioning AV fistula    History of Present Illness:      Ying Cardoza is a 64 y.o. gentleman who presents for follow up to discuss revision of his AV fistula. He has a right radial cephalic Av fistula and fistulagram revealed stenosis at cephalic vein near antecubital fossa with drainage into deep system and brachial veins via collaterals. 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 He has significant swelling and collateral veins across his chest and neck on the right side. He underwent teeth extraction recently and has not been feeling well since, has a cough as well. He would like to get back to home dialysis like he had with Peritoneal dialysis. Currently undergoing hemodialysis through his tunneled catheter in his chest.      Ying Cardoza is a 64 y. o.left handed gentleman who presents with chronic swelling of his right arm and malfunctioning AV fistula. He had a right radial cephalic fistula created on his wrist a few years ago ( Dr. Alexsandra Aguilar 2016). It was being utilized up until about a year ago when he started to develop significant swelling and elevated pressures during treatments. He is currently undergoing dialysis through a tunneled catheter in his chest.  He has gone through a series of them over the past few years. He denies prior DVT or swelling in his arms, denies symptoms suggestive of ischemic steal.  He has noticed increased swelling in his right leg with negative DVT workup last fall. He was advised by his vascular surgery team to have his fistula ligated due to issues with outflow vein and central stenosis, along with creation of new access in his left arm. He comes in today for second opinion.     Medical History:     Past Medical History:   Diagnosis Date    Allergic rhinitis     Anemia     BPH (benign prostatic hyperplasia)     CAD (coronary Psychiatric/Behavioral: Negative. Physical Exam:     Vitals:  Ht 6' 1\" (1.854 m)   Wt 210 lb (95.3 kg)   BMI 27.71 kg/m²     Physical Exam  Constitutional:       Appearance: He is well-developed and well-groomed. Eyes:      Extraocular Movements: Extraocular movements intact. Conjunctiva/sclera: Conjunctivae normal.   Neck:      Vascular: No carotid bruit. Cardiovascular:      Rate and Rhythm: Normal rate and regular rhythm. Pulses:           Radial pulses are 2+ on the right side and 2+ on the left side. Arteriovenous access: right arteriovenous access is present. Comments: Pulsatile thrill over AV fistula  Pulmonary:      Effort: Pulmonary effort is normal. No respiratory distress. Chest:      Comments: Tunneled hemodialysis catheter in place  Abdominal:      Palpations: Abdomen is soft. Tenderness: There is no abdominal tenderness. Musculoskeletal:      Right upper arm: Swelling present. No tenderness. Left upper arm: No swelling or tenderness. Right forearm: Swelling present. No tenderness. Left forearm: No swelling or tenderness. Right hand: Swelling present. No tenderness. Normal strength. Normal sensation. Normal capillary refill. Left hand: No swelling or tenderness. Normal strength. Normal sensation. Normal capillary refill. Cervical back: Full passive range of motion without pain. Right lower leg: No edema. Left lower leg: No edema. Skin:     General: Skin is warm. Capillary Refill: Capillary refill takes less than 2 seconds. Neurological:      Mental Status: He is alert and oriented to person, place, and time. GCS: GCS eye subscore is 4. GCS verbal subscore is 5. GCS motor subscore is 6. Sensory: Sensation is intact. Motor: Motor function is intact.    Psychiatric:         Mood and Affect: Mood normal.         Speech: Speech normal.         Behavior: Behavior normal.                   Imaging/Labs: Fistulagram March 2022 reveals stenosis of cephalic vein at [facility] fossa with drainage into brachial vein, complete occlusion of right innominate veins with large collateralization                Assessment and Plan:     ESRD on hemodialysis, malfunctioning AV fistula, central venous occlusion  · Will likely need central venogram from jugular and femoral access and attempts to re- cannulate central venous system if we are going to have any chance in salvaging right arm access  · If that is successful he will need open revision of AV fistula with likely conduit to deep system  · Discussed risks and benefits of procedures, no guarantees made  · He wants to think about it a little longer and return to discuss  · The other option would be to ligate AV fistula and place new access in left arm 9his dominant side)    Electronically signed by Rafaela Olsen MD on 6/16/22 at 2:22 PM EDT      [address] [place] [address],[address]: [phone]  C: [phone]  Email: [email or web address]

## 2022-07-11 ENCOUNTER — TELEPHONE (OUTPATIENT)
Dept: VASCULAR SURGERY | Age: 77
End: 2022-07-11

## 2022-07-11 NOTE — TELEPHONE ENCOUNTER
LVM stating appointment has been rescheduled, left new cornelio day and time, stated if this does not work to contact office.

## 2022-07-15 ENCOUNTER — OFFICE VISIT (OUTPATIENT)
Dept: VASCULAR SURGERY | Age: 77
End: 2022-07-15
Payer: MEDICARE

## 2022-07-15 VITALS
SYSTOLIC BLOOD PRESSURE: 125 MMHG | OXYGEN SATURATION: 93 % | RESPIRATION RATE: 20 BRPM | TEMPERATURE: 98.7 F | WEIGHT: 219 LBS | HEART RATE: 95 BPM | BODY MASS INDEX: 29.03 KG/M2 | HEIGHT: 73 IN | DIASTOLIC BLOOD PRESSURE: 70 MMHG

## 2022-07-15 DIAGNOSIS — N18.6 ENCOUNTER REGARDING VASCULAR ACCESS FOR DIALYSIS FOR END-STAGE RENAL DISEASE (HCC): Primary | ICD-10-CM

## 2022-07-15 DIAGNOSIS — I87.1 STENOSIS OF BRACHIOCEPHALIC VEIN: ICD-10-CM

## 2022-07-15 DIAGNOSIS — Z99.2 ENCOUNTER REGARDING VASCULAR ACCESS FOR DIALYSIS FOR END-STAGE RENAL DISEASE (HCC): Primary | ICD-10-CM

## 2022-07-15 PROCEDURE — 1124F ACP DISCUSS-NO DSCNMKR DOCD: CPT | Performed by: SURGERY

## 2022-07-15 PROCEDURE — 99214 OFFICE O/P EST MOD 30 MIN: CPT | Performed by: SURGERY

## 2022-07-15 NOTE — PROGRESS NOTES
Division of Vascular Surgery        Follow Up    Chief Complaint:      Arm swelling, neck and shoulder pain, malfunctioning AV fistula    History of Present Illness:      Estanislao Cabot is a 64 y.o. gentleman who presents for follow up to discuss possible interventions to treat his central venous occlusion causing significant swelling and discomfort in his access arm. Last office visit he was not feeling great due to having teeth extracted. He comes in today with his family to discuss potential surgical options. As I recall he had fistula created in 2016 by Dr. Jeffrey Bennett. He developed significant tortuosity and aneurysmal changes and has been maintained on catheter based dialysis since last year due to issues with his access. He continues to have significant discomfort in his right arm due to the swelling and heaviness. He is also having discomfort over his catheter site on his left neck and shoulder. He is left hand dominate. He denies any symptoms to suggest ischemic steal syndrome. Estanislao Cabot is a 64 y. o.left handed gentleman who presents with chronic swelling of his right arm and malfunctioning AV fistula. He had a right radial cephalic fistula created on his wrist a few years ago ( Dr. Jeffrey Bennett 2016). It was being utilized up until about a year ago when he started to develop significant swelling and elevated pressures during treatments. He is currently undergoing dialysis through a tunneled catheter in his chest.  He has gone through a series of them over the past few years. He denies prior DVT or swelling in his arms, denies symptoms suggestive of ischemic steal.  He has noticed increased swelling in his right leg with negative DVT workup last fall. He was advised by his vascular surgery team to have his fistula ligated due to issues with outflow vein and central stenosis, along with creation of new access in his left arm. He comes in today for second opinion.     Medical History: Past Medical History:   Diagnosis Date    Allergic rhinitis     Anemia     BPH (benign prostatic hyperplasia)     CAD (coronary artery disease)     Carotid artery stenosis 2013    ulcerated plaque in left ICA 60 - 70% on carotid angiogram    Cerebrovascular disease 2013    left frontal hemorrhage    Diabetes mellitus ([facility] 2013    Eczema     ESRD (end stage renal disease) on dialysis ([address]     Full dentures     Upper / Lower    GERD (gastroesophageal reflux disease)     Gout     Hemodialysis patient ([address] 10/2015    US [facility] [address]  M,W F    Hyperlipidemia     Hypertension     Neuropathy     Osteoarthritis     Peripheral vascular disease ([address]     Seizures ([address] 2013    after stroke, LAST HNUPHIJ4605/20/2016    Stroke ([facility] [address] 2013    Unspecified cerebral artery occlusion with cerebral infarction 4-    speech short term and lt side       Surgical History:     Past Surgical History:   Procedure Laterality Date    ABDOMEN SURGERY  2016    PERITONEAL CATHETER    ABDOMEN SURGERY  05/26/2016    pd catherter removed from abdomen    BLADDER SURGERY  Spring 2014    dilatated     CAROTID ENDARTERECTOMY  2015    left    COLONOSCOPY  2008    polypectomy, diverticulosis    COLONOSCOPY  1/28/2015    COLONOSCOPY  5/10/2018    COLONOSCOPY performed by Chanel Moreau MD at \[facility]"" Endoscopy    CYSTOSCOPY  2008    bladder tumor removal    DIALYSIS FISTULA CREATION Right 08/25/2016    INGUINAL HERNIA REPAIR Right     IR TUNNELED CATHETER PLACEMENT GREATER THAN 5 YEARS  7/26/2021    IR TUNNELED CATHETER PLACEMENT GREATER THAN 5 YEARS 7/26/2021 [facility] SPECIAL PROCEDURES    KIDNEY BIOPSY  May 2013    SINUS SURGERY      TUNNELED VENOUS PORT PLACEMENT Right 10/2015    dialysis catheter- chest     UPPER GASTROINTESTINAL ENDOSCOPY  1/28/2015    UPPER GASTROINTESTINAL ENDOSCOPY N/A 11/1/2018    EGD BIOPSY performed by Arielle Coleman DO at [facility] Endoscopy       Family History:     Family History   Problem Relation Age of Onset    Diabetes Mother     Kidney Disease Mother         Dialysis    Cancer Mother         KIDNEY    Anesth Problems Mother         delayed awakening     Other Father         Henrik Posey    Diabetes Sister     High Blood Pressure Sister     Cancer Brother        Allergies:       Patient has no known allergies. Medications:      Current Outpatient Medications   Medication Sig Dispense Refill    ibuprofen (ADVIL;MOTRIN) 800 MG tablet       penicillin v potassium (VEETID) 500 MG tablet       VITAMIN D, ERGOCALCIFEROL, PO Take 3 mcg by mouth      Sucroferric Oxyhydroxide (VELPHORO) 500 MG CHEW Take 1 mg by mouth      sevelamer (RENVELA) 800 MG tablet Take 3 tablets by mouth 3 times daily      folic acid (FOLVITE) 1 MG tablet Take 1 mg by mouth daily      apixaban (ELIQUIS) 2.5 MG TABS tablet Take 1 tablet by mouth 2 times daily 60 tablet 3    atorvastatin (LIPITOR) 40 MG tablet Take 1 tablet by mouth daily 30 tablet 3    metoprolol tartrate 75 MG TABS Take 75 mg by mouth 2 times daily 60 tablet 3    HYDROcodone-acetaminophen (NORCO) 5-325 MG per tablet  (Patient not taking: No sig reported)       No current facility-administered medications for this visit. Social History:     Tobacco:    reports that he has never smoked. He has never used smokeless tobacco.  Alcohol:      reports no history of alcohol use. Drug Use:  reports no history of drug use. Review of Systems:     Review of Systems   Constitutional:  Negative for chills and fever. HENT:  Negative for congestion. Eyes:  Negative for visual disturbance. Respiratory:  Negative for chest tightness and shortness of breath. Cardiovascular:  Positive for leg swelling. Negative for chest pain. Gastrointestinal:  Negative for abdominal pain. Endocrine: Negative. Genitourinary: Negative. Musculoskeletal:  Positive for arthralgias. Skin:  Negative for color change and wound. Allergic/Immunologic: Negative.     Neurological:  Negative for facial asymmetry, speech difficulty, weakness and numbness. Hematological: Negative. Psychiatric/Behavioral: Negative. Physical Exam:     Vitals:  /70 (Site: Left Upper Arm, Position: Sitting, Cuff Size: Medium Adult)   Pulse 95   Temp 98.7 °F (37.1 °C) (Temporal)   Resp 20   Ht 6' 1\" (1.854 m)   Wt 219 lb (99.3 kg)   SpO2 93%   BMI 28.89 kg/m²     Physical Exam  Constitutional:       Appearance: He is well-developed and well-groomed. Eyes:      Extraocular Movements: Extraocular movements intact. Conjunctiva/sclera: Conjunctivae normal.   Neck:      Vascular: No carotid bruit. Cardiovascular:      Rate and Rhythm: Normal rate and regular rhythm. Pulses:           Radial pulses are 2+ on the right side and 2+ on the left side. Arteriovenous access: Right arteriovenous access is present. Comments: Pulsatile thrill over AV fistula  Pulmonary:      Effort: Pulmonary effort is normal. No respiratory distress. Chest:      Comments: Tunneled hemodialysis catheter in place  Abdominal:      Palpations: Abdomen is soft. Tenderness: There is no abdominal tenderness. Musculoskeletal:      Right upper arm: Swelling present. No tenderness. Left upper arm: No swelling or tenderness. Right forearm: Swelling present. No tenderness. Left forearm: No swelling or tenderness. Right hand: Swelling present. No tenderness. Normal strength. Normal sensation. Normal capillary refill. Left hand: No swelling or tenderness. Normal strength. Normal sensation. Normal capillary refill. Cervical back: Full passive range of motion without pain. Right lower leg: No edema. Left lower leg: No edema. Skin:     General: Skin is warm. Capillary Refill: Capillary refill takes less than 2 seconds. Neurological:      Mental Status: He is alert and oriented to person, place, and time. GCS: GCS eye subscore is 4. GCS verbal subscore is 5.  GCS motor subscore is 6.      Sensory: Sensation is intact. Motor: Motor function is intact. Psychiatric:         Mood and Affect: Mood normal.         Speech: Speech normal.         Behavior: Behavior normal.     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 Imaging/Labs:     Fistulagram from march reveals aneurysmal changes in forearm, severe stenosis and occlusion of cephalic vein at antecubital fossa with drainage into deep system (collateral and brachial vein). Occlusion of right innominate vein. Ultrasound performed in the office reveals good basilic vein in the left arm but several collaterals seen    Assessment and Plan:     Malfunctioning right upper extremity AV fistula, right innominate vein occlusion, right arm swelling  Discussed with patient and family member regarding the swelling in his arm causing the swelling, related to his fistula with arterial flow and occlusion of central veins. Discussed attempting to open up the innominate vein by attempting femoral and jugular access, this will hopefully help with the swelling  He may need revision of fistula if successful to help with outflow in the forearm due to cephalic vein issues  We also discussed potentially ligating fistula to help with the swelling if I am unable to open up central veins and then we can consider new access in his left arm  If that is the case I will plan on performing left upper extremity venogram as well to see if there is any central issues that would make access creation difficult  Hold Eliquis 2 days prior  Tentatively on schedule for Hybrid room with MAC anesthesia on July 19   Ok to use dialysis catheter catheter for IV access during surgery    Electronically signed by Kailey Bhat MD on 7/15/22 at 9:45 AM EDT      [address] [place] [address],[address] [place] [address] North: [phone]  C: [phone]  Email: [email or web address]

## 2022-07-15 NOTE — H&P (VIEW-ONLY)
Division of Vascular Surgery        Follow Up    Chief Complaint:      Arm swelling, neck and shoulder pain, malfunctioning AV fistula    History of Present Illness:      Caren Carmona is a 64 y.o. gentleman who presents for follow up to discuss possible interventions to treat his central venous occlusion causing significant swelling and discomfort in his access arm. Last office visit he was not feeling great due to having teeth extracted. He comes in today with his family to discuss potential surgical options. As I recall he had fistula created in 2016 by Dr. Irma Wood. He developed significant tortuosity and aneurysmal changes and has been maintained on catheter based dialysis since last year due to issues with his access. He continues to have significant discomfort in his right arm due to the swelling and heaviness. He is also having discomfort over his catheter site on his left neck and shoulder. He is left hand dominate. He denies any symptoms to suggest ischemic steal syndrome. Caren Carmona is a 64 y. o.left handed gentleman who presents with chronic swelling of his right arm and malfunctioning AV fistula. He had a right radial cephalic fistula created on his wrist a few years ago ( Dr. Irma Wood 2016). It was being utilized up until about a year ago when he started to develop significant swelling and elevated pressures during treatments. He is currently undergoing dialysis through a tunneled catheter in his chest.  He has gone through a series of them over the past few years. He denies prior DVT or swelling in his arms, denies symptoms suggestive of ischemic steal.  He has noticed increased swelling in his right leg with negative DVT workup last fall. He was advised by his vascular surgery team to have his fistula ligated due to issues with outflow vein and central stenosis, along with creation of new access in his left arm. He comes in today for second opinion.     Medical History: Mother     Kidney Disease Mother         Dialysis    Cancer Mother         KIDNEY    Anesth Problems Mother         delayed awakening     Other Father         Panchito Cifuentes    Diabetes Sister     High Blood Pressure Sister     Cancer Brother        Allergies:       Patient has no known allergies. Medications:      Current Outpatient Medications   Medication Sig Dispense Refill    ibuprofen (ADVIL;MOTRIN) 800 MG tablet       penicillin v potassium (VEETID) 500 MG tablet       VITAMIN D, ERGOCALCIFEROL, PO Take 3 mcg by mouth      Sucroferric Oxyhydroxide (VELPHORO) 500 MG CHEW Take 1 mg by mouth      sevelamer (RENVELA) 800 MG tablet Take 3 tablets by mouth 3 times daily      folic acid (FOLVITE) 1 MG tablet Take 1 mg by mouth daily      apixaban (ELIQUIS) 2.5 MG TABS tablet Take 1 tablet by mouth 2 times daily 60 tablet 3    atorvastatin (LIPITOR) 40 MG tablet Take 1 tablet by mouth daily 30 tablet 3    metoprolol tartrate 75 MG TABS Take 75 mg by mouth 2 times daily 60 tablet 3    HYDROcodone-acetaminophen (NORCO) 5-325 MG per tablet  (Patient not taking: No sig reported)       No current facility-administered medications for this visit. Social History:     Tobacco:    reports that he has never smoked. He has never used smokeless tobacco.  Alcohol:      reports no history of alcohol use. Drug Use:  reports no history of drug use. Review of Systems:     Review of Systems   Constitutional:  Negative for chills and fever. HENT:  Negative for congestion. Eyes:  Negative for visual disturbance. Respiratory:  Negative for chest tightness and shortness of breath. Cardiovascular:  Positive for leg swelling. Negative for chest pain. Gastrointestinal:  Negative for abdominal pain. Endocrine: Negative. Genitourinary: Negative. Musculoskeletal:  Positive for arthralgias. Skin:  Negative for color change and wound. Allergic/Immunologic: Negative.     Neurological:  Negative for facial asymmetry, speech difficulty, weakness and numbness. Hematological: Negative. Psychiatric/Behavioral: Negative. Physical Exam:     Vitals:  /70 (Site: Left Upper Arm, Position: Sitting, Cuff Size: Medium Adult)   Pulse 95   Temp 98.7 °F (37.1 °C) (Temporal)   Resp 20   Ht 6' 1\" (1.854 m)   Wt 219 lb (99.3 kg)   SpO2 93%   BMI 28.89 kg/m²     Physical Exam  Constitutional:       Appearance: He is well-developed and well-groomed. Eyes:      Extraocular Movements: Extraocular movements intact. Conjunctiva/sclera: Conjunctivae normal.   Neck:      Vascular: No carotid bruit. Cardiovascular:      Rate and Rhythm: Normal rate and regular rhythm. Pulses:           Radial pulses are 2+ on the right side and 2+ on the left side. Arteriovenous access: Right arteriovenous access is present. Comments: Pulsatile thrill over AV fistula  Pulmonary:      Effort: Pulmonary effort is normal. No respiratory distress. Chest:      Comments: Tunneled hemodialysis catheter in place  Abdominal:      Palpations: Abdomen is soft. Tenderness: There is no abdominal tenderness. Musculoskeletal:      Right upper arm: Swelling present. No tenderness. Left upper arm: No swelling or tenderness. Right forearm: Swelling present. No tenderness. Left forearm: No swelling or tenderness. Right hand: Swelling present. No tenderness. Normal strength. Normal sensation. Normal capillary refill. Left hand: No swelling or tenderness. Normal strength. Normal sensation. Normal capillary refill. Cervical back: Full passive range of motion without pain. Right lower leg: No edema. Left lower leg: No edema. Skin:     General: Skin is warm. Capillary Refill: Capillary refill takes less than 2 seconds. Neurological:      Mental Status: He is alert and oriented to person, place, and time. GCS: GCS eye subscore is 4. GCS verbal subscore is 5.  GCS motor subscore is 6.      Sensory: Sensation is intact. Motor: Motor function is intact. Psychiatric:         Mood and Affect: Mood normal.         Speech: Speech normal.         Behavior: Behavior normal.     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 Imaging/Labs:     Fistulagram from march reveals aneurysmal changes in forearm, severe stenosis and occlusion of cephalic vein at antecubital fossa with drainage into deep system (collateral and brachial vein). Occlusion of right innominate vein. Ultrasound performed in the office reveals good basilic vein in the left arm but several collaterals seen    Assessment and Plan:     Malfunctioning right upper extremity AV fistula, right innominate vein occlusion, right arm swelling  Discussed with patient and family member regarding the swelling in his arm causing the swelling, related to his fistula with arterial flow and occlusion of central veins. Discussed attempting to open up the innominate vein by attempting femoral and jugular access, this will hopefully help with the swelling  He may need revision of fistula if successful to help with outflow in the forearm due to cephalic vein issues  We also discussed potentially ligating fistula to help with the swelling if I am unable to open up central veins and then we can consider new access in his left arm  If that is the case I will plan on performing left upper extremity venogram as well to see if there is any central issues that would make access creation difficult  Hold Eliquis 2 days prior  Tentatively on schedule for Hybrid room with MAC anesthesia on July 19   Ok to use dialysis catheter catheter for IV access during surgery    Electronically signed by Bernadette Desir MD on 7/15/22 at 9:45 AM EDT      [address]: [phone]  C: [phone]  Email: [email or web address]

## 2022-07-17 PROBLEM — I87.1: Status: ACTIVE | Noted: 2022-07-17

## 2022-07-17 ASSESSMENT — ENCOUNTER SYMPTOMS
ABDOMINAL PAIN: 0
ALLERGIC/IMMUNOLOGIC NEGATIVE: 1
CHEST TIGHTNESS: 0
SHORTNESS OF BREATH: 0
COLOR CHANGE: 0

## 2022-07-19 ENCOUNTER — HOSPITAL ENCOUNTER (OUTPATIENT)
Age: 77
Setting detail: OUTPATIENT SURGERY
Discharge: HOME | End: 2022-07-19
Attending: SURGERY | Admitting: SURGERY
Payer: MEDICARE

## 2022-07-19 ENCOUNTER — ANESTHESIA EVENT (OUTPATIENT)
Dept: OPERATING ROOM | Age: 77
End: 2022-07-19
Payer: MEDICARE

## 2022-07-19 ENCOUNTER — ANESTHESIA (OUTPATIENT)
Dept: OPERATING ROOM | Age: 77
End: 2022-07-19
Payer: MEDICARE

## 2022-07-19 VITALS
SYSTOLIC BLOOD PRESSURE: 140 MMHG | HEART RATE: 86 BPM | OXYGEN SATURATION: 95 % | DIASTOLIC BLOOD PRESSURE: 83 MMHG | TEMPERATURE: 98.3 F | WEIGHT: 219 LBS | RESPIRATION RATE: 20 BRPM | BODY MASS INDEX: 29.03 KG/M2 | HEIGHT: 73 IN

## 2022-07-19 LAB
GFR NON-AFRICAN AMERICAN: 6 ML/MIN
GFR SERPL CREATININE-BSD FRML MDRD: 7 ML/MIN
GFR SERPL CREATININE-BSD FRML MDRD: ABNORMAL ML/MIN/{1.73_M2}
GLUCOSE BLD-MCNC: 70 MG/DL (ref 74–100)
POC BUN: 39 MG/DL (ref 8–26)
POC CHLORIDE: 104 MMOL/L (ref 98–107)
POC CREATININE: 8.7 MG/DL (ref 0.51–1.19)
POC HEMATOCRIT: 36 % (ref 41–53)
POC HEMOGLOBIN: 12.3 G/DL (ref 13.5–17.5)
POC IONIZED CALCIUM: 1.32 MMOL/L (ref 1.15–1.33)
POC POTASSIUM: 4 MMOL/L (ref 3.5–4.5)
POC SODIUM: 141 MMOL/L (ref 138–146)

## 2022-07-19 PROCEDURE — C1894 INTRO/SHEATH, NON-LASER: HCPCS

## 2022-07-19 PROCEDURE — 3700000000 HC ANESTHESIA ATTENDED CARE: Performed by: SURGERY

## 2022-07-19 PROCEDURE — 82435 ASSAY OF BLOOD CHLORIDE: CPT

## 2022-07-19 PROCEDURE — C1892 INTRO/SHEATH,FIXED,PEEL-AWAY: HCPCS | Performed by: SURGERY

## 2022-07-19 PROCEDURE — 85014 HEMATOCRIT: CPT

## 2022-07-19 PROCEDURE — C1769 GUIDE WIRE: HCPCS | Performed by: SURGERY

## 2022-07-19 PROCEDURE — 3700000001 HC ADD 15 MINUTES (ANESTHESIA): Performed by: SURGERY

## 2022-07-19 PROCEDURE — 7100000011 HC PHASE II RECOVERY - ADDTL 15 MIN: Performed by: SURGERY

## 2022-07-19 PROCEDURE — 2709999900 HC NON-CHARGEABLE SUPPLY: Performed by: SURGERY

## 2022-07-19 PROCEDURE — 82947 ASSAY GLUCOSE BLOOD QUANT: CPT

## 2022-07-19 PROCEDURE — 82330 ASSAY OF CALCIUM: CPT

## 2022-07-19 PROCEDURE — 2500000003 HC RX 250 WO HCPCS: Performed by: SURGERY

## 2022-07-19 PROCEDURE — 7100000001 HC PACU RECOVERY - ADDTL 15 MIN

## 2022-07-19 PROCEDURE — 10701887 HC NON-CHARGEABLE SUPPLY

## 2022-07-19 PROCEDURE — 75825 VEIN X-RAY TRUNK: CPT | Performed by: SURGERY

## 2022-07-19 PROCEDURE — 84132 ASSAY OF SERUM POTASSIUM: CPT

## 2022-07-19 PROCEDURE — 2580000003 HC RX 258

## 2022-07-19 PROCEDURE — 7100000010 HC PHASE II RECOVERY - FIRST 15 MIN: Performed by: SURGERY

## 2022-07-19 PROCEDURE — 76937 US GUIDE VASCULAR ACCESS: CPT | Performed by: SURGERY

## 2022-07-19 PROCEDURE — A4217 STERILE WATER/SALINE, 500 ML: HCPCS | Performed by: SURGERY

## 2022-07-19 PROCEDURE — 6360000004 HC RX CONTRAST MEDICATION: Performed by: SURGERY

## 2022-07-19 PROCEDURE — 82565 ASSAY OF CREATININE: CPT

## 2022-07-19 PROCEDURE — 6360000002 HC RX W HCPCS: Performed by: SURGERY

## 2022-07-19 PROCEDURE — 84520 ASSAY OF UREA NITROGEN: CPT

## 2022-07-19 PROCEDURE — 3600000007 HC SURGERY HYBRID BASE: Performed by: SURGERY

## 2022-07-19 PROCEDURE — 93005 ELECTROCARDIOGRAM TRACING: CPT | Performed by: SURGERY

## 2022-07-19 PROCEDURE — 7100000000 HC PACU RECOVERY - FIRST 15 MIN

## 2022-07-19 PROCEDURE — 36011 PLACE CATHETER IN VEIN: CPT | Performed by: SURGERY

## 2022-07-19 PROCEDURE — 75820 VEIN X-RAY ARM/LEG: CPT | Performed by: SURGERY

## 2022-07-19 PROCEDURE — 2580000003 HC RX 258: Performed by: SURGERY

## 2022-07-19 PROCEDURE — 2709999900 HC NON-CHARGEABLE SUPPLY

## 2022-07-19 PROCEDURE — 10701887 HC MISC INTRODUCER/SHEATH

## 2022-07-19 PROCEDURE — 6360000002 HC RX W HCPCS: Performed by: NURSE ANESTHETIST, CERTIFIED REGISTERED

## 2022-07-19 PROCEDURE — 84295 ASSAY OF SERUM SODIUM: CPT

## 2022-07-19 PROCEDURE — 3600000017 HC SURGERY HYBRID ADDL 15MIN: Performed by: SURGERY

## 2022-07-19 PROCEDURE — 2500000003 HC RX 250 WO HCPCS: Performed by: NURSE ANESTHETIST, CERTIFIED REGISTERED

## 2022-07-19 RX ORDER — FENTANYL CITRATE 50 UG/ML
INJECTION, SOLUTION INTRAMUSCULAR; INTRAVENOUS PRN
Status: DISCONTINUED | OUTPATIENT
Start: 2022-07-19 | End: 2022-07-19 | Stop reason: SDUPTHER

## 2022-07-19 RX ORDER — SODIUM CHLORIDE 0.9 % (FLUSH) 0.9 %
5-40 SYRINGE (ML) INJECTION PRN
Status: CANCELLED | OUTPATIENT
Start: 2022-07-19

## 2022-07-19 RX ORDER — SODIUM CHLORIDE 0.9 % (FLUSH) 0.9 %
5-40 SYRINGE (ML) INJECTION EVERY 12 HOURS SCHEDULED
Status: CANCELLED | OUTPATIENT
Start: 2022-07-19

## 2022-07-19 RX ORDER — SODIUM CHLORIDE 9 MG/ML
INJECTION, SOLUTION INTRAVENOUS PRN
Status: CANCELLED | OUTPATIENT
Start: 2022-07-19

## 2022-07-19 RX ORDER — IODIXANOL 320 MG/ML
INJECTION, SOLUTION INTRAVASCULAR PRN
Status: DISCONTINUED | OUTPATIENT
Start: 2022-07-19 | End: 2022-07-19 | Stop reason: ALTCHOICE

## 2022-07-19 RX ORDER — LIDOCAINE HYDROCHLORIDE 10 MG/ML
INJECTION, SOLUTION INFILTRATION; PERINEURAL
Status: COMPLETED
Start: 2022-07-19 | End: 2022-07-19

## 2022-07-19 RX ORDER — HEPARIN SODIUM 1000 [USP'U]/ML
INJECTION, SOLUTION INTRAVENOUS; SUBCUTANEOUS
Status: DISCONTINUED
Start: 2022-07-19 | End: 2022-07-19 | Stop reason: HOSPADM

## 2022-07-19 RX ORDER — PROPOFOL 10 MG/ML
INJECTION, EMULSION INTRAVENOUS
Status: COMPLETED
Start: 2022-07-19 | End: 2022-07-19

## 2022-07-19 RX ORDER — ONDANSETRON 2 MG/ML
4 INJECTION INTRAMUSCULAR; INTRAVENOUS
Status: CANCELLED | OUTPATIENT
Start: 2022-07-19 | End: 2022-07-19

## 2022-07-19 RX ORDER — LIDOCAINE HYDROCHLORIDE 10 MG/ML
INJECTION, SOLUTION EPIDURAL; INFILTRATION; INTRACAUDAL; PERINEURAL PRN
Status: DISCONTINUED | OUTPATIENT
Start: 2022-07-19 | End: 2022-07-19 | Stop reason: SDUPTHER

## 2022-07-19 RX ORDER — PROPOFOL 10 MG/ML
INJECTION, EMULSION INTRAVENOUS PRN
Status: DISCONTINUED | OUTPATIENT
Start: 2022-07-19 | End: 2022-07-19 | Stop reason: SDUPTHER

## 2022-07-19 RX ORDER — IODIXANOL 320 MG/ML
INJECTION, SOLUTION INTRAVASCULAR
Status: DISCONTINUED
Start: 2022-07-19 | End: 2022-07-19 | Stop reason: HOSPADM

## 2022-07-19 RX ORDER — LIDOCAINE HYDROCHLORIDE 10 MG/ML
INJECTION, SOLUTION EPIDURAL; INFILTRATION; INTRACAUDAL; PERINEURAL PRN
Status: DISCONTINUED | OUTPATIENT
Start: 2022-07-19 | End: 2022-07-19 | Stop reason: ALTCHOICE

## 2022-07-19 RX ADMIN — PROPOFOL 20 MG: 10 INJECTION, EMULSION INTRAVENOUS at 14:28

## 2022-07-19 RX ADMIN — PROPOFOL 20 MG: 10 INJECTION, EMULSION INTRAVENOUS at 14:09

## 2022-07-19 RX ADMIN — LIDOCAINE HYDROCHLORIDE 50 MG: 10 INJECTION, SOLUTION EPIDURAL; INFILTRATION; INTRACAUDAL; PERINEURAL at 14:09

## 2022-07-19 RX ADMIN — FENTANYL CITRATE 25 MCG: 50 INJECTION, SOLUTION INTRAMUSCULAR; INTRAVENOUS at 14:09

## 2022-07-19 RX ADMIN — PROPOFOL 30 MG: 10 INJECTION, EMULSION INTRAVENOUS at 14:17

## 2022-07-19 NOTE — PROGRESS NOTES
Patient admitted, consent signed, all questions answered. Pt ready for procedure. Bed in low position, call light to reach with side rails up 2 of 2.    Prep completed to fistula area with 2% Chlorhexidine Gluconate

## 2022-07-19 NOTE — OP NOTE
Operative Note      Patient: Estanislao Cabot  YOB: 1945  MRN: [id number]    Date of Procedure: 7/19/2022    Pre-Op Diagnosis: MALFUNCTIONING FISTULA, right innominate vein occlusion    Post-Op Diagnosis: Same       Procedure:  1) US guided vascular access of right common femoral vein  2) Selective catheterization of superior vena cava, left internal jugular and innominate vein  3) Central venogram    Surgeon(s): Nathaniel Jha MD    Assistant: Dr. 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 Plan:  Unable to cross right innominate vein occlusion, will discuss with family to consider HEROgraft versus ligation of fistula to treat swelling and central venous occlusion. Detailed Description of Procedure:     Mr. Christa Juarez was brought to the Hybrid room for evaluation and treatment of his central venous stenosis causing significant swelling in his access arm. After appropriate anesthesia delivered, the right groin was prepped and draped in standard sterile fashion, timeout performed and agreed upon. I began by evaluating the right common femoral vein, it was patent and compressible. Local anesthesia delivered and under ultrasound guidance using a micropuncture needle the right common femoral vein was accessed, permanent ultrasound images saved. Next I up sized to a short 5-[place] sheath. Using a glide wire and sal catheter I selected the superior vena cava and left innominate vein. Central venogram performed revealing the above findings. Using the glide wire and sal catheter I attempted crossing the right innominate vein occlusion. Multiple attempts were performed but I could not get across the occlusion.   At this point the case was completed; the wire, catheter and sheath were removed and manual pressure held for hemostasis.     Electronically signed by Riya Davidson MD on 7/19/2022 at 2:47 PM

## 2022-07-19 NOTE — ANESTHESIA POSTPROCEDURE EVALUATION
Department of Anesthesiology  Postprocedure Note    Patient: Claude Salmon  MRN: [id number]  YOB: 1945  Date of evaluation: 7/19/2022      Procedure Summary     Date: 07/19/22 Room / Location: [address]    Anesthesia Start: 1402 Anesthesia Stop: 1246    Procedure: CENTRAL VENOGRAM Diagnosis:       Malfunction of arteriovenous dialysis fistula, initial encounter ([facility]      (MALFUNCTIONING FISTULA)    Surgeons: Lorraine Au MD Responsible Provider: Carroll Carcamo MD    Anesthesia Type: MAC ASA Status: 4          Anesthesia Type: No value filed.     Francis Phase I:      Francis Phase II:        Anesthesia Post Evaluation    Patient location during evaluation: PACU  Patient participation: complete - patient participated  Level of consciousness: awake and alert  Pain score: 0  Airway patency: patent  Nausea & Vomiting: no vomiting and no nausea  Complications: no  Cardiovascular status: hemodynamically stable  Respiratory status: acceptable

## 2022-07-19 NOTE — ANESTHESIA PRE PROCEDURE
acid (FOLVITE) 1 MG tablet Take 1 mg by mouth daily      apixaban (ELIQUIS) 2.5 MG TABS tablet Take 1 tablet by mouth 2 times daily 60 tablet 3    atorvastatin (LIPITOR) 40 MG tablet Take 1 tablet by mouth daily 30 tablet 3    metoprolol tartrate 75 MG TABS Take 75 mg by mouth 2 times daily 60 tablet 3       Allergies:  No Known Allergies    Problem List:    Patient Active Problem List   Diagnosis Code    BPH (benign prostatic hyperplasia) N40.0    CAD (coronary artery disease) I25.10    Anemia D64.9    CVA (cerebrovascular accident due to intracerebral hemorrhage) ([facility] I61.9    [facility] disorder, [facility] G40.909    Seizures ([facility] R56.9    Cerebral artery occlusion with cerebral infarction ([facility] I63.50    Hypertension I10    Hyperlipidemia E78.5    ESRD on hemodialysis ([facility] N18.6, Z99.2    Carotid stenosis, asymptomatic I65.29    Seizure disorder as sequela of cerebrovascular accident ([facility] I69.398, G40.909    Chronic ischemic left middle cerebral artery (MCA) stroke I69.30    Folate deficiency E53.8    Bronchitis J40    Sigmoid diverticulosis K57.30    Redundant colon Q43.8    Nonsustained supraventricular tachycardia (HCC) I47.1    NSVT (nonsustained ventricular tachycardia) ([facility]) I47.2    Tinea corporis B35.4    Valvular heart disease I38    Palpitations R00.2    Paroxysmal SVT (supraventricular tachycardia) ([facility]) I47.1    Hemorrhage of arteriovenous fistula ([facility]) T82.838A    Dialysis AV fistula malfunction, initial encounter ([facility] T82.590A    Encounter regarding vascular access for dialysis for end-stage renal disease (HCC) N18.6, Z99.2    Acidosis E87.2    Anemia in chronic kidney disease N18.9, D63.1    Angina pectoris, unspecified ([facility]) I20.9    Bilateral carotid artery stenosis I65.23    Coagulation defect, unspecified ([facility]) D68.9    Cramp and spasm R25.2    Disorder of phosphorus metabolism, unspecified E83.30    Fever, unspecified R50.9    Fluid overload, unspecified E87.70    Hypercalcemia E83.52    Iron deficiency anemia, unspecified D50.9    Other fatigue R53.83    Pain, unspecified R52    Pneumonia due to other staphylococcus ([facility] J15.29    Pruritus, unspecified L29.9    Secondary hyperparathyroidism of renal origin ([facility] N25.81    Unspecified infection due to central venous catheter, initial encounter T80.219A    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       Past Medical History:        Diagnosis Date    Allergic rhinitis     Anemia     BPH (benign prostatic hyperplasia)     CAD (coronary artery disease)     Carotid artery stenosis 2013    ulcerated plaque in left ICA 60 - 70% on carotid angiogram    Cerebrovascular disease 2013    left frontal hemorrhage    Diabetes mellitus ([facility] 2013    Eczema     ESRD (end stage renal disease) on dialysis ([facility]     Full dentures     Upper / Lower    GERD (gastroesophageal reflux disease)     Gout     Hemodialysis patient ([facility] 10/2015     [address]  M,W F    Hyperlipidemia     Hypertension     Neuropathy     Osteoarthritis     Peripheral vascular disease ([facility]     Seizures ([facility] 2013    after stroke, LAST [id number]/20/2016    Stroke [facility]) 2013    Unspecified cerebral artery occlusion with cerebral infarction 4-    speech short term and lt side       Past Surgical History:        Procedure Laterality Date    ABDOMEN SURGERY  2016    PERITONEAL CATHETER    ABDOMEN SURGERY  05/26/2016    pd catherter removed from abdomen    BLADDER SURGERY  Spring 2014    dilatated     CAROTID ENDARTERECTOMY  2015    left    COLONOSCOPY  2008    polypectomy, diverticulosis    COLONOSCOPY  1/28/2015    COLONOSCOPY  5/10/2018    COLONOSCOPY performed by Starlene Lefort, [facility] at [facility]    CYSTOSCOPY  2008    bladder tumor removal    DIALYSIS FISTULA CREATION Right 08/25/2016    INGUINAL HERNIA REPAIR Right     IR TUNNELED CATHETER PLACEMENT GREATER THAN 5 YEARS  7/26/2021    IR TUNNELED CATHETER PLACEMENT GREATER THAN 5 YEARS 7/26/2021 [facility]    KIDNEY BIOPSY  May 2013    SINUS SURGERY      TUNNELED VENOUS PORT PLACEMENT Right 10/2015    dialysis catheter- chest     UPPER GASTROINTESTINAL ENDOSCOPY  1/28/2015    UPPER GASTROINTESTINAL ENDOSCOPY N/A 11/1/2018    EGD BIOPSY performed by Louise Forbes DO at [facility]       Social History:    Social History     Tobacco Use    Smoking status: Never    Smokeless tobacco: Never   Substance Use Topics    Alcohol use: No     Comment: none                                Counseling given: Not Answered      Vital Signs (Current): There were no vitals filed for this visit.                                            BP Readings from Last 3 Encounters:   07/15/22 125/70   06/16/22 (!) 154/89   03/08/22 (!) 176/82       NPO Status: Time of last liquid consumption: 2200                        Time of last solid consumption: 2100                                                      BMI:   Wt Readings from Last 3 Encounters:   07/15/22 219 lb (99.3 kg)   06/16/22 210 lb (95.3 kg)   03/08/22 217 lb 12.8 oz (98.8 kg)     There is no height or weight on file to calculate BMI.    CBC:   Lab Results   Component Value Date/Time    WBC 4.4 11/01/2021 04:20 PM    RBC 3.12 11/01/2021 04:20 PM    RBC 3.96 09/16/2011 01:46 PM    HGB 8.7 11/01/2021 04:20 PM    HCT 29.4 11/01/2021 04:20 PM    MCV 94.2 11/01/2021 04:20 PM    RDW 22.4 11/01/2021 04:20 PM    PLT See Reflexed IPF Result 11/01/2021 04:20 PM    PLT Platelet clumps present, count appears adequate. 09/16/2011 01:46 PM       CMP:   Lab Results   Component Value Date/Time     11/01/2021 04:20 PM    K 4.1 11/01/2021 04:20 PM    CL 98 11/01/2021 04:20 PM    CO2 28 11/01/2021 04:20 PM    BUN 31 11/01/2021 04:20 PM    CREATININE 8.36 11/01/2021 04:20 PM    GFRAA 8 11/01/2021 04:20 PM    LABGLOM 6 11/01/2021 04:20 PM    GLUCOSE 71 11/01/2021 04:20 PM    GLUCOSE 87 09/16/2011 01:44 PM    PROT 6.6 11/01/2021 04:20 PM    PROT 7.6 10/17/2012 01:08 AM    CALCIUM 8.9 11/01/2021 04:20 PM    BILITOT 0.36 11/01/2021 04:20 PM    ALKPHOS 65 11/01/2021 04:20 PM    AST 9 11/01/2021 04:20 PM    ALT 6 11/01/2021 04:20 PM       POC Tests: No results for input(s): POCGLU, POCNA, POCK, POCCL, POCBUN, POCHEMO, POCHCT in the last 72 hours. Coags:   Lab Results   Component Value Date/Time    PROTIME 11.9 07/24/2021 03:25 PM    PROTIME 12.6 06/28/2013 08:00 AM    INR 1.1 07/24/2021 03:25 PM    APTT 19.5 07/24/2021 03:25 PM       HCG (If Applicable): No results found for: PREGTESTUR, PREGSERUM, HCG, HCGQUANT     ABGs: No results found for: PHART, PO2ART, UQR1UOG, ZCH0EYU, BEART, E3AZAUWG     Type & Screen (If Applicable):  No results found for: LABABO, LABRH    Drug/Infectious Status (If Applicable):  Lab Results   Component Value Date/Time    HEPCAB NONREACTIVE 07/19/2021 07:44 PM       COVID-19 Screening (If Applicable):   Lab Results   Component Value Date/Time    COVID19 Not Detected 10/03/2021 06:28 PM           Anesthesia Evaluation  Patient summary reviewed  Airway: Mallampati: II  TM distance: >3 FB   Neck ROM: full  Mouth opening: > = 3 FB   Dental:      Comment: 3 tooth left    Pulmonary:normal exam        (-) pneumonia                           Cardiovascular:    (+) hypertension: no interval change, angina: no interval change, CAD: no interval change,       ECG reviewed  Rhythm: regular  Rate: normal  Echocardiogram reviewed                  Neuro/Psych:   (+) seizures:, CVA: residual symptoms,             GI/Hepatic/Renal:   (+) GERD: no interval change, renal disease: ESRD and dialysis,           Endo/Other:    (+) DiabetesType II DM, no interval change, , blood dyscrasia: anemia:., .                 Abdominal:             Vascular: negative vascular ROS. Other Findings:           Anesthesia Plan      MAC     ASA 4       Induction: intravenous.       Anesthetic plan and risks discussed with patient. Plan discussed with CRNA.                     Cresencio Wiggins MD   7/19/2022

## 2022-07-19 NOTE — INTERVAL H&P NOTE
Update History & Physical    The patient's History and Physical of Azra 15, 2022 was reviewed with the patient and I examined the patient. There was no change. The surgical site was confirmed by the patient and me. Plan: The risks, benefits, expected outcome, and alternative to the recommended procedure have been discussed with the patient. Patient understands and wants to proceed with the procedure.      Electronically signed by Lucien Carpio DO on 7/19/2022 at 1:17 PM

## 2022-07-20 LAB
EKG ATRIAL RATE: 91 BPM
EKG P AXIS: 65 DEGREES
EKG P-R INTERVAL: 172 MS
EKG Q-T INTERVAL: 376 MS
EKG QRS DURATION: 92 MS
EKG QTC CALCULATION (BAZETT): 462 MS
EKG R AXIS: -5 DEGREES
EKG T AXIS: 98 DEGREES
EKG VENTRICULAR RATE: 91 BPM

## 2022-07-20 PROCEDURE — 93010 ELECTROCARDIOGRAM REPORT: CPT | Performed by: INTERNAL MEDICINE

## 2022-07-21 ENCOUNTER — TELEPHONE (OUTPATIENT)
Dept: VASCULAR SURGERY | Age: 77
End: 2022-07-21

## 2022-07-21 NOTE — TELEPHONE ENCOUNTER
----- Message from Kristen Ramirez MD sent at 7/20/2022  4:05 PM EDT -----  Can you guys arrange for him to be scheduled for right upper extremity AV fistula ligation on August 2nd at 130 pm  CV[facility]  1.5 hours  MAC anesthesia    Thank you

## 2022-07-21 NOTE — TELEPHONE ENCOUNTER
----- Message from Manish Evans MA sent at 7/21/2022  8:20 AM EDT -----  Scheduled, spoke with Avis at dialysis and he stated to fax over all pre-op instructions and they will review with pt, he verbalized understanding.   ----- Message -----  From: Jamia Douglass MD  Sent: 7/20/2022   4:06 PM EDT  To: [facility]    Can you guys arrange for him to be scheduled for right upper extremity AV fistula ligation on August 2nd at 130 pm  CVOR  1.5 hours  MAC anesthesia    Thank you

## 2022-08-02 ENCOUNTER — ANESTHESIA (OUTPATIENT)
Dept: OPERATING ROOM | Age: 77
End: 2022-08-02
Payer: MEDICARE

## 2022-08-02 ENCOUNTER — ANESTHESIA EVENT (OUTPATIENT)
Dept: OPERATING ROOM | Age: 77
End: 2022-08-02
Payer: MEDICARE

## 2022-08-02 ENCOUNTER — HOSPITAL ENCOUNTER (OUTPATIENT)
Age: 77
Setting detail: OUTPATIENT SURGERY
Discharge: HOME | End: 2022-08-02
Attending: SURGERY
Payer: MEDICARE

## 2022-08-02 VITALS
SYSTOLIC BLOOD PRESSURE: 141 MMHG | RESPIRATION RATE: 16 BRPM | OXYGEN SATURATION: 100 % | DIASTOLIC BLOOD PRESSURE: 79 MMHG | HEART RATE: 70 BPM

## 2022-08-02 LAB
GFR NON-AFRICAN AMERICAN: 6 ML/MIN
GFR SERPL CREATININE-BSD FRML MDRD: 7 ML/MIN
GFR SERPL CREATININE-BSD FRML MDRD: ABNORMAL ML/MIN/{1.73_M2}
GLUCOSE BLD-MCNC: 71 MG/DL (ref 74–100)
POC BUN: 25 MG/DL (ref 8–26)
POC CHLORIDE: 103 MMOL/L (ref 98–107)
POC CREATININE: 9.24 MG/DL (ref 0.51–1.19)
POC HEMATOCRIT: 35 % (ref 41–53)
POC HEMOGLOBIN: 11.9 G/DL (ref 13.5–17.5)
POC POTASSIUM: 3.9 MMOL/L (ref 3.5–4.5)
POC SODIUM: 139 MMOL/L (ref 138–146)

## 2022-08-02 PROCEDURE — 2500000003 HC RX 250 WO HCPCS: Performed by: SURGERY

## 2022-08-02 PROCEDURE — 7100000011 HC PHASE II RECOVERY - ADDTL 15 MIN: Performed by: SURGERY

## 2022-08-02 PROCEDURE — 99214 OFFICE O/P EST MOD 30 MIN: CPT | Performed by: SURGERY

## 2022-08-02 PROCEDURE — 84295 ASSAY OF SERUM SODIUM: CPT

## 2022-08-02 PROCEDURE — 7100000001 HC PACU RECOVERY - ADDTL 15 MIN

## 2022-08-02 PROCEDURE — 82565 ASSAY OF CREATININE: CPT

## 2022-08-02 PROCEDURE — 82947 ASSAY GLUCOSE BLOOD QUANT: CPT

## 2022-08-02 PROCEDURE — 2580000003 HC RX 258

## 2022-08-02 PROCEDURE — 85014 HEMATOCRIT: CPT

## 2022-08-02 PROCEDURE — 2580000003 HC RX 258: Performed by: SURGERY

## 2022-08-02 PROCEDURE — 3600000004 HC SURGERY LEVEL 4 BASE: Performed by: SURGERY

## 2022-08-02 PROCEDURE — 3600000014 HC SURGERY LEVEL 4 ADDTL 15MIN: Performed by: SURGERY

## 2022-08-02 PROCEDURE — 82435 ASSAY OF BLOOD CHLORIDE: CPT

## 2022-08-02 PROCEDURE — 6360000002 HC RX W HCPCS

## 2022-08-02 PROCEDURE — 84520 ASSAY OF UREA NITROGEN: CPT

## 2022-08-02 PROCEDURE — 6360000002 HC RX W HCPCS: Performed by: SURGERY

## 2022-08-02 PROCEDURE — 3700000000 HC ANESTHESIA ATTENDED CARE: Performed by: SURGERY

## 2022-08-02 PROCEDURE — 7100000010 HC PHASE II RECOVERY - FIRST 15 MIN: Performed by: SURGERY

## 2022-08-02 PROCEDURE — 84132 ASSAY OF SERUM POTASSIUM: CPT

## 2022-08-02 PROCEDURE — 2500000003 HC RX 250 WO HCPCS

## 2022-08-02 PROCEDURE — 3700000001 HC ADD 15 MINUTES (ANESTHESIA): Performed by: SURGERY

## 2022-08-02 PROCEDURE — 7100000000 HC PACU RECOVERY - FIRST 15 MIN

## 2022-08-02 PROCEDURE — 2709999900 HC NON-CHARGEABLE SUPPLY: Performed by: SURGERY

## 2022-08-02 PROCEDURE — 37607 LIG/BANDING ANGIOACS AV FSTL: CPT | Performed by: SURGERY

## 2022-08-02 RX ORDER — FENTANYL CITRATE 50 UG/ML
INJECTION, SOLUTION INTRAMUSCULAR; INTRAVENOUS PRN
Status: DISCONTINUED | OUTPATIENT
Start: 2022-08-02 | End: 2022-08-02 | Stop reason: SDUPTHER

## 2022-08-02 RX ORDER — SODIUM CHLORIDE 0.9 % (FLUSH) 0.9 %
5-40 SYRINGE (ML) INJECTION EVERY 12 HOURS SCHEDULED
Status: DISCONTINUED | OUTPATIENT
Start: 2022-08-02 | End: 2022-08-02 | Stop reason: HOSPADM

## 2022-08-02 RX ORDER — CEFAZOLIN SODIUM 1 G/3ML
INJECTION, POWDER, FOR SOLUTION INTRAMUSCULAR; INTRAVENOUS PRN
Status: DISCONTINUED | OUTPATIENT
Start: 2022-08-02 | End: 2022-08-02 | Stop reason: SDUPTHER

## 2022-08-02 RX ORDER — MAGNESIUM HYDROXIDE 1200 MG/15ML
LIQUID ORAL CONTINUOUS PRN
Status: COMPLETED | OUTPATIENT
Start: 2022-08-02 | End: 2022-08-02

## 2022-08-02 RX ORDER — FENTANYL CITRATE 50 UG/ML
25 INJECTION, SOLUTION INTRAMUSCULAR; INTRAVENOUS EVERY 5 MIN PRN
Status: DISCONTINUED | OUTPATIENT
Start: 2022-08-02 | End: 2022-08-02 | Stop reason: HOSPADM

## 2022-08-02 RX ORDER — DEXMEDETOMIDINE HYDROCHLORIDE 4 UG/ML
INJECTION, SOLUTION INTRAVENOUS CONTINUOUS PRN
Status: DISCONTINUED | OUTPATIENT
Start: 2022-08-02 | End: 2022-08-02 | Stop reason: SDUPTHER

## 2022-08-02 RX ORDER — LIDOCAINE HYDROCHLORIDE 10 MG/ML
INJECTION, SOLUTION INFILTRATION; PERINEURAL PRN
Status: DISCONTINUED | OUTPATIENT
Start: 2022-08-02 | End: 2022-08-02 | Stop reason: ALTCHOICE

## 2022-08-02 RX ORDER — MORPHINE SULFATE 1 MG/ML
2 INJECTION, SOLUTION EPIDURAL; INTRATHECAL; INTRAVENOUS EVERY 5 MIN PRN
Status: DISCONTINUED | OUTPATIENT
Start: 2022-08-02 | End: 2022-08-02 | Stop reason: HOSPADM

## 2022-08-02 RX ORDER — SODIUM CHLORIDE 9 MG/ML
INJECTION, SOLUTION INTRAVENOUS PRN
Status: DISCONTINUED | OUTPATIENT
Start: 2022-08-02 | End: 2022-08-02 | Stop reason: HOSPADM

## 2022-08-02 RX ORDER — DEXMEDETOMIDINE HYDROCHLORIDE 100 UG/ML
INJECTION, SOLUTION INTRAVENOUS PRN
Status: DISCONTINUED | OUTPATIENT
Start: 2022-08-02 | End: 2022-08-02 | Stop reason: SDUPTHER

## 2022-08-02 RX ORDER — ACETAMINOPHEN 325 MG/1
650 TABLET ORAL
Status: DISCONTINUED | OUTPATIENT
Start: 2022-08-02 | End: 2022-08-02 | Stop reason: HOSPADM

## 2022-08-02 RX ORDER — HEPARIN SODIUM 1000 [USP'U]/ML
INJECTION, SOLUTION INTRAVENOUS; SUBCUTANEOUS PRN
Status: DISCONTINUED | OUTPATIENT
Start: 2022-08-02 | End: 2022-08-02 | Stop reason: ALTCHOICE

## 2022-08-02 RX ORDER — SODIUM CHLORIDE 9 MG/ML
INJECTION, SOLUTION INTRAVENOUS CONTINUOUS PRN
Status: DISCONTINUED | OUTPATIENT
Start: 2022-08-02 | End: 2022-08-02 | Stop reason: SDUPTHER

## 2022-08-02 RX ORDER — SODIUM CHLORIDE 0.9 % (FLUSH) 0.9 %
5-40 SYRINGE (ML) INJECTION PRN
Status: DISCONTINUED | OUTPATIENT
Start: 2022-08-02 | End: 2022-08-02 | Stop reason: HOSPADM

## 2022-08-02 RX ADMIN — FENTANYL CITRATE 25 MCG: 50 INJECTION, SOLUTION INTRAMUSCULAR; INTRAVENOUS at 14:08

## 2022-08-02 RX ADMIN — DEXMEDETOMIDINE HYDROCHLORIDE 0.3 MCG/KG/HR: 4 INJECTION, SOLUTION INTRAVENOUS at 14:03

## 2022-08-02 RX ADMIN — DEXMEDETOMIDINE HCL 4 MCG: 100 INJECTION INTRAVENOUS at 14:01

## 2022-08-02 RX ADMIN — DEXMEDETOMIDINE HCL 4 MCG: 100 INJECTION INTRAVENOUS at 14:07

## 2022-08-02 RX ADMIN — SODIUM CHLORIDE: 9 INJECTION, SOLUTION INTRAVENOUS at 13:58

## 2022-08-02 RX ADMIN — DEXMEDETOMIDINE HCL 4 MCG: 100 INJECTION INTRAVENOUS at 14:10

## 2022-08-02 RX ADMIN — DEXMEDETOMIDINE HCL 4 MCG: 100 INJECTION INTRAVENOUS at 13:59

## 2022-08-02 RX ADMIN — DEXMEDETOMIDINE HCL 4 MCG: 100 INJECTION INTRAVENOUS at 14:05

## 2022-08-02 RX ADMIN — CEFAZOLIN 2000 MG: 1 INJECTION, POWDER, FOR SOLUTION INTRAMUSCULAR; INTRAVENOUS at 14:09

## 2022-08-02 NOTE — ANESTHESIA PRE PROCEDURE
Department of Anesthesiology  Preprocedure Note       Name:  Claude Salmon   Age:  64 y.o.  :  1945                                          MRN:  [id number]         Date:  2022      Surgeon: Franck Myrick): Lorraine Au MD    Procedure: Procedure(s):  UPPER EXTREMITY AV FISTULA LIGATION    Medications prior to admission:   Prior to Admission medications    Medication Sig Start Date End Date Taking? Authorizing Provider   HYDROcodone-acetaminophen [facility]) 5-325 MG per tablet  22   Historical Provider, MD   ibuprofen (ADVIL;MOTRIN) 800 MG tablet  22   Historical Provider, MD   penicillin v potassium (VEETID) 500 MG tablet  22   Historical Provider, MD   VITAMIN D, ERGOCALCIFEROL, PO Take 3 mcg by mouth  Patient not taking: Reported on 2022  Historical Provider, MD   Sucroferric Oxyhydroxide (VELPHORO) 500 MG CHEW Take 1 mg by mouth 22   Historical Provider, MD   sevelamer (RENVELA) 800 MG tablet Take 3 tablets by mouth 3 times daily 21   Historical Provider, MD   folic acid (FOLVITE) 1 MG tablet Take 1 mg by mouth daily  Patient not taking: Reported on 2022   Historical Provider, MD   apixaban (ELIQUIS) 2.5 MG TABS tablet Take 1 tablet by mouth 2 times daily 21   Andre Valentine MD   atorvastatin (LIPITOR) 40 MG tablet Take 1 tablet by mouth daily 21   Simone Sood MD   metoprolol tartrate 75 MG TABS Take 75 mg by mouth 2 times daily 21   Ish Spencer MD       Current medications:    No current outpatient medications on file. No current facility-administered medications for this visit.        Allergies:  No Known Allergies    Problem List:    Patient Active Problem List   Diagnosis Code    BPH (benign prostatic hyperplasia) N40.0    CAD (coronary artery disease) I25.10    Anemia D64.9    CVA (cerebrovascular accident due to intracerebral hemorrhage) ([facility] I61.9    [facility], [facility] G40.907    Seizures ([facility] R56.9    Cerebral artery occlusion with cerebral infarction ([facility]) I63.50    Hypertension I10    Hyperlipidemia E78.5    ESRD on hemodialysis ([facility] N18.6, Z99.2    Carotid stenosis, asymptomatic I65.29    Seizure disorder as sequela of cerebrovascular accident ([place] [address] I69.398, G40.909    Chronic ischemic left middle cerebral artery (MCA) stroke I69.30    Folate deficiency E53.8    Bronchitis J40    Sigmoid diverticulosis K57.30    Redundant colon Q43.8    Nonsustained supraventricular tachycardia (HCC) I47.1    NSVT (nonsustained ventricular tachycardia) ([facility]) I47.2    Tinea corporis B35.4    Valvular heart disease I38    Palpitations R00.2    Paroxysmal SVT (supraventricular tachycardia) ([facility]) I47.1    Hemorrhage of arteriovenous fistula ([facility]) T82.838A    Dialysis AV fistula malfunction, initial encounter ([place] [address] T82.590A    Encounter regarding vascular access for dialysis for end-stage renal disease ([facility]) N18.6, Z99.2    Acidosis E87.2    Anemia in chronic kidney disease N18.9, D63.1    Angina pectoris, unspecified ([facility]) I20.9    Bilateral carotid artery stenosis I65.23    Coagulation defect, unspecified (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       Past Medical History:        Diagnosis Date    Allergic rhinitis     Anemia     BPH (benign prostatic hyperplasia)     CAD (coronary artery disease)     Carotid artery stenosis 2013    ulcerated plaque in left ICA 61 - 70% on carotid angiogram    Cerebrovascular disease 2013    left frontal hemorrhage    Diabetes mellitus ([address] 2013    Eczema     ESRD (end stage renal disease) on dialysis ([address]     Full dentures     Upper / Lower    GERD (gastroesophageal reflux disease)     Gout     Hemodialysis patient ([address] 10/2015    [facility] [address]  M,W F    Hyperlipidemia     Hypertension     Neuropathy     Osteoarthritis     Peripheral vascular disease ([address]     Seizures ([address] 2013    after stroke, LAST [id number]/20/2016    Stroke [facility]) 2013    Unspecified cerebral artery occlusion with cerebral infarction 4-    speech short term and lt side       Past Surgical History:        Procedure Laterality Date    ABDOMEN SURGERY  2016    PERITONEAL CATHETER    ABDOMEN SURGERY  05/26/2016    pd catherter removed from abdomen    BLADDER SURGERY  Spring 2014    dilatated     CAROTID ENDARTERECTOMY  2015    left    COLONOSCOPY  2008    polypectomy, diverticulosis    COLONOSCOPY  01/28/2015    COLONOSCOPY  05/10/2018    COLONOSCOPY performed by Kimo Berman MD at [address] [place] [address]  2008    bladder tumor removal    DIALYSIS FISTULA CREATION Right 08/25/2016    FISTULAGRAM N/A 7/19/2022    CENTRAL VENOGRAM performed by Nolan Carmen MD at [place] [address] Right     IR TUNNELED [address] 5 YEARS  07/26/2021    IR TUNNELED CATHETER PLACEMENT GREATER THAN 5 YEARS 7/26/2021 [facility]    KIDNEY BIOPSY  05/2013    OTHER SURGICAL HISTORY      central venogram    SINUS SURGERY      TUNNELED VENOUS PORT PLACEMENT Right 10/2015    dialysis catheter- chest     UPPER GASTROINTESTINAL ENDOSCOPY  01/28/2015    UPPER GASTROINTESTINAL ENDOSCOPY N/A 11/01/2018    EGD BIOPSY performed by Danii Landon DO at [facility]       Social History:    Social History     Tobacco Use    Smoking status: Never    Smokeless tobacco: Never   Substance Use Topics    Alcohol use: No     Comment: none                                Counseling given: Not Answered      Vital Signs (Current): There were no vitals filed for this visit. BP Readings from Last 3 Encounters:   08/02/22 136/82   07/19/22 (!) 140/83   07/15/22 125/70       NPO Status:                                                                                 BMI:   Wt Readings from Last 3 Encounters:   07/19/22 219 lb (99.3 kg)   07/15/22 219 lb (99.3 kg)   06/16/22 210 lb (95.3 kg)     There is no height or weight on file to calculate BMI.    CBC:   Lab Results   Component Value Date/Time    WBC 4.4 11/01/2021 04:20 PM    RBC 3.12 11/01/2021 04:20 PM    RBC 3.96 09/16/2011 01:46 PM    HGB 8.7 11/01/2021 04:20 PM    HCT 29.4 11/01/2021 04:20 PM    MCV 94.2 11/01/2021 04:20 PM    RDW 22.4 11/01/2021 04:20 PM    PLT See Reflexed IPF Result 11/01/2021 04:20 PM    PLT Platelet clumps present, count appears adequate. 09/16/2011 01:46 PM       CMP:   Lab Results   Component Value Date/Time     11/01/2021 04:20 PM    K 4.1 11/01/2021 04:20 PM    CL 98 11/01/2021 04:20 PM    CO2 28 11/01/2021 04:20 PM    BUN 31 11/01/2021 04:20 PM    CREATININE 8.70 07/19/2022 01:00 PM    CREATININE 8.36 11/01/2021 04:20 PM    GFRAA 8 11/01/2021 04:20 PM    LABGLOM 6 07/19/2022 01:00 PM    GLUCOSE 71 11/01/2021 04:20 PM    GLUCOSE 87 09/16/2011 01:44 PM    PROT 6.6 11/01/2021 04:20 PM    PROT 7.6 10/17/2012 01:08 AM    CALCIUM 8.9 11/01/2021 04:20 PM    BILITOT 0.36 11/01/2021 04:20 PM    ALKPHOS 65 11/01/2021 04:20 PM    AST 9 11/01/2021 04:20 PM    ALT 6 11/01/2021 04:20 PM       POC Tests: No results for input(s): POCGLU, POCNA, POCK, POCCL, POCBUN, POCHEMO, POCHCT in the last 72 hours.     Coags:   Lab Results   Component Value Date/Time    PROTIME 11.9 07/24/2021 03:25 PM    PROTIME 12.6 06/28/2013 08:00 AM    INR 1.1 07/24/2021 03:25 PM    APTT 19.5 07/24/2021 03:25 PM       HCG (If Applicable): No results found for: PREGTESTUR, PREGSERUM, HCG, HCGQUANT     ABGs: No results found for: PHART, PO2ART, UYX7IVV, WCD3NIQ, BEART, G0YQBNBZ     Type & Screen (If Applicable):  No results found for: LABABO, LABRH    Drug/Infectious Status (If Applicable):  Lab Results   Component Value Date/Time    HEPCAB NONREACTIVE 07/19/2021 07:44 PM       COVID-19 Screening (If Applicable):   Lab Results   Component Value Date/Time    COVID19 Not Detected 10/03/2021 06:28 PM           Anesthesia Evaluation  Patient summary reviewed no history of anesthetic complications:   Airway: Mallampati: II  TM distance: >3 FB   Neck ROM: full  Mouth opening: > = 3 FB   Dental:      Comment: 3 teeth left (otherwise all missing) - poor dentition of remaining teeth. Pulmonary:normal exam        (-) pneumonia                           Cardiovascular:  Exercise tolerance: poor (<4 METS),   (+) hypertension: no interval change, angina: no interval change, CAD: no interval change,       ECG reviewed  Rhythm: regular  Rate: normal  Echocardiogram reviewed                  Neuro/Psych:   (+) seizures:, CVA: residual symptoms,             GI/Hepatic/Renal:   (+) GERD: no interval change, renal disease: ESRD and dialysis,           Endo/Other:    (+) DiabetesType II DM, no interval change, , blood dyscrasia: anemia:., .                 Abdominal:             Vascular: negative vascular ROS. Other Findings:             Anesthesia Plan      MAC     ASA 3       Induction: intravenous. Anesthetic plan and risks discussed with patient. Use of blood products discussed with patient whom consented to blood products. Plan discussed with CRNA.                     Denver Northern, MD   8/2/2022

## 2022-08-02 NOTE — DISCHARGE INSTRUCTIONS
Remove band aid in 2-3 days    Keep arm wrapped for at least 1 day    Ice pack over arm the first day then heat to help with hardness over AV fistula    Follow up in 2-3 weeks, office will call to schedule    Use catheter for dialysis

## 2022-08-02 NOTE — H&P
ATTENDING ATTESTATION: I personally examined Rickie Burkitt today and confirmed the pertinent history, exam and medical decision making in the resident's note. Please note there may be additional comments below. Additional Comments: Plan for ligating AV fistula due to continued swelling and large collaterals forming around his chest and neck due to bilateral innominate vein occlusions. Will discuss possible HeroGraft from the left side in the near future versus considering femoral loop graft. Electronically signed by Kenneth Esparza MD on 8/2/2022 at 3:04 PM      [address] [place] [address],[address]: [phone]  C: [phone]  Email: [email or web address]             Division of Vascular Surgery        History and Physical    History of Present Illness:      Rickie Burkitt is a 64 y.o. gentleman who presents with for AV fistula ligation. He had fistula created in 2016 by Dr. Georgiana Spencer and developed significant tortuosity and aneurysmal changes and has been maintained on catheter based dialysis since last year due to issues with his access. He continues to have significant discomfort in his right arm due to the swelling and heaviness. He is also having discomfort over his catheter site on his left neck and shoulder. He is left hand dominate. He denies any symptoms to suggest ischemic steal syndrome. 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 Multiple attempts were performed but the occlusion could not be crossed. We had a discussion with the family and decision was made to perform AV fistula ligation.      Medical History:     Past Medical History:   Diagnosis Date    Allergic rhinitis     Anemia     BPH (benign prostatic hyperplasia)     CAD (coronary artery disease)     Carotid artery stenosis 2013    ulcerated plaque in left ICA 60 - 70% on carotid angiogram    Cerebrovascular disease 2013    left frontal hemorrhage    Diabetes mellitus ([facility] 2013    Eczema     ESRD (end stage renal disease) on dialysis ([facility]     Full dentures     Upper / Lower    GERD (gastroesophageal reflux disease)     Gout     Hemodialysis patient ([facility] 10/2015    US [facility] [address]  M,W F    Hyperlipidemia     Hypertension     Neuropathy     Osteoarthritis     Peripheral vascular disease ([facility]     Seizures ([facility] 2013    after stroke, LAST [id number]/20/2016    Stroke [facility]) 2013    Unspecified cerebral artery occlusion with cerebral infarction 4-    speech short term and lt side       Surgical History:     Past Surgical History:   Procedure Laterality Date    ABDOMEN SURGERY  2016    PERITONEAL CATHETER    ABDOMEN SURGERY  05/26/2016    pd catherter removed from abdomen    BLADDER SURGERY  Spring 2014    dilatated     CAROTID ENDARTERECTOMY  2015    left    COLONOSCOPY  2008    polypectomy, diverticulosis    COLONOSCOPY  01/28/2015    COLONOSCOPY  05/10/2018    COLONOSCOPY performed by Jerod Argueta MD at [facility]    CYSTOSCOPY  2008    bladder tumor removal    DIALYSIS FISTULA CREATION Right 08/25/2016    FISTULAGRAM N/A 7/19/2022    CENTRAL VENOGRAM performed by Wen Gonzalez MD at [address] [place] [address] Right     IR TUNNELED [address] 5 YEARS  07/26/2021    IR TUNNELED CATHETER PLACEMENT GREATER THAN 5 YEARS 7/26/2021 [facility] PROCEDURES    KIDNEY BIOPSY  05/2013    OTHER SURGICAL HISTORY      central venogram    SINUS SURGERY      TUNNELED VENOUS PORT PLACEMENT Right 10/2015    dialysis catheter- chest     UPPER GASTROINTESTINAL ENDOSCOPY  01/28/2015    UPPER GASTROINTESTINAL ENDOSCOPY N/A 11/01/2018    EGD BIOPSY performed by Jaymie Chamberlain DO at [facility]       Family History:     Family History   Problem Relation Age of Onset    Diabetes Mother     Kidney Disease Mother         Dialysis    Cancer Mother         KIDNEY    Anesth Problems Mother         delayed awakening Other Father         Rock Ebbing    Diabetes Sister     High Blood Pressure Sister     Cancer Brother        Allergies:       Patient has no known allergies. Medications:      No current facility-administered medications for this encounter. Social History:     Tobacco:    reports that he has never smoked. He has never used smokeless tobacco.  Alcohol:      reports no history of alcohol use. Drug Use:  reports no history of drug use. Review of Systems:     Review of Systems  Constitutional:  Negative for chills and fever. HENT:  Negative for congestion. Eyes:  Negative for visual disturbance. Respiratory:  Negative for chest tightness and shortness of breath. Cardiovascular:  Positive for leg swelling. Negative for chest pain. Gastrointestinal:  Negative for abdominal pain. Endocrine: Negative. Genitourinary: Negative. Musculoskeletal:  Positive for arthralgias. Skin:  Negative for color change and wound. Allergic/Immunologic: Negative. Neurological:  Negative for facial asymmetry, speech difficulty, weakness and numbness. Hematological: Negative. Psychiatric/Behavioral: Negative. Physical Exam:     Vitals:  /82   Pulse 85   Resp 26   SpO2 (!) 87%     Physical Exam  Physical Exam  Constitutional:       Appearance: He is well-developed and well-groomed. Eyes:     Extraocular Movements: Extraocular movements intact. Conjunctiva/sclera: Conjunctivae normal.  Neck:     Vascular: No carotid bruit. Cardiovascular:     Rate and Rhythm: Normal rate and regular rhythm. Pulses:           Radial pulses are 2+ on the right side and 2+ on the left side. Arteriovenous access: Right arteriovenous access is present. Comments: Pulsatile thrill over AV fistula  Pulmonary:     Effort: Pulmonary effort is normal. No respiratory distress. Chest:     Comments: Tunneled hemodialysis catheter in place  Abdominal:     Palpations: Abdomen is soft. Tenderness:  There

## 2022-08-02 NOTE — PROGRESS NOTES
Received from OR in pcc 7. Pt alert on arousal, answers appropriately. Very sleepy. Phase 2 recovery initiated.

## 2022-08-02 NOTE — ANESTHESIA POSTPROCEDURE EVALUATION
Department of Anesthesiology  Postprocedure Note    Patient: Rickie Burkitt  MRN: [id number]  YOB: 1945  Date of evaluation: 8/2/2022      Procedure Summary     Date: 08/02/22 Room / Location: [address]    Anesthesia Start: 3177 Anesthesia Stop: 8058    Procedure: UPPER EXTREMITY AV FISTULA LIGATION (Right) Diagnosis:       End stage renal disease ([facility]      (END STAGE RENAL DISEASE)    Surgeons: Kenneth Esparza MD Responsible Provider: Zion Weaver MD    Anesthesia Type: MAC ASA Status: 3          Anesthesia Type: No value filed.     Francis Phase I: Francis Score: 10    Francis Phase II:        Anesthesia Post Evaluation    Patient location during evaluation: PACU  Patient participation: complete - patient participated  Level of consciousness: awake  Airway patency: patent  Nausea & Vomiting: no nausea and no vomiting  Complications: no  Cardiovascular status: hemodynamically stable  Respiratory status: acceptable  Hydration status: stable

## 2022-08-02 NOTE — PROGRESS NOTES
Patient admitted, consent signed and questions answered. Patient ready for procedure. Call light to reach with side rails up 2 of 2. .  wife at bedside with patient. History and physical Not complete. Left tunnel cath accedded as per order and protocol for  blood for POC and IV access. Right fistula site cleansed with chloroprep wipes. Pinky Angles

## 2022-08-02 NOTE — PROGRESS NOTES
All discharge instructions explained to pt and wife. Discharged per wheelchair with all belongings to home with wife and family.

## 2022-08-02 NOTE — OP NOTE
Operative Note      Patient: Rosaline Portillo  YOB: 1945  MRN: [id number]    Date of Procedure: 8/2/2022    Pre-Op Diagnosis: END STAGE RENAL DISEASE, malfunctioning right upper extremity AV fistula, arm swelling, central venous occlusion    Post-Op Diagnosis: Same       Procedure:  Right upper extremity AV fistula ligation    Surgeon(s): Jackolyn Angelucci, MD    Anesthesia: Monitor Anesthesia Care, local    Estimated Blood Loss (mL): 11 ml    Complications: None    Specimens: none    Implants: none      Drains: none    Findings: after ligation of fistula there is no more thrill or bruit, palpable radial artery pulse at level of wrist.    Detailed Description of Procedure:     Mr. Cheli Colón was brought to the operating room for ligation of his right upper extremity Av fistula due to central venous occlusion, swelling and malfunctioning AV fistula. After appropriate anesthesia delivered, right arm prepped and draped in standard sterile fashion, timeout performed and agreed upon, antibiotics given during this period. Local anesthesia given over the proximal aspect of the right radial cephalic fistula at level of wrist.  Small longitudinal incision created over the proximal AV fistula. Cautery used to dissect out the cephalic vein fistula. Blunt and sharp dissection performed and the cephalic vein fistula was circumferentially controlled and ligated with a couple of 2-0 silk and 3-o proline suture due to calcific nature of the proximal segment of AV fistula. This essentially stopped all flow through the fistula, there was no more thrill or bruit noted. Wound bed irrigated and dried, it was hemostatic. Incision then closed in layers of interrupted vicryl and monocryl. Skin glue and Band-Aid applied, followed by light compressive wrap to the elbow. Patient tolerated procedure well and was brought to the recovery room.     Electronically signed by Jackolyn Angelucci, MD on 8/2/2022 at 2:57 PM

## 2022-08-18 ENCOUNTER — OFFICE VISIT (OUTPATIENT)
Dept: INTERNAL MEDICINE | Age: 77
End: 2022-08-18
Payer: MEDICARE

## 2022-08-18 VITALS
TEMPERATURE: 97.8 F | DIASTOLIC BLOOD PRESSURE: 94 MMHG | BODY MASS INDEX: 28.63 KG/M2 | HEART RATE: 80 BPM | SYSTOLIC BLOOD PRESSURE: 158 MMHG | WEIGHT: 217 LBS | OXYGEN SATURATION: 97 %

## 2022-08-18 DIAGNOSIS — D17.1 LIPOMA OF TORSO: ICD-10-CM

## 2022-08-18 DIAGNOSIS — N18.6 ESRD ON HEMODIALYSIS (HCC): ICD-10-CM

## 2022-08-18 DIAGNOSIS — R05.3 CHRONIC COUGH: ICD-10-CM

## 2022-08-18 DIAGNOSIS — B36.0 TINEA VERSICOLOR: ICD-10-CM

## 2022-08-18 DIAGNOSIS — I48.92 ATRIAL FLUTTER, UNSPECIFIED TYPE (HCC): ICD-10-CM

## 2022-08-18 DIAGNOSIS — Z99.2 ESRD ON HEMODIALYSIS (HCC): ICD-10-CM

## 2022-08-18 DIAGNOSIS — I35.0 AORTIC VALVE STENOSIS, ETIOLOGY OF CARDIAC VALVE DISEASE UNSPECIFIED: ICD-10-CM

## 2022-08-18 DIAGNOSIS — D63.8 ANEMIA OF CHRONIC DISEASE: ICD-10-CM

## 2022-08-18 DIAGNOSIS — I10 ESSENTIAL HYPERTENSION: Primary | ICD-10-CM

## 2022-08-18 DIAGNOSIS — I25.10 CORONARY ARTERY DISEASE INVOLVING NATIVE CORONARY ARTERY OF NATIVE HEART WITHOUT ANGINA PECTORIS: ICD-10-CM

## 2022-08-18 PROCEDURE — 99214 OFFICE O/P EST MOD 30 MIN: CPT | Performed by: INTERNAL MEDICINE

## 2022-08-18 PROCEDURE — 1124F ACP DISCUSS-NO DSCNMKR DOCD: CPT | Performed by: INTERNAL MEDICINE

## 2022-08-18 PROCEDURE — 99211 OFF/OP EST MAY X REQ PHY/QHP: CPT | Performed by: INTERNAL MEDICINE

## 2022-08-18 RX ORDER — ZOSTER VACCINE RECOMBINANT, ADJUVANTED 50 MCG/0.5
0.5 KIT INTRAMUSCULAR SEE ADMIN INSTRUCTIONS
Qty: 0.5 ML | Refills: 0 | Status: SHIPPED | OUTPATIENT
Start: 2022-08-18 | End: 2023-02-14

## 2022-08-18 RX ORDER — ATORVASTATIN CALCIUM 40 MG/1
40 TABLET, FILM COATED ORAL DAILY
Qty: 30 TABLET | Refills: 3 | Status: SHIPPED | OUTPATIENT
Start: 2022-08-18

## 2022-08-18 RX ORDER — FOLIC ACID 1 MG/1
1 TABLET ORAL DAILY
COMMUNITY

## 2022-08-18 SDOH — ECONOMIC STABILITY: FOOD INSECURITY: WITHIN THE PAST 12 MONTHS, THE FOOD YOU BOUGHT JUST DIDN'T LAST AND YOU DIDN'T HAVE MONEY TO GET MORE.: NEVER TRUE

## 2022-08-18 SDOH — ECONOMIC STABILITY: FOOD INSECURITY: WITHIN THE PAST 12 MONTHS, YOU WORRIED THAT YOUR FOOD WOULD RUN OUT BEFORE YOU GOT MONEY TO BUY MORE.: NEVER TRUE

## 2022-08-18 ASSESSMENT — ENCOUNTER SYMPTOMS
CONSTIPATION: 0
WHEEZING: 0
ABDOMINAL PAIN: 0
SHORTNESS OF BREATH: 0
BLOOD IN STOOL: 0
PHOTOPHOBIA: 0
COUGH: 1

## 2022-08-18 ASSESSMENT — SOCIAL DETERMINANTS OF HEALTH (SDOH): HOW HARD IS IT FOR YOU TO PAY FOR THE VERY BASICS LIKE FOOD, HOUSING, MEDICAL CARE, AND HEATING?: NOT HARD AT ALL

## 2022-08-18 NOTE — PROGRESS NOTES
[facility]    Progress Note    Date of patient's visit: 8/18/2022    Patient's Name:  Jon Saldivar    YOB: 1945            Patient Care Team:  Kian Pink MD as PCP - Guillermo Robles MD as PCP - [facility]  Harvy Duane, MD as Consulting Physician (Urology)  Karin Luz MD as Consulting Physician (Gastroenterology)  Kian Pink MD (Internal Medicine)  [address] [facility] (Dialysis)  Lissa Dobson MD as Surgeon (General Surgery)    REASON FOR VISIT: Routine outpatient follow     Chief Complaint   Patient presents with    Hypertension    Health Maintenance     Tdap and shingrix pended     Cough     Pt c/o having ongoing cough for over the last 6 months, worse when lying down. Insomnia     Pt states that he has been having issues sleeping at night          HISTORY OF PRESENT ILLNESS:    History was obtained from the patient. Jon Saldivar is a 64 y.o. is here for follow-up. He is accompanied by his daughter and wife. Unfortunately they did not bring any of his medications with him and none of them know what he is taking. Patient does not seem to be taking anything except possibly sevelamer. Patient is complaining of some cough that has been persisting for a year along with some leg edema. Also some tired appearing eyes and not sleeping well at night. He is also complaining of a rash which is worse specially on his back and would like a referral to a new dermatologist of their choice. He also has a large lipoma on his back and they would like to have that removed. Its been there for more than 2 years. Wife and patient deny that it is getting bigger in size. Patient keeps stating that he is tired of dialysis and does not want to do it anymore. He says he is looking for a new kidney. His blood pressure is elevated.   He has not been back to cardiology since last year though daughter was under the assumption he has been going regularly. Patient states he is taking Eliquis but I am not sure. He is no longer taking metoprolol or atorvastatin per patient. Dialysis blood pressures and notes reviewed. Few labs obtained from them. Patient does not urinate anymore. I cannot really prescribe diuretics. Echo  CONCLUSIONS     Summary  Overall preserved LV systolic function, EF 59%. Aortic calcification with stenosis, mean gradient 21-25 mm Hg. Mild to moderate mitral regurgitation  Mild tricuspid regurgitation.        Past Medical History:   Diagnosis Date    Allergic rhinitis     Anemia     BPH (benign prostatic hyperplasia)     CAD (coronary artery disease)     Carotid artery stenosis 2013    ulcerated plaque in left ICA 60 - 70% on carotid angiogram    Cerebrovascular disease 2013    left frontal hemorrhage    Diabetes mellitus ([address] 2013    Eczema     ESRD (end stage renal disease) on dialysis ([address]     Full dentures     Upper / Lower    GERD (gastroesophageal reflux disease)     Gout     Hemodialysis patient ([address] 10/2015     [address]  M,W F    Hyperlipidemia     Hypertension     Neuropathy     Osteoarthritis     Peripheral vascular disease ([address]     Seizures ([address] 2013    after stroke, LAST [id number]/20/2016    Stroke [facility]) 2013    Unspecified cerebral artery occlusion with cerebral infarction 4-    speech short term and lt side       Past Surgical History:   Procedure Laterality Date    ABDOMEN SURGERY  2016    PERITONEAL CATHETER    ABDOMEN SURGERY  05/26/2016    pd catherter removed from abdomen    BLADDER SURGERY  Spring 2014    dilatated     CAROTID ENDARTERECTOMY  2015    left    COLONOSCOPY  2008    polypectomy, diverticulosis    COLONOSCOPY  01/28/2015    COLONOSCOPY  05/10/2018    COLONOSCOPY performed by Olena Ramirez MD at [facility]    CYSTOSCOPY  2008    bladder tumor removal    DIALYSIS FISTULA CREATION Right 08/25/2016    FISTULAGRAM N/A 7/19/2022    CENTRAL VENOGRAM Shingles vaccine (1 of 2) Never done    Lipids  04/26/2019    COVID-19 Vaccine (4 - Booster for Pfizer series) 02/02/2022       REVIEW OF SYSTEMS:    12 point review of symptoms completed and found to be normal except noted in the HPI    Review of Systems   Constitutional:  Positive for fatigue. Negative for chills, fever and unexpected weight change. Eyes:  Negative for photophobia and visual disturbance. Respiratory:  Positive for cough. Negative for shortness of breath and wheezing. Cardiovascular:  Positive for leg swelling. Negative for chest pain and palpitations. Gastrointestinal:  Negative for abdominal pain, blood in stool and constipation. Genitourinary:  Negative for flank pain. Anuria    Skin:  Positive for rash. Negative for wound. Neurological:  Negative for dizziness, seizures, weakness and headaches. Hematological:  Negative for adenopathy. Does not bruise/bleed easily. Psychiatric/Behavioral:  Positive for confusion and sleep disturbance. Negative for dysphoric mood. The patient is not nervous/anxious. PHYSICAL EXAM:     Vitals:    08/18/22 0836   BP: (!) 158/94   Site: Left Upper Arm   Position: Sitting   Cuff Size: Small Adult   Pulse: 80   Temp: 97.8 °F (36.6 °C)   TempSrc: Infrared   SpO2: 97%   Weight: 217 lb (98.4 kg)     Body mass index is 28.63 kg/m². BP Readings from Last 3 Encounters:   08/18/22 (!) 158/94   08/02/22 (!) 141/79   07/19/22 (!) 140/83        Wt Readings from Last 3 Encounters:   08/18/22 217 lb (98.4 kg)   07/19/22 219 lb (99.3 kg)   07/15/22 219 lb (99.3 kg)       Physical Exam  Vitals and nursing note reviewed. Constitutional:       General: He is not in acute distress. Appearance: Normal appearance. He is not ill-appearing. HENT:      Head: Normocephalic and atraumatic. Eyes:      Extraocular Movements: Extraocular movements intact.       Conjunctiva/sclera: Conjunctivae normal.      Pupils: Pupils are equal, round, and reactive to light. Cardiovascular:      Rate and Rhythm: Normal rate and regular rhythm. Heart sounds: Murmur (3/6 ESM all over chest) heard. Pulmonary:      Effort: Pulmonary effort is normal. No respiratory distress. Breath sounds: Rales present. No wheezing. Musculoskeletal:      Right lower leg: Edema present. Left lower leg: Edema present. Comments: Right forearm fistula and varicosities   Skin:     General: Skin is warm and dry. Findings: Rash (darkly pigmented rash over back with variegated appearance.) present. Neurological:      General: No focal deficit present. Mental Status: He is alert and oriented to person, place, and time.              LABORATORY FINDINGS:    CBC:  Lab Results   Component Value Date/Time    WBC 4.4 11/01/2021 04:20 PM    HGB 8.7 11/01/2021 04:20 PM    PLT See Reflexed IPF Result 11/01/2021 04:20 PM    PLT Platelet clumps present, count appears adequate. 09/16/2011 01:46 PM     BMP:    Lab Results   Component Value Date/Time     11/01/2021 04:20 PM    K 4.1 11/01/2021 04:20 PM    CL 98 11/01/2021 04:20 PM    CO2 28 11/01/2021 04:20 PM    BUN 31 11/01/2021 04:20 PM    CREATININE 9.24 08/02/2022 12:40 PM    CREATININE 8.36 11/01/2021 04:20 PM    GLUCOSE 71 11/01/2021 04:20 PM    GLUCOSE 87 09/16/2011 01:44 PM     HEMOGLOBIN A1C:   Lab Results   Component Value Date/Time    LABA1C 5.1 04/11/2019 03:08 PM     MICROALBUMIN URINE:   Lab Results   Component Value Date/Time    MICROALBUR 971 09/29/2015 11:39 AM     FASTING LIPID PANEL:  Lab Results   Component Value Date    CHOL 173 04/26/2018    HDL 45 04/26/2018    TRIG 87 04/26/2018     Lab Results   Component Value Date    LDLCHOLESTEROL 111 04/26/2018       LIVER PROFILE:  Lab Results   Component Value Date/Time    ALT 6 11/01/2021 04:20 PM    AST 9 11/01/2021 04:20 PM    PROT 6.6 11/01/2021 04:20 PM    PROT 7.6 10/17/2012 01:08 AM    BILITOT 0.36 11/01/2021 04:20 PM    BILIDIR 0.09 10/05/2018 08:23 AM LABALBU 4.0 11/01/2021 04:20 PM    LABALBU 4.5 09/16/2011 01:44 PM      THYROID FUNCTION:   Lab Results   Component Value Date/Time    TSH 2.34 04/26/2018 10:40 AM      URINEANALYSIS: No results found for: LABURIN  ASSESSMENT AND PLAN:    1. Essential hypertension  Daughter to call back with meds at home    2. Aortic valve stenosis, etiology of cardiac valve disease unspecified  Follow up with Cardiology  Daughter advised to take him to appt  Needs echo    3. ESRD on hemodialysis ([facility]      4. Coronary artery disease involving native coronary artery of native heart without angina pectoris  Not taking BB or statin per patient  Get med list    - atorvastatin (LIPITOR) 40 MG tablet; Take 1 tablet by mouth daily  Dispense: 30 tablet; Refill: 3    5. Atrial flutter, unspecified type ([facility]  On Eliquis? ? CHF  Needs more fluid removal and f/u cardiology    - XR CHEST (2 VW); Future    7. Anemia of chronic disease  Get labs     8. Tinea versicolor    - External Referral To Dermatology  - econazole nitrate 1 % cream; Apply topically daily. Dispense: 1 g; Refill: 0    9. Lipoma of torso    - AFL (Epic) - Moe SMITH, DO Joe, General Surgery, [place]          FOLLOW UP AND INSTRUCTIONS:   Return in about 3 months (around 11/18/2022). Tanya Gould received counseling on the following healthy behaviors: nutrition, exercise, and medication adherence    Reviewed prior labs and health maintenance. Discussed use, benefit, and side effects of prescribed medications. Barriers to medication compliance addressed. All patient questions answered. Pt voiced understanding.      Patient given educational materials - see patient instructions    Rajni Mike  Attending Physician, [address] [place] [address], Internal Medicine Residency Program  [address] [place] [address]  8/18/2022, 9:18 AM

## 2022-08-30 ENCOUNTER — HOSPITAL ENCOUNTER (OUTPATIENT)
Dept: GENERAL RADIOLOGY | Age: 77
Discharge: HOME | End: 2022-09-01
Payer: MEDICARE

## 2022-08-30 ENCOUNTER — HOSPITAL ENCOUNTER (OUTPATIENT)
Age: 77
Discharge: HOME | End: 2022-09-01
Payer: MEDICARE

## 2022-08-30 DIAGNOSIS — R05.3 CHRONIC COUGH: ICD-10-CM

## 2022-08-30 PROCEDURE — 71046 X-RAY EXAM CHEST 2 VIEWS: CPT

## 2022-11-10 ENCOUNTER — OFFICE VISIT (OUTPATIENT)
Dept: VASCULAR SURGERY | Age: 77
End: 2022-11-10
Payer: MEDICARE

## 2022-11-10 VITALS
HEART RATE: 88 BPM | BODY MASS INDEX: 27.83 KG/M2 | OXYGEN SATURATION: 91 % | WEIGHT: 210 LBS | TEMPERATURE: 97.4 F | HEIGHT: 73 IN | DIASTOLIC BLOOD PRESSURE: 67 MMHG | SYSTOLIC BLOOD PRESSURE: 121 MMHG

## 2022-11-10 DIAGNOSIS — I87.1 STENOSIS OF BRACHIOCEPHALIC VEIN: ICD-10-CM

## 2022-11-10 DIAGNOSIS — Z99.2 ENCOUNTER REGARDING VASCULAR ACCESS FOR DIALYSIS FOR END-STAGE RENAL DISEASE (HCC): Primary | ICD-10-CM

## 2022-11-10 DIAGNOSIS — N18.6 ENCOUNTER REGARDING VASCULAR ACCESS FOR DIALYSIS FOR END-STAGE RENAL DISEASE (HCC): Primary | ICD-10-CM

## 2022-11-10 PROCEDURE — 99214 OFFICE O/P EST MOD 30 MIN: CPT | Performed by: SURGERY

## 2022-11-10 PROCEDURE — 3074F SYST BP LT 130 MM HG: CPT | Performed by: SURGERY

## 2022-11-10 PROCEDURE — 3078F DIAST BP <80 MM HG: CPT | Performed by: SURGERY

## 2022-11-10 PROCEDURE — 1124F ACP DISCUSS-NO DSCNMKR DOCD: CPT | Performed by: SURGERY

## 2022-11-10 ASSESSMENT — ENCOUNTER SYMPTOMS
CHEST TIGHTNESS: 0
SHORTNESS OF BREATH: 0
ABDOMINAL PAIN: 0
COLOR CHANGE: 0
ALLERGIC/IMMUNOLOGIC NEGATIVE: 1

## 2022-11-10 NOTE — PROGRESS NOTES
Division of Vascular Surgery        Follow Up      AVF ligation 8/2/22    Chief Complaint:     Fatigue, knee pain    History of Present Illness:      Jazmin Arellano is a 76 y.o. gentleman who presents for follow up regarding his dialysis access. He is getting dialysis through a tunneled catheter in his left chest.  He had his right radial cephalic AV fistula ligated secondary to severe swelling and central venous occlusion. Attempts at crossing the right innominate vein occlusion were difficult and not successful. Ultimately he had fistula ligated to help with swelling symptoms. Dialysis through the catheter has been working well no issues thus far. He does feel extremely fatigued all the time, sleeps all the time. He has severe arthritis in his knees and is not candidate for surgery do to co-morbidities. Ever since his stroke 8 years ago he has been not doing well, decreased mobility, weakness on right side. He continues to have some swelling over his right elbow that bothers him. He denies significant pain or swelling in his arm otherwise.     Medical History:     Past Medical History:   Diagnosis Date    Allergic rhinitis     Anemia     BPH (benign prostatic hyperplasia)     CAD (coronary artery disease)     Carotid artery stenosis 2013    ulcerated plaque in left ICA 60 - 70% on carotid angiogram    Cerebrovascular disease 2013    left frontal hemorrhage    Diabetes mellitus ([address] 2013    Eczema     ESRD (end stage renal disease) on dialysis ([address]     Full dentures     Upper / Lower    GERD (gastroesophageal reflux disease)     Gout     Hemodialysis patient ([address] 10/2015     [facility]  M,W F    Hyperlipidemia     Hypertension     Neuropathy     Osteoarthritis     Peripheral vascular disease ([address]     Seizures ([address] 2013    after stroke, LAST TUTFTLB4805/20/2016    Stroke [facility]) 2013    Unspecified cerebral artery occlusion with cerebral infarction 4-    speech short term and lt side       Surgical History:     Past Surgical History:   Procedure Laterality Date    ABDOMEN SURGERY  2016    PERITONEAL CATHETER    ABDOMEN SURGERY  05/26/2016    pd catherter removed from abdomen    BLADDER SURGERY  Spring 2014    dilatated     CAROTID ENDARTERECTOMY  2015    left    COLONOSCOPY  2008    polypectomy, diverticulosis    COLONOSCOPY  01/28/2015    COLONOSCOPY  05/10/2018    COLONOSCOPY performed by Denny Perales MD at [facility]    CYSTOSCOPY  2008    bladder tumor removal    DIALYSIS FISTULA CREATION Right 08/25/2016    FISTULAGRAM N/A 7/19/2022    CENTRAL VENOGRAM performed by Missael Lopez MD at [address] [place] [address] Right     IR TUNNELED Sadaf Reas 5 YEARS  07/26/2021    IR TUNNELED CATHETER PLACEMENT GREATER THAN 5 YEARS 7/26/2021 [facility] SPECIAL PROCEDURES    KIDNEY BIOPSY  05/2013    OTHER SURGICAL HISTORY      central venogram    SINUS SURGERY      TUNNELED VENOUS PORT PLACEMENT Right 10/2015    dialysis catheter- chest     UPPER GASTROINTESTINAL ENDOSCOPY  01/28/2015    UPPER GASTROINTESTINAL ENDOSCOPY N/A 11/01/2018    EGD BIOPSY performed by Fabrice Pike DO at [facility]    VASCULAR SURGERY Right 8/2/2022    UPPER EXTREMITY AV FISTULA LIGATION performed by Missael Lopez MD at [place] History:     Family History   Problem Relation Age of Onset    Diabetes Mother     Kidney Disease Mother         Dialysis    Cancer Mother         KIDNEY    Anesth Problems Mother         delayed awakening     Other Father         Harry Villatoro    Diabetes Sister     High Blood Pressure Sister     Cancer Brother        Allergies:       Patient has no known allergies.     Medications:      Current Outpatient Medications   Medication Sig Dispense Refill    zoster recombinant adjuvanted vaccine [facility]) 50 MCG/0.5ML SUSR injection Inject 0.5 mLs into the muscle See Admin Instructions 1 dose now and repeat in 2-6 months 0.5 mL 0    folic acid (FOLVITE) 1 MG tablet Take 1 mg by mouth daily      econazole nitrate 1 % cream Apply topically daily. 1 g 0    atorvastatin (LIPITOR) 40 MG tablet Take 1 tablet by mouth daily 30 tablet 3    HYDROcodone-acetaminophen (NORCO) 5-325 MG per tablet       ibuprofen (ADVIL;MOTRIN) 800 MG tablet       VITAMIN D, ERGOCALCIFEROL, PO Take 3 mcg by mouth      Sucroferric Oxyhydroxide (VELPHORO) 500 MG CHEW Take 1 mg by mouth      sevelamer (RENVELA) 800 MG tablet Take 3 tablets by mouth 3 times daily      apixaban (ELIQUIS) 2.5 MG TABS tablet Take 1 tablet by mouth 2 times daily 60 tablet 3    metoprolol tartrate 75 MG TABS Take 75 mg by mouth 2 times daily 60 tablet 3     No current facility-administered medications for this visit. Social History:     Tobacco:    reports that he has never smoked. He has never used smokeless tobacco.  Alcohol:      reports no history of alcohol use. Drug Use:  reports no history of drug use. Review of Systems:     Review of Systems   Constitutional:  Negative for chills and fever. HENT:  Negative for congestion. Eyes:  Negative for visual disturbance. Respiratory:  Negative for chest tightness and shortness of breath. Cardiovascular:  Positive for leg swelling. Negative for chest pain. Gastrointestinal:  Negative for abdominal pain. Endocrine: Negative. Genitourinary: Negative. Musculoskeletal:  Positive for arthralgias. Skin:  Negative for color change and wound. Allergic/Immunologic: Negative. Neurological:  Negative for facial asymmetry, speech difficulty, weakness and numbness. Hematological: Negative. Psychiatric/Behavioral: Negative.      Physical Exam:     Vitals:  /67 (Site: Left Upper Arm, Position: Sitting, Cuff Size: Medium Adult)   Pulse 88   Temp 97.4 °F (36.3 °C) (Temporal)   Ht 6' 1\" (1.854 m)   Wt 210 lb (95.3 kg)   SpO2 91%   BMI 27.71 kg/m²     Physical Exam  Constitutional:       Appearance: He is well-developed and well-groomed. Eyes:      Extraocular Movements: Extraocular movements intact. Conjunctiva/sclera: Conjunctivae normal.   Neck:      Vascular: No carotid bruit. Cardiovascular:      Rate and Rhythm: Normal rate and regular rhythm. Pulses:           Radial pulses are 2+ on the right side and 2+ on the left side. Arteriovenous access: Right arteriovenous access is present. Comments: Thrombosed AV fistula  Pulmonary:      Effort: Pulmonary effort is normal. No respiratory distress. Chest:      Comments: Tunneled hemodialysis catheter in place no erythema or drainage noted, catheter cleaned  Abdominal:      Palpations: Abdomen is soft. Tenderness: There is no abdominal tenderness. Musculoskeletal:      Right upper arm: No swelling or tenderness. Left upper arm: No swelling or tenderness. Right forearm: No swelling or tenderness. Left forearm: No swelling or tenderness. Right hand: No swelling or tenderness. Normal strength. Normal sensation. Normal capillary refill. Left hand: No swelling or tenderness. Normal strength. Normal sensation. Normal capillary refill. Cervical back: Full passive range of motion without pain. Right lower leg: No edema. Left lower leg: No edema. Skin:     General: Skin is warm. Capillary Refill: Capillary refill takes less than 2 seconds. Neurological:      Mental Status: He is alert and oriented to person, place, and time. GCS: GCS eye subscore is 4. GCS verbal subscore is 5. GCS motor subscore is 6. Sensory: Sensation is intact. Motor: Motor function is intact.    Psychiatric:         Mood and Affect: Mood normal.         Speech: Speech normal.         Behavior: Behavior normal.       Imaging/Labs:     Right innominate vein occlusion            Assessment and Plan:     ESRD on hemodialysis via catheter, right innominate vein occlusion  He is not ready for new access attempts at this time, seems reasonable with everything going on with him  I cleaned the catheter and showed his wife to do so as well  They understand the risks of catheter based hemodialysis with infection and need for new catheter if need be  Will check in with them in 3-6 months, sometime in the summer to see how he is doing  Considerations for left upper extremity HeroGraft can be considered since catheter is in place    Electronically signed by Shiloh Mcguire MD on 11/10/22 at 9:03 AM [address]: [phone]  C: [phone]  Email: [email or web address]

## 2022-12-19 ENCOUNTER — TELEPHONE (OUTPATIENT)
Dept: INTERNAL MEDICINE | Age: 77
End: 2022-12-19

## 2022-12-19 NOTE — LETTER
[facility] [address]  [address] [id number]  Phone: [phone]  Fax: [phone]    Drew Mcdowell MD        December 19, 2022     [address] [place] [address] 10      Dear Molly Anderson: We missed seeing you for a scheduled appointment at [address] [place] [address] with Drew Mcdowell MD on 12/13/22. We're sorry you were unable to keep your appointment and hope that you are doing well. We ask that you please call 24 hours in advance if you are unable to make your appointment, so that we can give that time to another patient in need. We care about you and the management of your healthcare and want to make sure that you follow up as recommended. To provide quality care and timely appointments to all our patients, you may be dismissed from the practice if you do not show for three (3) scheduled appointments within a 12-month period. We would like to continue treating your healthcare needs. Please call the office to reschedule your appointment, if needed.      Sincerely,        Drew Mcdowell MD

## 2022-12-30 ENCOUNTER — HOSPITAL ENCOUNTER (EMERGENCY)
Age: 77
Discharge: HOME | End: 2022-12-30
Attending: EMERGENCY MEDICINE
Payer: MEDICARE

## 2022-12-30 ENCOUNTER — APPOINTMENT (OUTPATIENT)
Dept: INTERVENTIONAL RADIOLOGY/VASCULAR | Age: 77
End: 2022-12-30
Payer: MEDICARE

## 2022-12-30 VITALS
SYSTOLIC BLOOD PRESSURE: 111 MMHG | HEIGHT: 73 IN | TEMPERATURE: 98.1 F | BODY MASS INDEX: 27.43 KG/M2 | HEART RATE: 87 BPM | WEIGHT: 207 LBS | RESPIRATION RATE: 18 BRPM | DIASTOLIC BLOOD PRESSURE: 76 MMHG | OXYGEN SATURATION: 89 %

## 2022-12-30 DIAGNOSIS — T82.41XA MECHANICAL BREAKDOWN OF VASCULAR DIALYSIS CATHETER, INITIAL ENCOUNTER (HCC): Primary | ICD-10-CM

## 2022-12-30 LAB
ABSOLUTE EOS #: 0.04 K/UL (ref 0–0.4)
ABSOLUTE IMMATURE GRANULOCYTE: 0 K/UL (ref 0–0.3)
ABSOLUTE LYMPH #: 0.95 K/UL (ref 1–4.8)
ABSOLUTE MONO #: 0.3 K/UL (ref 0.1–0.8)
ANION GAP SERPL CALCULATED.3IONS-SCNC: 11 MMOL/L (ref 9–17)
BASOPHILS # BLD: 1 % (ref 0–2)
BASOPHILS ABSOLUTE: 0.04 K/UL (ref 0–0.2)
BUN BLDV-MCNC: 46 MG/DL (ref 8–23)
CALCIUM SERPL-MCNC: 9.4 MG/DL (ref 8.6–10.4)
CHLORIDE BLD-SCNC: 97 MMOL/L (ref 98–107)
CO2: 26 MMOL/L (ref 20–31)
CREAT SERPL-MCNC: 8.52 MG/DL (ref 0.7–1.2)
EOSINOPHILS RELATIVE PERCENT: 1 % (ref 1–4)
GFR SERPL CREATININE-BSD FRML MDRD: 6 ML/MIN/1.73M2
GLUCOSE BLD-MCNC: 88 MG/DL (ref 70–99)
HCT VFR BLD CALC: 37.1 % (ref 40.7–50.3)
HEMOGLOBIN: 10.7 G/DL (ref 13–17)
IMMATURE GRANULOCYTES: 0 %
INR BLD: 1.2
LYMPHOCYTES # BLD: 22 % (ref 24–44)
MCH RBC QN AUTO: 25.8 PG (ref 25.2–33.5)
MCHC RBC AUTO-ENTMCNC: 28.8 G/DL (ref 28.4–34.8)
MCV RBC AUTO: 89.6 FL (ref 82.6–102.9)
MONOCYTES # BLD: 7 % (ref 1–7)
MORPHOLOGY: ABNORMAL
NRBC AUTOMATED: 0 PER 100 WBC
PARTIAL THROMBOPLASTIN TIME: 28.8 SEC (ref 20.5–30.5)
PDW BLD-RTO: 21 % (ref 11.8–14.4)
PLATELET # BLD: ABNORMAL K/UL (ref 138–453)
PLATELET, FLUORESCENCE: 163 K/UL (ref 138–453)
PLATELET, IMMATURE FRACTION: 9.4 % (ref 1.1–10.3)
POTASSIUM SERPL-SCNC: 4.5 MMOL/L (ref 3.7–5.3)
PROTHROMBIN TIME: 12.2 SEC (ref 9.1–12.3)
RBC # BLD: 4.14 M/UL (ref 4.21–5.77)
SEG NEUTROPHILS: 69 % (ref 36–66)
SEGMENTED NEUTROPHILS ABSOLUTE COUNT: 2.97 K/UL (ref 1.8–7.7)
SODIUM BLD-SCNC: 134 MMOL/L (ref 135–144)
WBC # BLD: 4.3 K/UL (ref 3.5–11.3)

## 2022-12-30 PROCEDURE — C1750 CATH, HEMODIALYSIS,LONG-TERM: HCPCS

## 2022-12-30 PROCEDURE — 6360000002 HC RX W HCPCS: Performed by: PHYSICIAN ASSISTANT

## 2022-12-30 PROCEDURE — 6360000002 HC RX W HCPCS: Performed by: EMERGENCY MEDICINE

## 2022-12-30 PROCEDURE — 77001 FLUOROGUIDE FOR VEIN DEVICE: CPT

## 2022-12-30 PROCEDURE — 96374 THER/PROPH/DIAG INJ IV PUSH: CPT

## 2022-12-30 PROCEDURE — 99284 EMERGENCY DEPT VISIT MOD MDM: CPT

## 2022-12-30 PROCEDURE — 80048 BASIC METABOLIC PNL TOTAL CA: CPT

## 2022-12-30 PROCEDURE — 85025 COMPLETE CBC W/AUTO DIFF WBC: CPT

## 2022-12-30 PROCEDURE — 85730 THROMBOPLASTIN TIME PARTIAL: CPT

## 2022-12-30 PROCEDURE — 36556 INSERT NON-TUNNEL CV CATH: CPT

## 2022-12-30 PROCEDURE — 2709999900 HC NON-CHARGEABLE SUPPLY

## 2022-12-30 PROCEDURE — 85055 RETICULATED PLATELET ASSAY: CPT

## 2022-12-30 PROCEDURE — C1769 GUIDE WIRE: HCPCS

## 2022-12-30 PROCEDURE — 85610 PROTHROMBIN TIME: CPT

## 2022-12-30 PROCEDURE — 2580000003 HC RX 258: Performed by: PHYSICIAN ASSISTANT

## 2022-12-30 PROCEDURE — 36581 REPLACE TUNNELED CV CATH: CPT

## 2022-12-30 RX ORDER — HEPARIN SODIUM (PORCINE) LOCK FLUSH IV SOLN 100 UNIT/ML 100 UNIT/ML
SOLUTION INTRAVENOUS
Status: COMPLETED | OUTPATIENT
Start: 2022-12-30 | End: 2022-12-30

## 2022-12-30 RX ORDER — HEPARIN SODIUM (PORCINE) LOCK FLUSH IV SOLN 100 UNIT/ML 100 UNIT/ML
300 SOLUTION INTRAVENOUS ONCE
Status: COMPLETED | OUTPATIENT
Start: 2022-12-30 | End: 2022-12-30

## 2022-12-30 RX ADMIN — HEPARIN 300 UNITS: 100 SYRINGE at 14:13

## 2022-12-30 RX ADMIN — HEPARIN 2300 UNITS: 100 SYRINGE at 16:56

## 2022-12-30 RX ADMIN — HEPARIN 2300 UNITS: 100 SYRINGE at 16:55

## 2022-12-30 RX ADMIN — CEFAZOLIN 2000 MG: 1 INJECTION, POWDER, FOR SOLUTION INTRAMUSCULAR; INTRAVENOUS at 16:36

## 2022-12-30 ASSESSMENT — PAIN - FUNCTIONAL ASSESSMENT: PAIN_FUNCTIONAL_ASSESSMENT: NONE - DENIES PAIN

## 2022-12-30 ASSESSMENT — ENCOUNTER SYMPTOMS
CHEST TIGHTNESS: 0
SHORTNESS OF BREATH: 0

## 2022-12-30 NOTE — BRIEF OP NOTE
Brief Postoperative Note    Latoya Hummel  YOB: 1945  [id number]    Pre-operative Diagnosis: Chronic Renal Failure/Malfunctioning HD Catheter    Post-operative Diagnosis: Same    Procedure: Tunneled HD Catheter Exchange    Anesthesia: Local 2% Lidocaine    Medications Given: none    Surgeons/Assistants: FAIZAN Rosas PA-C and Gonzales Miner MD    Estimated Blood Loss: Minimal    Complications: none    Specimens: were not obtained    Tunneled HD catheter exchanged for new 14Fr x 33 cm tip to cuff Palindrome HD catheter. Dressing applied. May use HD catheter for dialysis. Catheter locked with heparin. Vitals signs were reviewed and were stable after the procedure.       Electronically signed by FAIZAN Rosas on 12/30/2022 at 4:51 PM

## 2022-12-30 NOTE — DISCHARGE INSTRUCTIONS
Continue care for this catheter as you did your previous one. You will need to obtain dialysis tomorrow, you may come here to the emergency department for dialysis or he may go to your dialysis center if they have an open spot. Return to the emergency department if you have any worsening redness, drainage from the catheter site, any fevers or chills or nausea or vomiting. Any concerns for infectious processes or worsening infection.

## 2022-12-30 NOTE — ED NOTES
Heparin flush pulled from omnicell by Michelle ROD and handed to Dr. Sadie Murguia at this time for administration per his request.     Wes Marshall RN  12/30/22 [id number]

## 2022-12-30 NOTE — ED TRIAGE NOTES
Pt arrived to ED 46 via triage. Pt was sent by his dialysis today and they had a problem with his dialysis port. Pt denies any pain on arrival.  Pt is resting on stretcher with call light within reach. Breathing is non labored and no acute distress is noted.    Will continue to follow plan of care

## 2022-12-30 NOTE — ED NOTES
Pt taken to IV via transport   Ancef sent with patient per IR to be given down there  Fanta Vizcaino, MARCEL  12/30/22 [address] [facility]  12/30/22 0036

## 2022-12-30 NOTE — ED PROVIDER NOTES
[address] [facility]     Emergency Department     Faculty Attestation    I performed a history and physical examination of the patient and discussed management with the resident. I reviewed the residents note and agree with the documented findings including all diagnostic interpretations and plan of care. Any areas of disagreement are noted on the chart. I was personally present for the key portions of any procedures. I have documented in the chart those procedures where I was not present during the key portions. I have reviewed the emergency nurses triage note. I agree with the chief complaint, past medical history, past surgical history, allergies, medications, social and family history as documented unless otherwise noted below. Documentation of the HPI, Physical Exam and Medical Decision Making performed by scribes is based on my personal performance of the HPI, PE and MDM. For Physician Assistant/ Nurse Practitioner cases/documentation I have personally evaluated this patient and have completed at least one if not all key elements of the E/M (history, physical exam, and MDM). Additional findings are as noted. Primary Care Physician: Brandie Salmeron MD    History: This is a 76 y.o. male who presents to the Emergency Department with complaint of issues with tunneled dialysis catheter. When initiating dialysis today the glue and of the catheter came apart. This was quickly but there was concern for potential emanation results. No issues with most recent dialysis. Physical:     height is 6' 1\" (1.854 m) and weight is 207 lb (93.9 kg). His oral temperature is 98.1 °F (36.7 °C). His blood pressure is 145/86 (abnormal) and his pulse is 85.  His respiration is 18 and oxygen saturation is 94%.    76 y.o. male no acute distress, dialysis catheter shows splattering of blood on the palms, there is misaligned portion of blue catheter and that appears consistent with dislodgment or fracture a portion of the line.   No signs of infection at the insertion site no tenderness around the site    Impression: Dialysis catheter dysfunction    Plan: Discuss with nephrology, likely exchange dialysis catheter    Cary Olivera MD, Johnie Will  Attending Emergency Physician         Zack Oleary MD  12/30/22 1151

## 2022-12-30 NOTE — ED PROVIDER NOTES
[facility] ED  Emergency Department Encounter  EmergencyMedicine Resident     Pt Aleta Loaiza  MRN: [id number]  [place] 1945  Date of evaluation: 12/30/22  PCP:  Eric Diaz MD    CHIEF COMPLAINT       Chief Complaint   Patient presents with    Vascular Access Problem       HISTORY OF PRESENT ILLNESS  (Location/Symptom, Timing/Onset, Context/Setting, Quality, Duration, Modifying Factors, Severity.)      Abner Rubinstein is a 76 y.o. male who presents with dialysis catheter that separates from the past piece of tubing while he was at dialysis earlier today. Patient denies any chest pain or shortness of breath. Patient denies any issues. Patient noted to have catheter came in with syringe attached to the catheter with blood on it. Patient denies any symptoms. PAST MEDICAL / SURGICAL / SOCIAL / FAMILY HISTORY      has a past medical history of Allergic rhinitis, Anemia, BPH (benign prostatic hyperplasia), CAD (coronary artery disease), Carotid artery stenosis, Cerebrovascular disease, Diabetes mellitus ([address] Eczema, ESRD (end stage renal disease) on dialysis ([address] Full dentures, GERD (gastroesophageal reflux disease), Gout, Hemodialysis patient ([address] Hyperlipidemia, Hypertension, Neuropathy, Osteoarthritis, Peripheral vascular disease ([address] Seizures ([address] Stroke ([address] and Unspecified cerebral artery occlusion with cerebral infarction. No additional pertinent     has a past surgical history that includes Bladder surgery (Spring 2014); sinus surgery; Cystocopy (2008); Inguinal hernia repair (Right); Kidney biopsy (05/2013); Upper gastrointestinal endoscopy (01/28/2015); Colonoscopy (2008); Colonoscopy (01/28/2015); Carotid endarterectomy (2015); Abdomen surgery (2016); Dialysis fistula creation (Right, 08/25/2016); Tunneled venous port placement (Right, 10/2015); Abdomen surgery (05/26/2016); Colonoscopy (05/10/2018); Upper gastrointestinal endoscopy (N/A, 11/01/2018);  IR TUNNELED CVC PLACE WO SQ PORT/PUMP > 5 YEARS (07/26/2021); other surgical history; Fistulagram (N/A, 7/19/2022); and vascular surgery (Right, 8/2/2022). No additional pertinent    Social History     Socioeconomic History    Marital status:      Spouse name: Mary Palomino    Number of children: 1    Years of education: Not on file    Highest education level: Not on file   Occupational History    Not on file   Tobacco Use    Smoking status: Never    Smokeless tobacco: Never   Vaping Use    Vaping Use: Never used   Substance and Sexual Activity    Alcohol use: No     Comment: none    Drug use: No    Sexual activity: Never   Other Topics Concern    Not on file   Social History Narrative    ** Merged History Encounter **         ** Merged History Encounter **          Social Determinants of Health     Financial Resource Strain: Low Risk     Difficulty of Paying Living Expenses: Not hard at all   Food Insecurity: No Food Insecurity    Worried About Running Out of Food in the Last Year: Never true    Ran Out of Food in the Last Year: Never true   Transportation Needs: Not on file   Physical Activity: Not on file   Stress: Not on file   Social Connections: Not on file   Intimate Partner Violence: Not on file   Housing Stability: Not on file       Family History   Problem Relation Age of Onset    Diabetes Mother     Kidney Disease Mother         Dialysis    Cancer Mother         KIDNEY    Anesth Problems Mother         delayed awakening     Other Father         Joseph Tovar    Diabetes Sister     High Blood Pressure Sister     Cancer Brother        Allergies:  Patient has no known allergies. Home Medications:  Prior to Admission medications    Medication Sig Start Date End Date Taking?  Authorizing Provider   zoster recombinant adjuvanted vaccine [facility]) 50 MCG/0.5ML SUSR injection Inject 0.5 mLs into the muscle See Admin Instructions 1 dose now and repeat in 2-6 months 8/18/22 2/14/23  Licha Padilla MD   folic acid (FOLVITE) 1 MG tablet Take 1 mg by mouth daily    Historical Provider, MD   econazole nitrate 1 % cream Apply topically daily. 8/18/22   Criselda Hollingsworth MD   atorvastatin (LIPITOR) 40 MG tablet Take 1 tablet by mouth daily 8/18/22   Criselda Hollingsworth MD   HYDROcodone-acetaminophen [facility]) 5-325 MG per tablet  5/24/22   Historical Provider, MD   ibuprofen (ADVIL;MOTRIN) 800 MG tablet  5/24/22   Historical Provider, MD   VITAMIN D, ERGOCALCIFEROL, PO Take 3 mcg by mouth 5/13/22 5/12/23  Historical Provider, MD   Sucroferric Oxyhydroxide (VELPHORO) 500 MG CHEW Take 1 mg by mouth 5/27/22   Historical Provider, MD   sevelamer (RENVELA) 800 MG tablet Take 3 tablets by mouth 3 times daily 12/27/21   Historical Provider, MD   apixaban (ELIQUIS) 2.5 MG TABS tablet Take 1 tablet by mouth 2 times daily 12/30/21   Criselda Hollingsworth MD   metoprolol tartrate 75 MG TABS Take 75 mg by mouth 2 times daily 7/27/21   Jackelyn Severance, MD       REVIEW OF SYSTEMS    (2-9 systems for level 4, 10 or more for level 5)      Review of Systems   Constitutional:  Negative for chills and fever. Respiratory:  Negative for chest tightness and shortness of breath. Cardiovascular:  Negative for chest pain. PHYSICAL EXAM   (up to 7 for level 4, 8 or more for level 5)      INITIAL VITALS:   /76   Pulse 87   Temp 98.1 °F (36.7 °C) (Oral)   Resp 18   Ht 6' 1\" (1.854 m)   Wt 207 lb (93.9 kg)   SpO2 (!) 89%   BMI 27.31 kg/m²     Physical Exam  HENT:      Head: Normocephalic and atraumatic. Mouth/Throat:      Mouth: Mucous membranes are moist.      Pharynx: Oropharynx is clear. Cardiovascular:      Rate and Rhythm: Normal rate and regular rhythm. Pulses: Normal pulses. Pulmonary:      Effort: Pulmonary effort is normal.   Chest:      Comments: Tunneled catheter in place, blood on the catheter with syringe attached to it. Blood noted to be in the tubing. Abdominal:      General: Abdomen is flat.    Skin:     General: Skin is warm and dry. Neurological:      General: No focal deficit present. Mental Status: He is alert and oriented to person, place, and time.    Psychiatric:         Mood and Affect: Mood normal.         Behavior: Behavior normal.       DIFFERENTIAL  DIAGNOSIS     PLAN (LABS / IMAGING / EKG):  Orders Placed This Encounter   Procedures    IR REPLACE TUNNELED CVC WO SQ PORT/PUMP SAME ACCESS    Protime-INR    APTT    CBC with Auto Differential    Basic Metabolic Panel    Immature Platelet Fraction    Inpatient consult to Nephrology       MEDICATIONS ORDERED:  Orders Placed This Encounter   Medications    heparin flush 100 UNIT/ML injection 300 Units    DISCONTD: ceFAZolin (ANCEF) 2000 mg in sterile water 20 mL IV syringe     Order Specific Question:   Antimicrobial Indications     Answer:   Surgical Prophylaxis    ceFAZolin (ANCEF) 2,000 mg in dextrose 5 % 50 mL IVPB (mini-bag)    heparin flush 100 UNIT/ML injection    heparin flush 100 UNIT/ML injection       DIAGNOSTIC RESULTS / EMERGENCY DEPARTMENT COURSE / MDM   LAB RESULTS:  Results for orders placed or performed during the hospital encounter of 12/30/22   Protime-INR   Result Value Ref Range    Protime 12.2 9.1 - 12.3 sec    INR 1.2    APTT   Result Value Ref Range    PTT 28.8 20.5 - 30.5 sec   CBC with Auto Differential   Result Value Ref Range    WBC 4.3 3.5 - 11.3 k/uL    RBC 4.14 (L) 4.21 - 5.77 m/uL    Hemoglobin 10.7 (L) 13.0 - 17.0 g/dL    Hematocrit 37.1 (L) 40.7 - 50.3 %    MCV 89.6 82.6 - 102.9 fL    MCH 25.8 25.2 - 33.5 pg    MCHC 28.8 28.4 - 34.8 g/dL    RDW 21.0 (H) 11.8 - 14.4 %    Platelets See Reflexed IPF Result 138 - 453 k/uL    NRBC Automated 0.0 0.0 per 100 WBC    Immature Granulocytes 0 0 %    Seg Neutrophils 69 (H) 36 - 66 %    Lymphocytes 22 (L) 24 - 44 %    Monocytes 7 1 - 7 %    Eosinophils % 1 1 - 4 %    Basophils 1 0 - 2 %    Absolute Immature Granulocyte 0.00 0.00 - 0.30 k/uL    Segs Absolute 2.97 1.8 - 7.7 k/uL    Absolute Lymph # 0.95 (L) 1.0 - 4.8 k/uL    Absolute Mono # 0.30 0.1 - 0.8 k/uL    Absolute Eos # 0.04 0.0 - 0.4 k/uL    Basophils Absolute 0.04 0.0 - 0.2 k/uL    Morphology ANISOCYTOSIS PRESENT    Basic Metabolic Panel   Result Value Ref Range    Glucose 88 70 - 99 mg/dL    BUN 46 (H) 8 - 23 mg/dL    Creatinine 8.52 (HH) 0.70 - 1.20 mg/dL    Est, Glom Filt Rate 6 (L) >60 mL/min/1.73m2    Calcium 9.4 8.6 - 10.4 mg/dL    Sodium 134 (L) 135 - 144 mmol/L    Potassium 4.5 3.7 - 5.3 mmol/L    Chloride 97 (L) 98 - 107 mmol/L    CO2 26 20 - 31 mmol/L    Anion Gap 11 9 - 17 mmol/L   Immature Platelet Fraction   Result Value Ref Range    Platelet, Immature Fraction 9.4 1.1 - 10.3 %    Platelet, Fluorescence 163 138 - 453 k/uL       RADIOLOGY:  IR REPLACE TUNNELED CVC WO SQ PORT/PUMP SAME ACCESS   Final Result   Successful exchange of tunneled dialysis catheter. r Co-signs this chart in the absence of a cardiologist.    EMERGENCY DEPARTMENT COURSE:  ED Course as of 12/30/22 2241   Fri Dec 30, 2022   1415 Aspirated line, good blood flow, no concerns for clotting, flush line with 2.3 mL of heparin. [CR]   1239 Discussed with nurse Inocente rodas at [facility] in [facility]. Patient stated when they were setting up the tubing for dialysis the  Of the catheter came undone where the plastic pulled out the tubing. They were able to put this back together but had concerns whether it was put back together completely. They state that the plastic piece of the catheter fell onto a blue nancy, was able to put it back together without any difficulty. [CR]   8075 Discussed patient with nephrology, they recommend replacement of the catheter and IR consultation. They recommend 2 g of Ancef be given to patient for concerns of contaminated line and prophylaxis.   Patient may get dialysis tomorrow at his outpatient center or here. [CR]      ED Course User Index  [CR] [address], DO PROCEDURES:  None    CONSULTS:  IP CONSULT TO NEPHROLOGY    MEDICAL DECISION MAKING:  Patient presenting with dialysis catheter problems, neurology discussed with, they recommend changing out the catheter. Patient was also given 2 g of Ancef. Catheter was exchanged by IR. Patient no complications. Patient was evaluated after catheter exchange. Patient instructed return for any erythema or signs of infection. Patient agreeable to plan discharged home in stable condition    CRITICAL CARE:  Please see attending note    FINAL IMPRESSION      1.  Mechanical breakdown of vascular dialysis catheter, initial encounter [facility])          DISPOSITION / PLAN     DISPOSITION Decision To Discharge 12/30/2022 05:33:49 PM      PATIENT REFERRED TO:  MD Jill Platt [address] [id number]  [phone]    Schedule an appointment as soon as possible for a visit       DISCHARGE MEDICATIONS:  Discharge Medication List as of 12/30/2022  5:40 PM          Roxy Kumar DO  Emergency Medicine Resident    (Please note that portions of thisnote were completed with a voice recognition program.  Efforts were made to edit the dictations but occasionally words are mis-transcribed.)        Roxy Kumar DO  Resident  12/30/22 8565

## 2023-02-14 ENCOUNTER — OFFICE VISIT (OUTPATIENT)
Dept: INTERNAL MEDICINE | Age: 78
End: 2023-02-14
Payer: MEDICARE

## 2023-02-14 VITALS
HEART RATE: 88 BPM | BODY MASS INDEX: 28.1 KG/M2 | OXYGEN SATURATION: 95 % | HEIGHT: 73 IN | TEMPERATURE: 98.6 F | DIASTOLIC BLOOD PRESSURE: 84 MMHG | SYSTOLIC BLOOD PRESSURE: 109 MMHG | WEIGHT: 212 LBS

## 2023-02-14 DIAGNOSIS — Z99.2 ESRD ON HEMODIALYSIS (HCC): ICD-10-CM

## 2023-02-14 DIAGNOSIS — N18.6 ESRD ON HEMODIALYSIS (HCC): ICD-10-CM

## 2023-02-14 DIAGNOSIS — I87.2 VENOUS INSUFFICIENCY: ICD-10-CM

## 2023-02-14 DIAGNOSIS — R60.0 LOWER EXTREMITY EDEMA: ICD-10-CM

## 2023-02-14 DIAGNOSIS — K40.90 NON-RECURRENT UNILATERAL INGUINAL HERNIA WITHOUT OBSTRUCTION OR GANGRENE: Primary | ICD-10-CM

## 2023-02-14 PROCEDURE — 99213 OFFICE O/P EST LOW 20 MIN: CPT | Performed by: STUDENT IN AN ORGANIZED HEALTH CARE EDUCATION/TRAINING PROGRAM

## 2023-02-14 PROCEDURE — 1124F ACP DISCUSS-NO DSCNMKR DOCD: CPT | Performed by: STUDENT IN AN ORGANIZED HEALTH CARE EDUCATION/TRAINING PROGRAM

## 2023-02-14 PROCEDURE — 3074F SYST BP LT 130 MM HG: CPT | Performed by: STUDENT IN AN ORGANIZED HEALTH CARE EDUCATION/TRAINING PROGRAM

## 2023-02-14 PROCEDURE — 3078F DIAST BP <80 MM HG: CPT | Performed by: STUDENT IN AN ORGANIZED HEALTH CARE EDUCATION/TRAINING PROGRAM

## 2023-02-14 SDOH — ECONOMIC STABILITY: HOUSING INSECURITY
IN THE LAST 12 MONTHS, WAS THERE A TIME WHEN YOU DID NOT HAVE A STEADY PLACE TO SLEEP OR SLEPT IN A SHELTER (INCLUDING NOW)?: NO

## 2023-02-14 SDOH — ECONOMIC STABILITY: FOOD INSECURITY: WITHIN THE PAST 12 MONTHS, YOU WORRIED THAT YOUR FOOD WOULD RUN OUT BEFORE YOU GOT MONEY TO BUY MORE.: NEVER TRUE

## 2023-02-14 SDOH — ECONOMIC STABILITY: FOOD INSECURITY: WITHIN THE PAST 12 MONTHS, THE FOOD YOU BOUGHT JUST DIDN'T LAST AND YOU DIDN'T HAVE MONEY TO GET MORE.: NEVER TRUE

## 2023-02-14 SDOH — ECONOMIC STABILITY: INCOME INSECURITY: HOW HARD IS IT FOR YOU TO PAY FOR THE VERY BASICS LIKE FOOD, HOUSING, MEDICAL CARE, AND HEATING?: NOT HARD AT ALL

## 2023-02-14 ASSESSMENT — PATIENT HEALTH QUESTIONNAIRE - PHQ9
SUM OF ALL RESPONSES TO PHQ9 QUESTIONS 1 & 2: 0
1. LITTLE INTEREST OR PLEASURE IN DOING THINGS: 0
SUM OF ALL RESPONSES TO PHQ QUESTIONS 1-9: 0
2. FEELING DOWN, DEPRESSED OR HOPELESS: 0
SUM OF ALL RESPONSES TO PHQ QUESTIONS 1-9: 0

## 2023-02-14 NOTE — PATIENT INSTRUCTIONS
A printed script for Durable Medical Equipment was ordered for the patient. The script was faxed to [facility] RADHA Taylor, [address] [place] [address]  P: [phone] F: [phone] per the patients request.    Return To Clinic 5/4/2023. After Visit Summary  given and reviewed. --    It is very important for your care that you keep your appointment. If for some reason you are unable to keep your appointment it is equally important that you call our office at [phone] to cancel your appointment and reschedule. Failure to do so may result in your termination from our practice.

## 2023-02-14 NOTE — PROGRESS NOTES
[facility]    Progress Note    Date of patient's visit: 2/14/2023    Patient's Name:  Felipe Ag    YOB: 1945            Patient Care Team:  Jose Hannon MD as PCP - Minna Flor MD as PCP - Empaneled Provider  Wendi Ayala MD as Consulting Physician (Urology)  Jake Ceja MD as Consulting Physician (Gastroenterology)  Jose Hannon MD (Internal Medicine)  [address] [facility] (Dialysis)  Tamela Kelsey MD as Surgeon (General Surgery)    REASON FOR VISIT: Hernia    Chief Complaint   Patient presents with    Hernia     Hernia has increased in pain and size discuss referral for surgery     HISTORY OF PRESENT ILLNESS:    History was obtained from the patient. Felipe Ag is a 76 y.o. is here for same day visit for left inguinal hernia. Pt states he first noticed a bulge in his left groin ~ 2 weeks ago when bearing down/coughing. Only visible with theses activities. Has some associated discomfort in the region. Denies fever, chills, N/V, diarrhea, or constipation. States he had a remote h/o right sided inguinal hernia that was repaired a long time ago (in his young teens). Pt also reporting b/l YOMI that has been present for a long time. Swelling is equal in both legs and worse at the end of the day. Denies any recent worsening. ESRD on MWF dialysis. Pt is anuric. Denies orthopnea, PND. Unable to state whether he gets SOB with exertion as he is very slow and difficult with ambulation due to severe b/l knee OA.     Past Medical History:   Diagnosis Date    Allergic rhinitis     Anemia     BPH (benign prostatic hyperplasia)     CAD (coronary artery disease)     Carotid artery stenosis 2013    ulcerated plaque in left ICA 60 - 70% on carotid angiogram    Cerebrovascular disease 2013    left frontal hemorrhage    Diabetes mellitus ([address] 2013    Eczema     ESRD (end stage renal disease) on dialysis ([facility]     Full dentures Upper / Lower    GERD (gastroesophageal reflux disease)     Gout     Hemod[facility] patient ([facility] 10/2015    [facility] [address] [address]  M,W F    Hyperlipidemia     Hypertension     Neuropathy     Osteoarthritis     Peripheral vascular disease ([facility]     Seizures ([facility] 2013    after stroke, LAST [id number]/20/2016    Stroke [facility]) 2013    Unspecified cerebral artery occlusion with cerebral infarction 4-    speech short term and lt side     Past Surgical History:   Procedure Laterality Date    ABDOMEN SURGERY  2016    PERITONEAL CATHETER    ABDOMEN SURGERY  05/26/2016    pd catherter removed from abdomen    BLADDER SURGERY  Spring 2014    dilatated     CAROTID ENDARTERECTOMY  2015    left    COLONOSCOPY  2008    polypectomy, diverticulosis    COLONOSCOPY  01/28/2015    COLONOSCOPY  05/10/2018    COLONOSCOPY performed by Keyla King MD at [facility]    CYSTOSCOPY  2008    bladder tumor removal    DIALYSIS FISTULA CREATION Right 08/25/2016    FISTULAGRAM N/A 7/19/2022    CENTRAL VENOGRAM performed by Ephraim Cisneros MD at [facility] Right     IR TUNNELED [address] 5 YEARS  07/26/2021    IR TUNNELED [address] 5 YEARS 7/26/2021 [facility] PROCEDURES    KIDNEY BIOPSY  05/2013    OTHER SURGICAL HISTORY      central venogram    SINUS SURGERY      TUNNELED VENOUS PORT PLACEMENT Right 10/2015    dialysis catheter- chest     UPPER GASTROINTESTINAL ENDOSCOPY  01/28/2015    UPPER GASTROINTESTINAL ENDOSCOPY N/A 11/01/2018    EGD BIOPSY performed by Faith Fabian DO at [address] Right 8/2/2022    UPPER EXTREMITY AV FISTULA LIGATION performed by Ephraim Cisneros MD at [facility]    No Known Allergies      MEDICATIONS:      Current Outpatient Medications on File Prior to Visit   Medication Sig Dispense Refill    atorvastatin (LIPITOR) 40 MG tablet Take 1 tablet by mouth daily 30 tablet 3    ibuprofen (ADVIL;MOTRIN) 800 MG tablet       folic acid (FOLVITE) 1 MG tablet Take 1 mg by mouth daily (Patient not taking: Reported on 2/14/2023)      econazole nitrate 1 % cream Apply topically daily. (Patient not taking: Reported on 2/14/2023) 1 g 0    HYDROcodone-acetaminophen (NORCO) 5-325 MG per tablet  (Patient not taking: Reported on 2/14/2023)      VITAMIN D, ERGOCALCIFEROL, PO Take 3 mcg by mouth      Sucroferric Oxyhydroxide (VELPHORO) 500 MG CHEW Take 1 mg by mouth (Patient not taking: Reported on 2/14/2023)      sevelamer (RENVELA) 800 MG tablet Take 3 tablets by mouth 3 times daily (Patient not taking: Reported on 2/14/2023)      apixaban (ELIQUIS) 2.5 MG TABS tablet Take 1 tablet by mouth 2 times daily (Patient not taking: Reported on 2/14/2023) 60 tablet 3     No current facility-administered medications on file prior to visit. HISTORY    Reviewed and no change from previous record. Jacqueline Arora  reports that he has never smoked. He has never used smokeless tobacco.    FAMILY HISTORY:    Reviewed and No change from previous visit    HEALTH MAINTENANCE DUE:      Health Maintenance Due   Topic Date Due    DTaP/Tdap/Td vaccine (1 - Tdap) Never done    Shingles vaccine (1 of 2) Never done    Hepatitis B vaccine (3 of 5 - Risk Dialysis 4-dose series) 06/24/2016    Lipids  04/26/2019    COVID-19 Vaccine (4 - Booster for Pfizer series) 11/27/2021    Annual Wellness Visit (AWV)  01/05/2023     REVIEW OF SYSTEMS:    ROS as noted in the HPI    PHYSICAL EXAM:     Vitals:    02/14/23 0835 02/14/23 0839   BP: (!) 128/53 109/84   Site: Right Upper Arm Left Upper Arm   Position: Sitting Sitting   Cuff Size: Medium Adult Medium Adult   Pulse: 88    Temp: 98.6 °F (37 °C)    SpO2: 95%    Weight: 212 lb (96.2 kg)    Height: 6' 1\" (1.854 m)      Body mass index is 27.97 kg/m².     BP Readings from Last 3 Encounters:   02/14/23 109/84   12/30/22 111/76   11/10/22 121/67      Wt Readings from Last 3 Encounters:   02/14/23 212 lb (96.2 kg)   12/30/22 207 lb (93.9 kg)   11/10/22 210 lb (95.3 kg)     Physical Exam  GEN: chronically ill appearing male, in NAD  Respiratory:  Normal breath sounds, No respiratory distress, No wheezing or crackles  Cardiovascular:  Murmur (3/6 ESM all over chest). Otherwise normal heart rate, normal rhythm. Ab/:  Visible/palpable bulge in left inguinal region upon valsavla only. Otherwise no visible bulge, mass or abnormality of the scrotum/external genitalia. Ext:1+ b/l pitting edema to mid shin with skin changes c/w venous insufficiency/stasis    ASSESSMENT AND PLAN:    Felipe Ag is a 76 y.o. is here for same day visit for left inguinal hernia. #Non-recurrent unilateral inguinal hernia without obstruction or gangrene  Exam c/w direct inguinal hernia on left. Discussed with patient that he likely is not a good surgical candidate with his overall co morbidities. However, he requested referral to discuss with surgeon. Educated that although hernia may cause some discomfort it is very low risk for incarceration or strangulation which would be a medical emergency. Reviewed s/s that would warrant urgent/emergent eval including bulging that does not reduce, severe pain, recurrent N/V, or lack of passing stool or gas. - Referral to [address] [place] [address] Rd    #Lower extremity edema  #ESRD on hemodialysis [facility])  Chronic b/l 1+ YOMI likely 2/2 hypervolemia from anuria/ESRD worsened by low protein state and venous insufficiency. Educated pt on keeping legs elevated as much as able & use of compression stockings. - Compression Stocking    FOLLOW UP AND INSTRUCTIONS:   Return for As already scheduled with Dr. Samuel Ramos.     Rossana Bañuelos MD  Attending Physician, [address] [place] [address], Internal Medicine Residency Program  [facility] ([facility]) Physician - [facility]  2/14/2023, 9:14 AM

## 2023-02-22 ENCOUNTER — OFFICE VISIT (OUTPATIENT)
Dept: SURGERY | Age: 78
End: 2023-02-22

## 2023-02-22 VITALS
SYSTOLIC BLOOD PRESSURE: 138 MMHG | OXYGEN SATURATION: 94 % | BODY MASS INDEX: 28.65 KG/M2 | HEIGHT: 73 IN | DIASTOLIC BLOOD PRESSURE: 83 MMHG | HEART RATE: 85 BPM | WEIGHT: 216.2 LBS | TEMPERATURE: 97.9 F

## 2023-02-22 DIAGNOSIS — K40.90 LEFT INGUINAL HERNIA: Primary | ICD-10-CM

## 2023-02-22 NOTE — PROGRESS NOTES
History and Physical  [facility]    Patient's Name/Date of Birth: Merrill Conner / 1945 (59 y.o.)    Date: February 22, 2023     HPI: Pt is a 76 y.o. male  with several comorbidites including ESRD on dialysis, CVD, and severe b/l knee arthritis who presents to [facility] for a L inguinal hernia. The patient was seen by Dr. Jaun Vargas for the same complain on 2/14 and was referred to the [facility]. He states the hernia only occurs when he coughs, or lifts something. He states it reproduces itself causes discomfort and radiates into his ipsilateral scrotum. He denies pain, nausea, vomiting, or enlargement of the hernia.        Past Medical History:   Diagnosis Date    Allergic rhinitis     Anemia     BPH (benign prostatic hyperplasia)     CAD (coronary artery disease)     Carotid artery stenosis 2013    ulcerated plaque in left ICA 60 - 70% on carotid angiogram    Cerebrovascular disease 2013    left frontal hemorrhage    Diabetes mellitus ([address] 2013    Eczema     ESRD (end stage renal disease) on dialysis ([address]     Full dentures     Upper / Lower    GERD (gastroesophageal reflux disease)     Gout     Hemodialysis patient ([address] 10/2015    US [address] [address]  M,W F    Hyperlipidemia     Hypertension     Neuropathy     Osteoarthritis     Peripheral vascular disease ([address]     Seizures ([address] 2013    after stroke, LAST [id number]/20/2016    Stroke [facility]) 2013    Unspecified cerebral artery occlusion with cerebral infarction 4-    speech short term and lt side       Past Surgical History:   Procedure Laterality Date    ABDOMEN SURGERY  2016    PERITONEAL CATHETER    ABDOMEN SURGERY  05/26/2016    pd catherter removed from abdomen    BLADDER SURGERY  Spring 2014    dilatated     CAROTID ENDARTERECTOMY  2015    left    COLONOSCOPY  2008    polypectomy, diverticulosis    COLONOSCOPY  01/28/2015    COLONOSCOPY  05/10/2018    COLONOSCOPY performed by Oliverio Aldana MD at [facility] [address] 2008    bladder tumor removal    DIALYSIS FISTULA CREATION Right 08/25/2016    FISTULAGRAM N/A 7/19/2022    CENTRAL VENOGRAM performed by Andrew Conner MD at [address] Right     IR TUNNELED CATHETER PLACEMENT GREATER THAN 5 YEARS  07/26/2021    IR TUNNELED CATHETER PLACEMENT GREATER THAN 5 YEARS 7/26/2021 [facility] SPECIAL PROCEDURES    KIDNEY BIOPSY  05/2013    OTHER SURGICAL HISTORY      central venogram    SINUS SURGERY      TUNNELED VENOUS PORT PLACEMENT Right 10/2015    dialysis catheter- chest     UPPER GASTROINTESTINAL ENDOSCOPY  01/28/2015    UPPER GASTROINTESTINAL ENDOSCOPY N/A 11/01/2018    EGD BIOPSY performed by Jetta Krabbe, DO at [facility]    VASCULAR SURGERY Right 8/2/2022    UPPER EXTREMITY AV FISTULA LIGATION performed by Andrew Conner MD at [facility]       Current Outpatient Medications   Medication Sig Dispense Refill    atorvastatin (LIPITOR) 40 MG tablet Take 1 tablet by mouth daily 30 tablet 3    ibuprofen (ADVIL;MOTRIN) 800 MG tablet       VITAMIN D, ERGOCALCIFEROL, PO Take 3 mcg by mouth      folic acid (FOLVITE) 1 MG tablet Take 1 mg by mouth daily (Patient not taking: No sig reported)      econazole nitrate 1 % cream Apply topically daily. (Patient not taking: No sig reported) 1 g 0    HYDROcodone-acetaminophen (NORCO) 5-325 MG per tablet  (Patient not taking: No sig reported)      Sucroferric Oxyhydroxide (VELPHORO) 500 MG CHEW Take 1 mg by mouth (Patient not taking: No sig reported)      sevelamer (RENVELA) 800 MG tablet Take 3 tablets by mouth 3 times daily (Patient not taking: No sig reported)      apixaban (ELIQUIS) 2.5 MG TABS tablet Take 1 tablet by mouth 2 times daily (Patient not taking: No sig reported) 60 tablet 3     No current facility-administered medications for this visit.        No Known Allergies    Family History   Problem Relation Age of Onset    Diabetes Mother     Kidney Disease Mother         Dialysis    Cancer Mother KIDNEY    Anesth Problems Mother         delayed awakening     Other Father         Paulino Goltz    Diabetes Sister     High Blood Pressure Sister     Cancer Brother        Social History     Socioeconomic History    Marital status:      Spouse name: Denny    Number of children: 1    Years of education: Not on file    Highest education level: Not on file   Occupational History    Not on file   Tobacco Use    Smoking status: Never    Smokeless tobacco: Never   Vaping Use    Vaping Use: Never used   Substance and Sexual Activity    Alcohol use: No     Comment: none    Drug use: No    Sexual activity: Never   Other Topics Concern    Not on file   Social History Narrative    ** Merged History Encounter **         ** Merged History Encounter **          Social Determinants of Health     Financial Resource Strain: Low Risk     Difficulty of Paying Living Expenses: Not hard at all   Food Insecurity: No Food Insecurity    Worried About [address] in the Last Year: Never true    [address] in the Last Year: Never true   Transportation Needs: Unknown    Lack of Transportation (Medical): Not on file    Lack of Transportation (Non-Medical): No   Physical Activity: Not on file   Stress: Not on file   Social Connections: Not on file   Intimate Partner Violence: Not on file   Housing Stability: Unknown    Unable to Pay for Housing in the Last Year: Not on file    Number of Places Lived in the Last Year: Not on file    Unstable Housing in the Last Year: No       ROS:   GEN: Denies recent weight loss, fatigue, fevers, chills. HEENT: No rhinorrhea, dysphagia, odynphagia. CV: No history of MI, recent chest pain. RESP: Denies shortness of breath, COPD, asthma. GI: PER HPI  : Denies increased frequency or dysuria. HEM[de-identified] Denies history of anemia or DVTs. ENDO: Denies history of thyroid problems or diabetes. NEURO: Denies history of CVA, TIA.     Physical Exam:  Vitals:    02/22/23 0910   BP: 138/83 Pulse: 85   Temp: 97.9 °F (36.6 °C)   SpO2: 94%     General:A & O x3  Abdomen: soft, nontender, no HSM, no guarding, no rebound. L inguinal hernia present on palpation when patient coughed. Assessment     76year old male with history of ESRD, CVD, and b/l knee DJD who presents with L inguinal hernia    Plan   Get risk stratification from cardiologist and primary care physician  Patient would benefit from repair of his left inguinal hernia but need to weigh the risk vs benefit  Will see patient again after he is evaluated for risk stratification. Discussed with Dr. Milvia Gamez DO  2/22/2023     Attending Physician Statement  I have discussed the case with Dr Barbara Flores, including pertinent history and exam findings with the resident. I have seen and examined the patient and the key elements of the encounter have been performed by me. I agree with the assessment, plan and orders as documented by the resident.       Electronically signed by Dave Rosa IV, DO  on 3/1/2023 at 10:04 AM

## 2023-02-22 NOTE — PROGRESS NOTES
History and Physical  [facility]    Patient's Name/Date of Birth: Kevin Bright / 1945 (89 y.o.)    Date: February 22, 2023     HPI: Pt is a 76 y.o. male  with several comorbidites including ESRD on dialysis, CVD, and severe b/l knee arthritis who presents to [facility] for a L inguinal hernia. The patient was seen by Dr. Kellie Alcaraz for the same complain on 2/14 and was referred to the [facility] clinic. He states the hernia only occurs when he coughs, or lifts something. He states it reproduces itself causes discomfort and radiates into his ipsilateral scrotum. He denies pain, nausea, vomiting, or enlargement of the hernia.        Past Medical History:   Diagnosis Date    Allergic rhinitis     Anemia     BPH (benign prostatic hyperplasia)     CAD (coronary artery disease)     Carotid artery stenosis 2013    ulcerated plaque in left ICA 60 - 70% on carotid angiogram    Cerebrovascular disease 2013    left frontal hemorrhage    Diabetes mellitus ([address] 2013    Eczema     ESRD (end stage renal disease) on dialysis ([address]     Full dentures     Upper / Lower    GERD (gastroesophageal reflux disease)     Gout     Hemodialysis patient ([address] 10/2015    US [address] [address]  M,W F    Hyperlipidemia     Hypertension     Neuropathy     Osteoarthritis     Peripheral vascular disease ([address]     Seizures ([address] 2013    after stroke, LAST [id number]/20/2016    Stroke [facility]) 2013    Unspecified cerebral artery occlusion with cerebral infarction 4-    speech short term and lt side       Past Surgical History:   Procedure Laterality Date    ABDOMEN SURGERY  2016    PERITONEAL CATHETER    ABDOMEN SURGERY  05/26/2016    pd catherter removed from abdomen    BLADDER SURGERY  Spring 2014    dilatated     CAROTID ENDARTERECTOMY  2015    left    COLONOSCOPY  2008    polypectomy, diverticulosis    COLONOSCOPY  01/28/2015    COLONOSCOPY  05/10/2018    COLON[facility]ese Saravia MD [facility] [address] 2008    bladder tumor removal    DIALYSIS FISTULA CREATION Right 08/25/2016    FISTULAGRAM N/A 7/19/2022    CENTRAL VENOGRAM performed by Pipo Paula MD at [address] Right     IR TUNNELED CATHETER PLACEMENT GREATER THAN 5 YEARS  07/26/2021    IR TUNNELED CATHETER PLACEMENT GREATER THAN 5 YEARS 7/26/2021 [facility] SPECIAL PROCEDURES    KIDNEY BIOPSY  05/2013    OTHER SURGICAL HISTORY      central venogram    SINUS SURGERY      TUNNELED VENOUS PORT PLACEMENT Right 10/2015    dialysis catheter- chest     UPPER GASTROINTESTINAL ENDOSCOPY  01/28/2015    UPPER GASTROINTESTINAL ENDOSCOPY N/A 11/01/2018    EGD BIOPSY performed by Jayy Escalante DO at [facility]    VASCULAR SURGERY Right 8/2/2022    UPPER EXTREMITY AV FISTULA LIGATION performed by Pipo Paula MD at [address] [facility]       Current Outpatient Medications   Medication Sig Dispense Refill    folic acid (FOLVITE) 1 MG tablet Take 1 mg by mouth daily (Patient not taking: Reported on 2/14/2023)      econazole nitrate 1 % cream Apply topically daily. (Patient not taking: Reported on 2/14/2023) 1 g 0    atorvastatin (LIPITOR) 40 MG tablet Take 1 tablet by mouth daily 30 tablet 3    HYDROcodone-acetaminophen (NORCO) 5-325 MG per tablet  (Patient not taking: Reported on 2/14/2023)      ibuprofen (ADVIL;MOTRIN) 800 MG tablet       VITAMIN D, ERGOCALCIFEROL, PO Take 3 mcg by mouth      Sucroferric Oxyhydroxide (VELPHORO) 500 MG CHEW Take 1 mg by mouth (Patient not taking: Reported on 2/14/2023)      sevelamer (RENVELA) 800 MG tablet Take 3 tablets by mouth 3 times daily (Patient not taking: Reported on 2/14/2023)      apixaban (ELIQUIS) 2.5 MG TABS tablet Take 1 tablet by mouth 2 times daily (Patient not taking: Reported on 2/14/2023) 60 tablet 3     No current facility-administered medications for this visit.        No Known Allergies    Family History   Problem Relation Age of Onset    Diabetes Mother     Kidney Disease Mother Dialysis    Cancer Mother         KIDNEY    Anesth Problems Mother         delayed awakening     Other Father         [place]    Diabetes Sister     High Blood Pressure Sister     Cancer Brother        Social History     Socioeconomic History    Marital status:      Spouse name: Srinivasan Archer    Number of children: 1    Years of education: Not on file    Highest education level: Not on file   Occupational History    Not on file   Tobacco Use    Smoking status: Never    Smokeless tobacco: Never   Vaping Use    Vaping Use: Never used   Substance and Sexual Activity    Alcohol use: No     Comment: none    Drug use: No    Sexual activity: Never   Other Topics Concern    Not on file   Social History Narrative    ** Merged History Encounter **         ** Merged History Encounter **          Social Determinants of Health     Financial Resource Strain: Low Risk     Difficulty of Paying Living Expenses: Not hard at all   Food Insecurity: No Food Insecurity    Worried About [address] in the Last Year: Never true    [address] in the Last Year: Never true   Transportation Needs: Unknown    Lack of Transportation (Medical): Not on file    Lack of Transportation (Non-Medical): No   Physical Activity: Not on file   Stress: Not on file   Social Connections: Not on file   Intimate Partner Violence: Not on file   Housing Stability: Unknown    Unable to Pay for Housing in the Last Year: Not on file    Number of Places Lived in the Last Year: Not on file    Unstable Housing in the Last Year: No       ROS:   GEN: Denies recent weight loss, fatigue, fevers, chills. HEENT: No rhinorrhea, dysphagia, odynphagia. CV: No history of MI, recent chest pain. RESP: Denies shortness of breath, COPD, asthma. GI: PER HPI  : Denies increased frequency or dysuria. HEM[de-identified] Denies history of anemia or DVTs. ENDO: Denies history of thyroid problems or diabetes. NEURO: Denies history of CVA, TIA.     Physical Exam:  There were no vitals filed for this visit. General:A & O x3  Abdomen: soft, nontender, no HSM, no guarding, no rebound. L inguinal hernia present on palpation when patient coughed.       Assessment     76year old male with history of ESRD, CVD, and b/l knee DJD who presents with L inguinal hernia    Plan   Get medical clearance from cardiologist and primary care physician    eDniz Rivera 42 Student OMS III  2/22/2023

## 2023-02-22 NOTE — PATIENT INSTRUCTIONS
Thank you letting us take care of you today. We hope that all your questions were addressed. If a question was overlooked or something else comes to mind after you return home, please call our office at [phone]. If you need to cancel or change an appointment, surgery or procedure, please contact the office at [phone].

## 2023-02-22 NOTE — PROGRESS NOTES
Visit Information    Have you changed or started any medications since your last visit including any over-the-counter medicines, vitamins, or herbal medicines? no   Have you stopped taking any of your medications? Is so, why? -  no  Are you having any side effects from any of your medications? - no    Have you seen any other physician or provider since your last visit?  no   Have you had any other diagnostic tests since your last visit?  no   Have you been seen in the emergency room and/or had an admission in a hospital since we last saw you?  no   Have you had your routine dental cleaning in the past 6 months?  no     Do you have an active MyChart account? If no, what is the barrier?   No: pending    Patient Care Team:  Ruchi Tariq MD as PCP - General  Ruchi Tariq MD as PCP - Empaneled Provider  Virgen Mcghee MD as Consulting Physician (Urology)  Moriah Castellanos MD as Consulting Physician (Gastroenterology)  Ruchi Tariq MD (Internal Medicine)  [address] [address] Dialysis (Dialysis)  Silvio Sweeney MD as Surgeon (General Surgery)    Medical History Review  Past Medical, Family, and Social History reviewed and does not contribute to the patient presenting condition    Health Maintenance   Topic Date Due    DTaP/Tdap/Td vaccine (1 - Tdap) Never done    Hepatitis B vaccine (3 of 5 - Risk Dialysis 4-dose series) 06/24/2016    Lipids  04/26/2019    COVID-19 Vaccine (4 - Booster for Aguirre Peter series) 11/27/2021    Annual Wellness Visit (AWV)  01/05/2023    Shingles vaccine (2 of 2) 04/11/2023    Depression Screen  02/14/2024    Flu vaccine  Completed    Pneumococcal 65+ years Vaccine  Completed    Hepatitis C screen  Completed    Hepatitis A vaccine  Aged Out    Hib vaccine  Aged Out    Meningococcal (ACWY) vaccine  Aged Out

## 2023-05-04 ENCOUNTER — OFFICE VISIT (OUTPATIENT)
Dept: INTERNAL MEDICINE | Age: 78
End: 2023-05-04
Payer: MEDICARE

## 2023-05-04 VITALS
OXYGEN SATURATION: 92 % | WEIGHT: 211 LBS | DIASTOLIC BLOOD PRESSURE: 70 MMHG | HEART RATE: 90 BPM | BODY MASS INDEX: 27.96 KG/M2 | SYSTOLIC BLOOD PRESSURE: 113 MMHG | TEMPERATURE: 98.1 F | HEIGHT: 73 IN

## 2023-05-04 DIAGNOSIS — N18.6 ESRD ON HEMODIALYSIS (HCC): ICD-10-CM

## 2023-05-04 DIAGNOSIS — K40.90 LEFT INGUINAL HERNIA: ICD-10-CM

## 2023-05-04 DIAGNOSIS — N25.81 SECONDARY HYPERPARATHYROIDISM OF RENAL ORIGIN (HCC): ICD-10-CM

## 2023-05-04 DIAGNOSIS — Z99.2 ESRD ON HEMODIALYSIS (HCC): ICD-10-CM

## 2023-05-04 DIAGNOSIS — G40.909 SEIZURE DISORDER AS SEQUELA OF CEREBROVASCULAR ACCIDENT (HCC): ICD-10-CM

## 2023-05-04 DIAGNOSIS — I35.0 AORTIC VALVE STENOSIS, ETIOLOGY OF CARDIAC VALVE DISEASE UNSPECIFIED: ICD-10-CM

## 2023-05-04 DIAGNOSIS — I10 ESSENTIAL HYPERTENSION: Primary | ICD-10-CM

## 2023-05-04 DIAGNOSIS — I25.10 CORONARY ARTERY DISEASE INVOLVING NATIVE CORONARY ARTERY OF NATIVE HEART WITHOUT ANGINA PECTORIS: ICD-10-CM

## 2023-05-04 DIAGNOSIS — Z23 NEED FOR HEPATITIS B VACCINATION: ICD-10-CM

## 2023-05-04 DIAGNOSIS — I69.398 SEIZURE DISORDER AS SEQUELA OF CEREBROVASCULAR ACCIDENT (HCC): ICD-10-CM

## 2023-05-04 DIAGNOSIS — Z23 NEED FOR TDAP VACCINATION: ICD-10-CM

## 2023-05-04 DIAGNOSIS — I48.92 ATRIAL FLUTTER, UNSPECIFIED TYPE (HCC): ICD-10-CM

## 2023-05-04 PROBLEM — D68.9 COAGULATION DEFECT, UNSPECIFIED (HCC): Status: RESOLVED | Noted: 2019-11-13 | Resolved: 2023-05-04

## 2023-05-04 PROBLEM — I20.9 ANGINA PECTORIS, UNSPECIFIED (HCC): Status: RESOLVED | Noted: 2018-06-22 | Resolved: 2023-05-04

## 2023-05-04 PROCEDURE — 90715 TDAP VACCINE 7 YRS/> IM: CPT | Performed by: INTERNAL MEDICINE

## 2023-05-04 PROCEDURE — 90744 HEPB VACC 3 DOSE PED/ADOL IM: CPT | Performed by: INTERNAL MEDICINE

## 2023-05-04 PROCEDURE — 3078F DIAST BP <80 MM HG: CPT | Performed by: INTERNAL MEDICINE

## 2023-05-04 PROCEDURE — 3074F SYST BP LT 130 MM HG: CPT | Performed by: INTERNAL MEDICINE

## 2023-05-04 PROCEDURE — 99214 OFFICE O/P EST MOD 30 MIN: CPT | Performed by: INTERNAL MEDICINE

## 2023-05-04 PROCEDURE — 1124F ACP DISCUSS-NO DSCNMKR DOCD: CPT | Performed by: INTERNAL MEDICINE

## 2023-05-04 RX ORDER — FOLIC ACID 1 MG/1
1 TABLET ORAL DAILY
Qty: 30 TABLET | Refills: 3 | Status: SHIPPED | OUTPATIENT
Start: 2023-05-04

## 2023-05-04 RX ORDER — SENNA AND DOCUSATE SODIUM 50; 8.6 MG/1; MG/1
1 TABLET, FILM COATED ORAL DAILY
Qty: 30 TABLET | Refills: 1 | Status: SHIPPED | OUTPATIENT
Start: 2023-05-04

## 2023-05-04 RX ORDER — ATORVASTATIN CALCIUM 40 MG/1
40 TABLET, FILM COATED ORAL DAILY
Qty: 30 TABLET | Refills: 3 | Status: SHIPPED | OUTPATIENT
Start: 2023-05-04

## 2023-05-04 RX ORDER — DOCUSATE SODIUM 100 MG/1
100 CAPSULE, LIQUID FILLED ORAL 2 TIMES DAILY PRN
Qty: 60 CAPSULE | Refills: 0 | Status: SHIPPED | OUTPATIENT
Start: 2023-05-04

## 2023-05-04 RX ORDER — SEVELAMER CARBONATE 800 MG/1
3 TABLET, FILM COATED ORAL 3 TIMES DAILY
Qty: 90 TABLET | Refills: 3 | Status: SHIPPED | OUTPATIENT
Start: 2023-05-04

## 2023-05-04 ASSESSMENT — ENCOUNTER SYMPTOMS
PHOTOPHOBIA: 0
CONSTIPATION: 1
RHINORRHEA: 1
BACK PAIN: 1
COUGH: 1
WHEEZING: 0
SHORTNESS OF BREATH: 0
ABDOMINAL PAIN: 0
SINUS PRESSURE: 0
NAUSEA: 0
BLOOD IN STOOL: 0

## 2023-05-04 NOTE — PROGRESS NOTES
LABURIN  ASSESSMENT AND PLAN:    1. Essential hypertension  Resolved. Not on meds     2. Left inguinal hernia  To ER if pain or symptoms of obstruction which was explained    - AFL - Samir Tadeo MD, General Surgery, [place]    3. Aortic valve stenosis, etiology of cardiac valve disease unspecified    - ECHO 2D WO Color Doppler Complete; Future  - ECHO 2D WO Color Doppler Complete; Future  - AFL (Epic) - Rosa Jnae MD, Cardiology, [place]    4. Atrial flutter, unspecified type ([facility]  In NSR now    - ECHO 2D WO Color Doppler Complete; Future  - ECHO 2D WO Color Doppler Complete; Future  - AFL (Epic) - Rosa Jane MD, Cardiology, [place]    5. Seizure disorder as sequela of cerebrovascular accident [facility])  Not on meds  Does not drive     6. ESRD on hemodialysis (HCC)    - folic acid (FOLVITE) 1 MG tablet; Take 1 tablet by mouth daily  Dispense: 30 tablet; Refill: 3    7. Coronary artery disease involving native coronary artery of native heart without angina pectoris    - atorvastatin (LIPITOR) 40 MG tablet; Take 1 tablet by mouth daily  Dispense: 30 tablet; Refill: 3  - ECHO 2D WO Color Doppler Complete; Future  - AFL (Epic) - Rsoa Jane MD, Cardiology, [place]    8. Secondary hyperparathyroidism of renal origin ([facility]    - sevelamer (RENVELA) 800 MG tablet; Take 3 tablets by mouth 3 times daily  Dispense: 90 tablet; Refill: 3    9. Need for Tdap vaccination    - [place] IM ADM PRQ ID SUBQ/IM NJXS EA VACCINE  - Tdap, BOOSTRIX, (age 8 yrs+), IM    10. Need for hepatitis B vaccination  - Hep B, ENGERIX-B, (age birth-19 yrs), IM, 0.5mL 3-dose  - [place] IM ADM PRQ ID SUBQ/IM NJXS 1 VACCINE          FOLLOW UP AND INSTRUCTIONS:   Return in about 6 months (around 11/4/2023). Ivett Araya received counseling on the following healthy behaviors: nutrition, exercise, and medication adherence    Reviewed prior labs and health maintenance. Discussed use, benefit, and side effects of prescribed medications.

## 2023-05-04 NOTE — PATIENT INSTRUCTIONS
6 mon f/u   Labs   Referral Cardiology appt 10.45am 06/23/23  Clearance from general surgery needed  -Your doctor has ordered a Echo Doppler  for you, please contact our scheduling department at [phone] to set up an appointment to have this completed before your next visit.     General surgery appt scheduled 05/18/23 9.00  Daughter Gordon Lange informed of appt scheduled

## 2023-06-23 PROBLEM — I35.0 AORTIC VALVE STENOSIS: Status: ACTIVE | Noted: 2023-06-23

## 2023-06-23 PROBLEM — I48.0 PAROXYSMAL ATRIAL FIBRILLATION (HCC): Status: ACTIVE | Noted: 2023-06-23

## 2023-06-23 PROBLEM — R94.31 ABNORMAL ECG: Status: ACTIVE | Noted: 2023-06-23

## 2023-06-23 PROBLEM — Z01.810 PREPROCEDURAL CARDIOVASCULAR EXAMINATION: Status: ACTIVE | Noted: 2023-06-23

## 2023-06-23 PROBLEM — I25.10 ATHEROSCLEROSIS OF NATIVE CORONARY ARTERY OF NATIVE HEART WITHOUT ANGINA PECTORIS: Status: ACTIVE | Noted: 2023-06-23

## 2023-06-23 PROBLEM — R06.02 SHORTNESS OF BREATH: Status: ACTIVE | Noted: 2023-06-23

## 2023-06-23 PROBLEM — R06.09 DYSPNEA ON EXERTION: Status: ACTIVE | Noted: 2023-06-23

## 2023-07-19 ENCOUNTER — APPOINTMENT (OUTPATIENT)
Dept: GENERAL RADIOLOGY | Age: 78
DRG: 286 | End: 2023-07-19
Payer: MEDICARE

## 2023-07-19 ENCOUNTER — HOSPITAL ENCOUNTER (INPATIENT)
Age: 78
LOS: 27 days | Discharge: HOME HEALTH | DRG: 286 | End: 2023-08-15
Attending: EMERGENCY MEDICINE | Admitting: INTERNAL MEDICINE
Payer: MEDICARE

## 2023-07-19 ENCOUNTER — APPOINTMENT (OUTPATIENT)
Dept: CT IMAGING | Age: 78
DRG: 286 | End: 2023-07-19
Payer: MEDICARE

## 2023-07-19 DIAGNOSIS — I25.5 CARDIOMYOPATHY, ISCHEMIC: ICD-10-CM

## 2023-07-19 DIAGNOSIS — I35.0 AORTIC STENOSIS: ICD-10-CM

## 2023-07-19 DIAGNOSIS — N25.81 SECONDARY HYPERPARATHYROIDISM OF RENAL ORIGIN (HCC): ICD-10-CM

## 2023-07-19 DIAGNOSIS — I42.9 CARDIOMYOPATHY (HCC): ICD-10-CM

## 2023-07-19 DIAGNOSIS — I51.89 DIASTOLIC DYSFUNCTION: ICD-10-CM

## 2023-07-19 DIAGNOSIS — I48.92 ATRIAL FLUTTER, UNSPECIFIED TYPE (HCC): ICD-10-CM

## 2023-07-19 DIAGNOSIS — M79.89 LEG SWELLING: Primary | ICD-10-CM

## 2023-07-19 DIAGNOSIS — R05.1 ACUTE COUGH: ICD-10-CM

## 2023-07-19 DIAGNOSIS — Z99.2 ESRD ON HEMODIALYSIS (HCC): ICD-10-CM

## 2023-07-19 DIAGNOSIS — I50.9 CONGESTIVE HEART FAILURE, UNSPECIFIED HF CHRONICITY, UNSPECIFIED HEART FAILURE TYPE (HCC): ICD-10-CM

## 2023-07-19 DIAGNOSIS — N18.6 ESRD ON HEMODIALYSIS (HCC): ICD-10-CM

## 2023-07-19 DIAGNOSIS — I25.10 CORONARY ARTERY DISEASE INVOLVING NATIVE CORONARY ARTERY OF NATIVE HEART WITHOUT ANGINA PECTORIS: ICD-10-CM

## 2023-07-19 DIAGNOSIS — I25.812 CORONARY ARTERY DISEASE INVOLVING BYPASS GRAFT OF TRANSPLANTED HEART: ICD-10-CM

## 2023-07-19 PROBLEM — J44.1 CHRONIC OBSTRUCTIVE PULMONARY DISEASE WITH ACUTE EXACERBATION (HCC): Status: ACTIVE | Noted: 2023-07-19

## 2023-07-19 PROBLEM — J96.21 ACUTE ON CHRONIC RESPIRATORY FAILURE WITH HYPOXIA AND HYPERCAPNIA (HCC): Status: ACTIVE | Noted: 2023-07-19

## 2023-07-19 PROBLEM — R91.8 MULTIPLE PULMONARY NODULES: Status: ACTIVE | Noted: 2023-07-19

## 2023-07-19 PROBLEM — D72.819 LEUKOPENIA: Status: ACTIVE | Noted: 2023-07-19

## 2023-07-19 PROBLEM — J96.22 ACUTE ON CHRONIC RESPIRATORY FAILURE WITH HYPOXIA AND HYPERCAPNIA (HCC): Status: ACTIVE | Noted: 2023-07-19

## 2023-07-19 PROBLEM — D61.818 PANCYTOPENIA (HCC): Status: ACTIVE | Noted: 2018-06-22

## 2023-07-19 PROBLEM — I50.30 (HFPEF) HEART FAILURE WITH PRESERVED EJECTION FRACTION (HCC): Status: ACTIVE | Noted: 2023-07-19

## 2023-07-19 LAB
ALBUMIN SERPL-MCNC: 3.8 G/DL (ref 3.5–5.2)
ALBUMIN/GLOB SERPL: 1 {RATIO} (ref 1–2.5)
ALP SERPL-CCNC: 58 U/L (ref 40–129)
ALT SERPL-CCNC: 17 U/L (ref 5–41)
ANION GAP SERPL CALCULATED.3IONS-SCNC: 16 MMOL/L (ref 9–17)
AST SERPL-CCNC: 19 U/L
BASOPHILS # BLD: 0 K/UL (ref 0–0.2)
BASOPHILS NFR BLD: 0 % (ref 0–2)
BILIRUB SERPL-MCNC: 0.4 MG/DL (ref 0.3–1.2)
BNP SERPL-MCNC: ABNORMAL PG/ML
BODY TEMPERATURE: 37
BUN SERPL-MCNC: 36 MG/DL (ref 8–23)
CALCIUM SERPL-MCNC: 9.3 MG/DL (ref 8.6–10.4)
CHLORIDE SERPL-SCNC: 94 MMOL/L (ref 98–107)
CO2 SERPL-SCNC: 22 MMOL/L (ref 20–31)
COHGB MFR BLD: 1.8 % (ref 0–5)
CREAT SERPL-MCNC: 7.4 MG/DL (ref 0.7–1.2)
D DIMER PPP FEU-MCNC: 3.12 UG/ML FEU (ref 0–0.57)
EOSINOPHIL # BLD: 0.03 K/UL (ref 0–0.4)
EOSINOPHILS RELATIVE PERCENT: 1 % (ref 1–4)
ERYTHROCYTE [DISTWIDTH] IN BLOOD BY AUTOMATED COUNT: 20.5 % (ref 11.8–14.4)
FIO2 ON VENT: ABNORMAL %
FLUAV AG SPEC QL: NEGATIVE
FLUBV AG SPEC QL: NEGATIVE
GFR SERPL CREATININE-BSD FRML MDRD: 7 ML/MIN/1.73M2
GLUCOSE SERPL-MCNC: 85 MG/DL (ref 70–99)
HBV CORE AB SER QL: NONREACTIVE
HBV SURFACE AB SERPL IA-ACNC: >1000 MIU/ML
HBV SURFACE AG SERPL QL IA: NONREACTIVE
HCO3 VENOUS: 24.5 MMOL/L (ref 24–30)
HCT VFR BLD AUTO: 35.6 % (ref 40.7–50.3)
HCV AB SERPL QL IA: NONREACTIVE
HGB BLD-MCNC: 10.8 G/DL (ref 13–17)
IMM GRANULOCYTES # BLD AUTO: 0.03 K/UL (ref 0–0.3)
IMM GRANULOCYTES NFR BLD: 1 %
INR PPP: 1.2
LACTIC ACID, WHOLE BLOOD: 2 MMOL/L (ref 0.7–2.1)
LYMPHOCYTES NFR BLD: 0.99 K/UL (ref 1–4.8)
LYMPHOCYTES RELATIVE PERCENT: 29 % (ref 24–44)
MCH RBC QN AUTO: 28.4 PG (ref 25.2–33.5)
MCHC RBC AUTO-ENTMCNC: 30.3 G/DL (ref 28.4–34.8)
MCV RBC AUTO: 93.7 FL (ref 82.6–102.9)
MONOCYTES NFR BLD: 0.44 K/UL (ref 0.1–0.8)
MONOCYTES NFR BLD: 13 % (ref 1–7)
MORPHOLOGY: ABNORMAL
NEGATIVE BASE EXCESS, VEN: 2.3 MMOL/L (ref 0–2)
NEUTROPHILS NFR BLD: 56 % (ref 36–66)
NEUTS SEG NFR BLD: 1.91 K/UL (ref 1.8–7.7)
NRBC BLD-RTO: 0 PER 100 WBC
O2 SAT, VEN: 65.8 % (ref 60–85)
PCO2, VEN: 53.9 MM HG (ref 39–55)
PH VENOUS: 7.28 (ref 7.32–7.42)
PLATELET # BLD AUTO: 119 K/UL (ref 138–453)
PMV BLD AUTO: 12.8 FL (ref 8.1–13.5)
PO2, VEN: 41.8 MM HG (ref 30–50)
POTASSIUM SERPL-SCNC: 5.1 MMOL/L (ref 3.7–5.3)
PROT SERPL-MCNC: 7.5 G/DL (ref 6.4–8.3)
PROTHROMBIN TIME: 15 SEC (ref 11.7–14.9)
RBC # BLD AUTO: 3.8 M/UL (ref 4.21–5.77)
SARS-COV-2 RDRP RESP QL NAA+PROBE: NOT DETECTED
SODIUM SERPL-SCNC: 132 MMOL/L (ref 135–144)
SPECIMEN DESCRIPTION: NORMAL
TROPONIN I SERPL HS-MCNC: 261 NG/L (ref 0–22)
TROPONIN I SERPL HS-MCNC: 278 NG/L (ref 0–22)
WBC OTHER # BLD: 3.4 K/UL (ref 3.5–11.3)

## 2023-07-19 PROCEDURE — 80053 COMPREHEN METABOLIC PANEL: CPT

## 2023-07-19 PROCEDURE — 6370000000 HC RX 637 (ALT 250 FOR IP): Performed by: INTERNAL MEDICINE

## 2023-07-19 PROCEDURE — 85027 COMPLETE CBC AUTOMATED: CPT

## 2023-07-19 PROCEDURE — 83880 ASSAY OF NATRIURETIC PEPTIDE: CPT

## 2023-07-19 PROCEDURE — 99223 1ST HOSP IP/OBS HIGH 75: CPT | Performed by: INTERNAL MEDICINE

## 2023-07-19 PROCEDURE — 99285 EMERGENCY DEPT VISIT HI MDM: CPT

## 2023-07-19 PROCEDURE — 86317 IMMUNOASSAY INFECTIOUS AGENT: CPT

## 2023-07-19 PROCEDURE — 82805 BLOOD GASES W/O2 SATURATION: CPT

## 2023-07-19 PROCEDURE — 83605 ASSAY OF LACTIC ACID: CPT

## 2023-07-19 PROCEDURE — 85610 PROTHROMBIN TIME: CPT

## 2023-07-19 PROCEDURE — 86704 HEP B CORE ANTIBODY TOTAL: CPT

## 2023-07-19 PROCEDURE — 84484 ASSAY OF TROPONIN QUANT: CPT

## 2023-07-19 PROCEDURE — 6370000000 HC RX 637 (ALT 250 FOR IP)

## 2023-07-19 PROCEDURE — 6360000004 HC RX CONTRAST MEDICATION: Performed by: EMERGENCY MEDICINE

## 2023-07-19 PROCEDURE — 71045 X-RAY EXAM CHEST 1 VIEW: CPT

## 2023-07-19 PROCEDURE — 6370000000 HC RX 637 (ALT 250 FOR IP): Performed by: EMERGENCY MEDICINE

## 2023-07-19 PROCEDURE — 86803 HEPATITIS C AB TEST: CPT

## 2023-07-19 PROCEDURE — 99233 SBSQ HOSP IP/OBS HIGH 50: CPT | Performed by: INTERNAL MEDICINE

## 2023-07-19 PROCEDURE — 94640 AIRWAY INHALATION TREATMENT: CPT

## 2023-07-19 PROCEDURE — 87804 INFLUENZA ASSAY W/OPTIC: CPT

## 2023-07-19 PROCEDURE — 2060000000 HC ICU INTERMEDIATE R&B

## 2023-07-19 PROCEDURE — 87040 BLOOD CULTURE FOR BACTERIA: CPT

## 2023-07-19 PROCEDURE — 85379 FIBRIN DEGRADATION QUANT: CPT

## 2023-07-19 PROCEDURE — 6360000002 HC RX W HCPCS

## 2023-07-19 PROCEDURE — 5A1D70Z PERFORMANCE OF URINARY FILTRATION, INTERMITTENT, LESS THAN 6 HOURS PER DAY: ICD-10-PCS | Performed by: INTERNAL MEDICINE

## 2023-07-19 PROCEDURE — 87340 HEPATITIS B SURFACE AG IA: CPT

## 2023-07-19 PROCEDURE — 36415 COLL VENOUS BLD VENIPUNCTURE: CPT

## 2023-07-19 PROCEDURE — 2580000003 HC RX 258

## 2023-07-19 PROCEDURE — 71260 CT THORAX DX C+: CPT

## 2023-07-19 PROCEDURE — 90935 HEMODIALYSIS ONE EVALUATION: CPT

## 2023-07-19 PROCEDURE — 94660 CPAP INITIATION&MGMT: CPT

## 2023-07-19 PROCEDURE — 6360000002 HC RX W HCPCS: Performed by: INTERNAL MEDICINE

## 2023-07-19 PROCEDURE — 93005 ELECTROCARDIOGRAM TRACING: CPT | Performed by: INTERNAL MEDICINE

## 2023-07-19 PROCEDURE — 87635 SARS-COV-2 COVID-19 AMP PRB: CPT

## 2023-07-19 RX ORDER — SODIUM CHLORIDE 9 MG/ML
INJECTION, SOLUTION INTRAVENOUS PRN
Status: DISCONTINUED | OUTPATIENT
Start: 2023-07-19 | End: 2023-08-15 | Stop reason: HOSPADM

## 2023-07-19 RX ORDER — ONDANSETRON 4 MG/1
4 TABLET, ORALLY DISINTEGRATING ORAL EVERY 8 HOURS PRN
Status: DISCONTINUED | OUTPATIENT
Start: 2023-07-19 | End: 2023-08-15 | Stop reason: HOSPADM

## 2023-07-19 RX ORDER — SODIUM CHLORIDE 0.9 % (FLUSH) 0.9 %
5-40 SYRINGE (ML) INJECTION EVERY 12 HOURS SCHEDULED
Status: DISCONTINUED | OUTPATIENT
Start: 2023-07-19 | End: 2023-08-15 | Stop reason: HOSPADM

## 2023-07-19 RX ORDER — POLYETHYLENE GLYCOL 3350 17 G/17G
17 POWDER, FOR SOLUTION ORAL DAILY PRN
Status: DISCONTINUED | OUTPATIENT
Start: 2023-07-19 | End: 2023-08-15 | Stop reason: HOSPADM

## 2023-07-19 RX ORDER — GUAIFENESIN/DEXTROMETHORPHAN 100-10MG/5
5 SYRUP ORAL EVERY 4 HOURS PRN
Status: DISCONTINUED | OUTPATIENT
Start: 2023-07-19 | End: 2023-07-26

## 2023-07-19 RX ORDER — IOPAMIDOL 755 MG/ML
75 INJECTION, SOLUTION INTRAVASCULAR
Status: COMPLETED | OUTPATIENT
Start: 2023-07-19 | End: 2023-07-19

## 2023-07-19 RX ORDER — IPRATROPIUM BROMIDE AND ALBUTEROL SULFATE 2.5; .5 MG/3ML; MG/3ML
1 SOLUTION RESPIRATORY (INHALATION) ONCE
Status: COMPLETED | OUTPATIENT
Start: 2023-07-19 | End: 2023-07-19

## 2023-07-19 RX ORDER — ONDANSETRON 2 MG/ML
4 INJECTION INTRAMUSCULAR; INTRAVENOUS EVERY 6 HOURS PRN
Status: DISCONTINUED | OUTPATIENT
Start: 2023-07-19 | End: 2023-08-15 | Stop reason: HOSPADM

## 2023-07-19 RX ORDER — MIDODRINE HYDROCHLORIDE 5 MG/1
10 TABLET ORAL PRN
Status: DISCONTINUED | OUTPATIENT
Start: 2023-07-19 | End: 2023-07-25

## 2023-07-19 RX ORDER — ATORVASTATIN CALCIUM 40 MG/1
40 TABLET, FILM COATED ORAL DAILY
Status: DISCONTINUED | OUTPATIENT
Start: 2023-07-19 | End: 2023-08-15 | Stop reason: HOSPADM

## 2023-07-19 RX ORDER — BENZONATATE 100 MG/1
100 CAPSULE ORAL 3 TIMES DAILY PRN
Status: DISCONTINUED | OUTPATIENT
Start: 2023-07-19 | End: 2023-08-15 | Stop reason: HOSPADM

## 2023-07-19 RX ORDER — ACETAMINOPHEN 325 MG/1
650 TABLET ORAL EVERY 6 HOURS PRN
Status: DISCONTINUED | OUTPATIENT
Start: 2023-07-19 | End: 2023-08-03 | Stop reason: SDUPTHER

## 2023-07-19 RX ORDER — ALBUMIN (HUMAN) 12.5 G/50ML
25 SOLUTION INTRAVENOUS PRN
Status: DISCONTINUED | OUTPATIENT
Start: 2023-07-19 | End: 2023-08-15 | Stop reason: HOSPADM

## 2023-07-19 RX ORDER — DEXAMETHASONE SODIUM PHOSPHATE 10 MG/ML
6 INJECTION, SOLUTION INTRAMUSCULAR; INTRAVENOUS EVERY 6 HOURS
Status: DISCONTINUED | OUTPATIENT
Start: 2023-07-19 | End: 2023-07-23

## 2023-07-19 RX ORDER — HEPARIN SODIUM 1000 [USP'U]/ML
2300 INJECTION, SOLUTION INTRAVENOUS; SUBCUTANEOUS PRN
Status: DISCONTINUED | OUTPATIENT
Start: 2023-07-19 | End: 2023-08-09

## 2023-07-19 RX ORDER — ALBUTEROL SULFATE 90 UG/1
2 INHALANT RESPIRATORY (INHALATION) EVERY 6 HOURS PRN
Status: DISCONTINUED | OUTPATIENT
Start: 2023-07-19 | End: 2023-08-15 | Stop reason: HOSPADM

## 2023-07-19 RX ORDER — IPRATROPIUM BROMIDE AND ALBUTEROL SULFATE 2.5; .5 MG/3ML; MG/3ML
1 SOLUTION RESPIRATORY (INHALATION)
Status: DISCONTINUED | OUTPATIENT
Start: 2023-07-19 | End: 2023-07-28

## 2023-07-19 RX ORDER — ACETAMINOPHEN 650 MG/1
650 SUPPOSITORY RECTAL EVERY 6 HOURS PRN
Status: DISCONTINUED | OUTPATIENT
Start: 2023-07-19 | End: 2023-08-03 | Stop reason: SDUPTHER

## 2023-07-19 RX ORDER — SODIUM CHLORIDE 0.9 % (FLUSH) 0.9 %
5-40 SYRINGE (ML) INJECTION PRN
Status: DISCONTINUED | OUTPATIENT
Start: 2023-07-19 | End: 2023-08-15 | Stop reason: HOSPADM

## 2023-07-19 RX ADMIN — APIXABAN 5 MG: 5 TABLET, FILM COATED ORAL at 21:12

## 2023-07-19 RX ADMIN — HEPARIN SODIUM 2300 UNITS: 1000 INJECTION INTRAVENOUS; SUBCUTANEOUS at 21:03

## 2023-07-19 RX ADMIN — IPRATROPIUM BROMIDE AND ALBUTEROL SULFATE 1 DOSE: .5; 2.5 SOLUTION RESPIRATORY (INHALATION) at 20:47

## 2023-07-19 RX ADMIN — BENZONATATE 100 MG: 100 CAPSULE ORAL at 22:17

## 2023-07-19 RX ADMIN — DESMOPRESSIN ACETATE 40 MG: 0.2 TABLET ORAL at 21:12

## 2023-07-19 RX ADMIN — DEXAMETHASONE SODIUM PHOSPHATE 6 MG: 10 INJECTION, SOLUTION INTRAMUSCULAR; INTRAVENOUS at 13:21

## 2023-07-19 RX ADMIN — IPRATROPIUM BROMIDE AND ALBUTEROL SULFATE 1 DOSE: .5; 2.5 SOLUTION RESPIRATORY (INHALATION) at 11:03

## 2023-07-19 RX ADMIN — IPRATROPIUM BROMIDE AND ALBUTEROL SULFATE 1 DOSE: .5; 2.5 SOLUTION RESPIRATORY (INHALATION) at 15:42

## 2023-07-19 RX ADMIN — MIDODRINE HYDROCHLORIDE 10 MG: 5 TABLET ORAL at 22:16

## 2023-07-19 RX ADMIN — CEFTRIAXONE SODIUM 1000 MG: 1 INJECTION, POWDER, FOR SOLUTION INTRAMUSCULAR; INTRAVENOUS at 15:00

## 2023-07-19 RX ADMIN — IOPAMIDOL 75 ML: 755 INJECTION, SOLUTION INTRAVENOUS at 15:06

## 2023-07-19 RX ADMIN — DEXAMETHASONE SODIUM PHOSPHATE 6 MG: 10 INJECTION, SOLUTION INTRAMUSCULAR; INTRAVENOUS at 21:12

## 2023-07-19 RX ADMIN — SODIUM CHLORIDE, PRESERVATIVE FREE 10 ML: 5 INJECTION INTRAVENOUS at 21:17

## 2023-07-19 RX ADMIN — BENZONATATE 100 MG: 100 CAPSULE ORAL at 10:23

## 2023-07-19 ASSESSMENT — PAIN - FUNCTIONAL ASSESSMENT: PAIN_FUNCTIONAL_ASSESSMENT: 0-10

## 2023-07-19 ASSESSMENT — PAIN SCALES - GENERAL
PAINLEVEL_OUTOF10: 0

## 2023-07-19 NOTE — ED PROVIDER NOTES
[address] [facility]  Emergency Department Encounter  Emergency Medicine Attending     Pt Rigoberto Hicks  MRN: [id number]  [address] [facility] 1945  Date of evaluation: 7/19/23  PCP:  Dennis Gaona MD      [address]       Chief Complaint   Patient presents with    Cough    Leg Swelling       HISTORY OF PRESENT ILLNESS  (Location/Symptom, Timing/Onset, Context/Setting, Quality, Duration, Modifying Factors, Severity.)      Brian Garcia is a 76 y.o. male who presents with productive cough for several months, shortness of breath, fatigue, lower extremity swelling bilaterally for several months. Patient is on dialysis, has been on dialysis for approximately 10 years, he has a left-sided chest port. He is Monday Wednesday Friday dialysis, last dialysis session was Monday, did not go to dialysis today. He has been having episodes of hypotension during dialysis so he stopped taking his meds, last dose of any of his medications was on Sunday. Is here with his son, patient and son both state he has had multiple falls and has difficulty ambulating due to bilateral knee pain as well as difficulty sleeping due to this cough. Son states he has been getting progressively worse over the last 4 to 5 months. PAST MEDICAL / SURGICAL / SOCIAL / FAMILY HISTORY      has a past medical history of Allergic rhinitis, Anemia, BPH (benign prostatic hyperplasia), CAD (coronary artery disease), Carotid artery stenosis, Cerebrovascular disease, Chronic obstructive pulmonary disease with acute exacerbation ([address]), Diabetes mellitus ([address]), Eczema, ESRD (end stage renal disease) on dialysis ([address]), Full dentures, GERD (gastroesophageal reflux disease), Gout, Hemodialysis patient ([address]), Hyperlipidemia, Hypertension, Neuropathy, Osteoarthritis, Peripheral vascular disease ([address]), Seizures ([address]), Stroke ([address]), and Unspecified cerebral artery occlusion with cerebral infarction.        has a past surgical history that includes

## 2023-07-19 NOTE — ED NOTES
Patient presents to the ED with son with c/o cough and SOB. Son reports he has had a cough for 3-4 months, and it has progressively gotten worse, he states he has had a newer productive cough this week. Patient reports having an increase in SOB. Son also reports his legs have become more swollen over the last few weeks, patient denies diagnosis of CHF. Patient is a dialysis patient, reports he is a MWF dialysis patient, states he missed today, last time he was there was Monday 7/17/23. Patient is alert and oriented x4, answering questions appropriately. Patient is changed into a gown, placed on cardiac monitor, EKG done, IV established, will continue to monitor. On arrival, patient hypoxic with O2 sat 88%, does not wear O2 at home, placed on 3L, O2 sat WNL after O2 admin.       Gena Delgado RN  07/19/23 2379
Patient to CT.       Iron Wan RN  07/19/23 2327
Report given to [facility]. Pt continues in dialysis and will report to floor room when finished.       Ольга Baez RN  07/19/23 2605
Respiratory at bedside     Susan Eldridge RN  07/19/23 5005
Writer spoke with dialysis who asked if patient can come to dialysis. Writer informed dialysis that lab is currently in room drawing blood cultures and patient is supposed to be going to CT soon. Dialysis to be informed after CT complete.      Tootie Vasquez LPN  52/60/33 2526
X-ray at bedside     Susan Eldridge, [place]  07/19/23 5781
at [facility]    VASCULAR SURGERY Right 8/2/2022    UPPER EXTREMITY AV FISTULA LIGATION performed by Shani Sawyer MD at [facility]       Past Medical History:   Diagnosis Date    Allergic rhinitis     Anemia     BPH (benign prostatic hyperplasia)     CAD (coronary artery disease)     Carotid artery stenosis 2013    ulcerated plaque in left ICA 60 - 70% on carotid angiogram    Cerebrovascular disease 2013    left frontal hemorrhage    Chronic obstructive pulmonary disease with acute exacerbation ([address]) 7/19/2023    Diabetes mellitus ([address]) 2013    Eczema     ESRD (end stage renal disease) on dialysis ([address])     Full dentures     Upper / Lower    GERD (gastroesophageal reflux disease)     Gout     Hemodialysis patient ([address]) 10/2015    US RENAL [address]  M,W F    Hyperlipidemia     Hypertension     Neuropathy     Osteoarthritis     Peripheral vascular disease ([address])     Seizures ([address]) 2013    after stroke, LAST [id number]/20/2016    Stroke [facility]) 2013    Unspecified cerebral artery occlusion with cerebral infarction 4-    speech short term and lt side       Labs:  Labs Reviewed   D-DIMER, QUANTITATIVE - Abnormal; Notable for the following components:       Result Value    D-Dimer, Quant 3.12 (*)     All other components within normal limits   PROTIME-INR - Abnormal; Notable for the following components:    Protime 15.0 (*)     All other components within normal limits   TROPONIN - Abnormal; Notable for the following components:    Troponin, High Sensitivity 278 (*)     All other components within normal limits   TROPONIN - Abnormal; Notable for the following components:    Troponin, High Sensitivity 261 (*)     All other components within normal limits   BLOOD GAS, VENOUS - Abnormal; Notable for the following components:    pH, Tang 7.279 (*)     Negative Base Excess, Tang 2.3 (*)     All other components within normal limits   CBC WITH AUTO DIFFERENTIAL - Abnormal; Notable for

## 2023-07-20 ENCOUNTER — APPOINTMENT (OUTPATIENT)
Dept: GENERAL RADIOLOGY | Age: 78
DRG: 286 | End: 2023-07-20
Payer: MEDICARE

## 2023-07-20 ENCOUNTER — APPOINTMENT (OUTPATIENT)
Dept: DIALYSIS | Age: 78
DRG: 286 | End: 2023-07-20
Payer: MEDICARE

## 2023-07-20 PROBLEM — N28.89 RENAL MASS: Status: ACTIVE | Noted: 2023-07-20

## 2023-07-20 LAB
ALBUMIN SERPL-MCNC: 3.6 G/DL (ref 3.5–5.2)
ALBUMIN/GLOB SERPL: 1 {RATIO} (ref 1–2.5)
ALP SERPL-CCNC: 55 U/L (ref 40–129)
ALT SERPL-CCNC: 15 U/L (ref 5–41)
ANION GAP SERPL CALCULATED.3IONS-SCNC: 15 MMOL/L (ref 9–17)
AST SERPL-CCNC: 14 U/L
BASOPHILS # BLD: 0 K/UL (ref 0–0.2)
BASOPHILS NFR BLD: 0 % (ref 0–2)
BILIRUB DIRECT SERPL-MCNC: 0.2 MG/DL
BILIRUB INDIRECT SERPL-MCNC: 0.1 MG/DL (ref 0–1)
BILIRUB SERPL-MCNC: 0.3 MG/DL (ref 0.3–1.2)
BUN SERPL-MCNC: 29 MG/DL (ref 8–23)
CALCIUM SERPL-MCNC: 8.1 MG/DL (ref 8.6–10.4)
CHLORIDE SERPL-SCNC: 91 MMOL/L (ref 98–107)
CO2 SERPL-SCNC: 24 MMOL/L (ref 20–31)
CREAT SERPL-MCNC: 6.2 MG/DL (ref 0.7–1.2)
EKG ATRIAL RATE: 90 BPM
EKG P AXIS: 69 DEGREES
EKG P-R INTERVAL: 182 MS
EKG Q-T INTERVAL: 386 MS
EKG QRS DURATION: 96 MS
EKG QTC CALCULATION (BAZETT): 472 MS
EKG R AXIS: -28 DEGREES
EKG T AXIS: 133 DEGREES
EKG VENTRICULAR RATE: 90 BPM
EOSINOPHIL # BLD: 0 K/UL (ref 0–0.4)
EOSINOPHILS RELATIVE PERCENT: 0 % (ref 1–4)
ERYTHROCYTE [DISTWIDTH] IN BLOOD BY AUTOMATED COUNT: 19.9 % (ref 11.8–14.4)
GFR SERPL CREATININE-BSD FRML MDRD: 9 ML/MIN/1.73M2
GLUCOSE SERPL-MCNC: 135 MG/DL (ref 70–99)
HCT VFR BLD AUTO: 35 % (ref 40.7–50.3)
HGB BLD-MCNC: 10.9 G/DL (ref 13–17)
IMM GRANULOCYTES # BLD AUTO: 0 K/UL (ref 0–0.3)
IMM GRANULOCYTES NFR BLD: 0 %
LYMPHOCYTES NFR BLD: 0.59 K/UL (ref 1–4.8)
LYMPHOCYTES RELATIVE PERCENT: 8 % (ref 24–44)
MCH RBC QN AUTO: 29.1 PG (ref 25.2–33.5)
MCHC RBC AUTO-ENTMCNC: 31.1 G/DL (ref 28.4–34.8)
MCV RBC AUTO: 93.6 FL (ref 82.6–102.9)
MONOCYTES NFR BLD: 0.3 K/UL (ref 0.1–0.8)
MONOCYTES NFR BLD: 4 % (ref 1–7)
MORPHOLOGY: ABNORMAL
MORPHOLOGY: ABNORMAL
NEUTROPHILS NFR BLD: 88 % (ref 36–66)
NEUTS SEG NFR BLD: 6.51 K/UL (ref 1.8–7.7)
NRBC BLD-RTO: 0 PER 100 WBC
PLATELET # BLD AUTO: ABNORMAL K/UL (ref 138–453)
PLATELET, FLUORESCENCE: 131 K/UL (ref 138–453)
PLATELETS.RETICULATED NFR BLD AUTO: 13.9 % (ref 1.1–10.3)
POTASSIUM SERPL-SCNC: 4.5 MMOL/L (ref 3.7–5.3)
PROT SERPL-MCNC: 7.2 G/DL (ref 6.4–8.3)
PSA SERPL-MCNC: 3.63 NG/ML
RBC # BLD AUTO: 3.74 M/UL (ref 4.21–5.77)
SODIUM SERPL-SCNC: 130 MMOL/L (ref 135–144)
WBC OTHER # BLD: 7.4 K/UL (ref 3.5–11.3)

## 2023-07-20 PROCEDURE — 36415 COLL VENOUS BLD VENIPUNCTURE: CPT

## 2023-07-20 PROCEDURE — 97535 SELF CARE MNGMENT TRAINING: CPT

## 2023-07-20 PROCEDURE — 85055 RETICULATED PLATELET ASSAY: CPT

## 2023-07-20 PROCEDURE — 6370000000 HC RX 637 (ALT 250 FOR IP)

## 2023-07-20 PROCEDURE — 97166 OT EVAL MOD COMPLEX 45 MIN: CPT

## 2023-07-20 PROCEDURE — 2700000000 HC OXYGEN THERAPY PER DAY

## 2023-07-20 PROCEDURE — 6360000002 HC RX W HCPCS

## 2023-07-20 PROCEDURE — 71045 X-RAY EXAM CHEST 1 VIEW: CPT

## 2023-07-20 PROCEDURE — 97116 GAIT TRAINING THERAPY: CPT

## 2023-07-20 PROCEDURE — 6370000000 HC RX 637 (ALT 250 FOR IP): Performed by: INTERNAL MEDICINE

## 2023-07-20 PROCEDURE — P9047 ALBUMIN (HUMAN), 25%, 50ML: HCPCS | Performed by: INTERNAL MEDICINE

## 2023-07-20 PROCEDURE — 99222 1ST HOSP IP/OBS MODERATE 55: CPT | Performed by: INTERNAL MEDICINE

## 2023-07-20 PROCEDURE — 80048 BASIC METABOLIC PNL TOTAL CA: CPT

## 2023-07-20 PROCEDURE — 6360000002 HC RX W HCPCS: Performed by: INTERNAL MEDICINE

## 2023-07-20 PROCEDURE — 2060000000 HC ICU INTERMEDIATE R&B

## 2023-07-20 PROCEDURE — 97530 THERAPEUTIC ACTIVITIES: CPT

## 2023-07-20 PROCEDURE — 93010 ELECTROCARDIOGRAM REPORT: CPT | Performed by: INTERNAL MEDICINE

## 2023-07-20 PROCEDURE — 90935 HEMODIALYSIS ONE EVALUATION: CPT

## 2023-07-20 PROCEDURE — 90935 HEMODIALYSIS ONE EVALUATION: CPT | Performed by: INTERNAL MEDICINE

## 2023-07-20 PROCEDURE — 94761 N-INVAS EAR/PLS OXIMETRY MLT: CPT

## 2023-07-20 PROCEDURE — 2580000003 HC RX 258

## 2023-07-20 PROCEDURE — 99223 1ST HOSP IP/OBS HIGH 75: CPT | Performed by: INTERNAL MEDICINE

## 2023-07-20 PROCEDURE — 80076 HEPATIC FUNCTION PANEL: CPT

## 2023-07-20 PROCEDURE — 99232 SBSQ HOSP IP/OBS MODERATE 35: CPT | Performed by: INTERNAL MEDICINE

## 2023-07-20 PROCEDURE — 85027 COMPLETE CBC AUTOMATED: CPT

## 2023-07-20 PROCEDURE — 97162 PT EVAL MOD COMPLEX 30 MIN: CPT

## 2023-07-20 PROCEDURE — G0103 PSA SCREENING: HCPCS

## 2023-07-20 PROCEDURE — 94640 AIRWAY INHALATION TREATMENT: CPT

## 2023-07-20 RX ADMIN — DESMOPRESSIN ACETATE 40 MG: 0.2 TABLET ORAL at 08:34

## 2023-07-20 RX ADMIN — PIPERACILLIN AND TAZOBACTAM 3375 MG: 3; .375 INJECTION, POWDER, LYOPHILIZED, FOR SOLUTION INTRAVENOUS at 22:05

## 2023-07-20 RX ADMIN — IPRATROPIUM BROMIDE AND ALBUTEROL SULFATE 1 DOSE: .5; 2.5 SOLUTION RESPIRATORY (INHALATION) at 08:08

## 2023-07-20 RX ADMIN — HEPARIN SODIUM 2300 UNITS: 1000 INJECTION INTRAVENOUS; SUBCUTANEOUS at 14:15

## 2023-07-20 RX ADMIN — SODIUM CHLORIDE, PRESERVATIVE FREE 10 ML: 5 INJECTION INTRAVENOUS at 08:31

## 2023-07-20 RX ADMIN — DEXAMETHASONE SODIUM PHOSPHATE 6 MG: 10 INJECTION, SOLUTION INTRAMUSCULAR; INTRAVENOUS at 08:31

## 2023-07-20 RX ADMIN — GUAIFENESIN AND DEXTROMETHORPHAN 5 ML: 100; 10 SYRUP ORAL at 20:27

## 2023-07-20 RX ADMIN — APIXABAN 5 MG: 5 TABLET, FILM COATED ORAL at 20:27

## 2023-07-20 RX ADMIN — BENZONATATE 100 MG: 100 CAPSULE ORAL at 20:27

## 2023-07-20 RX ADMIN — IPRATROPIUM BROMIDE AND ALBUTEROL SULFATE 1 DOSE: .5; 2.5 SOLUTION RESPIRATORY (INHALATION) at 15:59

## 2023-07-20 RX ADMIN — ALBUMIN (HUMAN) 25 G: 0.25 INJECTION, SOLUTION INTRAVENOUS at 10:30

## 2023-07-20 RX ADMIN — DEXAMETHASONE SODIUM PHOSPHATE 6 MG: 10 INJECTION, SOLUTION INTRAMUSCULAR; INTRAVENOUS at 20:27

## 2023-07-20 RX ADMIN — IPRATROPIUM BROMIDE AND ALBUTEROL SULFATE 1 DOSE: .5; 2.5 SOLUTION RESPIRATORY (INHALATION) at 21:36

## 2023-07-20 RX ADMIN — VANCOMYCIN HYDROCHLORIDE 1000 MG: 10 INJECTION, POWDER, LYOPHILIZED, FOR SOLUTION INTRAVENOUS at 17:42

## 2023-07-20 RX ADMIN — GUAIFENESIN AND DEXTROMETHORPHAN 5 ML: 100; 10 SYRUP ORAL at 03:16

## 2023-07-20 RX ADMIN — HEPARIN SODIUM 2300 UNITS: 1000 INJECTION INTRAVENOUS; SUBCUTANEOUS at 14:16

## 2023-07-20 RX ADMIN — MIDODRINE HYDROCHLORIDE 10 MG: 5 TABLET ORAL at 10:28

## 2023-07-20 RX ADMIN — DEXAMETHASONE SODIUM PHOSPHATE 6 MG: 10 INJECTION, SOLUTION INTRAMUSCULAR; INTRAVENOUS at 03:17

## 2023-07-20 RX ADMIN — MIDODRINE HYDROCHLORIDE 10 MG: 5 TABLET ORAL at 20:27

## 2023-07-20 RX ADMIN — APIXABAN 5 MG: 5 TABLET, FILM COATED ORAL at 08:34

## 2023-07-20 RX ADMIN — SODIUM CHLORIDE, PRESERVATIVE FREE 10 ML: 5 INJECTION INTRAVENOUS at 20:32

## 2023-07-20 RX ADMIN — ALBUMIN (HUMAN) 25 G: 0.25 INJECTION, SOLUTION INTRAVENOUS at 12:23

## 2023-07-20 RX ADMIN — PIPERACILLIN AND TAZOBACTAM 4500 MG: 4; .5 INJECTION, POWDER, LYOPHILIZED, FOR SOLUTION INTRAVENOUS at 19:40

## 2023-07-20 ASSESSMENT — ENCOUNTER SYMPTOMS
DIARRHEA: 0
COUGH: 1
WHEEZING: 0
VOICE CHANGE: 0
FACIAL SWELLING: 0
NAUSEA: 0
VOMITING: 0
ABDOMINAL PAIN: 0
SHORTNESS OF BREATH: 1

## 2023-07-20 NOTE — CARE COORDINATION
Case Management Assessment  Initial Evaluation    Date/Time of Evaluation: 7/20/2023 8:00AM  Assessment Completed by: Madeleine Closs, RN    If patient is discharged prior to next notation, then this note serves as note for discharge by case management. Patient Name: Paola Hobson                   YOB: 1945  Diagnosis: Leg swelling [M79.89]  Acute on chronic respiratory failure with hypoxia and hypercapnia ([address]) [J96.21, J96.22]  Acute cough [R05.1]                   Date / Time: 7/19/2023  9:55 AM    Patient Admission Status: Inpatient   Readmission Risk (Low < 19, Mod (19-27), High > 27): Readmission Risk Score: 16.6    Current PCP: Warren Zavala MD  PCP verified by CM? (P) Yes Warren Zavala MD)    Chart Reviewed: Yes      History Provided by: (P) Patient  Patient Orientation: (P) Alert and Oriented, Person, Place, Situation    Patient Cognition: (P) Alert    Hospitalization in the last 30 days (Readmission):  No    If yes, Readmission Assessment in  Navigator will be completed. Advance Directives:      Code Status: Full Code   Patient's Primary Decision Maker is: (P) Legal Next of Kin      Discharge Planning:    Patient lives with: (P) Spouse/Significant Other, Children Type of Home: (P) House  Primary Care Giver: (P) Self  Patient Support Systems include: (P) Spouse/Significant Other, Children   Current Financial resources: (P) Medicare  Current community resources: (P) None  Current services prior to admission: (P) Durable Medical Equipment            Current DME: (P) Walker, Cane            Type of Home Care services:  (P) None    ADLS  Prior functional level: (P) Independent in ADLs/IADLs  Current functional level: (P) Independent in ADLs/IADLs    PT AM-PAC:   /24  OT AM-PAC:   /24    Family can provide assistance at DC: (P) Yes  Would you like Case Management to discuss the discharge plan with any other family members/significant others, and if so, who?     Plans to Return to Present

## 2023-07-20 NOTE — CARE COORDINATION
Hemodialysis Initial Assessment    Information provided by: Patient    New or Current with HD: Current, ESRD    [facility]    Schedule: MWF AM    Physician: Girma Plascencia    Transportation: Wife/family    Insurance: [address] Medicare    DME:  TBD    -Met with patient who confirmed OP HD clinic. Patient denies any concerns related to OP HD, wife provides transportation. Will notify SW if assistance needed.

## 2023-07-21 ENCOUNTER — APPOINTMENT (OUTPATIENT)
Dept: CT IMAGING | Age: 78
DRG: 286 | End: 2023-07-21
Payer: MEDICARE

## 2023-07-21 LAB
ANION GAP SERPL CALCULATED.3IONS-SCNC: 16 MMOL/L (ref 9–17)
BASOPHILS # BLD: 0 K/UL (ref 0–0.2)
BASOPHILS NFR BLD: 0 % (ref 0–2)
BUN SERPL-MCNC: 25 MG/DL (ref 8–23)
CALCIUM SERPL-MCNC: 8.8 MG/DL (ref 8.6–10.4)
CHLORIDE SERPL-SCNC: 96 MMOL/L (ref 98–107)
CO2 SERPL-SCNC: 22 MMOL/L (ref 20–31)
CREAT SERPL-MCNC: 5.2 MG/DL (ref 0.7–1.2)
EOSINOPHIL # BLD: 0 K/UL (ref 0–0.4)
EOSINOPHILS RELATIVE PERCENT: 0 % (ref 1–4)
ERYTHROCYTE [DISTWIDTH] IN BLOOD BY AUTOMATED COUNT: 19.9 % (ref 11.8–14.4)
GFR SERPL CREATININE-BSD FRML MDRD: 11 ML/MIN/1.73M2
GLUCOSE SERPL-MCNC: 127 MG/DL (ref 70–99)
HCT VFR BLD AUTO: 33.7 % (ref 40.7–50.3)
HGB BLD-MCNC: 9.9 G/DL (ref 13–17)
IMM GRANULOCYTES # BLD AUTO: 0 K/UL (ref 0–0.3)
IMM GRANULOCYTES NFR BLD: 0 %
LYMPHOCYTES NFR BLD: 0.95 K/UL (ref 1–4.8)
LYMPHOCYTES RELATIVE PERCENT: 10 % (ref 24–44)
MCH RBC QN AUTO: 28.7 PG (ref 25.2–33.5)
MCHC RBC AUTO-ENTMCNC: 29.4 G/DL (ref 28.4–34.8)
MCV RBC AUTO: 97.7 FL (ref 82.6–102.9)
MONOCYTES NFR BLD: 0.48 K/UL (ref 0.1–0.8)
MONOCYTES NFR BLD: 5 % (ref 1–7)
MORPHOLOGY: ABNORMAL
NEUTROPHILS NFR BLD: 85 % (ref 36–66)
NEUTS SEG NFR BLD: 8.07 K/UL (ref 1.8–7.7)
NRBC BLD-RTO: 0.2 PER 100 WBC
PLATELET # BLD AUTO: 123 K/UL (ref 138–453)
PMV BLD AUTO: 12.8 FL (ref 8.1–13.5)
POTASSIUM SERPL-SCNC: 4.2 MMOL/L (ref 3.7–5.3)
RBC # BLD AUTO: 3.45 M/UL (ref 4.21–5.77)
SODIUM SERPL-SCNC: 134 MMOL/L (ref 135–144)
WBC OTHER # BLD: 9.5 K/UL (ref 3.5–11.3)

## 2023-07-21 PROCEDURE — 6370000000 HC RX 637 (ALT 250 FOR IP)

## 2023-07-21 PROCEDURE — 6360000004 HC RX CONTRAST MEDICATION: Performed by: INTERNAL MEDICINE

## 2023-07-21 PROCEDURE — 74178 CT ABD&PLV WO CNTR FLWD CNTR: CPT | Performed by: INTERNAL MEDICINE

## 2023-07-21 PROCEDURE — 99232 SBSQ HOSP IP/OBS MODERATE 35: CPT | Performed by: INTERNAL MEDICINE

## 2023-07-21 PROCEDURE — 6360000002 HC RX W HCPCS

## 2023-07-21 PROCEDURE — P9047 ALBUMIN (HUMAN), 25%, 50ML: HCPCS | Performed by: INTERNAL MEDICINE

## 2023-07-21 PROCEDURE — 90935 HEMODIALYSIS ONE EVALUATION: CPT

## 2023-07-21 PROCEDURE — 85027 COMPLETE CBC AUTOMATED: CPT

## 2023-07-21 PROCEDURE — 6360000002 HC RX W HCPCS: Performed by: INTERNAL MEDICINE

## 2023-07-21 PROCEDURE — 6370000000 HC RX 637 (ALT 250 FOR IP): Performed by: INTERNAL MEDICINE

## 2023-07-21 PROCEDURE — 99222 1ST HOSP IP/OBS MODERATE 55: CPT | Performed by: INTERNAL MEDICINE

## 2023-07-21 PROCEDURE — 2700000000 HC OXYGEN THERAPY PER DAY

## 2023-07-21 PROCEDURE — 80048 BASIC METABOLIC PNL TOTAL CA: CPT

## 2023-07-21 PROCEDURE — 94640 AIRWAY INHALATION TREATMENT: CPT

## 2023-07-21 PROCEDURE — 2580000003 HC RX 258

## 2023-07-21 PROCEDURE — 90935 HEMODIALYSIS ONE EVALUATION: CPT | Performed by: INTERNAL MEDICINE

## 2023-07-21 PROCEDURE — 36415 COLL VENOUS BLD VENIPUNCTURE: CPT

## 2023-07-21 PROCEDURE — 94660 CPAP INITIATION&MGMT: CPT

## 2023-07-21 PROCEDURE — 94761 N-INVAS EAR/PLS OXIMETRY MLT: CPT

## 2023-07-21 PROCEDURE — 2060000000 HC ICU INTERMEDIATE R&B

## 2023-07-21 RX ORDER — IOPAMIDOL 755 MG/ML
130 INJECTION, SOLUTION INTRAVASCULAR
Status: COMPLETED | OUTPATIENT
Start: 2023-07-21 | End: 2023-07-21

## 2023-07-21 RX ADMIN — IPRATROPIUM BROMIDE AND ALBUTEROL SULFATE 1 DOSE: .5; 2.5 SOLUTION RESPIRATORY (INHALATION) at 11:12

## 2023-07-21 RX ADMIN — VANCOMYCIN HYDROCHLORIDE 1000 MG: 10 INJECTION, POWDER, LYOPHILIZED, FOR SOLUTION INTRAVENOUS at 11:37

## 2023-07-21 RX ADMIN — PIPERACILLIN AND TAZOBACTAM 3375 MG: 3; .375 INJECTION, POWDER, LYOPHILIZED, FOR SOLUTION INTRAVENOUS at 17:00

## 2023-07-21 RX ADMIN — APIXABAN 5 MG: 5 TABLET, FILM COATED ORAL at 20:27

## 2023-07-21 RX ADMIN — MIDODRINE HYDROCHLORIDE 10 MG: 5 TABLET ORAL at 00:25

## 2023-07-21 RX ADMIN — DESMOPRESSIN ACETATE 40 MG: 0.2 TABLET ORAL at 16:53

## 2023-07-21 RX ADMIN — ALBUMIN (HUMAN) 25 G: 0.25 INJECTION, SOLUTION INTRAVENOUS at 09:13

## 2023-07-21 RX ADMIN — MIDODRINE HYDROCHLORIDE 10 MG: 5 TABLET ORAL at 09:13

## 2023-07-21 RX ADMIN — MIDODRINE HYDROCHLORIDE 10 MG: 5 TABLET ORAL at 18:30

## 2023-07-21 RX ADMIN — BENZONATATE 100 MG: 100 CAPSULE ORAL at 20:27

## 2023-07-21 RX ADMIN — HEPARIN SODIUM 2300 UNITS: 1000 INJECTION INTRAVENOUS; SUBCUTANEOUS at 12:49

## 2023-07-21 RX ADMIN — GUAIFENESIN AND DEXTROMETHORPHAN 5 ML: 100; 10 SYRUP ORAL at 00:24

## 2023-07-21 RX ADMIN — IPRATROPIUM BROMIDE AND ALBUTEROL SULFATE 1 DOSE: .5; 2.5 SOLUTION RESPIRATORY (INHALATION) at 07:48

## 2023-07-21 RX ADMIN — IPRATROPIUM BROMIDE AND ALBUTEROL SULFATE 1 DOSE: .5; 2.5 SOLUTION RESPIRATORY (INHALATION) at 16:06

## 2023-07-21 RX ADMIN — IOPAMIDOL 130 ML: 755 INJECTION, SOLUTION INTRAVENOUS at 14:12

## 2023-07-21 RX ADMIN — IPRATROPIUM BROMIDE AND ALBUTEROL SULFATE 1 DOSE: .5; 2.5 SOLUTION RESPIRATORY (INHALATION) at 21:02

## 2023-07-21 RX ADMIN — SODIUM CHLORIDE, PRESERVATIVE FREE 10 ML: 5 INJECTION INTRAVENOUS at 16:54

## 2023-07-21 RX ADMIN — SODIUM CHLORIDE, PRESERVATIVE FREE 10 ML: 5 INJECTION INTRAVENOUS at 20:28

## 2023-07-21 RX ADMIN — APIXABAN 5 MG: 5 TABLET, FILM COATED ORAL at 16:53

## 2023-07-21 RX ADMIN — GUAIFENESIN AND DEXTROMETHORPHAN 5 ML: 100; 10 SYRUP ORAL at 21:10

## 2023-07-21 RX ADMIN — DEXAMETHASONE SODIUM PHOSPHATE 6 MG: 10 INJECTION, SOLUTION INTRAMUSCULAR; INTRAVENOUS at 00:44

## 2023-07-21 RX ADMIN — DEXAMETHASONE SODIUM PHOSPHATE 6 MG: 10 INJECTION, SOLUTION INTRAMUSCULAR; INTRAVENOUS at 20:28

## 2023-07-21 ASSESSMENT — ENCOUNTER SYMPTOMS: COUGH: 1

## 2023-07-21 ASSESSMENT — PAIN SCALES - GENERAL
PAINLEVEL_OUTOF10: 0
PAINLEVEL_OUTOF10: 0

## 2023-07-22 PROBLEM — I50.9 CONGESTIVE HEART FAILURE (HCC): Status: ACTIVE | Noted: 2023-07-22

## 2023-07-22 PROBLEM — I71.40 AAA (ABDOMINAL AORTIC ANEURYSM) (HCC): Status: ACTIVE | Noted: 2023-07-22

## 2023-07-22 LAB
ANION GAP SERPL CALCULATED.3IONS-SCNC: 13 MMOL/L (ref 9–17)
BASOPHILS # BLD: 0 K/UL (ref 0–0.2)
BASOPHILS NFR BLD: 0 % (ref 0–2)
BODY TEMPERATURE: 37
BUN SERPL-MCNC: 22 MG/DL (ref 8–23)
CALCIUM SERPL-MCNC: 8.6 MG/DL (ref 8.6–10.4)
CHLORIDE SERPL-SCNC: 92 MMOL/L (ref 98–107)
CO2 SERPL-SCNC: 25 MMOL/L (ref 20–31)
COHGB MFR BLD: 0 % (ref 0–5)
CREAT SERPL-MCNC: 4.8 MG/DL (ref 0.7–1.2)
EOSINOPHIL # BLD: 0 K/UL (ref 0–0.4)
EOSINOPHILS RELATIVE PERCENT: 0 % (ref 1–4)
ERYTHROCYTE [DISTWIDTH] IN BLOOD BY AUTOMATED COUNT: 19.8 % (ref 11.8–14.4)
FIO2 ON VENT: ABNORMAL %
GFR SERPL CREATININE-BSD FRML MDRD: 12 ML/MIN/1.73M2
GLUCOSE SERPL-MCNC: 114 MG/DL (ref 70–99)
HCO3 VENOUS: 27.1 MMOL/L (ref 24–30)
HCT VFR BLD AUTO: 30.9 % (ref 40.7–50.3)
HGB BLD-MCNC: 9.8 G/DL (ref 13–17)
IMM GRANULOCYTES # BLD AUTO: 0.06 K/UL (ref 0–0.3)
IMM GRANULOCYTES NFR BLD: 1 %
LYMPHOCYTES NFR BLD: 0.54 K/UL (ref 1–4.8)
LYMPHOCYTES RELATIVE PERCENT: 9 % (ref 24–44)
MCH RBC QN AUTO: 28.9 PG (ref 25.2–33.5)
MCHC RBC AUTO-ENTMCNC: 31.7 G/DL (ref 28.4–34.8)
MCV RBC AUTO: 91.2 FL (ref 82.6–102.9)
MONOCYTES NFR BLD: 0.24 K/UL (ref 0.1–0.8)
MONOCYTES NFR BLD: 4 % (ref 1–7)
MORPHOLOGY: ABNORMAL
NEGATIVE BASE EXCESS, VEN: 0.8 MMOL/L (ref 0–2)
NEUTROPHILS NFR BLD: 86 % (ref 36–66)
NEUTS SEG NFR BLD: 5.16 K/UL (ref 1.8–7.7)
NRBC BLD-RTO: 0 PER 100 WBC
O2 SAT, VEN: 52.9 % (ref 60–85)
PCO2, VEN: 66 MM HG (ref 39–55)
PH VENOUS: 7.24 (ref 7.32–7.42)
PLATELET # BLD AUTO: ABNORMAL K/UL (ref 138–453)
PLATELET, FLUORESCENCE: 117 K/UL (ref 138–453)
PLATELETS.RETICULATED NFR BLD AUTO: 13.6 % (ref 1.1–10.3)
PO2, VEN: 32.4 MM HG (ref 30–50)
POTASSIUM SERPL-SCNC: 4.1 MMOL/L (ref 3.7–5.3)
RBC # BLD AUTO: 3.39 M/UL (ref 4.21–5.77)
SODIUM SERPL-SCNC: 130 MMOL/L (ref 135–144)
WBC OTHER # BLD: 6 K/UL (ref 3.5–11.3)

## 2023-07-22 PROCEDURE — 94640 AIRWAY INHALATION TREATMENT: CPT

## 2023-07-22 PROCEDURE — 80048 BASIC METABOLIC PNL TOTAL CA: CPT

## 2023-07-22 PROCEDURE — 6360000002 HC RX W HCPCS: Performed by: INTERNAL MEDICINE

## 2023-07-22 PROCEDURE — 99233 SBSQ HOSP IP/OBS HIGH 50: CPT | Performed by: INTERNAL MEDICINE

## 2023-07-22 PROCEDURE — 6360000002 HC RX W HCPCS

## 2023-07-22 PROCEDURE — 90935 HEMODIALYSIS ONE EVALUATION: CPT

## 2023-07-22 PROCEDURE — 2580000003 HC RX 258

## 2023-07-22 PROCEDURE — 2000000000 HC ICU R&B

## 2023-07-22 PROCEDURE — 6370000000 HC RX 637 (ALT 250 FOR IP)

## 2023-07-22 PROCEDURE — 2700000000 HC OXYGEN THERAPY PER DAY

## 2023-07-22 PROCEDURE — 82805 BLOOD GASES W/O2 SATURATION: CPT

## 2023-07-22 PROCEDURE — 85055 RETICULATED PLATELET ASSAY: CPT

## 2023-07-22 PROCEDURE — 85027 COMPLETE CBC AUTOMATED: CPT

## 2023-07-22 PROCEDURE — 6370000000 HC RX 637 (ALT 250 FOR IP): Performed by: INTERNAL MEDICINE

## 2023-07-22 PROCEDURE — 94761 N-INVAS EAR/PLS OXIMETRY MLT: CPT

## 2023-07-22 PROCEDURE — 99232 SBSQ HOSP IP/OBS MODERATE 35: CPT | Performed by: INTERNAL MEDICINE

## 2023-07-22 PROCEDURE — 94660 CPAP INITIATION&MGMT: CPT

## 2023-07-22 PROCEDURE — 36415 COLL VENOUS BLD VENIPUNCTURE: CPT

## 2023-07-22 PROCEDURE — 99222 1ST HOSP IP/OBS MODERATE 55: CPT | Performed by: STUDENT IN AN ORGANIZED HEALTH CARE EDUCATION/TRAINING PROGRAM

## 2023-07-22 RX ORDER — LEVOFLOXACIN 5 MG/ML
750 INJECTION, SOLUTION INTRAVENOUS ONCE
Status: COMPLETED | OUTPATIENT
Start: 2023-07-22 | End: 2023-07-22

## 2023-07-22 RX ORDER — LEVOFLOXACIN 5 MG/ML
500 INJECTION, SOLUTION INTRAVENOUS
Status: DISCONTINUED | OUTPATIENT
Start: 2023-07-24 | End: 2023-07-23

## 2023-07-22 RX ORDER — LEVOFLOXACIN 5 MG/ML
250 INJECTION, SOLUTION INTRAVENOUS
Status: DISCONTINUED | OUTPATIENT
Start: 2023-07-22 | End: 2023-07-22

## 2023-07-22 RX ADMIN — SODIUM CHLORIDE, PRESERVATIVE FREE 10 ML: 5 INJECTION INTRAVENOUS at 09:04

## 2023-07-22 RX ADMIN — LEVOFLOXACIN 750 MG: 5 INJECTION, SOLUTION INTRAVENOUS at 14:41

## 2023-07-22 RX ADMIN — DEXAMETHASONE SODIUM PHOSPHATE 6 MG: 10 INJECTION, SOLUTION INTRAMUSCULAR; INTRAVENOUS at 14:49

## 2023-07-22 RX ADMIN — BENZONATATE 100 MG: 100 CAPSULE ORAL at 10:36

## 2023-07-22 RX ADMIN — HEPARIN SODIUM 2300 UNITS: 1000 INJECTION INTRAVENOUS; SUBCUTANEOUS at 23:08

## 2023-07-22 RX ADMIN — SODIUM CHLORIDE, PRESERVATIVE FREE 5 ML: 5 INJECTION INTRAVENOUS at 22:41

## 2023-07-22 RX ADMIN — IPRATROPIUM BROMIDE AND ALBUTEROL SULFATE 1 DOSE: .5; 2.5 SOLUTION RESPIRATORY (INHALATION) at 16:57

## 2023-07-22 RX ADMIN — IPRATROPIUM BROMIDE AND ALBUTEROL SULFATE 1 DOSE: .5; 2.5 SOLUTION RESPIRATORY (INHALATION) at 09:29

## 2023-07-22 RX ADMIN — MIDODRINE HYDROCHLORIDE 10 MG: 5 TABLET ORAL at 20:13

## 2023-07-22 RX ADMIN — IPRATROPIUM BROMIDE AND ALBUTEROL SULFATE 1 DOSE: .5; 2.5 SOLUTION RESPIRATORY (INHALATION) at 19:54

## 2023-07-22 RX ADMIN — DESMOPRESSIN ACETATE 40 MG: 0.2 TABLET ORAL at 09:03

## 2023-07-22 RX ADMIN — PIPERACILLIN AND TAZOBACTAM 3375 MG: 3; .375 INJECTION, POWDER, LYOPHILIZED, FOR SOLUTION INTRAVENOUS at 04:56

## 2023-07-22 RX ADMIN — DEXAMETHASONE SODIUM PHOSPHATE 6 MG: 10 INJECTION, SOLUTION INTRAMUSCULAR; INTRAVENOUS at 01:01

## 2023-07-22 RX ADMIN — DEXAMETHASONE SODIUM PHOSPHATE 6 MG: 10 INJECTION, SOLUTION INTRAMUSCULAR; INTRAVENOUS at 20:13

## 2023-07-22 RX ADMIN — IPRATROPIUM BROMIDE AND ALBUTEROL SULFATE 1 DOSE: .5; 2.5 SOLUTION RESPIRATORY (INHALATION) at 12:34

## 2023-07-22 RX ADMIN — APIXABAN 5 MG: 5 TABLET, FILM COATED ORAL at 09:03

## 2023-07-22 RX ADMIN — APIXABAN 5 MG: 5 TABLET, FILM COATED ORAL at 20:12

## 2023-07-22 RX ADMIN — HEPARIN SODIUM 2300 UNITS: 1000 INJECTION INTRAVENOUS; SUBCUTANEOUS at 23:07

## 2023-07-22 RX ADMIN — DEXAMETHASONE SODIUM PHOSPHATE 6 MG: 10 INJECTION, SOLUTION INTRAMUSCULAR; INTRAVENOUS at 09:03

## 2023-07-22 RX ADMIN — BENZONATATE 100 MG: 100 CAPSULE ORAL at 17:59

## 2023-07-22 ASSESSMENT — ENCOUNTER SYMPTOMS
COUGH: 0
SHORTNESS OF BREATH: 0

## 2023-07-22 ASSESSMENT — PAIN SCALES - GENERAL
PAINLEVEL_OUTOF10: 0
PAINLEVEL_OUTOF10: 0

## 2023-07-22 NOTE — H&P
[address]     Department of Internal Medicine - Staff Internal Medicine Teaching Service          ADMISSION NOTE/HISTORY AND PHYSICAL EXAMINATION   Date: 7/19/2023  Patient Name: Ignacio Tolentino  Date of admission: 7/19/2023  9:55 AM  YOB: 1945  PCP: Sulaiman Lovett MD  History Obtained From:  patient    CHIEF COMPLAINT     Chief complaint: shortness of breath    HISTORY OF PRESENTING ILLNESS     The patient is a pleasant 76 y.o. male with a PMHx significant for     DM, HTN, ,HLD  CAD, PVD  ESRD  COPD  OA    presents with a chief complaint of shortness of breath. Patient reported that his symptoms started 2 weeks ago, progressive in nature, exacerbated on lying down, relieved on sitting. It was associated with cough, productive in nature, had greenish-yellow mucus along with audible wheezing from the past 3-4 days. He was generalized fatigue and low energy levels and was using a neto to ambulate, had leg swelling sinc 3 days. On admission his BNP was elevated, troponin elevated, CXR showed congestive heart failure findings. Patient has ESRD and has been undergoing dialysis for 10 years, MWF, missed last dialysis. Echo done in 2021 showed EF 55%, aortic stenosis, moderate MR. He underwent coronary endarterectomy in 2015. D-dimer was elevated but CTPE was negative for pulmonary embolus. Dialysis was done and 4L of fluid was removed, Patient @pm continued to experience shortness of breath, and cough, patient was put on BiPap to help with ventilation.     Patient denies fever, chills, rigors, abdominal pain, constipation, diarrhoea    Review of Systems: negative except the findings mentioned above  PAST MEDICAL HISTORY     Past Medical History:   Diagnosis Date    Allergic rhinitis     Anemia     BPH (benign prostatic hyperplasia)     CAD (coronary artery disease)     Carotid artery stenosis 2013    ulcerated plaque in left ICA 60 - 70% on carotid angiogram    Cerebrovascular
BILITOT 0.3   BILIDIR 0.2   LABALBU 3.6     Pancreatic functions:No results for input(s): LACTA, AMYLASE in the last 72 hours. S. Lactic Acid: No results for input(s): LACTA in the last 72 hours. Cardiac enzymes:No results for input(s): CKTOTAL, CKMB, CKMBINDEX, TROPONINI in the last 72 hours. BNP:No results for input(s): BNP in the last 72 hours. Lipid profile: No results for input(s): CHOL, TRIG, HDL, LDL, LDLCALC in the last 72 hours. Blood Gases:   Lab Results   Component Value Date/Time    PH 7.25 10/16/2012 10:47 PM    PCO2 42 10/16/2012 10:47 PM    HCO3 18.5 10/16/2012 10:47 PM     Thyroid functions:   Lab Results   Component Value Date/Time    TSH 2.34 04/26/2018 10:40 AM        ASSESSMENT:     Principal Problem:    Acute on chronic respiratory failure with hypoxia and hypercapnia (HCC)  Active Problems:    Chronic obstructive pulmonary disease with acute exacerbation (HCC)    AAA (abdominal aortic aneurysm) (HCC) 4.2 X 5.3 cm    Pancytopenia (HCC)    Seizures (HCC)    Hypertension, essential    ESRD on hemodialysis (HCC)    Valvular heart disease    ESRD on dialysis (HCC)    Paroxysmal atrial fibrillation (HCC)    SOB (shortness of breath)    Leg swelling    Multiple pulmonary nodules    (HFpEF) heart failure with preserved ejection fraction (HCC)    Leukopenia    Renal mass    Congestive heart failure ([address])  Resolved Problems:    * No resolved hospital problems. *      PLAN:     Neurologic:   Awake alert and oriented  Neuro checks per protocol  Sedation and Analgesia:No Sedation/Analgesia  Cardiovascular:  Acute on chronic congestive heart failure, EF 55%  Abdominal aortic aneurysm 4.2 X 5.3 cm  Atrial Fibrillation. Hypertension. Moderate AS  Follow-up on echocardiogram.  Appreciate cardiology and vascular surgery input.   Maintain MAP >65  Pulmonary:  Acute on chronic hypoxic respiratory failure likely secondary to exacerbation of heart failure versus concerns of pneumonia vs COPD

## 2023-07-23 ENCOUNTER — APPOINTMENT (OUTPATIENT)
Dept: GENERAL RADIOLOGY | Age: 78
DRG: 286 | End: 2023-07-23
Payer: MEDICARE

## 2023-07-23 LAB
ANION GAP SERPL CALCULATED.3IONS-SCNC: 12 MMOL/L (ref 9–17)
BASOPHILS # BLD: 0 K/UL (ref 0–0.2)
BASOPHILS NFR BLD: 0 % (ref 0–2)
BODY TEMPERATURE: 37
BUN SERPL-MCNC: 19 MG/DL (ref 8–23)
CALCIUM SERPL-MCNC: 8.4 MG/DL (ref 8.6–10.4)
CHLORIDE SERPL-SCNC: 96 MMOL/L (ref 98–107)
CO2 SERPL-SCNC: 23 MMOL/L (ref 20–31)
COHGB MFR BLD: 1.5 % (ref 0–5)
CREAT SERPL-MCNC: 4.3 MG/DL (ref 0.7–1.2)
EOSINOPHIL # BLD: 0 K/UL (ref 0–0.44)
EOSINOPHILS RELATIVE PERCENT: 0 % (ref 1–4)
ERYTHROCYTE [DISTWIDTH] IN BLOOD BY AUTOMATED COUNT: 19.9 % (ref 11.8–14.4)
FIO2 ON VENT: ABNORMAL %
GFR SERPL CREATININE-BSD FRML MDRD: 13 ML/MIN/1.73M2
GLUCOSE SERPL-MCNC: 148 MG/DL (ref 70–99)
HCO3 VENOUS: 25.5 MMOL/L (ref 24–30)
HCT VFR BLD AUTO: 33.5 % (ref 40.7–50.3)
HGB BLD-MCNC: 10.2 G/DL (ref 13–17)
IMM GRANULOCYTES # BLD AUTO: 0.06 K/UL (ref 0–0.3)
IMM GRANULOCYTES NFR BLD: 1 %
LYMPHOCYTES NFR BLD: 0.57 K/UL (ref 1.1–3.7)
LYMPHOCYTES RELATIVE PERCENT: 9 % (ref 24–43)
MCH RBC QN AUTO: 28.6 PG (ref 25.2–33.5)
MCHC RBC AUTO-ENTMCNC: 30.4 G/DL (ref 28.4–34.8)
MCV RBC AUTO: 93.8 FL (ref 82.6–102.9)
MONOCYTES NFR BLD: 0.32 K/UL (ref 0.1–1.2)
MONOCYTES NFR BLD: 5 % (ref 3–12)
MORPHOLOGY: ABNORMAL
MORPHOLOGY: ABNORMAL
NEGATIVE BASE EXCESS, VEN: 1.6 MMOL/L (ref 0–2)
NEUTROPHILS NFR BLD: 85 % (ref 36–65)
NEUTS SEG NFR BLD: 5.35 K/UL (ref 1.5–8.1)
NRBC BLD-RTO: 0 PER 100 WBC
O2 SAT, VEN: 61.4 % (ref 60–85)
PCO2, VEN: 57.1 MM HG (ref 39–55)
PH VENOUS: 7.27 (ref 7.32–7.42)
PLATELET # BLD AUTO: ABNORMAL K/UL (ref 138–453)
PLATELET, FLUORESCENCE: 136 K/UL (ref 138–453)
PLATELETS.RETICULATED NFR BLD AUTO: 15.3 % (ref 1.1–10.3)
PO2, VEN: 35.6 MM HG (ref 30–50)
POTASSIUM SERPL-SCNC: 4.2 MMOL/L (ref 3.7–5.3)
RBC # BLD AUTO: 3.57 M/UL (ref 4.21–5.77)
SODIUM SERPL-SCNC: 131 MMOL/L (ref 135–144)
TROPONIN I SERPL HS-MCNC: 225 NG/L (ref 0–22)
WBC OTHER # BLD: 6.3 K/UL (ref 3.5–11.3)

## 2023-07-23 PROCEDURE — 87641 MR-STAPH DNA AMP PROBE: CPT

## 2023-07-23 PROCEDURE — 71045 X-RAY EXAM CHEST 1 VIEW: CPT

## 2023-07-23 PROCEDURE — 99291 CRITICAL CARE FIRST HOUR: CPT | Performed by: INTERNAL MEDICINE

## 2023-07-23 PROCEDURE — 84484 ASSAY OF TROPONIN QUANT: CPT

## 2023-07-23 PROCEDURE — 90935 HEMODIALYSIS ONE EVALUATION: CPT

## 2023-07-23 PROCEDURE — P9047 ALBUMIN (HUMAN), 25%, 50ML: HCPCS | Performed by: INTERNAL MEDICINE

## 2023-07-23 PROCEDURE — 97530 THERAPEUTIC ACTIVITIES: CPT

## 2023-07-23 PROCEDURE — 6370000000 HC RX 637 (ALT 250 FOR IP)

## 2023-07-23 PROCEDURE — 2580000003 HC RX 258

## 2023-07-23 PROCEDURE — 6370000000 HC RX 637 (ALT 250 FOR IP): Performed by: INTERNAL MEDICINE

## 2023-07-23 PROCEDURE — 99232 SBSQ HOSP IP/OBS MODERATE 35: CPT | Performed by: INTERNAL MEDICINE

## 2023-07-23 PROCEDURE — 6360000002 HC RX W HCPCS

## 2023-07-23 PROCEDURE — 97116 GAIT TRAINING THERAPY: CPT

## 2023-07-23 PROCEDURE — 99233 SBSQ HOSP IP/OBS HIGH 50: CPT | Performed by: INTERNAL MEDICINE

## 2023-07-23 PROCEDURE — 80048 BASIC METABOLIC PNL TOTAL CA: CPT

## 2023-07-23 PROCEDURE — 85027 COMPLETE CBC AUTOMATED: CPT

## 2023-07-23 PROCEDURE — 85055 RETICULATED PLATELET ASSAY: CPT

## 2023-07-23 PROCEDURE — 6360000002 HC RX W HCPCS: Performed by: INTERNAL MEDICINE

## 2023-07-23 PROCEDURE — 83605 ASSAY OF LACTIC ACID: CPT

## 2023-07-23 PROCEDURE — 36415 COLL VENOUS BLD VENIPUNCTURE: CPT

## 2023-07-23 PROCEDURE — 94640 AIRWAY INHALATION TREATMENT: CPT

## 2023-07-23 PROCEDURE — 2580000003 HC RX 258: Performed by: INTERNAL MEDICINE

## 2023-07-23 PROCEDURE — 94660 CPAP INITIATION&MGMT: CPT

## 2023-07-23 PROCEDURE — 82805 BLOOD GASES W/O2 SATURATION: CPT

## 2023-07-23 PROCEDURE — 2500000003 HC RX 250 WO HCPCS

## 2023-07-23 PROCEDURE — 2000000000 HC ICU R&B

## 2023-07-23 RX ORDER — DEXAMETHASONE SODIUM PHOSPHATE 4 MG/ML
4 INJECTION, SOLUTION INTRA-ARTICULAR; INTRALESIONAL; INTRAMUSCULAR; INTRAVENOUS; SOFT TISSUE EVERY 12 HOURS
Status: DISCONTINUED | OUTPATIENT
Start: 2023-07-23 | End: 2023-08-01

## 2023-07-23 RX ORDER — ALBUMIN (HUMAN) 12.5 G/50ML
25 SOLUTION INTRAVENOUS
Status: COMPLETED | OUTPATIENT
Start: 2023-07-23 | End: 2023-07-23

## 2023-07-23 RX ADMIN — ALBUMIN (HUMAN) 25 G: 0.25 INJECTION, SOLUTION INTRAVENOUS at 20:06

## 2023-07-23 RX ADMIN — MIDODRINE HYDROCHLORIDE 10 MG: 5 TABLET ORAL at 19:19

## 2023-07-23 RX ADMIN — IPRATROPIUM BROMIDE AND ALBUTEROL SULFATE 1 DOSE: .5; 2.5 SOLUTION RESPIRATORY (INHALATION) at 15:48

## 2023-07-23 RX ADMIN — SODIUM CHLORIDE, PRESERVATIVE FREE 10 ML: 5 INJECTION INTRAVENOUS at 19:24

## 2023-07-23 RX ADMIN — APIXABAN 5 MG: 5 TABLET, FILM COATED ORAL at 09:05

## 2023-07-23 RX ADMIN — IPRATROPIUM BROMIDE AND ALBUTEROL SULFATE 1 DOSE: .5; 2.5 SOLUTION RESPIRATORY (INHALATION) at 11:40

## 2023-07-23 RX ADMIN — SODIUM CHLORIDE, PRESERVATIVE FREE 10 ML: 5 INJECTION INTRAVENOUS at 08:57

## 2023-07-23 RX ADMIN — CEFEPIME 2000 MG: 2 INJECTION, POWDER, FOR SOLUTION INTRAVENOUS at 14:06

## 2023-07-23 RX ADMIN — DEXAMETHASONE SODIUM PHOSPHATE 6 MG: 10 INJECTION, SOLUTION INTRAMUSCULAR; INTRAVENOUS at 07:52

## 2023-07-23 RX ADMIN — DEXAMETHASONE SODIUM PHOSPHATE 6 MG: 10 INJECTION, SOLUTION INTRAMUSCULAR; INTRAVENOUS at 01:14

## 2023-07-23 RX ADMIN — SODIUM CHLORIDE, PRESERVATIVE FREE 20 MG: 5 INJECTION INTRAVENOUS at 08:53

## 2023-07-23 RX ADMIN — IPRATROPIUM BROMIDE AND ALBUTEROL SULFATE 1 DOSE: .5; 2.5 SOLUTION RESPIRATORY (INHALATION) at 20:46

## 2023-07-23 RX ADMIN — DESMOPRESSIN ACETATE 40 MG: 0.2 TABLET ORAL at 09:05

## 2023-07-23 RX ADMIN — DEXAMETHASONE SODIUM PHOSPHATE 4 MG: 4 INJECTION, SOLUTION INTRAMUSCULAR; INTRAVENOUS at 19:22

## 2023-07-23 RX ADMIN — HEPARIN SODIUM 2300 UNITS: 1000 INJECTION INTRAVENOUS; SUBCUTANEOUS at 20:56

## 2023-07-23 RX ADMIN — MIDODRINE HYDROCHLORIDE 10 MG: 5 TABLET ORAL at 02:13

## 2023-07-23 RX ADMIN — APIXABAN 5 MG: 5 TABLET, FILM COATED ORAL at 20:46

## 2023-07-23 RX ADMIN — ALBUMIN (HUMAN) 25 G: 0.25 INJECTION, SOLUTION INTRAVENOUS at 18:33

## 2023-07-23 RX ADMIN — HEPARIN SODIUM 2300 UNITS: 1000 INJECTION INTRAVENOUS; SUBCUTANEOUS at 20:59

## 2023-07-23 RX ADMIN — Medication 1500 MG: at 15:54

## 2023-07-23 RX ADMIN — IPRATROPIUM BROMIDE AND ALBUTEROL SULFATE 1 DOSE: .5; 2.5 SOLUTION RESPIRATORY (INHALATION) at 08:36

## 2023-07-23 ASSESSMENT — PAIN SCALES - GENERAL
PAINLEVEL_OUTOF10: 0
PAINLEVEL_OUTOF10: 0

## 2023-07-23 NOTE — CARE COORDINATION
Transitional Planning  Spoke with patient,  spouse, dtr and Decon  at bedside, plan to return home with spouse and dtr has transportation.  OP HD MWF at [facility]

## 2023-07-24 ENCOUNTER — APPOINTMENT (OUTPATIENT)
Dept: GENERAL RADIOLOGY | Age: 78
DRG: 286 | End: 2023-07-24
Payer: MEDICARE

## 2023-07-24 PROBLEM — I50.1 PULMONARY EDEMA CARDIAC CAUSE (HCC): Status: ACTIVE | Noted: 2023-07-24

## 2023-07-24 PROBLEM — I95.0 IDIOPATHIC HYPOTENSION: Status: ACTIVE | Noted: 2023-07-24

## 2023-07-24 LAB
ANION GAP SERPL CALCULATED.3IONS-SCNC: 15 MMOL/L (ref 9–17)
BASOPHILS # BLD: <0.03 K/UL (ref 0–0.2)
BASOPHILS NFR BLD: 0 % (ref 0–2)
BUN SERPL-MCNC: 21 MG/DL (ref 8–23)
CALCIUM SERPL-MCNC: 8.7 MG/DL (ref 8.6–10.4)
CHLORIDE SERPL-SCNC: 95 MMOL/L (ref 98–107)
CO2 SERPL-SCNC: 21 MMOL/L (ref 20–31)
CREAT SERPL-MCNC: 4.2 MG/DL (ref 0.7–1.2)
EOSINOPHIL # BLD: <0.03 K/UL (ref 0–0.44)
EOSINOPHILS RELATIVE PERCENT: 0 % (ref 1–4)
ERYTHROCYTE [DISTWIDTH] IN BLOOD BY AUTOMATED COUNT: 20 % (ref 11.8–14.4)
GFR SERPL CREATININE-BSD FRML MDRD: 14 ML/MIN/1.73M2
GLUCOSE SERPL-MCNC: 103 MG/DL (ref 70–99)
HCT VFR BLD AUTO: 34.7 % (ref 40.7–50.3)
HGB BLD-MCNC: 10.3 G/DL (ref 13–17)
IMM GRANULOCYTES # BLD AUTO: 0.08 K/UL (ref 0–0.3)
IMM GRANULOCYTES NFR BLD: 1 %
LACTIC ACID, WHOLE BLOOD: 2.2 MMOL/L (ref 0.7–2.1)
LYMPHOCYTES NFR BLD: 1.04 K/UL (ref 1.1–3.7)
LYMPHOCYTES RELATIVE PERCENT: 15 % (ref 24–43)
MCH RBC QN AUTO: 28.8 PG (ref 25.2–33.5)
MCHC RBC AUTO-ENTMCNC: 29.7 G/DL (ref 28.4–34.8)
MCV RBC AUTO: 96.9 FL (ref 82.6–102.9)
MICROORGANISM SPEC CULT: NORMAL
MICROORGANISM SPEC CULT: NORMAL
MONOCYTES NFR BLD: 0.49 K/UL (ref 0.1–1.2)
MONOCYTES NFR BLD: 7 % (ref 3–12)
MRSA, DNA, NASAL: NEGATIVE
NEUTROPHILS NFR BLD: 77 % (ref 36–65)
NEUTS SEG NFR BLD: 5.35 K/UL (ref 1.5–8.1)
NRBC BLD-RTO: 0.3 PER 100 WBC
PLATELET # BLD AUTO: 116 K/UL (ref 138–453)
PMV BLD AUTO: 12 FL (ref 8.1–13.5)
POTASSIUM SERPL-SCNC: 3.9 MMOL/L (ref 3.7–5.3)
RBC # BLD AUTO: 3.58 M/UL (ref 4.21–5.77)
RBC # BLD: ABNORMAL 10*6/UL
SERVICE CMNT-IMP: NORMAL
SERVICE CMNT-IMP: NORMAL
SODIUM SERPL-SCNC: 131 MMOL/L (ref 135–144)
SPECIMEN DESCRIPTION: NORMAL
WBC OTHER # BLD: 7 K/UL (ref 3.5–11.3)

## 2023-07-24 PROCEDURE — 90935 HEMODIALYSIS ONE EVALUATION: CPT | Performed by: INTERNAL MEDICINE

## 2023-07-24 PROCEDURE — P9047 ALBUMIN (HUMAN), 25%, 50ML: HCPCS | Performed by: INTERNAL MEDICINE

## 2023-07-24 PROCEDURE — 80048 BASIC METABOLIC PNL TOTAL CA: CPT

## 2023-07-24 PROCEDURE — 6360000002 HC RX W HCPCS: Performed by: INTERNAL MEDICINE

## 2023-07-24 PROCEDURE — 6370000000 HC RX 637 (ALT 250 FOR IP)

## 2023-07-24 PROCEDURE — 6370000000 HC RX 637 (ALT 250 FOR IP): Performed by: INTERNAL MEDICINE

## 2023-07-24 PROCEDURE — 2580000003 HC RX 258

## 2023-07-24 PROCEDURE — 2500000003 HC RX 250 WO HCPCS

## 2023-07-24 PROCEDURE — 99233 SBSQ HOSP IP/OBS HIGH 50: CPT | Performed by: INTERNAL MEDICINE

## 2023-07-24 PROCEDURE — 99291 CRITICAL CARE FIRST HOUR: CPT | Performed by: INTERNAL MEDICINE

## 2023-07-24 PROCEDURE — 2580000003 HC RX 258: Performed by: INTERNAL MEDICINE

## 2023-07-24 PROCEDURE — 97530 THERAPEUTIC ACTIVITIES: CPT

## 2023-07-24 PROCEDURE — 99232 SBSQ HOSP IP/OBS MODERATE 35: CPT | Performed by: INTERNAL MEDICINE

## 2023-07-24 PROCEDURE — 2700000000 HC OXYGEN THERAPY PER DAY

## 2023-07-24 PROCEDURE — 2000000000 HC ICU R&B

## 2023-07-24 PROCEDURE — 73562 X-RAY EXAM OF KNEE 3: CPT

## 2023-07-24 PROCEDURE — 90935 HEMODIALYSIS ONE EVALUATION: CPT

## 2023-07-24 PROCEDURE — 97535 SELF CARE MNGMENT TRAINING: CPT

## 2023-07-24 PROCEDURE — 99232 SBSQ HOSP IP/OBS MODERATE 35: CPT | Performed by: SURGERY

## 2023-07-24 PROCEDURE — 94640 AIRWAY INHALATION TREATMENT: CPT

## 2023-07-24 PROCEDURE — 85027 COMPLETE CBC AUTOMATED: CPT

## 2023-07-24 PROCEDURE — 94761 N-INVAS EAR/PLS OXIMETRY MLT: CPT

## 2023-07-24 RX ADMIN — IPRATROPIUM BROMIDE AND ALBUTEROL SULFATE 1 DOSE: .5; 2.5 SOLUTION RESPIRATORY (INHALATION) at 15:53

## 2023-07-24 RX ADMIN — SODIUM CHLORIDE, PRESERVATIVE FREE 10 ML: 5 INJECTION INTRAVENOUS at 10:45

## 2023-07-24 RX ADMIN — CEFEPIME 1000 MG: 1 INJECTION, POWDER, FOR SOLUTION INTRAMUSCULAR; INTRAVENOUS at 15:51

## 2023-07-24 RX ADMIN — HEPARIN SODIUM 2300 UNITS: 1000 INJECTION INTRAVENOUS; SUBCUTANEOUS at 19:40

## 2023-07-24 RX ADMIN — APIXABAN 5 MG: 5 TABLET, FILM COATED ORAL at 10:45

## 2023-07-24 RX ADMIN — IPRATROPIUM BROMIDE AND ALBUTEROL SULFATE 1 DOSE: .5; 2.5 SOLUTION RESPIRATORY (INHALATION) at 20:28

## 2023-07-24 RX ADMIN — ALBUMIN (HUMAN) 25 G: 0.25 INJECTION, SOLUTION INTRAVENOUS at 17:02

## 2023-07-24 RX ADMIN — BENZONATATE 100 MG: 100 CAPSULE ORAL at 10:45

## 2023-07-24 RX ADMIN — IPRATROPIUM BROMIDE AND ALBUTEROL SULFATE 1 DOSE: .5; 2.5 SOLUTION RESPIRATORY (INHALATION) at 07:59

## 2023-07-24 RX ADMIN — SODIUM CHLORIDE, PRESERVATIVE FREE 10 ML: 5 INJECTION INTRAVENOUS at 20:37

## 2023-07-24 RX ADMIN — IPRATROPIUM BROMIDE AND ALBUTEROL SULFATE 1 DOSE: .5; 2.5 SOLUTION RESPIRATORY (INHALATION) at 11:39

## 2023-07-24 RX ADMIN — SODIUM CHLORIDE, PRESERVATIVE FREE 20 MG: 5 INJECTION INTRAVENOUS at 10:45

## 2023-07-24 RX ADMIN — DEXAMETHASONE SODIUM PHOSPHATE 4 MG: 4 INJECTION, SOLUTION INTRAMUSCULAR; INTRAVENOUS at 20:37

## 2023-07-24 RX ADMIN — APIXABAN 5 MG: 5 TABLET, FILM COATED ORAL at 20:37

## 2023-07-24 RX ADMIN — MIDODRINE HYDROCHLORIDE 10 MG: 5 TABLET ORAL at 15:45

## 2023-07-24 RX ADMIN — DESMOPRESSIN ACETATE 40 MG: 0.2 TABLET ORAL at 10:45

## 2023-07-24 RX ADMIN — DEXAMETHASONE SODIUM PHOSPHATE 4 MG: 4 INJECTION, SOLUTION INTRAMUSCULAR; INTRAVENOUS at 10:45

## 2023-07-24 ASSESSMENT — PAIN SCALES - GENERAL
PAINLEVEL_OUTOF10: 0
PAINLEVEL_OUTOF10: 0

## 2023-07-24 ASSESSMENT — PULMONARY FUNCTION TESTS
PIF_VALUE: 13
PIF_VALUE: 14
PIF_VALUE: 12
PIF_VALUE: 11

## 2023-07-25 LAB
ANION GAP SERPL CALCULATED.3IONS-SCNC: 13 MMOL/L (ref 9–17)
BASOPHILS # BLD: <0.03 K/UL (ref 0–0.2)
BASOPHILS NFR BLD: 0 % (ref 0–2)
BUN SERPL-MCNC: 23 MG/DL (ref 8–23)
CALCIUM SERPL-MCNC: 8.6 MG/DL (ref 8.6–10.4)
CHLORIDE SERPL-SCNC: 97 MMOL/L (ref 98–107)
CO2 SERPL-SCNC: 24 MMOL/L (ref 20–31)
CREAT SERPL-MCNC: 4 MG/DL (ref 0.7–1.2)
EOSINOPHIL # BLD: <0.03 K/UL (ref 0–0.44)
EOSINOPHILS RELATIVE PERCENT: 0 % (ref 1–4)
ERYTHROCYTE [DISTWIDTH] IN BLOOD BY AUTOMATED COUNT: 19.9 % (ref 11.8–14.4)
GFR SERPL CREATININE-BSD FRML MDRD: 15 ML/MIN/1.73M2
GLUCOSE SERPL-MCNC: 140 MG/DL (ref 70–99)
HCT VFR BLD AUTO: 33 % (ref 40.7–50.3)
HGB BLD-MCNC: 10.4 G/DL (ref 13–17)
IMM GRANULOCYTES # BLD AUTO: 0.06 K/UL (ref 0–0.3)
IMM GRANULOCYTES NFR BLD: 1 %
LYMPHOCYTES NFR BLD: 1.13 K/UL (ref 1.1–3.7)
LYMPHOCYTES RELATIVE PERCENT: 16 % (ref 24–43)
MCH RBC QN AUTO: 28.4 PG (ref 25.2–33.5)
MCHC RBC AUTO-ENTMCNC: 31.5 G/DL (ref 28.4–34.8)
MCV RBC AUTO: 90.2 FL (ref 82.6–102.9)
MONOCYTES NFR BLD: 0.59 K/UL (ref 0.1–1.2)
MONOCYTES NFR BLD: 9 % (ref 3–12)
NEUTROPHILS NFR BLD: 74 % (ref 36–65)
NEUTS SEG NFR BLD: 5.17 K/UL (ref 1.5–8.1)
NRBC BLD-RTO: 0 PER 100 WBC
PLATELET # BLD AUTO: ABNORMAL K/UL (ref 138–453)
PLATELET, FLUORESCENCE: 131 K/UL (ref 138–453)
PLATELETS.RETICULATED NFR BLD AUTO: 15.3 % (ref 1.1–10.3)
POTASSIUM SERPL-SCNC: 4.2 MMOL/L (ref 3.7–5.3)
RBC # BLD AUTO: 3.66 M/UL (ref 4.21–5.77)
RBC # BLD: ABNORMAL 10*6/UL
SODIUM SERPL-SCNC: 134 MMOL/L (ref 135–144)
WBC OTHER # BLD: 7 K/UL (ref 3.5–11.3)

## 2023-07-25 PROCEDURE — 2700000000 HC OXYGEN THERAPY PER DAY

## 2023-07-25 PROCEDURE — 6370000000 HC RX 637 (ALT 250 FOR IP)

## 2023-07-25 PROCEDURE — 85055 RETICULATED PLATELET ASSAY: CPT

## 2023-07-25 PROCEDURE — 36415 COLL VENOUS BLD VENIPUNCTURE: CPT

## 2023-07-25 PROCEDURE — 2580000003 HC RX 258

## 2023-07-25 PROCEDURE — 99232 SBSQ HOSP IP/OBS MODERATE 35: CPT | Performed by: INTERNAL MEDICINE

## 2023-07-25 PROCEDURE — 94640 AIRWAY INHALATION TREATMENT: CPT

## 2023-07-25 PROCEDURE — 99291 CRITICAL CARE FIRST HOUR: CPT | Performed by: INTERNAL MEDICINE

## 2023-07-25 PROCEDURE — 94660 CPAP INITIATION&MGMT: CPT

## 2023-07-25 PROCEDURE — 2060000000 HC ICU INTERMEDIATE R&B

## 2023-07-25 PROCEDURE — 2500000003 HC RX 250 WO HCPCS

## 2023-07-25 PROCEDURE — 2580000003 HC RX 258: Performed by: INTERNAL MEDICINE

## 2023-07-25 PROCEDURE — 80048 BASIC METABOLIC PNL TOTAL CA: CPT

## 2023-07-25 PROCEDURE — 85027 COMPLETE CBC AUTOMATED: CPT

## 2023-07-25 PROCEDURE — 6360000002 HC RX W HCPCS: Performed by: INTERNAL MEDICINE

## 2023-07-25 PROCEDURE — 94761 N-INVAS EAR/PLS OXIMETRY MLT: CPT

## 2023-07-25 RX ORDER — MIDODRINE HYDROCHLORIDE 5 MG/1
10 TABLET ORAL 3 TIMES DAILY
Status: DISCONTINUED | OUTPATIENT
Start: 2023-07-25 | End: 2023-08-15 | Stop reason: HOSPADM

## 2023-07-25 RX ORDER — MIDODRINE HYDROCHLORIDE 5 MG/1
10 TABLET ORAL 3 TIMES DAILY
Status: DISCONTINUED | OUTPATIENT
Start: 2023-07-25 | End: 2023-07-25

## 2023-07-25 RX ORDER — BUDESONIDE AND FORMOTEROL FUMARATE DIHYDRATE 80; 4.5 UG/1; UG/1
2 AEROSOL RESPIRATORY (INHALATION)
Status: DISCONTINUED | OUTPATIENT
Start: 2023-07-25 | End: 2023-08-15 | Stop reason: HOSPADM

## 2023-07-25 RX ORDER — FAMOTIDINE 20 MG/1
20 TABLET, FILM COATED ORAL DAILY
Status: DISCONTINUED | OUTPATIENT
Start: 2023-07-26 | End: 2023-08-15 | Stop reason: HOSPADM

## 2023-07-25 RX ADMIN — ACETAMINOPHEN 650 MG: 325 TABLET ORAL at 22:10

## 2023-07-25 RX ADMIN — SODIUM CHLORIDE: 9 INJECTION, SOLUTION INTRAVENOUS at 14:36

## 2023-07-25 RX ADMIN — MIDODRINE HYDROCHLORIDE 10 MG: 5 TABLET ORAL at 12:58

## 2023-07-25 RX ADMIN — BENZONATATE 100 MG: 100 CAPSULE ORAL at 14:39

## 2023-07-25 RX ADMIN — IPRATROPIUM BROMIDE AND ALBUTEROL SULFATE 1 DOSE: .5; 2.5 SOLUTION RESPIRATORY (INHALATION) at 12:33

## 2023-07-25 RX ADMIN — SODIUM CHLORIDE, PRESERVATIVE FREE 20 MG: 5 INJECTION INTRAVENOUS at 08:39

## 2023-07-25 RX ADMIN — DESMOPRESSIN ACETATE 40 MG: 0.2 TABLET ORAL at 08:39

## 2023-07-25 RX ADMIN — DEXAMETHASONE SODIUM PHOSPHATE 4 MG: 4 INJECTION, SOLUTION INTRAMUSCULAR; INTRAVENOUS at 20:33

## 2023-07-25 RX ADMIN — IPRATROPIUM BROMIDE AND ALBUTEROL SULFATE 1 DOSE: .5; 2.5 SOLUTION RESPIRATORY (INHALATION) at 16:36

## 2023-07-25 RX ADMIN — DEXAMETHASONE SODIUM PHOSPHATE 4 MG: 4 INJECTION, SOLUTION INTRAMUSCULAR; INTRAVENOUS at 08:39

## 2023-07-25 RX ADMIN — IPRATROPIUM BROMIDE AND ALBUTEROL SULFATE 1 DOSE: .5; 2.5 SOLUTION RESPIRATORY (INHALATION) at 08:05

## 2023-07-25 RX ADMIN — SODIUM CHLORIDE, PRESERVATIVE FREE 10 ML: 5 INJECTION INTRAVENOUS at 20:34

## 2023-07-25 RX ADMIN — APIXABAN 5 MG: 5 TABLET, FILM COATED ORAL at 20:33

## 2023-07-25 RX ADMIN — GUAIFENESIN AND DEXTROMETHORPHAN 5 ML: 100; 10 SYRUP ORAL at 14:39

## 2023-07-25 RX ADMIN — GUAIFENESIN AND DEXTROMETHORPHAN 5 ML: 100; 10 SYRUP ORAL at 10:22

## 2023-07-25 RX ADMIN — IPRATROPIUM BROMIDE AND ALBUTEROL SULFATE 1 DOSE: .5; 2.5 SOLUTION RESPIRATORY (INHALATION) at 20:45

## 2023-07-25 RX ADMIN — BENZONATATE 100 MG: 100 CAPSULE ORAL at 20:39

## 2023-07-25 RX ADMIN — CEFEPIME 1000 MG: 1 INJECTION, POWDER, FOR SOLUTION INTRAMUSCULAR; INTRAVENOUS at 14:37

## 2023-07-25 RX ADMIN — BUDESONIDE AND FORMOTEROL FUMARATE DIHYDRATE 2 PUFF: 80; 4.5 AEROSOL RESPIRATORY (INHALATION) at 20:45

## 2023-07-25 RX ADMIN — BENZONATATE 100 MG: 100 CAPSULE ORAL at 08:39

## 2023-07-25 RX ADMIN — MIDODRINE HYDROCHLORIDE 10 MG: 5 TABLET ORAL at 20:33

## 2023-07-25 RX ADMIN — GUAIFENESIN AND DEXTROMETHORPHAN 5 ML: 100; 10 SYRUP ORAL at 20:39

## 2023-07-25 RX ADMIN — APIXABAN 5 MG: 5 TABLET, FILM COATED ORAL at 08:39

## 2023-07-25 RX ADMIN — SODIUM CHLORIDE, PRESERVATIVE FREE 10 ML: 5 INJECTION INTRAVENOUS at 08:39

## 2023-07-25 ASSESSMENT — PAIN SCALES - GENERAL
PAINLEVEL_OUTOF10: 3
PAINLEVEL_OUTOF10: 3

## 2023-07-25 ASSESSMENT — PULMONARY FUNCTION TESTS
PIF_VALUE: 12
PIF_VALUE: 12

## 2023-07-25 ASSESSMENT — PAIN DESCRIPTION - LOCATION: LOCATION: KNEE

## 2023-07-25 ASSESSMENT — PAIN DESCRIPTION - DESCRIPTORS: DESCRIPTORS: ACHING

## 2023-07-25 ASSESSMENT — PAIN DESCRIPTION - ORIENTATION: ORIENTATION: RIGHT

## 2023-07-25 NOTE — DISCHARGE INSTRUCTIONS
CT Chest 7/19/23  IMPRESSION:  Multiple pulmonary nodules as above suspicious for metastatic disease. Mild-to-moderate CHF. Coronary artery disease and valvular heart disease. Calcified right renal mass. Recommend follow-up CT urogram non emergently. CT Urogram 7/19/23  IMPRESSION:  1. Both kidneys demonstrate some atrophy and cystic changes with a calcified  cystic mass in the right kidney in the superior pole measuring 4 x 3.5 cm  which is partly cystic. 2. No evidence of excretion of contrast through the kidneys. This indicates  poor renal function. 3. Abdominal aortic aneurysm with the largest axial dimension of 4.2 x 5.3 cm.  4. 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 5. Evidence of bowel obstruction or perforation. Diverticulosis but no acute  diverticulitis. 6. Left inguinal hernia containing bowel no obstruction or strangulation. 7. No evidence of intra-abdominal ascites. Mesenteric congestion. Mild  anasarca. RECOMMENDATIONS:  For management of fusiform AAA: Recommend Vascular consultation if a fusiform AAA enlarges by >0.5 cm  in 6 months or >1 cm in 1 year or for a saccular AAA of any size. References: Sofia Gum Radiol 2013; 49(02):267-083; J Vasc Surg. 2018; 67:2-77    You are advised to follow-up with urology as an outpatient to complete workup for renal mass and to rule out malignancy. Also advised to follow-up with heme/oncology after urology workup has been done. Please follow-up with pulmonary service for suspicious nodules on the CT scan of the lung and might need biopsy at some point of time. You have been seen by cardiology and they recommend TAVR. Please follow-up with their recommendations and have outpatient visit with them. Continue take your medication as advised or recommended. Continue to work with physical therapy occupational therapy is recommended. Case of worsening symptoms, seek medical attention or come to emergency department.

## 2023-07-26 ENCOUNTER — APPOINTMENT (OUTPATIENT)
Age: 78
DRG: 286 | End: 2023-07-26
Payer: MEDICARE

## 2023-07-26 LAB
ANION GAP SERPL CALCULATED.3IONS-SCNC: 13 MMOL/L (ref 9–17)
BUN SERPL-MCNC: 23 MG/DL (ref 8–23)
CALCIUM SERPL-MCNC: 9.2 MG/DL (ref 8.6–10.4)
CHLORIDE SERPL-SCNC: 98 MMOL/L (ref 98–107)
CO2 SERPL-SCNC: 24 MMOL/L (ref 20–31)
CREAT SERPL-MCNC: 4.4 MG/DL (ref 0.7–1.2)
GFR SERPL CREATININE-BSD FRML MDRD: 13 ML/MIN/1.73M2
GLUCOSE SERPL-MCNC: 137 MG/DL (ref 70–99)
METER GLUCOSE: 176 MG/DL (ref 75–110)
METER GLUCOSE: 196 MG/DL (ref 75–110)
POTASSIUM SERPL-SCNC: 3.7 MMOL/L (ref 3.7–5.3)
SODIUM SERPL-SCNC: 135 MMOL/L (ref 135–144)

## 2023-07-26 PROCEDURE — 6370000000 HC RX 637 (ALT 250 FOR IP)

## 2023-07-26 PROCEDURE — 36415 COLL VENOUS BLD VENIPUNCTURE: CPT

## 2023-07-26 PROCEDURE — 94640 AIRWAY INHALATION TREATMENT: CPT

## 2023-07-26 PROCEDURE — 6360000002 HC RX W HCPCS: Performed by: INTERNAL MEDICINE

## 2023-07-26 PROCEDURE — 2060000000 HC ICU INTERMEDIATE R&B

## 2023-07-26 PROCEDURE — 2580000003 HC RX 258: Performed by: INTERNAL MEDICINE

## 2023-07-26 PROCEDURE — 2700000000 HC OXYGEN THERAPY PER DAY

## 2023-07-26 PROCEDURE — 90935 HEMODIALYSIS ONE EVALUATION: CPT

## 2023-07-26 PROCEDURE — 80048 BASIC METABOLIC PNL TOTAL CA: CPT

## 2023-07-26 PROCEDURE — 93306 TTE W/DOPPLER COMPLETE: CPT

## 2023-07-26 PROCEDURE — 99232 SBSQ HOSP IP/OBS MODERATE 35: CPT | Performed by: INTERNAL MEDICINE

## 2023-07-26 PROCEDURE — 6370000000 HC RX 637 (ALT 250 FOR IP): Performed by: INTERNAL MEDICINE

## 2023-07-26 PROCEDURE — 94660 CPAP INITIATION&MGMT: CPT

## 2023-07-26 PROCEDURE — 94667 MNPJ CHEST WALL 1ST: CPT

## 2023-07-26 PROCEDURE — 90935 HEMODIALYSIS ONE EVALUATION: CPT | Performed by: INTERNAL MEDICINE

## 2023-07-26 PROCEDURE — 94761 N-INVAS EAR/PLS OXIMETRY MLT: CPT

## 2023-07-26 PROCEDURE — 93306 TTE W/DOPPLER COMPLETE: CPT | Performed by: INTERNAL MEDICINE

## 2023-07-26 PROCEDURE — 99233 SBSQ HOSP IP/OBS HIGH 50: CPT | Performed by: NURSE PRACTITIONER

## 2023-07-26 PROCEDURE — 99233 SBSQ HOSP IP/OBS HIGH 50: CPT | Performed by: INTERNAL MEDICINE

## 2023-07-26 PROCEDURE — 82947 ASSAY GLUCOSE BLOOD QUANT: CPT

## 2023-07-26 PROCEDURE — 2580000003 HC RX 258

## 2023-07-26 RX ORDER — MIDODRINE HYDROCHLORIDE 5 MG/1
10 TABLET ORAL ONCE
Status: COMPLETED | OUTPATIENT
Start: 2023-07-26 | End: 2023-07-26

## 2023-07-26 RX ORDER — GUAIFENESIN 600 MG/1
1200 TABLET, EXTENDED RELEASE ORAL 2 TIMES DAILY
Status: DISCONTINUED | OUTPATIENT
Start: 2023-07-26 | End: 2023-08-04

## 2023-07-26 RX ADMIN — FAMOTIDINE 20 MG: 20 TABLET, FILM COATED ORAL at 15:13

## 2023-07-26 RX ADMIN — MIDODRINE HYDROCHLORIDE 10 MG: 5 TABLET ORAL at 04:17

## 2023-07-26 RX ADMIN — IPRATROPIUM BROMIDE AND ALBUTEROL SULFATE 1 DOSE: .5; 2.5 SOLUTION RESPIRATORY (INHALATION) at 09:34

## 2023-07-26 RX ADMIN — BUDESONIDE AND FORMOTEROL FUMARATE DIHYDRATE 2 PUFF: 80; 4.5 AEROSOL RESPIRATORY (INHALATION) at 19:44

## 2023-07-26 RX ADMIN — HEPARIN SODIUM 2300 UNITS: 1000 INJECTION INTRAVENOUS; SUBCUTANEOUS at 12:54

## 2023-07-26 RX ADMIN — GUAIFENESIN 1200 MG: 600 TABLET, EXTENDED RELEASE ORAL at 15:10

## 2023-07-26 RX ADMIN — CEFEPIME 1000 MG: 1 INJECTION, POWDER, FOR SOLUTION INTRAMUSCULAR; INTRAVENOUS at 12:54

## 2023-07-26 RX ADMIN — BUDESONIDE AND FORMOTEROL FUMARATE DIHYDRATE 2 PUFF: 80; 4.5 AEROSOL RESPIRATORY (INHALATION) at 09:35

## 2023-07-26 RX ADMIN — APIXABAN 5 MG: 5 TABLET, FILM COATED ORAL at 19:42

## 2023-07-26 RX ADMIN — HEPARIN SODIUM 2300 UNITS: 1000 INJECTION INTRAVENOUS; SUBCUTANEOUS at 12:55

## 2023-07-26 RX ADMIN — SODIUM CHLORIDE, PRESERVATIVE FREE 10 ML: 5 INJECTION INTRAVENOUS at 09:44

## 2023-07-26 RX ADMIN — DESMOPRESSIN ACETATE 40 MG: 0.2 TABLET ORAL at 15:13

## 2023-07-26 RX ADMIN — IPRATROPIUM BROMIDE AND ALBUTEROL SULFATE 1 DOSE: .5; 2.5 SOLUTION RESPIRATORY (INHALATION) at 15:16

## 2023-07-26 RX ADMIN — MIDODRINE HYDROCHLORIDE 10 MG: 5 TABLET ORAL at 15:09

## 2023-07-26 RX ADMIN — DEXAMETHASONE SODIUM PHOSPHATE 4 MG: 4 INJECTION, SOLUTION INTRAMUSCULAR; INTRAVENOUS at 19:43

## 2023-07-26 RX ADMIN — IPRATROPIUM BROMIDE AND ALBUTEROL SULFATE 1 DOSE: .5; 2.5 SOLUTION RESPIRATORY (INHALATION) at 19:44

## 2023-07-26 RX ADMIN — DEXAMETHASONE SODIUM PHOSPHATE 4 MG: 4 INJECTION, SOLUTION INTRAMUSCULAR; INTRAVENOUS at 09:44

## 2023-07-26 RX ADMIN — SODIUM CHLORIDE, PRESERVATIVE FREE 10 ML: 5 INJECTION INTRAVENOUS at 19:43

## 2023-07-26 RX ADMIN — MIDODRINE HYDROCHLORIDE 10 MG: 5 TABLET ORAL at 19:42

## 2023-07-26 RX ADMIN — GUAIFENESIN 1200 MG: 600 TABLET, EXTENDED RELEASE ORAL at 19:42

## 2023-07-26 ASSESSMENT — PAIN SCALES - GENERAL
PAINLEVEL_OUTOF10: 0
PAINLEVEL_OUTOF10: 0

## 2023-07-27 PROCEDURE — 99233 SBSQ HOSP IP/OBS HIGH 50: CPT | Performed by: INTERNAL MEDICINE

## 2023-07-27 PROCEDURE — 6370000000 HC RX 637 (ALT 250 FOR IP)

## 2023-07-27 PROCEDURE — 99233 SBSQ HOSP IP/OBS HIGH 50: CPT | Performed by: NURSE PRACTITIONER

## 2023-07-27 PROCEDURE — 6370000000 HC RX 637 (ALT 250 FOR IP): Performed by: INTERNAL MEDICINE

## 2023-07-27 PROCEDURE — 94761 N-INVAS EAR/PLS OXIMETRY MLT: CPT

## 2023-07-27 PROCEDURE — 2060000000 HC ICU INTERMEDIATE R&B

## 2023-07-27 PROCEDURE — 99232 SBSQ HOSP IP/OBS MODERATE 35: CPT | Performed by: INTERNAL MEDICINE

## 2023-07-27 PROCEDURE — 2580000003 HC RX 258

## 2023-07-27 PROCEDURE — 97110 THERAPEUTIC EXERCISES: CPT

## 2023-07-27 PROCEDURE — 6360000002 HC RX W HCPCS: Performed by: INTERNAL MEDICINE

## 2023-07-27 PROCEDURE — 97116 GAIT TRAINING THERAPY: CPT

## 2023-07-27 PROCEDURE — 2700000000 HC OXYGEN THERAPY PER DAY

## 2023-07-27 PROCEDURE — 94640 AIRWAY INHALATION TREATMENT: CPT

## 2023-07-27 PROCEDURE — 2580000003 HC RX 258: Performed by: INTERNAL MEDICINE

## 2023-07-27 PROCEDURE — 94660 CPAP INITIATION&MGMT: CPT

## 2023-07-27 RX ADMIN — ACETAMINOPHEN 650 MG: 325 TABLET ORAL at 00:03

## 2023-07-27 RX ADMIN — DESMOPRESSIN ACETATE 40 MG: 0.2 TABLET ORAL at 09:42

## 2023-07-27 RX ADMIN — IPRATROPIUM BROMIDE AND ALBUTEROL SULFATE 1 DOSE: .5; 2.5 SOLUTION RESPIRATORY (INHALATION) at 20:04

## 2023-07-27 RX ADMIN — IPRATROPIUM BROMIDE AND ALBUTEROL SULFATE 1 DOSE: .5; 2.5 SOLUTION RESPIRATORY (INHALATION) at 12:08

## 2023-07-27 RX ADMIN — MIDODRINE HYDROCHLORIDE 10 MG: 5 TABLET ORAL at 09:42

## 2023-07-27 RX ADMIN — MIDODRINE HYDROCHLORIDE 10 MG: 5 TABLET ORAL at 15:56

## 2023-07-27 RX ADMIN — BUDESONIDE AND FORMOTEROL FUMARATE DIHYDRATE 2 PUFF: 80; 4.5 AEROSOL RESPIRATORY (INHALATION) at 20:04

## 2023-07-27 RX ADMIN — GUAIFENESIN 1200 MG: 600 TABLET, EXTENDED RELEASE ORAL at 21:54

## 2023-07-27 RX ADMIN — SODIUM CHLORIDE, PRESERVATIVE FREE 10 ML: 5 INJECTION INTRAVENOUS at 09:43

## 2023-07-27 RX ADMIN — BUDESONIDE AND FORMOTEROL FUMARATE DIHYDRATE 2 PUFF: 80; 4.5 AEROSOL RESPIRATORY (INHALATION) at 07:30

## 2023-07-27 RX ADMIN — CEFEPIME 1000 MG: 1 INJECTION, POWDER, FOR SOLUTION INTRAMUSCULAR; INTRAVENOUS at 15:58

## 2023-07-27 RX ADMIN — IPRATROPIUM BROMIDE AND ALBUTEROL SULFATE 1 DOSE: .5; 2.5 SOLUTION RESPIRATORY (INHALATION) at 16:02

## 2023-07-27 RX ADMIN — GUAIFENESIN 1200 MG: 600 TABLET, EXTENDED RELEASE ORAL at 09:42

## 2023-07-27 RX ADMIN — DEXAMETHASONE SODIUM PHOSPHATE 4 MG: 4 INJECTION, SOLUTION INTRAMUSCULAR; INTRAVENOUS at 21:54

## 2023-07-27 RX ADMIN — SODIUM CHLORIDE, PRESERVATIVE FREE 10 ML: 5 INJECTION INTRAVENOUS at 21:54

## 2023-07-27 RX ADMIN — MIDODRINE HYDROCHLORIDE 10 MG: 5 TABLET ORAL at 21:54

## 2023-07-27 RX ADMIN — APIXABAN 5 MG: 5 TABLET, FILM COATED ORAL at 21:54

## 2023-07-27 RX ADMIN — DEXAMETHASONE SODIUM PHOSPHATE 4 MG: 4 INJECTION, SOLUTION INTRAMUSCULAR; INTRAVENOUS at 09:42

## 2023-07-27 RX ADMIN — APIXABAN 5 MG: 5 TABLET, FILM COATED ORAL at 11:11

## 2023-07-27 RX ADMIN — FAMOTIDINE 20 MG: 20 TABLET, FILM COATED ORAL at 11:11

## 2023-07-27 RX ADMIN — IPRATROPIUM BROMIDE AND ALBUTEROL SULFATE 1 DOSE: .5; 2.5 SOLUTION RESPIRATORY (INHALATION) at 07:28

## 2023-07-27 ASSESSMENT — PAIN SCALES - GENERAL
PAINLEVEL_OUTOF10: 6
PAINLEVEL_OUTOF10: 0
PAINLEVEL_OUTOF10: 0

## 2023-07-27 ASSESSMENT — ENCOUNTER SYMPTOMS
COUGH: 1
WHEEZING: 0
SHORTNESS OF BREATH: 0

## 2023-07-27 NOTE — CARE COORDINATION
Met with patient to discuss transitional planning. Patient's plan is to return home with spouse and family. Patient will have transportation home. Patient requests order for rollator walker for home. Discussed possible home healthcare with patient and he is uncertain at this time. He states he would like to discuss that with his wife. 1210: perfect serve message to IM resident for DME order for rollator.

## 2023-07-28 LAB
ANION GAP SERPL CALCULATED.3IONS-SCNC: 17 MMOL/L (ref 9–17)
BASOPHILS # BLD: <0.03 K/UL (ref 0–0.2)
BASOPHILS NFR BLD: 0 % (ref 0–2)
BUN SERPL-MCNC: 52 MG/DL (ref 8–23)
CALCIUM SERPL-MCNC: 9.6 MG/DL (ref 8.6–10.4)
CHLORIDE SERPL-SCNC: 94 MMOL/L (ref 98–107)
CO2 SERPL-SCNC: 22 MMOL/L (ref 20–31)
CREAT SERPL-MCNC: 6.8 MG/DL (ref 0.7–1.2)
ECHO AO ROOT DIAM: 2.7 CM
ECHO AO ROOT INDEX: 1.22 CM/M2
ECHO AR MAX VEL PISA: 4.2 M/S
ECHO AV AREA PEAK VELOCITY: 0.6 CM2
ECHO AV AREA VTI: 0.6 CM2
ECHO AV AREA/BSA PEAK VELOCITY: 0.3 CM2/M2
ECHO AV AREA/BSA VTI: 0.3 CM2/M2
ECHO AV MEAN GRADIENT: 49 MMHG
ECHO AV MEAN VELOCITY: 3.3 M/S
ECHO AV PEAK GRADIENT: 86 MMHG
ECHO AV PEAK VELOCITY: 4.6 M/S
ECHO AV REGURGITANT PHT: 374 MS
ECHO AV VELOCITY RATIO: 0.15
ECHO AV VTI: 104 CM
ECHO BSA: 2.23 M2
ECHO EST RA PRESSURE: 5 MMHG
ECHO IVC PROX: 2.3 CM
ECHO LA AREA 2C: 44.5 CM2
ECHO LA AREA 4C: 33 CM2
ECHO LA DIAMETER INDEX: 2.16 CM/M2
ECHO LA DIAMETER: 4.8 CM
ECHO LA MAJOR AXIS: 7.4 CM
ECHO LA MINOR AXIS: 8.3 CM
ECHO LA TO AORTIC ROOT RATIO: 1.78
ECHO LA VOL A-L A2C: 190 ML (ref 18–58)
ECHO LA VOL A-L A4C: 118 ML (ref 18–58)
ECHO LA VOL BP: 158 ML (ref 18–58)
ECHO LA VOL/BSA BIPLANE: 71 ML/M2 (ref 16–34)
ECHO LA VOLUME INDEX A-L A2C: 86 ML/M2 (ref 16–34)
ECHO LA VOLUME INDEX A-L A4C: 53 ML/M2 (ref 16–34)
ECHO LV E' LATERAL VELOCITY: 5 CM/S
ECHO LV E' SEPTAL VELOCITY: 6 CM/S
ECHO LV EDV A2C: 170 ML
ECHO LV EDV A4C: 151 ML
ECHO LV EDV INDEX A4C: 68 ML/M2
ECHO LV EDV NDEX A2C: 77 ML/M2
ECHO LV EJECTION FRACTION A2C: 27 %
ECHO LV EJECTION FRACTION A4C: 26 %
ECHO LV EJECTION FRACTION BIPLANE: 27 % (ref 55–100)
ECHO LV ESV A2C: 124 ML
ECHO LV ESV A4C: 112 ML
ECHO LV ESV INDEX A2C: 56 ML/M2
ECHO LV ESV INDEX A4C: 50 ML/M2
ECHO LV FRACTIONAL SHORTENING: 20 % (ref 28–44)
ECHO LV INTERNAL DIMENSION DIASTOLE INDEX: 2.03 CM/M2
ECHO LV INTERNAL DIMENSION DIASTOLIC: 4.5 CM (ref 4.2–5.9)
ECHO LV INTERNAL DIMENSION SYSTOLIC INDEX: 1.62 CM/M2
ECHO LV INTERNAL DIMENSION SYSTOLIC: 3.6 CM
ECHO LV IVSD: 1.2 CM (ref 0.6–1)
ECHO LV MASS 2D: 198.1 G (ref 88–224)
ECHO LV MASS INDEX 2D: 89.2 G/M2 (ref 49–115)
ECHO LV POSTERIOR WALL DIASTOLIC: 1.2 CM (ref 0.6–1)
ECHO LV RELATIVE WALL THICKNESS RATIO: 0.53
ECHO LVOT AREA: 3.8 CM2
ECHO LVOT AV VTI INDEX: 0.15
ECHO LVOT DIAM: 2.2 CM
ECHO LVOT MEAN GRADIENT: 1 MMHG
ECHO LVOT PEAK GRADIENT: 2 MMHG
ECHO LVOT PEAK VELOCITY: 0.7 M/S
ECHO LVOT STROKE VOLUME INDEX: 26 ML/M2
ECHO LVOT SV: 57.8 ML
ECHO LVOT VTI: 15.2 CM
ECHO MV A VELOCITY: 1.39 M/S
ECHO MV AREA VTI: 1 CM2
ECHO MV E DECELERATION TIME (DT): 93 MS
ECHO MV E VELOCITY: 1.86 M/S
ECHO MV E/A RATIO: 1.34
ECHO MV E/E' LATERAL: 37.2
ECHO MV E/E' RATIO (AVERAGED): 34.1
ECHO MV E/E' SEPTAL: 31
ECHO MV EROA PISA: 0.3 CM2
ECHO MV LVOT VTI INDEX: 3.72
ECHO MV MAX VELOCITY: 2.1 M/S
ECHO MV MEAN GRADIENT: 8 MMHG
ECHO MV MEAN VELOCITY: 1.3 M/S
ECHO MV PEAK GRADIENT: 17 MMHG
ECHO MV REGURGITANT ALIASING (NYQUIST) VELOCITY: 37 CM/S
ECHO MV REGURGITANT PEAK GRADIENT: 125 MMHG
ECHO MV REGURGITANT PEAK VELOCITY: 5.6 M/S
ECHO MV REGURGITANT RADIUS PISA: 0.8 CM
ECHO MV REGURGITANT VOLUME PISA: 48.33 ML
ECHO MV REGURGITANT VTIA: 182 CM
ECHO MV VTI: 56.6 CM
ECHO PV MAX VELOCITY: 1.7 M/S
ECHO PV PEAK GRADIENT: 12 MMHG
ECHO RIGHT VENTRICULAR SYSTOLIC PRESSURE (RVSP): 51 MMHG
ECHO RV FREE WALL PEAK S': 16 CM/S
ECHO RV INTERNAL DIMENSION: 4.6 CM
ECHO RV TAPSE: 2.2 CM (ref 1.7–?)
ECHO TV REGURGITANT MAX VELOCITY: 3.4 M/S
ECHO TV REGURGITANT PEAK GRADIENT: 46 MMHG
EOSINOPHIL # BLD: <0.03 K/UL (ref 0–0.44)
EOSINOPHILS RELATIVE PERCENT: 0 % (ref 1–4)
ERYTHROCYTE [DISTWIDTH] IN BLOOD BY AUTOMATED COUNT: 20 % (ref 11.8–14.4)
GFR SERPL CREATININE-BSD FRML MDRD: 8 ML/MIN/1.73M2
GLUCOSE SERPL-MCNC: 86 MG/DL (ref 70–99)
HCT VFR BLD AUTO: 36.9 % (ref 40.7–50.3)
HGB BLD-MCNC: 12 G/DL (ref 13–17)
IMM GRANULOCYTES # BLD AUTO: 0.18 K/UL (ref 0–0.3)
IMM GRANULOCYTES NFR BLD: 2 %
LYMPHOCYTES NFR BLD: 1.35 K/UL (ref 1.1–3.7)
LYMPHOCYTES RELATIVE PERCENT: 11 % (ref 24–43)
MCH RBC QN AUTO: 28.6 PG (ref 25.2–33.5)
MCHC RBC AUTO-ENTMCNC: 32.5 G/DL (ref 28.4–34.8)
MCV RBC AUTO: 88.1 FL (ref 82.6–102.9)
MONOCYTES NFR BLD: 0.67 K/UL (ref 0.1–1.2)
MONOCYTES NFR BLD: 6 % (ref 3–12)
NEUTROPHILS NFR BLD: 81 % (ref 36–65)
NEUTS SEG NFR BLD: 9.62 K/UL (ref 1.5–8.1)
NRBC BLD-RTO: 0.2 PER 100 WBC
PLATELET # BLD AUTO: ABNORMAL K/UL (ref 138–453)
PLATELET, FLUORESCENCE: 167 K/UL (ref 138–453)
PLATELETS.RETICULATED NFR BLD AUTO: 14 % (ref 1.1–10.3)
POTASSIUM SERPL-SCNC: 4.4 MMOL/L (ref 3.7–5.3)
RBC # BLD AUTO: 4.19 M/UL (ref 4.21–5.77)
RBC # BLD: ABNORMAL 10*6/UL
SODIUM SERPL-SCNC: 133 MMOL/L (ref 135–144)
WBC OTHER # BLD: 11.9 K/UL (ref 3.5–11.3)

## 2023-07-28 PROCEDURE — 94761 N-INVAS EAR/PLS OXIMETRY MLT: CPT

## 2023-07-28 PROCEDURE — 6370000000 HC RX 637 (ALT 250 FOR IP)

## 2023-07-28 PROCEDURE — 6360000002 HC RX W HCPCS: Performed by: INTERNAL MEDICINE

## 2023-07-28 PROCEDURE — 85027 COMPLETE CBC AUTOMATED: CPT

## 2023-07-28 PROCEDURE — 80048 BASIC METABOLIC PNL TOTAL CA: CPT

## 2023-07-28 PROCEDURE — 99232 SBSQ HOSP IP/OBS MODERATE 35: CPT | Performed by: INTERNAL MEDICINE

## 2023-07-28 PROCEDURE — 90935 HEMODIALYSIS ONE EVALUATION: CPT

## 2023-07-28 PROCEDURE — 94660 CPAP INITIATION&MGMT: CPT

## 2023-07-28 PROCEDURE — 99233 SBSQ HOSP IP/OBS HIGH 50: CPT | Performed by: NURSE PRACTITIONER

## 2023-07-28 PROCEDURE — 2700000000 HC OXYGEN THERAPY PER DAY

## 2023-07-28 PROCEDURE — 2580000003 HC RX 258: Performed by: INTERNAL MEDICINE

## 2023-07-28 PROCEDURE — 2580000003 HC RX 258

## 2023-07-28 PROCEDURE — 6370000000 HC RX 637 (ALT 250 FOR IP): Performed by: INTERNAL MEDICINE

## 2023-07-28 PROCEDURE — 90935 HEMODIALYSIS ONE EVALUATION: CPT | Performed by: INTERNAL MEDICINE

## 2023-07-28 PROCEDURE — 85055 RETICULATED PLATELET ASSAY: CPT

## 2023-07-28 PROCEDURE — 99222 1ST HOSP IP/OBS MODERATE 55: CPT | Performed by: NURSE PRACTITIONER

## 2023-07-28 PROCEDURE — 2060000000 HC ICU INTERMEDIATE R&B

## 2023-07-28 PROCEDURE — 36415 COLL VENOUS BLD VENIPUNCTURE: CPT

## 2023-07-28 PROCEDURE — 94640 AIRWAY INHALATION TREATMENT: CPT

## 2023-07-28 RX ORDER — MIDODRINE HYDROCHLORIDE 5 MG/1
10 TABLET ORAL ONCE
Status: COMPLETED | OUTPATIENT
Start: 2023-07-28 | End: 2023-07-28

## 2023-07-28 RX ORDER — 0.9 % SODIUM CHLORIDE 0.9 %
500 INTRAVENOUS SOLUTION INTRAVENOUS ONCE
Status: DISCONTINUED | OUTPATIENT
Start: 2023-07-28 | End: 2023-07-28

## 2023-07-28 RX ORDER — 0.9 % SODIUM CHLORIDE 0.9 %
250 INTRAVENOUS SOLUTION INTRAVENOUS ONCE
Status: COMPLETED | OUTPATIENT
Start: 2023-07-28 | End: 2023-07-28

## 2023-07-28 RX ADMIN — SODIUM CHLORIDE, PRESERVATIVE FREE 10 ML: 5 INJECTION INTRAVENOUS at 08:20

## 2023-07-28 RX ADMIN — SODIUM CHLORIDE 250 ML: 9 INJECTION, SOLUTION INTRAVENOUS at 21:49

## 2023-07-28 RX ADMIN — GUAIFENESIN 1200 MG: 600 TABLET, EXTENDED RELEASE ORAL at 20:24

## 2023-07-28 RX ADMIN — MIDODRINE HYDROCHLORIDE 10 MG: 5 TABLET ORAL at 08:20

## 2023-07-28 RX ADMIN — BUDESONIDE AND FORMOTEROL FUMARATE DIHYDRATE 2 PUFF: 80; 4.5 AEROSOL RESPIRATORY (INHALATION) at 07:20

## 2023-07-28 RX ADMIN — APIXABAN 5 MG: 5 TABLET, FILM COATED ORAL at 09:00

## 2023-07-28 RX ADMIN — CEFEPIME 1000 MG: 1 INJECTION, POWDER, FOR SOLUTION INTRAMUSCULAR; INTRAVENOUS at 14:34

## 2023-07-28 RX ADMIN — DEXAMETHASONE SODIUM PHOSPHATE 4 MG: 4 INJECTION, SOLUTION INTRAMUSCULAR; INTRAVENOUS at 08:20

## 2023-07-28 RX ADMIN — HEPARIN SODIUM 2300 UNITS: 1000 INJECTION INTRAVENOUS; SUBCUTANEOUS at 13:15

## 2023-07-28 RX ADMIN — MIDODRINE HYDROCHLORIDE 10 MG: 5 TABLET ORAL at 22:08

## 2023-07-28 RX ADMIN — DESMOPRESSIN ACETATE 40 MG: 0.2 TABLET ORAL at 08:20

## 2023-07-28 RX ADMIN — FAMOTIDINE 20 MG: 20 TABLET, FILM COATED ORAL at 08:20

## 2023-07-28 RX ADMIN — BUDESONIDE AND FORMOTEROL FUMARATE DIHYDRATE 2 PUFF: 80; 4.5 AEROSOL RESPIRATORY (INHALATION) at 22:23

## 2023-07-28 RX ADMIN — MIDODRINE HYDROCHLORIDE 10 MG: 5 TABLET ORAL at 20:24

## 2023-07-28 RX ADMIN — IPRATROPIUM BROMIDE AND ALBUTEROL SULFATE 1 DOSE: .5; 2.5 SOLUTION RESPIRATORY (INHALATION) at 16:17

## 2023-07-28 RX ADMIN — IPRATROPIUM BROMIDE AND ALBUTEROL SULFATE 1 DOSE: .5; 2.5 SOLUTION RESPIRATORY (INHALATION) at 07:20

## 2023-07-28 RX ADMIN — GUAIFENESIN 1200 MG: 600 TABLET, EXTENDED RELEASE ORAL at 08:19

## 2023-07-28 RX ADMIN — ALBUTEROL SULFATE 2 PUFF: 90 AEROSOL, METERED RESPIRATORY (INHALATION) at 22:23

## 2023-07-28 RX ADMIN — IPRATROPIUM BROMIDE AND ALBUTEROL SULFATE 1 DOSE: .5; 2.5 SOLUTION RESPIRATORY (INHALATION) at 12:23

## 2023-07-28 RX ADMIN — APIXABAN 5 MG: 5 TABLET, FILM COATED ORAL at 20:24

## 2023-07-28 RX ADMIN — DEXAMETHASONE SODIUM PHOSPHATE 4 MG: 4 INJECTION, SOLUTION INTRAMUSCULAR; INTRAVENOUS at 20:24

## 2023-07-28 RX ADMIN — HEPARIN SODIUM 2300 UNITS: 1000 INJECTION INTRAVENOUS; SUBCUTANEOUS at 13:16

## 2023-07-28 RX ADMIN — MIDODRINE HYDROCHLORIDE 10 MG: 5 TABLET ORAL at 14:35

## 2023-07-28 ASSESSMENT — PAIN SCALES - GENERAL
PAINLEVEL_OUTOF10: 0
PAINLEVEL_OUTOF10: 4
PAINLEVEL_OUTOF10: 0

## 2023-07-28 ASSESSMENT — ENCOUNTER SYMPTOMS
WHEEZING: 0
SHORTNESS OF BREATH: 0
COUGH: 1
GASTROINTESTINAL NEGATIVE: 1

## 2023-07-28 ASSESSMENT — PAIN DESCRIPTION - LOCATION: LOCATION: GENERALIZED

## 2023-07-28 ASSESSMENT — PAIN DESCRIPTION - PAIN TYPE: TYPE: CHRONIC PAIN

## 2023-07-28 NOTE — ACP (ADVANCE CARE PLANNING)
Advance Care Planning     Advance Care Planning (ACP) Physician/NP/PA Conversation    Date of Conversation: 7/28/2023  Conducted with: Patient with Decision Making Capacity    Healthcare Decision Maker:  No healthcare decision makers have been documented. Click here to complete [address] including selection of the Healthcare Decision Maker Relationship (ie \"Primary\")     Patient is  to Aleks for 54 years and has 3 biological children Dottie Sheppard, and Matilde Ulrich. Patient does not have [address] paperwork and Denny wife is primary decision maker per [employer]     Care Preferences:    Hospitalization: \"If your health worsens and it becomes clear that your chance of recovery is unlikely, what would be your preference regarding hospitalization? \"  Hospitalized     Ventilation: \"If you were unable to breath on your own and your chance of recovery was unlikely, what would be your preference about the use of a ventilator (breathing machine) if it was available to you? \"  Full code     Resuscitation: \"In the event your heart stopped as a result of an underlying serious health condition, would you want attempts made to restart your heart, or would you prefer a natural death? \"  Full code         Conversation Outcome  Palliative Interaction:  I went and spoke with patient at bedside and introduced myself and my palliative care role. Patient is  to Aleks for 54 years and has 3 biological children Dottie Sheppard, and Matilde Ulrich. Patient does not have [address] paperwork and Denny wife is primary decision maker per [employer]      Patient is currently a full code status he states he thinks he may want to be a DNRCC-A no intubation but wants to talk with wife first. I put education sheet on code status on his bedside table. Patient lives at home with wife and the children help wife to care for patient. He gets dialysis MWF. I called patient wife Aleks and updated her on above conversation.  She

## 2023-07-28 NOTE — CARE COORDINATION
Patient is currently in dialysis. [facility] NP with Palliative informs me that the patient may be changing code status, but  would like to speak to his wife. Current plan is for patient to return home. May need O2 as he is on O2 here. Does have a bipap at home. Per morning report patient may need Life Vest I do not see a script yet.

## 2023-07-29 PROBLEM — R05.1 ACUTE COUGH: Status: ACTIVE | Noted: 2023-07-29

## 2023-07-29 LAB
ANION GAP SERPL CALCULATED.3IONS-SCNC: 12 MMOL/L (ref 9–17)
BUN SERPL-MCNC: 40 MG/DL (ref 8–23)
CALCIUM SERPL-MCNC: 9.5 MG/DL (ref 8.6–10.4)
CHLORIDE SERPL-SCNC: 97 MMOL/L (ref 98–107)
CO2 SERPL-SCNC: 25 MMOL/L (ref 20–31)
CREAT SERPL-MCNC: 5.4 MG/DL (ref 0.7–1.2)
ERYTHROCYTE [DISTWIDTH] IN BLOOD BY AUTOMATED COUNT: 20.1 % (ref 11.8–14.4)
GFR SERPL CREATININE-BSD FRML MDRD: 10 ML/MIN/1.73M2
GLUCOSE SERPL-MCNC: 159 MG/DL (ref 70–99)
HCT VFR BLD AUTO: 37.7 % (ref 40.7–50.3)
HGB BLD-MCNC: 11.7 G/DL (ref 13–17)
INR PPP: 1.4
MCH RBC QN AUTO: 28.2 PG (ref 25.2–33.5)
MCHC RBC AUTO-ENTMCNC: 31 G/DL (ref 28.4–34.8)
MCV RBC AUTO: 90.8 FL (ref 82.6–102.9)
NRBC BLD-RTO: 0.2 PER 100 WBC
PARTIAL THROMBOPLASTIN TIME: 104.5 SEC (ref 23–36.5)
PARTIAL THROMBOPLASTIN TIME: 33.3 SEC (ref 23–36.5)
PLATELET # BLD AUTO: 149 K/UL (ref 138–453)
PMV BLD AUTO: 12.1 FL (ref 8.1–13.5)
POTASSIUM SERPL-SCNC: 4.4 MMOL/L (ref 3.7–5.3)
PROTHROMBIN TIME: 16.7 SEC (ref 11.7–14.9)
RBC # BLD AUTO: 4.15 M/UL (ref 4.21–5.77)
SODIUM SERPL-SCNC: 134 MMOL/L (ref 135–144)
WBC OTHER # BLD: 10.3 K/UL (ref 3.5–11.3)

## 2023-07-29 PROCEDURE — 2700000000 HC OXYGEN THERAPY PER DAY

## 2023-07-29 PROCEDURE — 6370000000 HC RX 637 (ALT 250 FOR IP)

## 2023-07-29 PROCEDURE — 6370000000 HC RX 637 (ALT 250 FOR IP): Performed by: INTERNAL MEDICINE

## 2023-07-29 PROCEDURE — 2580000003 HC RX 258

## 2023-07-29 PROCEDURE — 6360000002 HC RX W HCPCS: Performed by: INTERNAL MEDICINE

## 2023-07-29 PROCEDURE — 36415 COLL VENOUS BLD VENIPUNCTURE: CPT

## 2023-07-29 PROCEDURE — 85027 COMPLETE CBC AUTOMATED: CPT

## 2023-07-29 PROCEDURE — 2060000000 HC ICU INTERMEDIATE R&B

## 2023-07-29 PROCEDURE — 2580000003 HC RX 258: Performed by: INTERNAL MEDICINE

## 2023-07-29 PROCEDURE — 99232 SBSQ HOSP IP/OBS MODERATE 35: CPT | Performed by: INTERNAL MEDICINE

## 2023-07-29 PROCEDURE — 85610 PROTHROMBIN TIME: CPT

## 2023-07-29 PROCEDURE — 99233 SBSQ HOSP IP/OBS HIGH 50: CPT | Performed by: INTERNAL MEDICINE

## 2023-07-29 PROCEDURE — 80048 BASIC METABOLIC PNL TOTAL CA: CPT

## 2023-07-29 PROCEDURE — 6360000002 HC RX W HCPCS: Performed by: NURSE PRACTITIONER

## 2023-07-29 PROCEDURE — 6360000002 HC RX W HCPCS

## 2023-07-29 PROCEDURE — 94640 AIRWAY INHALATION TREATMENT: CPT

## 2023-07-29 PROCEDURE — 99233 SBSQ HOSP IP/OBS HIGH 50: CPT | Performed by: NURSE PRACTITIONER

## 2023-07-29 PROCEDURE — 85730 THROMBOPLASTIN TIME PARTIAL: CPT

## 2023-07-29 PROCEDURE — 94761 N-INVAS EAR/PLS OXIMETRY MLT: CPT

## 2023-07-29 PROCEDURE — 94668 MNPJ CHEST WALL SBSQ: CPT

## 2023-07-29 RX ORDER — HEPARIN SODIUM 1000 [USP'U]/ML
4000 INJECTION, SOLUTION INTRAVENOUS; SUBCUTANEOUS ONCE
Status: COMPLETED | OUTPATIENT
Start: 2023-07-29 | End: 2023-07-29

## 2023-07-29 RX ORDER — HEPARIN SODIUM 1000 [USP'U]/ML
4000 INJECTION, SOLUTION INTRAVENOUS; SUBCUTANEOUS PRN
Status: DISCONTINUED | OUTPATIENT
Start: 2023-07-29 | End: 2023-08-09

## 2023-07-29 RX ORDER — MIDODRINE HYDROCHLORIDE 5 MG/1
10 TABLET ORAL
Status: DISPENSED | OUTPATIENT
Start: 2023-07-29 | End: 2023-07-30

## 2023-07-29 RX ORDER — LIDOCAINE 40 MG/G
CREAM TOPICAL PRN
Status: DISCONTINUED | OUTPATIENT
Start: 2023-07-29 | End: 2023-08-15 | Stop reason: HOSPADM

## 2023-07-29 RX ORDER — ALBUTEROL SULFATE 0.83 MG/ML
2.5 SOLUTION RESPIRATORY (INHALATION)
Status: COMPLETED | OUTPATIENT
Start: 2023-07-29 | End: 2023-07-30

## 2023-07-29 RX ORDER — HEPARIN SODIUM 1000 [USP'U]/ML
2000 INJECTION, SOLUTION INTRAVENOUS; SUBCUTANEOUS PRN
Status: DISCONTINUED | OUTPATIENT
Start: 2023-07-29 | End: 2023-08-09

## 2023-07-29 RX ORDER — HEPARIN SODIUM 10000 [USP'U]/100ML
5-30 INJECTION, SOLUTION INTRAVENOUS CONTINUOUS
Status: DISCONTINUED | OUTPATIENT
Start: 2023-07-29 | End: 2023-08-09

## 2023-07-29 RX ORDER — ALBUTEROL SULFATE 0.83 MG/ML
2.5 SOLUTION RESPIRATORY (INHALATION) EVERY 6 HOURS PRN
Status: COMPLETED | OUTPATIENT
Start: 2023-07-29 | End: 2023-08-01

## 2023-07-29 RX ADMIN — SODIUM CHLORIDE, PRESERVATIVE FREE 10 ML: 5 INJECTION INTRAVENOUS at 21:50

## 2023-07-29 RX ADMIN — ALBUTEROL SULFATE 2.5 MG: 2.5 SOLUTION RESPIRATORY (INHALATION) at 08:47

## 2023-07-29 RX ADMIN — FAMOTIDINE 20 MG: 20 TABLET, FILM COATED ORAL at 08:00

## 2023-07-29 RX ADMIN — BUDESONIDE AND FORMOTEROL FUMARATE DIHYDRATE 2 PUFF: 80; 4.5 AEROSOL RESPIRATORY (INHALATION) at 08:47

## 2023-07-29 RX ADMIN — ALBUTEROL SULFATE 2.5 MG: 2.5 SOLUTION RESPIRATORY (INHALATION) at 11:37

## 2023-07-29 RX ADMIN — GUAIFENESIN 1200 MG: 600 TABLET, EXTENDED RELEASE ORAL at 08:00

## 2023-07-29 RX ADMIN — HEPARIN SODIUM 4000 UNITS: 1000 INJECTION INTRAVENOUS; SUBCUTANEOUS at 09:33

## 2023-07-29 RX ADMIN — IPRATROPIUM BROMIDE 0.5 MG: 0.5 SOLUTION RESPIRATORY (INHALATION) at 15:13

## 2023-07-29 RX ADMIN — IPRATROPIUM BROMIDE 0.5 MG: 0.5 SOLUTION RESPIRATORY (INHALATION) at 08:49

## 2023-07-29 RX ADMIN — DEXAMETHASONE SODIUM PHOSPHATE 4 MG: 4 INJECTION, SOLUTION INTRAMUSCULAR; INTRAVENOUS at 21:50

## 2023-07-29 RX ADMIN — ALBUTEROL SULFATE 2.5 MG: 2.5 SOLUTION RESPIRATORY (INHALATION) at 20:21

## 2023-07-29 RX ADMIN — ALBUTEROL SULFATE 2.5 MG: 2.5 SOLUTION RESPIRATORY (INHALATION) at 15:13

## 2023-07-29 RX ADMIN — MIDODRINE HYDROCHLORIDE 10 MG: 5 TABLET ORAL at 14:15

## 2023-07-29 RX ADMIN — DEXAMETHASONE SODIUM PHOSPHATE 4 MG: 4 INJECTION, SOLUTION INTRAMUSCULAR; INTRAVENOUS at 08:00

## 2023-07-29 RX ADMIN — ALBUTEROL SULFATE 2.5 MG: 2.5 SOLUTION RESPIRATORY (INHALATION) at 02:54

## 2023-07-29 RX ADMIN — DESMOPRESSIN ACETATE 40 MG: 0.2 TABLET ORAL at 08:00

## 2023-07-29 RX ADMIN — SODIUM CHLORIDE, PRESERVATIVE FREE 10 ML: 5 INJECTION INTRAVENOUS at 08:02

## 2023-07-29 RX ADMIN — IPRATROPIUM BROMIDE 0.5 MG: 0.5 SOLUTION RESPIRATORY (INHALATION) at 20:21

## 2023-07-29 RX ADMIN — CEFEPIME 1000 MG: 1 INJECTION, POWDER, FOR SOLUTION INTRAMUSCULAR; INTRAVENOUS at 14:15

## 2023-07-29 RX ADMIN — MIDODRINE HYDROCHLORIDE 10 MG: 5 TABLET ORAL at 21:50

## 2023-07-29 RX ADMIN — BUDESONIDE AND FORMOTEROL FUMARATE DIHYDRATE 2 PUFF: 80; 4.5 AEROSOL RESPIRATORY (INHALATION) at 20:25

## 2023-07-29 RX ADMIN — HEPARIN SODIUM 10 UNITS/KG/HR: 10000 INJECTION, SOLUTION INTRAVENOUS at 09:37

## 2023-07-29 RX ADMIN — GUAIFENESIN 1200 MG: 600 TABLET, EXTENDED RELEASE ORAL at 21:50

## 2023-07-29 RX ADMIN — IPRATROPIUM BROMIDE 0.5 MG: 0.5 SOLUTION RESPIRATORY (INHALATION) at 11:37

## 2023-07-29 RX ADMIN — APIXABAN 5 MG: 5 TABLET, FILM COATED ORAL at 08:00

## 2023-07-29 RX ADMIN — MIDODRINE HYDROCHLORIDE 10 MG: 5 TABLET ORAL at 08:00

## 2023-07-29 RX ADMIN — HEPARIN SODIUM 4000 UNITS: 1000 INJECTION INTRAVENOUS; SUBCUTANEOUS at 13:06

## 2023-07-29 ASSESSMENT — ENCOUNTER SYMPTOMS
BACK PAIN: 0
NAUSEA: 0
APNEA: 0
DIARRHEA: 0
FACIAL SWELLING: 0
EYE REDNESS: 0
EYE DISCHARGE: 0
CHOKING: 0
EYE PAIN: 0
RHINORRHEA: 0
WHEEZING: 0
SHORTNESS OF BREATH: 1
EYE ITCHING: 0
COLOR CHANGE: 0
CONSTIPATION: 1
COUGH: 0
SINUS PRESSURE: 0
STRIDOR: 0
ABDOMINAL DISTENTION: 0
ABDOMINAL PAIN: 0
CHEST TIGHTNESS: 0
SINUS PAIN: 0

## 2023-07-30 LAB
ANION GAP SERPL CALCULATED.3IONS-SCNC: 13 MMOL/L (ref 9–17)
BUN SERPL-MCNC: 58 MG/DL (ref 8–23)
CALCIUM SERPL-MCNC: 9.6 MG/DL (ref 8.6–10.4)
CHLORIDE SERPL-SCNC: 94 MMOL/L (ref 98–107)
CO2 SERPL-SCNC: 26 MMOL/L (ref 20–31)
CREAT SERPL-MCNC: 6.8 MG/DL (ref 0.7–1.2)
GFR SERPL CREATININE-BSD FRML MDRD: 8 ML/MIN/1.73M2
GLUCOSE SERPL-MCNC: 122 MG/DL (ref 70–99)
PARTIAL THROMBOPLASTIN TIME: 63.9 SEC (ref 23–36.5)
PARTIAL THROMBOPLASTIN TIME: 69.9 SEC (ref 23–36.5)
PARTIAL THROMBOPLASTIN TIME: 85.4 SEC (ref 23–36.5)
POTASSIUM SERPL-SCNC: 4.5 MMOL/L (ref 3.7–5.3)
SODIUM SERPL-SCNC: 133 MMOL/L (ref 135–144)

## 2023-07-30 PROCEDURE — 6370000000 HC RX 637 (ALT 250 FOR IP)

## 2023-07-30 PROCEDURE — 6360000002 HC RX W HCPCS: Performed by: INTERNAL MEDICINE

## 2023-07-30 PROCEDURE — 6370000000 HC RX 637 (ALT 250 FOR IP): Performed by: INTERNAL MEDICINE

## 2023-07-30 PROCEDURE — 99233 SBSQ HOSP IP/OBS HIGH 50: CPT | Performed by: NURSE PRACTITIONER

## 2023-07-30 PROCEDURE — 99233 SBSQ HOSP IP/OBS HIGH 50: CPT | Performed by: INTERNAL MEDICINE

## 2023-07-30 PROCEDURE — 80048 BASIC METABOLIC PNL TOTAL CA: CPT

## 2023-07-30 PROCEDURE — 6360000002 HC RX W HCPCS: Performed by: NURSE PRACTITIONER

## 2023-07-30 PROCEDURE — 85730 THROMBOPLASTIN TIME PARTIAL: CPT

## 2023-07-30 PROCEDURE — 99232 SBSQ HOSP IP/OBS MODERATE 35: CPT | Performed by: INTERNAL MEDICINE

## 2023-07-30 PROCEDURE — 6360000002 HC RX W HCPCS

## 2023-07-30 PROCEDURE — 2060000000 HC ICU INTERMEDIATE R&B

## 2023-07-30 PROCEDURE — 2700000000 HC OXYGEN THERAPY PER DAY

## 2023-07-30 PROCEDURE — 36415 COLL VENOUS BLD VENIPUNCTURE: CPT

## 2023-07-30 PROCEDURE — 94640 AIRWAY INHALATION TREATMENT: CPT

## 2023-07-30 PROCEDURE — 97530 THERAPEUTIC ACTIVITIES: CPT

## 2023-07-30 PROCEDURE — 94761 N-INVAS EAR/PLS OXIMETRY MLT: CPT

## 2023-07-30 PROCEDURE — 2580000003 HC RX 258

## 2023-07-30 PROCEDURE — 97110 THERAPEUTIC EXERCISES: CPT

## 2023-07-30 RX ORDER — ALBUTEROL SULFATE 0.83 MG/ML
2.5 SOLUTION RESPIRATORY (INHALATION)
Status: DISPENSED | OUTPATIENT
Start: 2023-07-30 | End: 2023-07-31

## 2023-07-30 RX ORDER — ACETYLCYSTEINE 200 MG/ML
600 SOLUTION ORAL; RESPIRATORY (INHALATION) 2 TIMES DAILY
Status: DISCONTINUED | OUTPATIENT
Start: 2023-07-30 | End: 2023-08-03

## 2023-07-30 RX ORDER — OXYCODONE HYDROCHLORIDE 5 MG/1
5 TABLET ORAL ONCE
Status: COMPLETED | OUTPATIENT
Start: 2023-07-30 | End: 2023-07-30

## 2023-07-30 RX ORDER — FENTANYL CITRATE 50 UG/ML
25 INJECTION, SOLUTION INTRAMUSCULAR; INTRAVENOUS ONCE
Status: DISCONTINUED | OUTPATIENT
Start: 2023-07-30 | End: 2023-07-30

## 2023-07-30 RX ADMIN — GUAIFENESIN 1200 MG: 600 TABLET, EXTENDED RELEASE ORAL at 21:35

## 2023-07-30 RX ADMIN — DESMOPRESSIN ACETATE 40 MG: 0.2 TABLET ORAL at 07:23

## 2023-07-30 RX ADMIN — HEPARIN SODIUM 12 UNITS/KG/HR: 10000 INJECTION, SOLUTION INTRAVENOUS at 08:50

## 2023-07-30 RX ADMIN — IPRATROPIUM BROMIDE 0.5 MG: 0.5 SOLUTION RESPIRATORY (INHALATION) at 08:23

## 2023-07-30 RX ADMIN — HEPARIN SODIUM 2000 UNITS: 1000 INJECTION INTRAVENOUS; SUBCUTANEOUS at 11:39

## 2023-07-30 RX ADMIN — MIDODRINE HYDROCHLORIDE 10 MG: 5 TABLET ORAL at 21:35

## 2023-07-30 RX ADMIN — DEXAMETHASONE SODIUM PHOSPHATE 4 MG: 4 INJECTION, SOLUTION INTRAMUSCULAR; INTRAVENOUS at 21:35

## 2023-07-30 RX ADMIN — MIDODRINE HYDROCHLORIDE 10 MG: 5 TABLET ORAL at 07:23

## 2023-07-30 RX ADMIN — ALBUTEROL SULFATE 2.5 MG: 2.5 SOLUTION RESPIRATORY (INHALATION) at 22:32

## 2023-07-30 RX ADMIN — ALBUTEROL SULFATE 2.5 MG: 2.5 SOLUTION RESPIRATORY (INHALATION) at 15:48

## 2023-07-30 RX ADMIN — FAMOTIDINE 20 MG: 20 TABLET, FILM COATED ORAL at 07:23

## 2023-07-30 RX ADMIN — DEXAMETHASONE SODIUM PHOSPHATE 4 MG: 4 INJECTION, SOLUTION INTRAMUSCULAR; INTRAVENOUS at 07:23

## 2023-07-30 RX ADMIN — ALBUTEROL SULFATE 2.5 MG: 2.5 SOLUTION RESPIRATORY (INHALATION) at 08:23

## 2023-07-30 RX ADMIN — SODIUM CHLORIDE, PRESERVATIVE FREE 10 ML: 5 INJECTION INTRAVENOUS at 07:26

## 2023-07-30 RX ADMIN — SODIUM CHLORIDE, PRESERVATIVE FREE 10 ML: 5 INJECTION INTRAVENOUS at 21:35

## 2023-07-30 RX ADMIN — Medication 600 MG: at 22:32

## 2023-07-30 RX ADMIN — BENZONATATE 100 MG: 100 CAPSULE ORAL at 02:51

## 2023-07-30 RX ADMIN — OXYCODONE HYDROCHLORIDE 5 MG: 5 TABLET ORAL at 13:14

## 2023-07-30 RX ADMIN — BUDESONIDE AND FORMOTEROL FUMARATE DIHYDRATE 2 PUFF: 80; 4.5 AEROSOL RESPIRATORY (INHALATION) at 08:23

## 2023-07-30 RX ADMIN — MIDODRINE HYDROCHLORIDE 10 MG: 5 TABLET ORAL at 13:14

## 2023-07-30 RX ADMIN — GUAIFENESIN 1200 MG: 600 TABLET, EXTENDED RELEASE ORAL at 07:23

## 2023-07-30 ASSESSMENT — ENCOUNTER SYMPTOMS
APNEA: 0
ABDOMINAL PAIN: 0
ABDOMINAL DISTENTION: 0
WHEEZING: 0
COUGH: 1
EYE DISCHARGE: 0
STRIDOR: 0
BACK PAIN: 0
DIARRHEA: 0
RHINORRHEA: 0
COLOR CHANGE: 0
NAUSEA: 0
SINUS PRESSURE: 0
EYE REDNESS: 0
SHORTNESS OF BREATH: 1
CHOKING: 0
EYE ITCHING: 0
CHEST TIGHTNESS: 0
CONSTIPATION: 1
EYE PAIN: 0
FACIAL SWELLING: 0
SINUS PAIN: 0

## 2023-07-30 ASSESSMENT — PAIN SCALES - GENERAL: PAINLEVEL_OUTOF10: 7

## 2023-07-30 NOTE — CARE COORDINATION
Post Acute Facility/Agency List     Provided patient with the following list, the list includes the overall star ratings obtained from CMS per the Medicare Web site (www.Medicare.gov):     [] [address]  [] Acute Inpatient Rehabilitation Facilities  [] Skilled Nursing Facilities  [x] Home Care    Provided verbal instructions on how to utilize the QR Code to obtain additional detailed star ratings from www. Medicare. gov     offered to print and provide the detailed list:    []Accepted   [x]Declined    Referrals to [facility]. Hand faxed to [facility]. Not listed under CM in Epic.    16:15  Confirmed [facility] received. They are reviewing. 17:40  Call from Desk at [facility]. They can accept. When pt ready for d/c they would like information faxed to [phone].

## 2023-07-31 ENCOUNTER — APPOINTMENT (OUTPATIENT)
Dept: DIALYSIS | Age: 78
DRG: 286 | End: 2023-07-31
Payer: MEDICARE

## 2023-07-31 PROBLEM — I25.812 CORONARY ARTERY DISEASE INVOLVING BYPASS GRAFT OF TRANSPLANTED HEART: Status: ACTIVE | Noted: 2023-07-19

## 2023-07-31 LAB
ANION GAP SERPL CALCULATED.3IONS-SCNC: 19 MMOL/L (ref 9–17)
BUN SERPL-MCNC: 75 MG/DL (ref 8–23)
CALCIUM SERPL-MCNC: 9.6 MG/DL (ref 8.6–10.4)
CHLORIDE SERPL-SCNC: 92 MMOL/L (ref 98–107)
CO2 SERPL-SCNC: 20 MMOL/L (ref 20–31)
CREAT SERPL-MCNC: 8.4 MG/DL (ref 0.7–1.2)
ERYTHROCYTE [DISTWIDTH] IN BLOOD BY AUTOMATED COUNT: 19.9 % (ref 11.8–14.4)
GFR SERPL CREATININE-BSD FRML MDRD: 6 ML/MIN/1.73M2
GLUCOSE SERPL-MCNC: 116 MG/DL (ref 70–99)
HCT VFR BLD AUTO: 29.7 % (ref 40.7–50.3)
HGB BLD-MCNC: 9.6 G/DL (ref 13–17)
MCH RBC QN AUTO: 29.4 PG (ref 25.2–33.5)
MCHC RBC AUTO-ENTMCNC: 32.3 G/DL (ref 28.4–34.8)
MCV RBC AUTO: 90.8 FL (ref 82.6–102.9)
NRBC BLD-RTO: 0 PER 100 WBC
PARTIAL THROMBOPLASTIN TIME: 86.4 SEC (ref 23–36.5)
PLATELET # BLD AUTO: 155 K/UL (ref 138–453)
PMV BLD AUTO: 12.6 FL (ref 8.1–13.5)
POTASSIUM SERPL-SCNC: 4.6 MMOL/L (ref 3.7–5.3)
RBC # BLD AUTO: 3.27 M/UL (ref 4.21–5.77)
SODIUM SERPL-SCNC: 131 MMOL/L (ref 135–144)
WBC OTHER # BLD: 6.8 K/UL (ref 3.5–11.3)

## 2023-07-31 PROCEDURE — 6360000002 HC RX W HCPCS: Performed by: NURSE PRACTITIONER

## 2023-07-31 PROCEDURE — 85730 THROMBOPLASTIN TIME PARTIAL: CPT

## 2023-07-31 PROCEDURE — 2580000003 HC RX 258

## 2023-07-31 PROCEDURE — 2700000000 HC OXYGEN THERAPY PER DAY

## 2023-07-31 PROCEDURE — 99232 SBSQ HOSP IP/OBS MODERATE 35: CPT | Performed by: INTERNAL MEDICINE

## 2023-07-31 PROCEDURE — 90935 HEMODIALYSIS ONE EVALUATION: CPT

## 2023-07-31 PROCEDURE — 6360000002 HC RX W HCPCS: Performed by: INTERNAL MEDICINE

## 2023-07-31 PROCEDURE — 6360000002 HC RX W HCPCS

## 2023-07-31 PROCEDURE — 80048 BASIC METABOLIC PNL TOTAL CA: CPT

## 2023-07-31 PROCEDURE — 36415 COLL VENOUS BLD VENIPUNCTURE: CPT

## 2023-07-31 PROCEDURE — 6370000000 HC RX 637 (ALT 250 FOR IP): Performed by: INTERNAL MEDICINE

## 2023-07-31 PROCEDURE — 94640 AIRWAY INHALATION TREATMENT: CPT

## 2023-07-31 PROCEDURE — 6370000000 HC RX 637 (ALT 250 FOR IP)

## 2023-07-31 PROCEDURE — 2060000000 HC ICU INTERMEDIATE R&B

## 2023-07-31 PROCEDURE — 99233 SBSQ HOSP IP/OBS HIGH 50: CPT | Performed by: NURSE PRACTITIONER

## 2023-07-31 PROCEDURE — 90935 HEMODIALYSIS ONE EVALUATION: CPT | Performed by: INTERNAL MEDICINE

## 2023-07-31 PROCEDURE — 94761 N-INVAS EAR/PLS OXIMETRY MLT: CPT

## 2023-07-31 PROCEDURE — 94660 CPAP INITIATION&MGMT: CPT

## 2023-07-31 PROCEDURE — 85027 COMPLETE CBC AUTOMATED: CPT

## 2023-07-31 RX ADMIN — GUAIFENESIN 1200 MG: 600 TABLET, EXTENDED RELEASE ORAL at 08:43

## 2023-07-31 RX ADMIN — Medication 600 MG: at 09:21

## 2023-07-31 RX ADMIN — DESMOPRESSIN ACETATE 40 MG: 0.2 TABLET ORAL at 08:43

## 2023-07-31 RX ADMIN — SODIUM CHLORIDE, PRESERVATIVE FREE 10 ML: 5 INJECTION INTRAVENOUS at 08:43

## 2023-07-31 RX ADMIN — FAMOTIDINE 20 MG: 20 TABLET, FILM COATED ORAL at 08:44

## 2023-07-31 RX ADMIN — EPOETIN ALFA-EPBX 3000 UNITS: 3000 INJECTION, SOLUTION INTRAVENOUS; SUBCUTANEOUS at 15:00

## 2023-07-31 RX ADMIN — GUAIFENESIN 1200 MG: 600 TABLET, EXTENDED RELEASE ORAL at 21:24

## 2023-07-31 RX ADMIN — ALBUTEROL SULFATE 2.5 MG: 2.5 SOLUTION RESPIRATORY (INHALATION) at 09:20

## 2023-07-31 RX ADMIN — BUDESONIDE AND FORMOTEROL FUMARATE DIHYDRATE 2 PUFF: 80; 4.5 AEROSOL RESPIRATORY (INHALATION) at 09:21

## 2023-07-31 RX ADMIN — HEPARIN SODIUM 14 UNITS/KG/HR: 10000 INJECTION, SOLUTION INTRAVENOUS at 04:40

## 2023-07-31 RX ADMIN — ALBUTEROL SULFATE 2.5 MG: 2.5 SOLUTION RESPIRATORY (INHALATION) at 21:00

## 2023-07-31 RX ADMIN — HEPARIN SODIUM 2300 UNITS: 1000 INJECTION INTRAVENOUS; SUBCUTANEOUS at 14:48

## 2023-07-31 RX ADMIN — MIDODRINE HYDROCHLORIDE 10 MG: 5 TABLET ORAL at 08:43

## 2023-07-31 RX ADMIN — BUDESONIDE AND FORMOTEROL FUMARATE DIHYDRATE 2 PUFF: 80; 4.5 AEROSOL RESPIRATORY (INHALATION) at 21:00

## 2023-07-31 RX ADMIN — ERYTHROPOIETIN 2000 UNITS: 2000 INJECTION, SOLUTION INTRAVENOUS; SUBCUTANEOUS at 15:10

## 2023-07-31 RX ADMIN — HEPARIN SODIUM 2300 UNITS: 1000 INJECTION INTRAVENOUS; SUBCUTANEOUS at 14:47

## 2023-07-31 RX ADMIN — SODIUM CHLORIDE, PRESERVATIVE FREE 10 ML: 5 INJECTION INTRAVENOUS at 21:24

## 2023-07-31 RX ADMIN — Medication 600 MG: at 21:00

## 2023-07-31 RX ADMIN — MIDODRINE HYDROCHLORIDE 10 MG: 5 TABLET ORAL at 21:24

## 2023-07-31 RX ADMIN — DEXAMETHASONE SODIUM PHOSPHATE 4 MG: 4 INJECTION, SOLUTION INTRAMUSCULAR; INTRAVENOUS at 08:43

## 2023-07-31 RX ADMIN — DEXAMETHASONE SODIUM PHOSPHATE 4 MG: 4 INJECTION, SOLUTION INTRAMUSCULAR; INTRAVENOUS at 21:24

## 2023-07-31 ASSESSMENT — ENCOUNTER SYMPTOMS
WHEEZING: 0
NAUSEA: 0
ABDOMINAL PAIN: 0
SINUS PAIN: 0
EYE DISCHARGE: 0
SHORTNESS OF BREATH: 1
EYE REDNESS: 0
COUGH: 1
DIARRHEA: 0
CHEST TIGHTNESS: 0
CONSTIPATION: 1
EYE ITCHING: 0
STRIDOR: 0
ABDOMINAL DISTENTION: 0
COLOR CHANGE: 0
BACK PAIN: 0
CHOKING: 0
SINUS PRESSURE: 0
EYE PAIN: 0
FACIAL SWELLING: 0
RHINORRHEA: 0
APNEA: 0

## 2023-07-31 ASSESSMENT — PAIN SCALES - GENERAL: PAINLEVEL_OUTOF10: 0

## 2023-08-01 LAB
PARTIAL THROMBOPLASTIN TIME: 51.1 SEC (ref 23–36.5)
PARTIAL THROMBOPLASTIN TIME: 63.2 SEC (ref 23–36.5)
PARTIAL THROMBOPLASTIN TIME: >180 SEC (ref 23–36.5)

## 2023-08-01 PROCEDURE — 2700000000 HC OXYGEN THERAPY PER DAY

## 2023-08-01 PROCEDURE — 6370000000 HC RX 637 (ALT 250 FOR IP)

## 2023-08-01 PROCEDURE — 6360000002 HC RX W HCPCS: Performed by: INTERNAL MEDICINE

## 2023-08-01 PROCEDURE — 6360000002 HC RX W HCPCS

## 2023-08-01 PROCEDURE — 99231 SBSQ HOSP IP/OBS SF/LOW 25: CPT | Performed by: INTERNAL MEDICINE

## 2023-08-01 PROCEDURE — 85730 THROMBOPLASTIN TIME PARTIAL: CPT

## 2023-08-01 PROCEDURE — 94761 N-INVAS EAR/PLS OXIMETRY MLT: CPT

## 2023-08-01 PROCEDURE — 94640 AIRWAY INHALATION TREATMENT: CPT

## 2023-08-01 PROCEDURE — 99232 SBSQ HOSP IP/OBS MODERATE 35: CPT | Performed by: INTERNAL MEDICINE

## 2023-08-01 PROCEDURE — 6360000002 HC RX W HCPCS: Performed by: NURSE PRACTITIONER

## 2023-08-01 PROCEDURE — 6370000000 HC RX 637 (ALT 250 FOR IP): Performed by: INTERNAL MEDICINE

## 2023-08-01 PROCEDURE — 2060000000 HC ICU INTERMEDIATE R&B

## 2023-08-01 PROCEDURE — 97535 SELF CARE MNGMENT TRAINING: CPT

## 2023-08-01 PROCEDURE — 2580000003 HC RX 258

## 2023-08-01 PROCEDURE — 99233 SBSQ HOSP IP/OBS HIGH 50: CPT | Performed by: NURSE PRACTITIONER

## 2023-08-01 PROCEDURE — 94660 CPAP INITIATION&MGMT: CPT

## 2023-08-01 PROCEDURE — 36415 COLL VENOUS BLD VENIPUNCTURE: CPT

## 2023-08-01 RX ORDER — PREDNISONE 10 MG/1
10 TABLET ORAL DAILY
Status: COMPLETED | OUTPATIENT
Start: 2023-08-04 | End: 2023-08-05

## 2023-08-01 RX ORDER — PREDNISONE 20 MG/1
20 TABLET ORAL DAILY
Status: COMPLETED | OUTPATIENT
Start: 2023-08-02 | End: 2023-08-03

## 2023-08-01 RX ADMIN — Medication 600 MG: at 09:05

## 2023-08-01 RX ADMIN — DEXAMETHASONE SODIUM PHOSPHATE 4 MG: 4 INJECTION, SOLUTION INTRAMUSCULAR; INTRAVENOUS at 09:46

## 2023-08-01 RX ADMIN — SODIUM CHLORIDE, PRESERVATIVE FREE 10 ML: 5 INJECTION INTRAVENOUS at 20:07

## 2023-08-01 RX ADMIN — MIDODRINE HYDROCHLORIDE 10 MG: 5 TABLET ORAL at 09:47

## 2023-08-01 RX ADMIN — ALBUTEROL SULFATE 2.5 MG: 2.5 SOLUTION RESPIRATORY (INHALATION) at 20:32

## 2023-08-01 RX ADMIN — DESMOPRESSIN ACETATE 40 MG: 0.2 TABLET ORAL at 09:48

## 2023-08-01 RX ADMIN — HEPARIN SODIUM 17 UNITS/KG/HR: 10000 INJECTION, SOLUTION INTRAVENOUS at 20:59

## 2023-08-01 RX ADMIN — BUDESONIDE AND FORMOTEROL FUMARATE DIHYDRATE 2 PUFF: 80; 4.5 AEROSOL RESPIRATORY (INHALATION) at 20:34

## 2023-08-01 RX ADMIN — ACETAMINOPHEN 650 MG: 325 TABLET ORAL at 20:06

## 2023-08-01 RX ADMIN — SODIUM CHLORIDE, PRESERVATIVE FREE 10 ML: 5 INJECTION INTRAVENOUS at 09:48

## 2023-08-01 RX ADMIN — Medication 600 MG: at 20:34

## 2023-08-01 RX ADMIN — HEPARIN SODIUM 2000 UNITS: 1000 INJECTION INTRAVENOUS; SUBCUTANEOUS at 14:04

## 2023-08-01 RX ADMIN — GUAIFENESIN 1200 MG: 600 TABLET, EXTENDED RELEASE ORAL at 20:06

## 2023-08-01 RX ADMIN — FAMOTIDINE 20 MG: 20 TABLET, FILM COATED ORAL at 09:48

## 2023-08-01 RX ADMIN — MIDODRINE HYDROCHLORIDE 10 MG: 5 TABLET ORAL at 14:02

## 2023-08-01 RX ADMIN — HEPARIN SODIUM 18 UNITS/KG/HR: 10000 INJECTION, SOLUTION INTRAVENOUS at 11:38

## 2023-08-01 RX ADMIN — MIDODRINE HYDROCHLORIDE 10 MG: 5 TABLET ORAL at 20:06

## 2023-08-01 RX ADMIN — BUDESONIDE AND FORMOTEROL FUMARATE DIHYDRATE 2 PUFF: 80; 4.5 AEROSOL RESPIRATORY (INHALATION) at 09:05

## 2023-08-01 RX ADMIN — ALBUTEROL SULFATE 2.5 MG: 2.5 SOLUTION RESPIRATORY (INHALATION) at 09:05

## 2023-08-01 RX ADMIN — HEPARIN SODIUM 4000 UNITS: 1000 INJECTION INTRAVENOUS; SUBCUTANEOUS at 06:07

## 2023-08-01 RX ADMIN — GUAIFENESIN 1200 MG: 600 TABLET, EXTENDED RELEASE ORAL at 09:46

## 2023-08-01 ASSESSMENT — PAIN SCALES - GENERAL
PAINLEVEL_OUTOF10: 9
PAINLEVEL_OUTOF10: 0

## 2023-08-01 ASSESSMENT — ENCOUNTER SYMPTOMS
CHOKING: 0
ABDOMINAL PAIN: 0
SINUS PAIN: 0
SINUS PRESSURE: 0
ABDOMINAL DISTENTION: 0
BACK PAIN: 0
RHINORRHEA: 0
CHEST TIGHTNESS: 0
APNEA: 0
SHORTNESS OF BREATH: 0
NAUSEA: 0
WHEEZING: 0
EYE ITCHING: 0
EYE REDNESS: 0
STRIDOR: 0
FACIAL SWELLING: 0
EYE DISCHARGE: 0
DIARRHEA: 0
CONSTIPATION: 0
EYE PAIN: 0
COLOR CHANGE: 0
COUGH: 1

## 2023-08-01 ASSESSMENT — PAIN DESCRIPTION - DESCRIPTORS: DESCRIPTORS: ACHING;SORE

## 2023-08-01 ASSESSMENT — PAIN DESCRIPTION - LOCATION: LOCATION: BUTTOCKS

## 2023-08-01 ASSESSMENT — PAIN DESCRIPTION - ORIENTATION: ORIENTATION: MID

## 2023-08-01 NOTE — CARE COORDINATION
Met with patient to discuss transitional planning. Plan is to return home with spouse and family with [facility]. Patient will have transportation home. Will need to fax home care order and colten to [facility] at discharge to [phone]. Patient agreeable to plan.

## 2023-08-02 PROBLEM — I25.5 CARDIOMYOPATHY, ISCHEMIC: Status: ACTIVE | Noted: 2023-07-19

## 2023-08-02 LAB
ANION GAP SERPL CALCULATED.3IONS-SCNC: 18 MMOL/L (ref 9–17)
BUN SERPL-MCNC: 74 MG/DL (ref 8–23)
CALCIUM SERPL-MCNC: 10.2 MG/DL (ref 8.6–10.4)
CHLORIDE SERPL-SCNC: 88 MMOL/L (ref 98–107)
CO2 SERPL-SCNC: 21 MMOL/L (ref 20–31)
CREAT SERPL-MCNC: 8.3 MG/DL (ref 0.7–1.2)
ERYTHROCYTE [DISTWIDTH] IN BLOOD BY AUTOMATED COUNT: 19.9 % (ref 11.8–14.4)
GFR SERPL CREATININE-BSD FRML MDRD: 6 ML/MIN/1.73M2
GLUCOSE SERPL-MCNC: 84 MG/DL (ref 70–99)
HCT VFR BLD AUTO: 31.3 % (ref 40.7–50.3)
HGB BLD-MCNC: 9.9 G/DL (ref 13–17)
MCH RBC QN AUTO: 29.1 PG (ref 25.2–33.5)
MCHC RBC AUTO-ENTMCNC: 31.6 G/DL (ref 28.4–34.8)
MCV RBC AUTO: 92.1 FL (ref 82.6–102.9)
NRBC BLD-RTO: 0.3 PER 100 WBC
PARTIAL THROMBOPLASTIN TIME: 139.5 SEC (ref 23–36.5)
PARTIAL THROMBOPLASTIN TIME: 87.4 SEC (ref 23–36.5)
PLATELET # BLD AUTO: ABNORMAL K/UL (ref 138–453)
PLATELET, FLUORESCENCE: 148 K/UL (ref 138–453)
PLATELETS.RETICULATED NFR BLD AUTO: 15.5 % (ref 1.1–10.3)
POTASSIUM SERPL-SCNC: 4.7 MMOL/L (ref 3.7–5.3)
RBC # BLD AUTO: 3.4 M/UL (ref 4.21–5.77)
SODIUM SERPL-SCNC: 127 MMOL/L (ref 135–144)
WBC OTHER # BLD: 7.1 K/UL (ref 3.5–11.3)

## 2023-08-02 PROCEDURE — 7100000010 HC PHASE II RECOVERY - FIRST 15 MIN: Performed by: INTERNAL MEDICINE

## 2023-08-02 PROCEDURE — 6370000000 HC RX 637 (ALT 250 FOR IP): Performed by: INTERNAL MEDICINE

## 2023-08-02 PROCEDURE — 2060000000 HC ICU INTERMEDIATE R&B

## 2023-08-02 PROCEDURE — 6360000002 HC RX W HCPCS: Performed by: NURSE PRACTITIONER

## 2023-08-02 PROCEDURE — C1892 INTRO/SHEATH,FIXED,PEEL-AWAY: HCPCS | Performed by: INTERNAL MEDICINE

## 2023-08-02 PROCEDURE — B2111ZZ FLUOROSCOPY OF MULTIPLE CORONARY ARTERIES USING LOW OSMOLAR CONTRAST: ICD-10-PCS | Performed by: INTERNAL MEDICINE

## 2023-08-02 PROCEDURE — 6370000000 HC RX 637 (ALT 250 FOR IP)

## 2023-08-02 PROCEDURE — 85730 THROMBOPLASTIN TIME PARTIAL: CPT

## 2023-08-02 PROCEDURE — 85027 COMPLETE CBC AUTOMATED: CPT

## 2023-08-02 PROCEDURE — 6360000002 HC RX W HCPCS: Performed by: INTERNAL MEDICINE

## 2023-08-02 PROCEDURE — 7100000011 HC PHASE II RECOVERY - ADDTL 15 MIN: Performed by: INTERNAL MEDICINE

## 2023-08-02 PROCEDURE — 99232 SBSQ HOSP IP/OBS MODERATE 35: CPT | Performed by: INTERNAL MEDICINE

## 2023-08-02 PROCEDURE — 93454 CORONARY ARTERY ANGIO S&I: CPT | Performed by: INTERNAL MEDICINE

## 2023-08-02 PROCEDURE — 94640 AIRWAY INHALATION TREATMENT: CPT

## 2023-08-02 PROCEDURE — 2580000003 HC RX 258

## 2023-08-02 PROCEDURE — 99152 MOD SED SAME PHYS/QHP 5/>YRS: CPT | Performed by: INTERNAL MEDICINE

## 2023-08-02 PROCEDURE — 99233 SBSQ HOSP IP/OBS HIGH 50: CPT | Performed by: NURSE PRACTITIONER

## 2023-08-02 PROCEDURE — 85055 RETICULATED PLATELET ASSAY: CPT

## 2023-08-02 PROCEDURE — 2709999900 HC NON-CHARGEABLE SUPPLY: Performed by: INTERNAL MEDICINE

## 2023-08-02 PROCEDURE — 80048 BASIC METABOLIC PNL TOTAL CA: CPT

## 2023-08-02 PROCEDURE — 6360000004 HC RX CONTRAST MEDICATION: Performed by: INTERNAL MEDICINE

## 2023-08-02 PROCEDURE — 36415 COLL VENOUS BLD VENIPUNCTURE: CPT

## 2023-08-02 PROCEDURE — 90935 HEMODIALYSIS ONE EVALUATION: CPT

## 2023-08-02 RX ORDER — IOPAMIDOL 755 MG/ML
INJECTION, SOLUTION INTRAVASCULAR PRN
Status: DISCONTINUED | OUTPATIENT
Start: 2023-08-02 | End: 2023-08-02 | Stop reason: HOSPADM

## 2023-08-02 RX ORDER — MIDAZOLAM 1 MG/ML
INJECTION INTRAMUSCULAR; INTRAVENOUS PRN
Status: DISCONTINUED | OUTPATIENT
Start: 2023-08-02 | End: 2023-08-02 | Stop reason: HOSPADM

## 2023-08-02 RX ADMIN — BUDESONIDE AND FORMOTEROL FUMARATE DIHYDRATE 2 PUFF: 80; 4.5 AEROSOL RESPIRATORY (INHALATION) at 20:02

## 2023-08-02 RX ADMIN — DESMOPRESSIN ACETATE 40 MG: 0.2 TABLET ORAL at 07:30

## 2023-08-02 RX ADMIN — PREDNISONE 20 MG: 20 TABLET ORAL at 07:30

## 2023-08-02 RX ADMIN — MIDODRINE HYDROCHLORIDE 10 MG: 5 TABLET ORAL at 21:36

## 2023-08-02 RX ADMIN — MIDODRINE HYDROCHLORIDE 10 MG: 5 TABLET ORAL at 07:29

## 2023-08-02 RX ADMIN — FAMOTIDINE 20 MG: 20 TABLET, FILM COATED ORAL at 07:31

## 2023-08-02 RX ADMIN — HEPARIN SODIUM 17 UNITS/KG/HR: 10000 INJECTION, SOLUTION INTRAVENOUS at 03:36

## 2023-08-02 RX ADMIN — SODIUM CHLORIDE, PRESERVATIVE FREE 10 ML: 5 INJECTION INTRAVENOUS at 07:31

## 2023-08-02 RX ADMIN — BUDESONIDE AND FORMOTEROL FUMARATE DIHYDRATE 2 PUFF: 80; 4.5 AEROSOL RESPIRATORY (INHALATION) at 09:03

## 2023-08-02 RX ADMIN — ALBUTEROL SULFATE 2 PUFF: 90 AEROSOL, METERED RESPIRATORY (INHALATION) at 20:03

## 2023-08-02 RX ADMIN — Medication 600 MG: at 09:03

## 2023-08-02 RX ADMIN — GUAIFENESIN 1200 MG: 600 TABLET, EXTENDED RELEASE ORAL at 07:31

## 2023-08-02 RX ADMIN — SODIUM CHLORIDE, PRESERVATIVE FREE 10 ML: 5 INJECTION INTRAVENOUS at 21:36

## 2023-08-02 RX ADMIN — GUAIFENESIN 1200 MG: 600 TABLET, EXTENDED RELEASE ORAL at 21:35

## 2023-08-02 ASSESSMENT — ENCOUNTER SYMPTOMS
SINUS PAIN: 0
COUGH: 1
COLOR CHANGE: 0
FACIAL SWELLING: 0
CHEST TIGHTNESS: 0
CHOKING: 0
WHEEZING: 0
ABDOMINAL PAIN: 0
EYE ITCHING: 0
SHORTNESS OF BREATH: 0
RHINORRHEA: 0
EYE DISCHARGE: 0
EYE PAIN: 0
CONSTIPATION: 0
ABDOMINAL DISTENTION: 0
SINUS PRESSURE: 0
EYE REDNESS: 0
BACK PAIN: 0
DIARRHEA: 0
STRIDOR: 0
NAUSEA: 0
APNEA: 0

## 2023-08-02 ASSESSMENT — PAIN SCALES - GENERAL: PAINLEVEL_OUTOF10: 0

## 2023-08-03 PROBLEM — I42.9 CARDIOMYOPATHY (HCC): Status: ACTIVE | Noted: 2023-07-19

## 2023-08-03 LAB
ECHO BSA: 2.23 M2
OSMOLALITY SERPL: 285 MOSM/KG (ref 275–295)
PARTIAL THROMBOPLASTIN TIME: 45.3 SEC (ref 23–36.5)
PARTIAL THROMBOPLASTIN TIME: 73.5 SEC (ref 23–36.5)

## 2023-08-03 PROCEDURE — 6360000002 HC RX W HCPCS: Performed by: INTERNAL MEDICINE

## 2023-08-03 PROCEDURE — 93460 R&L HRT ART/VENTRICLE ANGIO: CPT | Performed by: INTERNAL MEDICINE

## 2023-08-03 PROCEDURE — 6370000000 HC RX 637 (ALT 250 FOR IP)

## 2023-08-03 PROCEDURE — 36415 COLL VENOUS BLD VENIPUNCTURE: CPT

## 2023-08-03 PROCEDURE — 4A023N8 MEASUREMENT OF CARDIAC SAMPLING AND PRESSURE, BILATERAL, PERCUTANEOUS APPROACH: ICD-10-PCS | Performed by: INTERNAL MEDICINE

## 2023-08-03 PROCEDURE — 94640 AIRWAY INHALATION TREATMENT: CPT

## 2023-08-03 PROCEDURE — 99233 SBSQ HOSP IP/OBS HIGH 50: CPT | Performed by: INTERNAL MEDICINE

## 2023-08-03 PROCEDURE — B2151ZZ FLUOROSCOPY OF LEFT HEART USING LOW OSMOLAR CONTRAST: ICD-10-PCS | Performed by: INTERNAL MEDICINE

## 2023-08-03 PROCEDURE — C1892 INTRO/SHEATH,FIXED,PEEL-AWAY: HCPCS | Performed by: INTERNAL MEDICINE

## 2023-08-03 PROCEDURE — 6360000002 HC RX W HCPCS: Performed by: NURSE PRACTITIONER

## 2023-08-03 PROCEDURE — 6370000000 HC RX 637 (ALT 250 FOR IP): Performed by: INTERNAL MEDICINE

## 2023-08-03 PROCEDURE — 99152 MOD SED SAME PHYS/QHP 5/>YRS: CPT | Performed by: INTERNAL MEDICINE

## 2023-08-03 PROCEDURE — 94761 N-INVAS EAR/PLS OXIMETRY MLT: CPT

## 2023-08-03 PROCEDURE — 2060000000 HC ICU INTERMEDIATE R&B

## 2023-08-03 PROCEDURE — 2500000003 HC RX 250 WO HCPCS: Performed by: INTERNAL MEDICINE

## 2023-08-03 PROCEDURE — 99153 MOD SED SAME PHYS/QHP EA: CPT | Performed by: INTERNAL MEDICINE

## 2023-08-03 PROCEDURE — 94660 CPAP INITIATION&MGMT: CPT

## 2023-08-03 PROCEDURE — B2111ZZ FLUOROSCOPY OF MULTIPLE CORONARY ARTERIES USING LOW OSMOLAR CONTRAST: ICD-10-PCS | Performed by: INTERNAL MEDICINE

## 2023-08-03 PROCEDURE — 83930 ASSAY OF BLOOD OSMOLALITY: CPT

## 2023-08-03 PROCEDURE — 99233 SBSQ HOSP IP/OBS HIGH 50: CPT | Performed by: NURSE PRACTITIONER

## 2023-08-03 PROCEDURE — 85730 THROMBOPLASTIN TIME PARTIAL: CPT

## 2023-08-03 PROCEDURE — 2709999900 HC NON-CHARGEABLE SUPPLY: Performed by: INTERNAL MEDICINE

## 2023-08-03 PROCEDURE — 99232 SBSQ HOSP IP/OBS MODERATE 35: CPT | Performed by: INTERNAL MEDICINE

## 2023-08-03 PROCEDURE — B2131ZZ FLUOROSCOPY OF MULTIPLE CORONARY ARTERY BYPASS GRAFTS USING LOW OSMOLAR CONTRAST: ICD-10-PCS | Performed by: INTERNAL MEDICINE

## 2023-08-03 PROCEDURE — C1769 GUIDE WIRE: HCPCS | Performed by: INTERNAL MEDICINE

## 2023-08-03 PROCEDURE — 6360000004 HC RX CONTRAST MEDICATION: Performed by: INTERNAL MEDICINE

## 2023-08-03 RX ORDER — ACETAMINOPHEN 325 MG/1
650 TABLET ORAL EVERY 4 HOURS PRN
Status: DISCONTINUED | OUTPATIENT
Start: 2023-08-03 | End: 2023-08-15 | Stop reason: HOSPADM

## 2023-08-03 RX ORDER — SODIUM CHLORIDE 0.9 % (FLUSH) 0.9 %
5-40 SYRINGE (ML) INJECTION PRN
Status: DISCONTINUED | OUTPATIENT
Start: 2023-08-03 | End: 2023-08-15 | Stop reason: HOSPADM

## 2023-08-03 RX ORDER — SODIUM CHLORIDE 9 MG/ML
INJECTION, SOLUTION INTRAVENOUS PRN
Status: DISCONTINUED | OUTPATIENT
Start: 2023-08-03 | End: 2023-08-15 | Stop reason: HOSPADM

## 2023-08-03 RX ORDER — FENTANYL CITRATE 50 UG/ML
INJECTION, SOLUTION INTRAMUSCULAR; INTRAVENOUS PRN
Status: DISCONTINUED | OUTPATIENT
Start: 2023-08-03 | End: 2023-08-03 | Stop reason: HOSPADM

## 2023-08-03 RX ORDER — SODIUM CHLORIDE 0.9 % (FLUSH) 0.9 %
5-40 SYRINGE (ML) INJECTION EVERY 12 HOURS SCHEDULED
Status: DISCONTINUED | OUTPATIENT
Start: 2023-08-03 | End: 2023-08-12

## 2023-08-03 RX ORDER — IOPAMIDOL 755 MG/ML
INJECTION, SOLUTION INTRAVASCULAR PRN
Status: DISCONTINUED | OUTPATIENT
Start: 2023-08-03 | End: 2023-08-03 | Stop reason: HOSPADM

## 2023-08-03 RX ORDER — MIDAZOLAM HYDROCHLORIDE 1 MG/ML
INJECTION, SOLUTION INTRAMUSCULAR; INTRAVENOUS PRN
Status: DISCONTINUED | OUTPATIENT
Start: 2023-08-03 | End: 2023-08-03 | Stop reason: HOSPADM

## 2023-08-03 RX ADMIN — Medication 600 MG: at 08:07

## 2023-08-03 RX ADMIN — EPOETIN ALFA-EPBX 3000 UNITS: 3000 INJECTION, SOLUTION INTRAVENOUS; SUBCUTANEOUS at 01:02

## 2023-08-03 RX ADMIN — HEPARIN SODIUM 4000 UNITS: 1000 INJECTION INTRAVENOUS; SUBCUTANEOUS at 07:31

## 2023-08-03 RX ADMIN — DESMOPRESSIN ACETATE 40 MG: 0.2 TABLET ORAL at 07:30

## 2023-08-03 RX ADMIN — ALBUTEROL SULFATE 2 PUFF: 90 AEROSOL, METERED RESPIRATORY (INHALATION) at 08:16

## 2023-08-03 RX ADMIN — GUAIFENESIN 1200 MG: 600 TABLET, EXTENDED RELEASE ORAL at 21:46

## 2023-08-03 RX ADMIN — HEPARIN SODIUM 14 UNITS/KG/HR: 10000 INJECTION, SOLUTION INTRAVENOUS at 16:25

## 2023-08-03 RX ADMIN — MIDODRINE HYDROCHLORIDE 10 MG: 5 TABLET ORAL at 21:46

## 2023-08-03 RX ADMIN — FAMOTIDINE 20 MG: 20 TABLET, FILM COATED ORAL at 07:31

## 2023-08-03 RX ADMIN — MIDODRINE HYDROCHLORIDE 10 MG: 5 TABLET ORAL at 15:33

## 2023-08-03 RX ADMIN — PREDNISONE 20 MG: 20 TABLET ORAL at 07:30

## 2023-08-03 RX ADMIN — BUDESONIDE AND FORMOTEROL FUMARATE DIHYDRATE 2 PUFF: 80; 4.5 AEROSOL RESPIRATORY (INHALATION) at 21:31

## 2023-08-03 RX ADMIN — MIDODRINE HYDROCHLORIDE 10 MG: 5 TABLET ORAL at 07:30

## 2023-08-03 RX ADMIN — HEPARIN SODIUM 2300 UNITS: 1000 INJECTION INTRAVENOUS; SUBCUTANEOUS at 01:02

## 2023-08-03 RX ADMIN — HEPARIN SODIUM 2300 UNITS: 1000 INJECTION INTRAVENOUS; SUBCUTANEOUS at 01:03

## 2023-08-03 RX ADMIN — GUAIFENESIN 1200 MG: 600 TABLET, EXTENDED RELEASE ORAL at 07:30

## 2023-08-03 RX ADMIN — BUDESONIDE AND FORMOTEROL FUMARATE DIHYDRATE 2 PUFF: 80; 4.5 AEROSOL RESPIRATORY (INHALATION) at 08:07

## 2023-08-03 ASSESSMENT — ENCOUNTER SYMPTOMS
SHORTNESS OF BREATH: 0
SINUS PRESSURE: 0
ABDOMINAL DISTENTION: 0
CHOKING: 0
EYE ITCHING: 0
COLOR CHANGE: 0
DIARRHEA: 0
SINUS PAIN: 0
EYE PAIN: 0
BACK PAIN: 0
RHINORRHEA: 0
CONSTIPATION: 0
CHEST TIGHTNESS: 0
WHEEZING: 0
STRIDOR: 0
NAUSEA: 0
ABDOMINAL PAIN: 0
COUGH: 1
EYE DISCHARGE: 0
APNEA: 0
FACIAL SWELLING: 0
EYE REDNESS: 0

## 2023-08-03 ASSESSMENT — PAIN SCALES - GENERAL: PAINLEVEL_OUTOF10: 0

## 2023-08-03 NOTE — CARE COORDINATION
Transitional planning note: plan is home with spouse/ family and [employer]. Will need to fax home care order and colten to [facility] at discharge to [phone].

## 2023-08-04 ENCOUNTER — APPOINTMENT (OUTPATIENT)
Dept: DIALYSIS | Age: 78
DRG: 286 | End: 2023-08-04
Payer: MEDICARE

## 2023-08-04 LAB
ANION GAP SERPL CALCULATED.3IONS-SCNC: 16 MMOL/L (ref 9–17)
BUN SERPL-MCNC: 71 MG/DL (ref 8–23)
CALCIUM SERPL-MCNC: 9.9 MG/DL (ref 8.6–10.4)
CHLORIDE SERPL-SCNC: 88 MMOL/L (ref 98–107)
CO2 SERPL-SCNC: 23 MMOL/L (ref 20–31)
CREAT SERPL-MCNC: 7.3 MG/DL (ref 0.7–1.2)
ERYTHROCYTE [DISTWIDTH] IN BLOOD BY AUTOMATED COUNT: 20 % (ref 11.8–14.4)
GFR SERPL CREATININE-BSD FRML MDRD: 7 ML/MIN/1.73M2
GLUCOSE SERPL-MCNC: 79 MG/DL (ref 70–99)
HCT VFR BLD AUTO: 29.4 % (ref 40.7–50.3)
HGB BLD-MCNC: 9.2 G/DL (ref 13–17)
MCH RBC QN AUTO: 28.6 PG (ref 25.2–33.5)
MCHC RBC AUTO-ENTMCNC: 31.3 G/DL (ref 28.4–34.8)
MCV RBC AUTO: 91.3 FL (ref 82.6–102.9)
NRBC BLD-RTO: 0.6 PER 100 WBC
PARTIAL THROMBOPLASTIN TIME: 84.4 SEC (ref 23–36.5)
PARTIAL THROMBOPLASTIN TIME: 88.5 SEC (ref 23–36.5)
PLATELET # BLD AUTO: ABNORMAL K/UL (ref 138–453)
PLATELET, FLUORESCENCE: 152 K/UL (ref 138–453)
PLATELETS.RETICULATED NFR BLD AUTO: 16.8 % (ref 1.1–10.3)
POTASSIUM SERPL-SCNC: 5.2 MMOL/L (ref 3.7–5.3)
RBC # BLD AUTO: 3.22 M/UL (ref 4.21–5.77)
SODIUM SERPL-SCNC: 127 MMOL/L (ref 135–144)
WBC OTHER # BLD: 7 K/UL (ref 3.5–11.3)

## 2023-08-04 PROCEDURE — 6360000002 HC RX W HCPCS: Performed by: NURSE PRACTITIONER

## 2023-08-04 PROCEDURE — 85055 RETICULATED PLATELET ASSAY: CPT

## 2023-08-04 PROCEDURE — 85730 THROMBOPLASTIN TIME PARTIAL: CPT

## 2023-08-04 PROCEDURE — 90935 HEMODIALYSIS ONE EVALUATION: CPT

## 2023-08-04 PROCEDURE — 99232 SBSQ HOSP IP/OBS MODERATE 35: CPT | Performed by: INTERNAL MEDICINE

## 2023-08-04 PROCEDURE — 94640 AIRWAY INHALATION TREATMENT: CPT

## 2023-08-04 PROCEDURE — 99233 SBSQ HOSP IP/OBS HIGH 50: CPT | Performed by: NURSE PRACTITIONER

## 2023-08-04 PROCEDURE — 36415 COLL VENOUS BLD VENIPUNCTURE: CPT

## 2023-08-04 PROCEDURE — 6370000000 HC RX 637 (ALT 250 FOR IP)

## 2023-08-04 PROCEDURE — 2060000000 HC ICU INTERMEDIATE R&B

## 2023-08-04 PROCEDURE — 85027 COMPLETE CBC AUTOMATED: CPT

## 2023-08-04 PROCEDURE — 6360000002 HC RX W HCPCS: Performed by: INTERNAL MEDICINE

## 2023-08-04 PROCEDURE — 6370000000 HC RX 637 (ALT 250 FOR IP): Performed by: INTERNAL MEDICINE

## 2023-08-04 PROCEDURE — 94761 N-INVAS EAR/PLS OXIMETRY MLT: CPT

## 2023-08-04 PROCEDURE — 2580000003 HC RX 258: Performed by: STUDENT IN AN ORGANIZED HEALTH CARE EDUCATION/TRAINING PROGRAM

## 2023-08-04 PROCEDURE — 99233 SBSQ HOSP IP/OBS HIGH 50: CPT | Performed by: INTERNAL MEDICINE

## 2023-08-04 PROCEDURE — 80048 BASIC METABOLIC PNL TOTAL CA: CPT

## 2023-08-04 PROCEDURE — 90935 HEMODIALYSIS ONE EVALUATION: CPT | Performed by: INTERNAL MEDICINE

## 2023-08-04 RX ADMIN — GUAIFENESIN 1200 MG: 600 TABLET, EXTENDED RELEASE ORAL at 08:28

## 2023-08-04 RX ADMIN — PREDNISONE 10 MG: 10 TABLET ORAL at 08:28

## 2023-08-04 RX ADMIN — HEPARIN SODIUM 2300 UNITS: 1000 INJECTION INTRAVENOUS; SUBCUTANEOUS at 13:21

## 2023-08-04 RX ADMIN — MIDODRINE HYDROCHLORIDE 10 MG: 5 TABLET ORAL at 17:18

## 2023-08-04 RX ADMIN — DESMOPRESSIN ACETATE 40 MG: 0.2 TABLET ORAL at 08:28

## 2023-08-04 RX ADMIN — MIDODRINE HYDROCHLORIDE 10 MG: 5 TABLET ORAL at 08:27

## 2023-08-04 RX ADMIN — BUDESONIDE AND FORMOTEROL FUMARATE DIHYDRATE 2 PUFF: 80; 4.5 AEROSOL RESPIRATORY (INHALATION) at 08:12

## 2023-08-04 RX ADMIN — BUDESONIDE AND FORMOTEROL FUMARATE DIHYDRATE 2 PUFF: 80; 4.5 AEROSOL RESPIRATORY (INHALATION) at 20:01

## 2023-08-04 RX ADMIN — SODIUM CHLORIDE, PRESERVATIVE FREE 10 ML: 5 INJECTION INTRAVENOUS at 21:37

## 2023-08-04 RX ADMIN — FAMOTIDINE 20 MG: 20 TABLET, FILM COATED ORAL at 08:27

## 2023-08-04 RX ADMIN — HEPARIN SODIUM 14 UNITS/KG/HR: 10000 INJECTION, SOLUTION INTRAVENOUS at 12:03

## 2023-08-04 RX ADMIN — MIDODRINE HYDROCHLORIDE 10 MG: 5 TABLET ORAL at 21:36

## 2023-08-04 RX ADMIN — EPOETIN ALFA-EPBX 3000 UNITS: 3000 INJECTION, SOLUTION INTRAVENOUS; SUBCUTANEOUS at 13:34

## 2023-08-04 ASSESSMENT — ENCOUNTER SYMPTOMS
EYE DISCHARGE: 0
EYE ITCHING: 0
APNEA: 0
SHORTNESS OF BREATH: 0
NAUSEA: 0
DIARRHEA: 0
EYE PAIN: 0
CHEST TIGHTNESS: 0
BACK PAIN: 0
FACIAL SWELLING: 0
EYE REDNESS: 0
COUGH: 1
ABDOMINAL PAIN: 0
CHOKING: 0
CONSTIPATION: 0
STRIDOR: 0
COLOR CHANGE: 0
SINUS PRESSURE: 0
SINUS PAIN: 0
WHEEZING: 0
RHINORRHEA: 0
ABDOMINAL DISTENTION: 0

## 2023-08-05 LAB
ANION GAP SERPL CALCULATED.3IONS-SCNC: 16 MMOL/L (ref 9–17)
BUN SERPL-MCNC: 49 MG/DL (ref 8–23)
CALCIUM SERPL-MCNC: 10.1 MG/DL (ref 8.6–10.4)
CHLORIDE SERPL-SCNC: 90 MMOL/L (ref 98–107)
CO2 SERPL-SCNC: 25 MMOL/L (ref 20–31)
CREAT SERPL-MCNC: 6 MG/DL (ref 0.7–1.2)
GFR SERPL CREATININE-BSD FRML MDRD: 9 ML/MIN/1.73M2
GLUCOSE SERPL-MCNC: 117 MG/DL (ref 70–99)
PARTIAL THROMBOPLASTIN TIME: 82.2 SEC (ref 23–36.5)
POTASSIUM SERPL-SCNC: 4.2 MMOL/L (ref 3.7–5.3)
SODIUM SERPL-SCNC: 131 MMOL/L (ref 135–144)

## 2023-08-05 PROCEDURE — 6370000000 HC RX 637 (ALT 250 FOR IP)

## 2023-08-05 PROCEDURE — 94660 CPAP INITIATION&MGMT: CPT

## 2023-08-05 PROCEDURE — 80048 BASIC METABOLIC PNL TOTAL CA: CPT

## 2023-08-05 PROCEDURE — 85730 THROMBOPLASTIN TIME PARTIAL: CPT

## 2023-08-05 PROCEDURE — 6360000002 HC RX W HCPCS: Performed by: NURSE PRACTITIONER

## 2023-08-05 PROCEDURE — 99233 SBSQ HOSP IP/OBS HIGH 50: CPT | Performed by: INTERNAL MEDICINE

## 2023-08-05 PROCEDURE — 6370000000 HC RX 637 (ALT 250 FOR IP): Performed by: INTERNAL MEDICINE

## 2023-08-05 PROCEDURE — 99232 SBSQ HOSP IP/OBS MODERATE 35: CPT | Performed by: INTERNAL MEDICINE

## 2023-08-05 PROCEDURE — 99231 SBSQ HOSP IP/OBS SF/LOW 25: CPT | Performed by: INTERNAL MEDICINE

## 2023-08-05 PROCEDURE — 99233 SBSQ HOSP IP/OBS HIGH 50: CPT | Performed by: NURSE PRACTITIONER

## 2023-08-05 PROCEDURE — 94640 AIRWAY INHALATION TREATMENT: CPT

## 2023-08-05 PROCEDURE — 2060000000 HC ICU INTERMEDIATE R&B

## 2023-08-05 PROCEDURE — 36415 COLL VENOUS BLD VENIPUNCTURE: CPT

## 2023-08-05 PROCEDURE — 94761 N-INVAS EAR/PLS OXIMETRY MLT: CPT

## 2023-08-05 RX ORDER — HYDROCORTISONE 25 MG/G
CREAM TOPICAL 2 TIMES DAILY
Status: DISCONTINUED | OUTPATIENT
Start: 2023-08-05 | End: 2023-08-08

## 2023-08-05 RX ORDER — LACTOBACILLUS RHAMNOSUS GG 10B CELL
1 CAPSULE ORAL
Status: DISCONTINUED | OUTPATIENT
Start: 2023-08-06 | End: 2023-08-15 | Stop reason: HOSPADM

## 2023-08-05 RX ADMIN — MIDODRINE HYDROCHLORIDE 10 MG: 5 TABLET ORAL at 20:59

## 2023-08-05 RX ADMIN — FAMOTIDINE 20 MG: 20 TABLET, FILM COATED ORAL at 09:23

## 2023-08-05 RX ADMIN — PSYLLIUM HUSK 1 PACKET: 3.4 POWDER ORAL at 15:37

## 2023-08-05 RX ADMIN — DESMOPRESSIN ACETATE 40 MG: 0.2 TABLET ORAL at 09:23

## 2023-08-05 RX ADMIN — PREDNISONE 10 MG: 10 TABLET ORAL at 09:23

## 2023-08-05 RX ADMIN — BUDESONIDE AND FORMOTEROL FUMARATE DIHYDRATE 2 PUFF: 80; 4.5 AEROSOL RESPIRATORY (INHALATION) at 09:30

## 2023-08-05 RX ADMIN — MIDODRINE HYDROCHLORIDE 10 MG: 5 TABLET ORAL at 09:23

## 2023-08-05 RX ADMIN — MIDODRINE HYDROCHLORIDE 10 MG: 5 TABLET ORAL at 15:37

## 2023-08-05 RX ADMIN — HYDROCORTISONE: 25 CREAM TOPICAL at 21:00

## 2023-08-05 RX ADMIN — HYDROCORTISONE: 25 CREAM TOPICAL at 13:45

## 2023-08-05 RX ADMIN — HEPARIN SODIUM 14 UNITS/KG/HR: 10000 INJECTION, SOLUTION INTRAVENOUS at 06:52

## 2023-08-05 ASSESSMENT — ENCOUNTER SYMPTOMS
SINUS PAIN: 0
COUGH: 1
BACK PAIN: 0
EYE ITCHING: 0
ABDOMINAL PAIN: 0
FACIAL SWELLING: 0
ABDOMINAL DISTENTION: 0
SINUS PRESSURE: 0
STRIDOR: 0
CHEST TIGHTNESS: 0
SHORTNESS OF BREATH: 0
RHINORRHEA: 0
DIARRHEA: 0
CHOKING: 0
CONSTIPATION: 0
EYE PAIN: 0
EYE REDNESS: 0
WHEEZING: 0
APNEA: 0
COLOR CHANGE: 0
EYE DISCHARGE: 0
NAUSEA: 0

## 2023-08-06 LAB
ERYTHROCYTE [DISTWIDTH] IN BLOOD BY AUTOMATED COUNT: 20.6 % (ref 11.8–14.4)
HCT VFR BLD AUTO: 27.4 % (ref 40.7–50.3)
HGB BLD-MCNC: 8.7 G/DL (ref 13–17)
MCH RBC QN AUTO: 29.2 PG (ref 25.2–33.5)
MCHC RBC AUTO-ENTMCNC: 31.8 G/DL (ref 28.4–34.8)
MCV RBC AUTO: 91.9 FL (ref 82.6–102.9)
NRBC BLD-RTO: 0.3 PER 100 WBC
PARTIAL THROMBOPLASTIN TIME: 107.9 SEC (ref 23–36.5)
PLATELET # BLD AUTO: 146 K/UL (ref 138–453)
PMV BLD AUTO: 12.4 FL (ref 8.1–13.5)
RBC # BLD AUTO: 2.98 M/UL (ref 4.21–5.77)
WBC OTHER # BLD: 7.7 K/UL (ref 3.5–11.3)

## 2023-08-06 PROCEDURE — 99232 SBSQ HOSP IP/OBS MODERATE 35: CPT | Performed by: INTERNAL MEDICINE

## 2023-08-06 PROCEDURE — 36415 COLL VENOUS BLD VENIPUNCTURE: CPT

## 2023-08-06 PROCEDURE — 6370000000 HC RX 637 (ALT 250 FOR IP)

## 2023-08-06 PROCEDURE — 2700000000 HC OXYGEN THERAPY PER DAY

## 2023-08-06 PROCEDURE — 85730 THROMBOPLASTIN TIME PARTIAL: CPT

## 2023-08-06 PROCEDURE — 2060000000 HC ICU INTERMEDIATE R&B

## 2023-08-06 PROCEDURE — 2580000003 HC RX 258: Performed by: STUDENT IN AN ORGANIZED HEALTH CARE EDUCATION/TRAINING PROGRAM

## 2023-08-06 PROCEDURE — 6360000002 HC RX W HCPCS: Performed by: NURSE PRACTITIONER

## 2023-08-06 PROCEDURE — 94640 AIRWAY INHALATION TREATMENT: CPT

## 2023-08-06 PROCEDURE — 99233 SBSQ HOSP IP/OBS HIGH 50: CPT | Performed by: NURSE PRACTITIONER

## 2023-08-06 PROCEDURE — 85027 COMPLETE CBC AUTOMATED: CPT

## 2023-08-06 PROCEDURE — 97110 THERAPEUTIC EXERCISES: CPT

## 2023-08-06 PROCEDURE — 94761 N-INVAS EAR/PLS OXIMETRY MLT: CPT

## 2023-08-06 RX ADMIN — MIDODRINE HYDROCHLORIDE 10 MG: 5 TABLET ORAL at 19:49

## 2023-08-06 RX ADMIN — PSYLLIUM HUSK 1 PACKET: 3.4 POWDER ORAL at 08:08

## 2023-08-06 RX ADMIN — HEPARIN SODIUM 14 UNITS/KG/HR: 10000 INJECTION, SOLUTION INTRAVENOUS at 01:55

## 2023-08-06 RX ADMIN — MIDODRINE HYDROCHLORIDE 10 MG: 5 TABLET ORAL at 08:07

## 2023-08-06 RX ADMIN — BUDESONIDE AND FORMOTEROL FUMARATE DIHYDRATE 2 PUFF: 80; 4.5 AEROSOL RESPIRATORY (INHALATION) at 09:18

## 2023-08-06 RX ADMIN — Medication 1 CAPSULE: at 08:07

## 2023-08-06 RX ADMIN — MIDODRINE HYDROCHLORIDE 10 MG: 5 TABLET ORAL at 15:47

## 2023-08-06 RX ADMIN — FAMOTIDINE 20 MG: 20 TABLET, FILM COATED ORAL at 08:07

## 2023-08-06 RX ADMIN — HYDROCORTISONE: 25 CREAM TOPICAL at 19:50

## 2023-08-06 RX ADMIN — HYDROCORTISONE: 25 CREAM TOPICAL at 08:08

## 2023-08-06 RX ADMIN — BUDESONIDE AND FORMOTEROL FUMARATE DIHYDRATE 2 PUFF: 80; 4.5 AEROSOL RESPIRATORY (INHALATION) at 20:33

## 2023-08-06 RX ADMIN — DESMOPRESSIN ACETATE 40 MG: 0.2 TABLET ORAL at 08:07

## 2023-08-06 RX ADMIN — SODIUM CHLORIDE, PRESERVATIVE FREE 10 ML: 5 INJECTION INTRAVENOUS at 19:50

## 2023-08-06 ASSESSMENT — ENCOUNTER SYMPTOMS
ABDOMINAL DISTENTION: 0
BACK PAIN: 0
CONSTIPATION: 0
SINUS PRESSURE: 0
EYE PAIN: 0
CHEST TIGHTNESS: 0
SHORTNESS OF BREATH: 0
DIARRHEA: 0
APNEA: 0
EYE REDNESS: 0
COUGH: 1
EYE ITCHING: 0
RHINORRHEA: 0
COLOR CHANGE: 0
WHEEZING: 0
NAUSEA: 0
ABDOMINAL PAIN: 0
CHOKING: 0
EYE DISCHARGE: 0
SINUS PAIN: 0
FACIAL SWELLING: 0
STRIDOR: 0

## 2023-08-07 ENCOUNTER — APPOINTMENT (OUTPATIENT)
Dept: DIALYSIS | Age: 78
DRG: 286 | End: 2023-08-07
Payer: MEDICARE

## 2023-08-07 LAB
ANION GAP SERPL CALCULATED.3IONS-SCNC: 17 MMOL/L (ref 9–17)
BUN SERPL-MCNC: 73 MG/DL (ref 8–23)
CALCIUM SERPL-MCNC: 10 MG/DL (ref 8.6–10.4)
CHLORIDE SERPL-SCNC: 86 MMOL/L (ref 98–107)
CO2 SERPL-SCNC: 25 MMOL/L (ref 20–31)
CREAT SERPL-MCNC: 8.5 MG/DL (ref 0.7–1.2)
GFR SERPL CREATININE-BSD FRML MDRD: 6 ML/MIN/1.73M2
GLUCOSE SERPL-MCNC: 68 MG/DL (ref 70–99)
PARTIAL THROMBOPLASTIN TIME: 68.1 SEC (ref 23–36.5)
POTASSIUM SERPL-SCNC: 5.2 MMOL/L (ref 3.7–5.3)
SODIUM SERPL-SCNC: 128 MMOL/L (ref 135–144)

## 2023-08-07 PROCEDURE — 82270 OCCULT BLOOD FECES: CPT

## 2023-08-07 PROCEDURE — 36415 COLL VENOUS BLD VENIPUNCTURE: CPT

## 2023-08-07 PROCEDURE — 90935 HEMODIALYSIS ONE EVALUATION: CPT

## 2023-08-07 PROCEDURE — 6370000000 HC RX 637 (ALT 250 FOR IP)

## 2023-08-07 PROCEDURE — 6360000002 HC RX W HCPCS: Performed by: INTERNAL MEDICINE

## 2023-08-07 PROCEDURE — 90935 HEMODIALYSIS ONE EVALUATION: CPT | Performed by: INTERNAL MEDICINE

## 2023-08-07 PROCEDURE — 85730 THROMBOPLASTIN TIME PARTIAL: CPT

## 2023-08-07 PROCEDURE — 94640 AIRWAY INHALATION TREATMENT: CPT

## 2023-08-07 PROCEDURE — 99232 SBSQ HOSP IP/OBS MODERATE 35: CPT | Performed by: INTERNAL MEDICINE

## 2023-08-07 PROCEDURE — 6370000000 HC RX 637 (ALT 250 FOR IP): Performed by: STUDENT IN AN ORGANIZED HEALTH CARE EDUCATION/TRAINING PROGRAM

## 2023-08-07 PROCEDURE — 6360000002 HC RX W HCPCS: Performed by: NURSE PRACTITIONER

## 2023-08-07 PROCEDURE — 99233 SBSQ HOSP IP/OBS HIGH 50: CPT | Performed by: INTERNAL MEDICINE

## 2023-08-07 PROCEDURE — 2060000000 HC ICU INTERMEDIATE R&B

## 2023-08-07 PROCEDURE — 2700000000 HC OXYGEN THERAPY PER DAY

## 2023-08-07 PROCEDURE — 80048 BASIC METABOLIC PNL TOTAL CA: CPT

## 2023-08-07 PROCEDURE — 2580000003 HC RX 258

## 2023-08-07 PROCEDURE — 94761 N-INVAS EAR/PLS OXIMETRY MLT: CPT

## 2023-08-07 PROCEDURE — 2580000003 HC RX 258: Performed by: STUDENT IN AN ORGANIZED HEALTH CARE EDUCATION/TRAINING PROGRAM

## 2023-08-07 RX ORDER — DEXTROSE MONOHYDRATE 100 MG/ML
INJECTION, SOLUTION INTRAVENOUS CONTINUOUS PRN
Status: DISCONTINUED | OUTPATIENT
Start: 2023-08-07 | End: 2023-08-15 | Stop reason: HOSPADM

## 2023-08-07 RX ADMIN — HYDROCORTISONE: 25 CREAM TOPICAL at 20:15

## 2023-08-07 RX ADMIN — DESMOPRESSIN ACETATE 40 MG: 0.2 TABLET ORAL at 14:37

## 2023-08-07 RX ADMIN — MIDODRINE HYDROCHLORIDE 10 MG: 5 TABLET ORAL at 20:15

## 2023-08-07 RX ADMIN — HEPARIN SODIUM 2300 UNITS: 1000 INJECTION INTRAVENOUS; SUBCUTANEOUS at 13:34

## 2023-08-07 RX ADMIN — MIDODRINE HYDROCHLORIDE 10 MG: 5 TABLET ORAL at 08:09

## 2023-08-07 RX ADMIN — HEPARIN SODIUM 11 UNITS/KG/HR: 10000 INJECTION, SOLUTION INTRAVENOUS at 02:50

## 2023-08-07 RX ADMIN — MIDODRINE HYDROCHLORIDE 10 MG: 5 TABLET ORAL at 14:37

## 2023-08-07 RX ADMIN — PSYLLIUM HUSK 1 PACKET: 3.4 POWDER ORAL at 08:10

## 2023-08-07 RX ADMIN — HYDROCORTISONE: 25 CREAM TOPICAL at 08:14

## 2023-08-07 RX ADMIN — SODIUM CHLORIDE, PRESERVATIVE FREE 10 ML: 5 INJECTION INTRAVENOUS at 20:15

## 2023-08-07 RX ADMIN — SODIUM CHLORIDE, PRESERVATIVE FREE 10 ML: 5 INJECTION INTRAVENOUS at 08:11

## 2023-08-07 RX ADMIN — FAMOTIDINE 20 MG: 20 TABLET, FILM COATED ORAL at 08:06

## 2023-08-07 RX ADMIN — EPOETIN ALFA-EPBX 3000 UNITS: 3000 INJECTION, SOLUTION INTRAVENOUS; SUBCUTANEOUS at 13:32

## 2023-08-07 RX ADMIN — BUDESONIDE AND FORMOTEROL FUMARATE DIHYDRATE 2 PUFF: 80; 4.5 AEROSOL RESPIRATORY (INHALATION) at 08:08

## 2023-08-07 RX ADMIN — HEPARIN SODIUM 2300 UNITS: 1000 INJECTION INTRAVENOUS; SUBCUTANEOUS at 13:35

## 2023-08-07 RX ADMIN — SODIUM CHLORIDE, PRESERVATIVE FREE 10 ML: 5 INJECTION INTRAVENOUS at 08:18

## 2023-08-07 RX ADMIN — ACETAMINOPHEN 650 MG: 325 TABLET ORAL at 06:09

## 2023-08-07 RX ADMIN — Medication 1 CAPSULE: at 08:08

## 2023-08-07 RX ADMIN — BUDESONIDE AND FORMOTEROL FUMARATE DIHYDRATE 2 PUFF: 80; 4.5 AEROSOL RESPIRATORY (INHALATION) at 20:51

## 2023-08-07 ASSESSMENT — PAIN DESCRIPTION - DESCRIPTORS: DESCRIPTORS: ACHING

## 2023-08-07 ASSESSMENT — ENCOUNTER SYMPTOMS
SHORTNESS OF BREATH: 0
DIARRHEA: 0
CHOKING: 0
EYE ITCHING: 0
ABDOMINAL PAIN: 0
COUGH: 1
WHEEZING: 0
EYE PAIN: 0
EYE REDNESS: 0
NAUSEA: 0
CONSTIPATION: 0
EYE DISCHARGE: 0
APNEA: 0
SINUS PAIN: 0
ABDOMINAL DISTENTION: 0
STRIDOR: 0
RHINORRHEA: 0
FACIAL SWELLING: 0
COLOR CHANGE: 0
BACK PAIN: 0
SINUS PRESSURE: 0
CHEST TIGHTNESS: 0

## 2023-08-07 ASSESSMENT — PAIN SCALES - GENERAL
PAINLEVEL_OUTOF10: 0
PAINLEVEL_OUTOF10: 6

## 2023-08-07 ASSESSMENT — PAIN DESCRIPTION - ORIENTATION: ORIENTATION: MID

## 2023-08-07 ASSESSMENT — PAIN - FUNCTIONAL ASSESSMENT: PAIN_FUNCTIONAL_ASSESSMENT: ACTIVITIES ARE NOT PREVENTED

## 2023-08-07 ASSESSMENT — PAIN DESCRIPTION - LOCATION: LOCATION: RECTUM

## 2023-08-07 NOTE — CARE COORDINATION
Transitional planning note: Goal is home with spouse and [facility]. Patient currently on heparin drip and awaiting possible stenting and/or TAVR.

## 2023-08-08 LAB
DATE, STOOL #1: NORMAL
ERYTHROCYTE [DISTWIDTH] IN BLOOD BY AUTOMATED COUNT: 20.8 % (ref 11.8–14.4)
HCT VFR BLD AUTO: 25.3 % (ref 40.7–50.3)
HCT VFR BLD AUTO: 27.7 % (ref 40.7–50.3)
HEMOCCULT SP1 STL QL: NEGATIVE
HGB BLD-MCNC: 7.8 G/DL (ref 13–17)
HGB BLD-MCNC: 8.7 G/DL (ref 13–17)
MCH RBC QN AUTO: 28.5 PG (ref 25.2–33.5)
MCHC RBC AUTO-ENTMCNC: 30.8 G/DL (ref 28.4–34.8)
MCV RBC AUTO: 92.3 FL (ref 82.6–102.9)
NRBC BLD-RTO: 0 PER 100 WBC
PARTIAL THROMBOPLASTIN TIME: 58.9 SEC (ref 23–36.5)
PARTIAL THROMBOPLASTIN TIME: 65.8 SEC (ref 23–36.5)
PARTIAL THROMBOPLASTIN TIME: 76.9 SEC (ref 23–36.5)
PLATELET # BLD AUTO: 110 K/UL (ref 138–453)
PMV BLD AUTO: 12.9 FL (ref 8.1–13.5)
RBC # BLD AUTO: 2.74 M/UL (ref 4.21–5.77)
TIME, STOOL #1: 1058
WBC OTHER # BLD: 5.6 K/UL (ref 3.5–11.3)

## 2023-08-08 PROCEDURE — 2700000000 HC OXYGEN THERAPY PER DAY

## 2023-08-08 PROCEDURE — 2060000000 HC ICU INTERMEDIATE R&B

## 2023-08-08 PROCEDURE — 99232 SBSQ HOSP IP/OBS MODERATE 35: CPT | Performed by: INTERNAL MEDICINE

## 2023-08-08 PROCEDURE — 6370000000 HC RX 637 (ALT 250 FOR IP)

## 2023-08-08 PROCEDURE — 85018 HEMOGLOBIN: CPT

## 2023-08-08 PROCEDURE — 94668 MNPJ CHEST WALL SBSQ: CPT

## 2023-08-08 PROCEDURE — 85014 HEMATOCRIT: CPT

## 2023-08-08 PROCEDURE — 94640 AIRWAY INHALATION TREATMENT: CPT

## 2023-08-08 PROCEDURE — 94761 N-INVAS EAR/PLS OXIMETRY MLT: CPT

## 2023-08-08 PROCEDURE — 97530 THERAPEUTIC ACTIVITIES: CPT

## 2023-08-08 PROCEDURE — 6360000002 HC RX W HCPCS: Performed by: NURSE PRACTITIONER

## 2023-08-08 PROCEDURE — 94660 CPAP INITIATION&MGMT: CPT

## 2023-08-08 PROCEDURE — 85027 COMPLETE CBC AUTOMATED: CPT

## 2023-08-08 PROCEDURE — 2580000003 HC RX 258

## 2023-08-08 PROCEDURE — 36415 COLL VENOUS BLD VENIPUNCTURE: CPT

## 2023-08-08 PROCEDURE — 2580000003 HC RX 258: Performed by: STUDENT IN AN ORGANIZED HEALTH CARE EDUCATION/TRAINING PROGRAM

## 2023-08-08 PROCEDURE — 85730 THROMBOPLASTIN TIME PARTIAL: CPT

## 2023-08-08 PROCEDURE — 97535 SELF CARE MNGMENT TRAINING: CPT

## 2023-08-08 PROCEDURE — 6370000000 HC RX 637 (ALT 250 FOR IP): Performed by: STUDENT IN AN ORGANIZED HEALTH CARE EDUCATION/TRAINING PROGRAM

## 2023-08-08 RX ORDER — HYDROCORTISONE 25 MG/G
CREAM TOPICAL 3 TIMES DAILY
Status: DISCONTINUED | OUTPATIENT
Start: 2023-08-08 | End: 2023-08-15 | Stop reason: HOSPADM

## 2023-08-08 RX ADMIN — MIDODRINE HYDROCHLORIDE 10 MG: 5 TABLET ORAL at 19:49

## 2023-08-08 RX ADMIN — HYDROCORTISONE: 25 CREAM TOPICAL at 10:38

## 2023-08-08 RX ADMIN — DESMOPRESSIN ACETATE 40 MG: 0.2 TABLET ORAL at 10:25

## 2023-08-08 RX ADMIN — SODIUM CHLORIDE, PRESERVATIVE FREE 10 ML: 5 INJECTION INTRAVENOUS at 19:51

## 2023-08-08 RX ADMIN — HEPARIN SODIUM 2000 UNITS: 1000 INJECTION INTRAVENOUS; SUBCUTANEOUS at 07:06

## 2023-08-08 RX ADMIN — ACETAMINOPHEN 650 MG: 325 TABLET ORAL at 19:48

## 2023-08-08 RX ADMIN — HYDROCORTISONE: 25 CREAM TOPICAL at 16:25

## 2023-08-08 RX ADMIN — SODIUM CHLORIDE, PRESERVATIVE FREE 10 ML: 5 INJECTION INTRAVENOUS at 19:50

## 2023-08-08 RX ADMIN — MIDODRINE HYDROCHLORIDE 10 MG: 5 TABLET ORAL at 10:25

## 2023-08-08 RX ADMIN — HEPARIN SODIUM 11 UNITS/KG/HR: 10000 INJECTION, SOLUTION INTRAVENOUS at 02:58

## 2023-08-08 RX ADMIN — BUDESONIDE AND FORMOTEROL FUMARATE DIHYDRATE 2 PUFF: 80; 4.5 AEROSOL RESPIRATORY (INHALATION) at 19:58

## 2023-08-08 RX ADMIN — MIDODRINE HYDROCHLORIDE 10 MG: 5 TABLET ORAL at 16:24

## 2023-08-08 RX ADMIN — HEPARIN SODIUM 13 UNITS/KG/HR: 10000 INJECTION, SOLUTION INTRAVENOUS at 21:50

## 2023-08-08 RX ADMIN — FAMOTIDINE 20 MG: 20 TABLET, FILM COATED ORAL at 10:25

## 2023-08-08 RX ADMIN — Medication 1 CAPSULE: at 10:42

## 2023-08-08 RX ADMIN — BUDESONIDE AND FORMOTEROL FUMARATE DIHYDRATE 2 PUFF: 80; 4.5 AEROSOL RESPIRATORY (INHALATION) at 09:30

## 2023-08-08 RX ADMIN — HYDROCORTISONE: 25 CREAM TOPICAL at 19:48

## 2023-08-08 RX ADMIN — SODIUM CHLORIDE, PRESERVATIVE FREE 10 ML: 5 INJECTION INTRAVENOUS at 10:26

## 2023-08-08 ASSESSMENT — ENCOUNTER SYMPTOMS
WHEEZING: 0
CHOKING: 0
EYE PAIN: 0
DIARRHEA: 0
COUGH: 1
ABDOMINAL PAIN: 0
EYE ITCHING: 0
ABDOMINAL DISTENTION: 0
BACK PAIN: 0
SINUS PAIN: 0
SINUS PRESSURE: 0
STRIDOR: 0
RHINORRHEA: 0
APNEA: 0
NAUSEA: 0
CHEST TIGHTNESS: 0
SHORTNESS OF BREATH: 0
EYE REDNESS: 0
CONSTIPATION: 0
FACIAL SWELLING: 0
COLOR CHANGE: 0
EYE DISCHARGE: 0

## 2023-08-08 ASSESSMENT — PAIN SCALES - GENERAL
PAINLEVEL_OUTOF10: 4
PAINLEVEL_OUTOF10: 3

## 2023-08-08 ASSESSMENT — PAIN DESCRIPTION - LOCATION
LOCATION: FOOT
LOCATION: FOOT

## 2023-08-08 ASSESSMENT — PAIN DESCRIPTION - ORIENTATION
ORIENTATION: RIGHT;LEFT
ORIENTATION: RIGHT;LEFT

## 2023-08-09 LAB
ANION GAP SERPL CALCULATED.3IONS-SCNC: 17 MMOL/L (ref 9–17)
BUN SERPL-MCNC: 60 MG/DL (ref 8–23)
CALCIUM SERPL-MCNC: 10.3 MG/DL (ref 8.6–10.4)
CHLORIDE SERPL-SCNC: 90 MMOL/L (ref 98–107)
CO2 SERPL-SCNC: 22 MMOL/L (ref 20–31)
CREAT SERPL-MCNC: 7.8 MG/DL (ref 0.7–1.2)
GFR SERPL CREATININE-BSD FRML MDRD: 7 ML/MIN/1.73M2
GLUCOSE SERPL-MCNC: 75 MG/DL (ref 70–99)
PARTIAL THROMBOPLASTIN TIME: 72.3 SEC (ref 23–36.5)
POTASSIUM SERPL-SCNC: 4.6 MMOL/L (ref 3.7–5.3)
SODIUM SERPL-SCNC: 129 MMOL/L (ref 135–144)

## 2023-08-09 PROCEDURE — 94761 N-INVAS EAR/PLS OXIMETRY MLT: CPT

## 2023-08-09 PROCEDURE — 99232 SBSQ HOSP IP/OBS MODERATE 35: CPT | Performed by: INTERNAL MEDICINE

## 2023-08-09 PROCEDURE — 90935 HEMODIALYSIS ONE EVALUATION: CPT | Performed by: INTERNAL MEDICINE

## 2023-08-09 PROCEDURE — 6370000000 HC RX 637 (ALT 250 FOR IP): Performed by: STUDENT IN AN ORGANIZED HEALTH CARE EDUCATION/TRAINING PROGRAM

## 2023-08-09 PROCEDURE — 2580000003 HC RX 258: Performed by: STUDENT IN AN ORGANIZED HEALTH CARE EDUCATION/TRAINING PROGRAM

## 2023-08-09 PROCEDURE — 6360000002 HC RX W HCPCS: Performed by: INTERNAL MEDICINE

## 2023-08-09 PROCEDURE — 94640 AIRWAY INHALATION TREATMENT: CPT

## 2023-08-09 PROCEDURE — 6370000000 HC RX 637 (ALT 250 FOR IP)

## 2023-08-09 PROCEDURE — 2580000003 HC RX 258

## 2023-08-09 PROCEDURE — 94660 CPAP INITIATION&MGMT: CPT

## 2023-08-09 PROCEDURE — 2700000000 HC OXYGEN THERAPY PER DAY

## 2023-08-09 PROCEDURE — 90935 HEMODIALYSIS ONE EVALUATION: CPT

## 2023-08-09 PROCEDURE — 80048 BASIC METABOLIC PNL TOTAL CA: CPT

## 2023-08-09 PROCEDURE — 85730 THROMBOPLASTIN TIME PARTIAL: CPT

## 2023-08-09 PROCEDURE — 2060000000 HC ICU INTERMEDIATE R&B

## 2023-08-09 PROCEDURE — 36415 COLL VENOUS BLD VENIPUNCTURE: CPT

## 2023-08-09 RX ADMIN — HYDROCORTISONE: 25 CREAM TOPICAL at 15:26

## 2023-08-09 RX ADMIN — FAMOTIDINE 20 MG: 20 TABLET, FILM COATED ORAL at 07:57

## 2023-08-09 RX ADMIN — HYDROCORTISONE: 25 CREAM TOPICAL at 20:21

## 2023-08-09 RX ADMIN — MIDODRINE HYDROCHLORIDE 10 MG: 5 TABLET ORAL at 15:25

## 2023-08-09 RX ADMIN — ACETAMINOPHEN 650 MG: 325 TABLET ORAL at 20:22

## 2023-08-09 RX ADMIN — MIDODRINE HYDROCHLORIDE 10 MG: 5 TABLET ORAL at 20:21

## 2023-08-09 RX ADMIN — SODIUM CHLORIDE, PRESERVATIVE FREE 10 ML: 5 INJECTION INTRAVENOUS at 20:21

## 2023-08-09 RX ADMIN — BUDESONIDE AND FORMOTEROL FUMARATE DIHYDRATE 2 PUFF: 80; 4.5 AEROSOL RESPIRATORY (INHALATION) at 08:09

## 2023-08-09 RX ADMIN — BUDESONIDE AND FORMOTEROL FUMARATE DIHYDRATE 2 PUFF: 80; 4.5 AEROSOL RESPIRATORY (INHALATION) at 20:12

## 2023-08-09 RX ADMIN — APIXABAN 5 MG: 5 TABLET, FILM COATED ORAL at 15:25

## 2023-08-09 RX ADMIN — Medication 1 CAPSULE: at 07:57

## 2023-08-09 RX ADMIN — MIDODRINE HYDROCHLORIDE 10 MG: 5 TABLET ORAL at 07:57

## 2023-08-09 RX ADMIN — DESMOPRESSIN ACETATE 40 MG: 0.2 TABLET ORAL at 07:58

## 2023-08-09 RX ADMIN — HYDROCORTISONE: 25 CREAM TOPICAL at 07:58

## 2023-08-09 RX ADMIN — SODIUM CHLORIDE, PRESERVATIVE FREE 10 ML: 5 INJECTION INTRAVENOUS at 07:57

## 2023-08-09 RX ADMIN — APIXABAN 5 MG: 5 TABLET, FILM COATED ORAL at 20:21

## 2023-08-09 RX ADMIN — EPOETIN ALFA-EPBX 3000 UNITS: 3000 INJECTION, SOLUTION INTRAVENOUS; SUBCUTANEOUS at 12:22

## 2023-08-09 RX ADMIN — HEPARIN SODIUM 2300 UNITS: 1000 INJECTION INTRAVENOUS; SUBCUTANEOUS at 12:25

## 2023-08-09 RX ADMIN — SODIUM CHLORIDE, PRESERVATIVE FREE 10 ML: 5 INJECTION INTRAVENOUS at 20:22

## 2023-08-09 ASSESSMENT — ENCOUNTER SYMPTOMS
ABDOMINAL PAIN: 0
CHEST TIGHTNESS: 0
COLOR CHANGE: 0
DIARRHEA: 0
RHINORRHEA: 0
ABDOMINAL DISTENTION: 0
CONSTIPATION: 0
SHORTNESS OF BREATH: 0
BACK PAIN: 0
CHOKING: 0
STRIDOR: 0
EYE PAIN: 0
SINUS PRESSURE: 0
FACIAL SWELLING: 0
SINUS PAIN: 0
APNEA: 0
EYE ITCHING: 0
WHEEZING: 0
COUGH: 1
NAUSEA: 0
EYE DISCHARGE: 0
EYE REDNESS: 0

## 2023-08-09 ASSESSMENT — PAIN SCALES - GENERAL
PAINLEVEL_OUTOF10: 0
PAINLEVEL_OUTOF10: 4
PAINLEVEL_OUTOF10: 3
PAINLEVEL_OUTOF10: 0
PAINLEVEL_OUTOF10: 6
PAINLEVEL_OUTOF10: 0

## 2023-08-09 ASSESSMENT — PAIN DESCRIPTION - ORIENTATION
ORIENTATION: RIGHT;LEFT

## 2023-08-09 ASSESSMENT — PAIN DESCRIPTION - LOCATION
LOCATION: FOOT

## 2023-08-09 NOTE — CARE COORDINATION
Transitional planning      Per primary RN patient would like to go to [facility], ps sent to [facility] for PM&R order and he would like referrals sent to placed close to his home, offered freedom of choice and referrals sent to [facility] and [facility] and [facility]. 1400 call from Ciara with [facility], but if he has several denials they could do a 1 time contract, will keep her updated.       1605 call from [facility] at [facility], will review,

## 2023-08-10 PROCEDURE — 6370000000 HC RX 637 (ALT 250 FOR IP)

## 2023-08-10 PROCEDURE — 97110 THERAPEUTIC EXERCISES: CPT

## 2023-08-10 PROCEDURE — 97535 SELF CARE MNGMENT TRAINING: CPT

## 2023-08-10 PROCEDURE — 97530 THERAPEUTIC ACTIVITIES: CPT

## 2023-08-10 PROCEDURE — 2580000003 HC RX 258

## 2023-08-10 PROCEDURE — 99232 SBSQ HOSP IP/OBS MODERATE 35: CPT | Performed by: INTERNAL MEDICINE

## 2023-08-10 PROCEDURE — 2060000000 HC ICU INTERMEDIATE R&B

## 2023-08-10 PROCEDURE — 94640 AIRWAY INHALATION TREATMENT: CPT

## 2023-08-10 PROCEDURE — 2580000003 HC RX 258: Performed by: STUDENT IN AN ORGANIZED HEALTH CARE EDUCATION/TRAINING PROGRAM

## 2023-08-10 PROCEDURE — 97116 GAIT TRAINING THERAPY: CPT

## 2023-08-10 PROCEDURE — 94669 MECHANICAL CHEST WALL OSCILL: CPT

## 2023-08-10 PROCEDURE — 94761 N-INVAS EAR/PLS OXIMETRY MLT: CPT

## 2023-08-10 PROCEDURE — 99222 1ST HOSP IP/OBS MODERATE 55: CPT | Performed by: PHYSICAL MEDICINE & REHABILITATION

## 2023-08-10 RX ORDER — MIDODRINE HYDROCHLORIDE 10 MG/1
10 TABLET ORAL 3 TIMES DAILY
Qty: 90 TABLET | Refills: 3 | Status: SHIPPED | OUTPATIENT
Start: 2023-08-10 | End: 2023-08-15 | Stop reason: SDUPTHER

## 2023-08-10 RX ORDER — BUDESONIDE AND FORMOTEROL FUMARATE DIHYDRATE 80; 4.5 UG/1; UG/1
2 AEROSOL RESPIRATORY (INHALATION)
Qty: 10.2 G | Refills: 3 | Status: SHIPPED | OUTPATIENT
Start: 2023-08-10 | End: 2023-08-15 | Stop reason: SDUPTHER

## 2023-08-10 RX ORDER — BENZONATATE 100 MG/1
100 CAPSULE ORAL 3 TIMES DAILY PRN
Qty: 21 CAPSULE | Refills: 0 | Status: SHIPPED | OUTPATIENT
Start: 2023-08-10 | End: 2023-08-15 | Stop reason: SDUPTHER

## 2023-08-10 RX ORDER — HYDROCORTISONE 25 MG/G
CREAM TOPICAL
Qty: 28 G | Refills: 0 | Status: SHIPPED | OUTPATIENT
Start: 2023-08-10 | End: 2023-08-15 | Stop reason: SDUPTHER

## 2023-08-10 RX ORDER — FAMOTIDINE 20 MG/1
20 TABLET, FILM COATED ORAL DAILY
Qty: 60 TABLET | Refills: 3 | Status: SHIPPED | OUTPATIENT
Start: 2023-08-11 | End: 2023-08-15 | Stop reason: SDUPTHER

## 2023-08-10 RX ORDER — ALBUTEROL SULFATE 90 UG/1
2 INHALANT RESPIRATORY (INHALATION) EVERY 6 HOURS PRN
Qty: 18 G | Refills: 3 | Status: SHIPPED | OUTPATIENT
Start: 2023-08-10 | End: 2023-08-15 | Stop reason: SDUPTHER

## 2023-08-10 RX ORDER — LIDOCAINE 40 MG/G
CREAM TOPICAL
Qty: 28 G | Refills: 2 | Status: SHIPPED | OUTPATIENT
Start: 2023-08-10 | End: 2023-08-15 | Stop reason: SDUPTHER

## 2023-08-10 RX ORDER — DOCUSATE SODIUM 100 MG/1
100 CAPSULE, LIQUID FILLED ORAL 2 TIMES DAILY PRN
Qty: 60 CAPSULE | Refills: 4 | Status: SHIPPED | OUTPATIENT
Start: 2023-08-10 | End: 2023-08-15 | Stop reason: SDUPTHER

## 2023-08-10 RX ADMIN — MIDODRINE HYDROCHLORIDE 10 MG: 5 TABLET ORAL at 20:40

## 2023-08-10 RX ADMIN — HYDROCORTISONE: 25 CREAM TOPICAL at 15:08

## 2023-08-10 RX ADMIN — SODIUM CHLORIDE, PRESERVATIVE FREE 10 ML: 5 INJECTION INTRAVENOUS at 20:41

## 2023-08-10 RX ADMIN — NITROGLYCERIN 0.5 INCH: 20 OINTMENT TOPICAL at 14:13

## 2023-08-10 RX ADMIN — PSYLLIUM HUSK 1 PACKET: 3.4 POWDER ORAL at 08:11

## 2023-08-10 RX ADMIN — BUDESONIDE AND FORMOTEROL FUMARATE DIHYDRATE 2 PUFF: 80; 4.5 AEROSOL RESPIRATORY (INHALATION) at 20:12

## 2023-08-10 RX ADMIN — DESMOPRESSIN ACETATE 40 MG: 0.2 TABLET ORAL at 08:11

## 2023-08-10 RX ADMIN — MIDODRINE HYDROCHLORIDE 10 MG: 5 TABLET ORAL at 08:11

## 2023-08-10 RX ADMIN — APIXABAN 5 MG: 5 TABLET, FILM COATED ORAL at 08:11

## 2023-08-10 RX ADMIN — MIDODRINE HYDROCHLORIDE 10 MG: 5 TABLET ORAL at 15:09

## 2023-08-10 RX ADMIN — SODIUM CHLORIDE, PRESERVATIVE FREE 10 ML: 5 INJECTION INTRAVENOUS at 08:12

## 2023-08-10 RX ADMIN — HYDROCORTISONE: 25 CREAM TOPICAL at 08:12

## 2023-08-10 RX ADMIN — HYDROCORTISONE: 25 CREAM TOPICAL at 20:40

## 2023-08-10 RX ADMIN — Medication 1 CAPSULE: at 08:11

## 2023-08-10 RX ADMIN — FAMOTIDINE 20 MG: 20 TABLET, FILM COATED ORAL at 08:11

## 2023-08-10 RX ADMIN — APIXABAN 5 MG: 5 TABLET, FILM COATED ORAL at 20:40

## 2023-08-10 RX ADMIN — BUDESONIDE AND FORMOTEROL FUMARATE DIHYDRATE 2 PUFF: 80; 4.5 AEROSOL RESPIRATORY (INHALATION) at 10:00

## 2023-08-10 ASSESSMENT — PAIN SCALES - GENERAL
PAINLEVEL_OUTOF10: 4
PAINLEVEL_OUTOF10: 0
PAINLEVEL_OUTOF10: 0

## 2023-08-10 ASSESSMENT — ENCOUNTER SYMPTOMS
BACK PAIN: 0
RHINORRHEA: 0
CONSTIPATION: 0
WHEEZING: 0
EYE DISCHARGE: 0
FACIAL SWELLING: 0
ABDOMINAL PAIN: 0
EYE PAIN: 0
CHEST TIGHTNESS: 0
DIARRHEA: 0
APNEA: 0
SHORTNESS OF BREATH: 0
SINUS PAIN: 0
NAUSEA: 0
ABDOMINAL DISTENTION: 0
SINUS PRESSURE: 0
EYE REDNESS: 0
COUGH: 1
COLOR CHANGE: 0
STRIDOR: 0
CHOKING: 0
EYE ITCHING: 0

## 2023-08-10 ASSESSMENT — PAIN DESCRIPTION - LOCATION: LOCATION: FOOT

## 2023-08-10 NOTE — CARE COORDINATION
Transitional planning    Call to Butch at [facility] for update, left  eith request for call back. 1300 received vm from Butch , need updated OT note, will notify. 1330 OT notified on unit of need to see patient    [phone] call to Josesito at [facility], will continue to review. 1620 call to Butch and updated OT note in , left vm.

## 2023-08-10 NOTE — CONSULTS
[facility] [place] / HISTORY AND PHYSICAL EXAMINATION            Date:   8/4/2023  Patient name:  Danna Yi  Date of admission:  7/19/2023  9:55 AM  MRN:   [id number]  Account:  [de-identified]  YOB: 1945  PCP:    Lavell Salazar MD  Room:   3071/6577-16  Code Status:    Full Code    Physician Requesting Consult: Adonis Serna MD    Reason for Consult:  MVCAD, severe AS    Chief Complaint:     Chief Complaint   Patient presents with    Cough    Leg Swelling       History Obtained From:     patient, electronic medical record    History of Present Illness:     Mr Donna Shaikh is a 72y.o.  [place] male with a pmhx of AAA, carotid artery stenosis s/p left CEA, CVA, COPD, DM, ESRD on HD, HTN, HLD who presents to [facility] with SOB and inability to lie flat. Work up revealed pulmonary vascular congestion consistent with HF exacerbation and elevated BNP. TTE was completed which revealed severe AS with MG of 49mmHg, KATEY of 0.6, moderate to severe MR, moderately hypokinetic LV, severely dilated RV and EF of 27%. [facility] was also performed which revealed heavy calcifications of the coronary arteries with  of LAD with collateral filling, ostial disease of the L Circ at about 80% stenosis, and OM2 with proximal disease of 80%. Mr Donell Cherry, denies history of previous MI, AF, or CP, he is able to ambulate at home with a cane. He is currently on Eliquis for previous stroke, last dose of 7/28.     Past Medical History:     Past Medical History:   Diagnosis Date    AAA (abdominal aortic aneurysm) ([address]) 4.2 X 5.3 cm 7/22/2023    Allergic rhinitis     Anemia     BPH (benign prostatic hyperplasia)     CAD (coronary artery disease)     Carotid artery stenosis 2013    ulcerated plaque in left ICA 60 - 70% on carotid angiogram    Cerebrovascular disease 2013    left frontal hemorrhage    Chronic obstructive pulmonary disease with acute
Division of Vascular Surgery        New Consult      Physician Requesting Consult:  ED    Reason for Consult:   AAA and bilateral internal iliac aneurysms    History of Present Illness:      Donis Hyman is a 76 y.o. gentleman who was admitted to the internal medicine service for acute on chronic respiratory failure, COPD, CHF, and ESRD requiring hemodialysis. A CT urogram was obtained to evaluate a renal mass, which also showed incidental findings of an Abdominal aortic aneurysm and bilateral internal iliac aneurysms. The AAA measured 4.2x 5.3cm and the internal iliac aneurysms both are 2.2cm with no evidence of rupture. At bedside today, Pt denies any chest, abdomen, back pain Patient also states no pain in legs and that he has no changes in the swelling of his legs. Patient does complain of a left inguinal hernia.     Medical History:     Past Medical History:   Diagnosis Date    AAA (abdominal aortic aneurysm) (HCC) 4.2 X 5.3 cm 7/22/2023    Allergic rhinitis     Anemia     BPH (benign prostatic hyperplasia)     CAD (coronary artery disease)     Carotid artery stenosis 2013    ulcerated plaque in left ICA 60 - 70% on carotid angiogram    Cerebrovascular disease 2013    left frontal hemorrhage    Chronic obstructive pulmonary disease with acute exacerbation ([address]) 7/19/2023    Diabetes mellitus ([address]) 2013    Eczema     ESRD (end stage renal disease) on dialysis ([address])     Full dentures     Upper / Lower    GERD (gastroesophageal reflux disease)     Gout     Hemodialysis patient ([address]) 10/2015     [address]  M,W F    Hyperlipidemia     Hypertension     Neuropathy     Osteoarthritis     Peripheral vascular disease ([address])     Seizures ([address]) 2013    after stroke, LAST [id number]/2016    Stroke [facility]) 2013    Unspecified cerebral artery occlusion with cerebral infarction 4-    speech short term and lt side       Surgical History:     Past Surgical History:   Procedure Laterality Date
Jimenez Arguelles  Urology Consult      Patient:  Alex Rader  MRN: [id number]  YOB: 1945    CHIEF COMPLAINT: Right renal lesion    HISTORY OF PRESENT ILLNESS:   The patient is a 76 y.o. male admitted with acute on chronic respiratory failure who underwent CTA of the chest where a right calcified renal lesion was incidentally found. CT urogram was ordered and is pending. Patient also does have a history of BPH and has been previously managed by Dr. Christine Burgess several years ago. No prior upper tract imaging is available for comparison. He is end-stage renal disease on hemodialysis. Lesion on CT Urogram measures 4.5cm in greatest diameter and is partially cystic. Patient denies any gross hematuria and states he is anuric. He denies any flank pain or nausea. Patient's old records, notes and chart reviewed and summarized above.     Past Medical History:    Past Medical History:   Diagnosis Date    Allergic rhinitis     Anemia     BPH (benign prostatic hyperplasia)     CAD (coronary artery disease)     Carotid artery stenosis 2013    ulcerated plaque in left ICA 60 - 70% on carotid angiogram    Cerebrovascular disease 2013    left frontal hemorrhage    Chronic obstructive pulmonary disease with acute exacerbation ([address]) 7/19/2023    Diabetes mellitus ([address]) 2013    Eczema     ESRD (end stage renal disease) on dialysis ([address])     Full dentures     Upper / Lower    GERD (gastroesophageal reflux disease)     Gout     Hemodialysis patient ([address]) 10/2015     [address]NAL [address]  M,W F    Hyperlipidemia     Hypertension     Neuropathy     Osteoarthritis     Peripheral vascular disease ([address])     Seizures ([address]) 2013    after stroke, LAST [id number]/20/2016    Stroke [facility]) 2013    Unspecified cerebral artery occlusion with cerebral infarction 4-    speech short term and lt side       Past Surgical History:    Past Surgical History:   Procedure Laterality Date    ABDOMEN SURGERY
[facility]    Consult / H&P               Today's Date: 7/21/2023  Patient Name: Delonte Parada  Date of admission: 7/19/2023  9:55 AM  Patient's age: 76 y.o., 1945  Admission Dx: Leg swelling [M79.89]  Acute on chronic respiratory failure with hypoxia and hypercapnia (HCC) [J96.21, J96.22]  Acute cough [R05.1]    Requesting Physician: Lea Reyez MD    Cardiac Evaluation Reason: CXR suggests CHF, Aortic stenosis, SOB    History Obtained From: patient and chart review     History of Present Illness: This patient 76y.o. years old with relevant past medical history of Hypertension, pAfib on elliquis, Nonsustained VT, HLD, ESRD on HD with Chronic Elevated Troponin, L CEA in 2015, Moderate AS that presents to the ED for cough, progressive lower leg swelling, and difficulty sleeping due to SOB. His last visit to see a cardiologist was on 6/23/23 ([address] [place] [address]) to reestablish care and preop clearance for hernia repair. At that time a SOB was noted and Lexiscan stress test to rule out ischemia was ordered but has not yet been completed. For the past couple of months he has noted progressive swelling in the feet, difficulty breathing, and having yo concentrate on breathing. He has not had any chest pain or discomfort. However, he can't walk far or climb stairs w/o shortness of breath. He often wakes up from sleep due to SOB and has to sit up in the chair. His cough has been productive of thick green sputum. He also endorses LH, DZ, fatigue and denies N/V, abdominal pain. In the ED, labs were notable for hyponatremia (134), hypocalcemia (4.23), normocytic anemia, Cxray showed cardiomegaly, worsening pulmonary edema with a small right pleural effusion. CT Chest notable for mild-to-moderate CHF, coronary artery disease and valvular heart disease.      Past Medical History:   has a past medical history of Allergic rhinitis, Anemia, BPH (benign prostatic hyperplasia), CAD (coronary artery
PULMONARY & CRITICAL CARE MEDICINE CONSULT NOTE     Patient:  Cristina Oliva  MRN: [id number]  Admit date: 7/19/2023  Primary Care Physician: Zeb Castillo MD  Consulting Physician: Enzo Cohn MD  CODE Status: Full Code  LOS: 1     SUBJECTIVE     CHIEF COMPLAINT/REASON FOR CONSULT: Multiple lung nodules noted on CTPE     HISTORY OF PRESENT ILLNESS:  The patient is a 76 y.o. male with a significant PMH for COPD, ESRD (dialysis MWF), HTN, DM, HLD, CAD, PVD, and OA initially presented to the hospital with SOB. Pt reported that his sxs started 2 weeks ago and was progressive in nature. SOB excerbated with lying down and improved with sitting. Describes the cough as productive with \"greenish-yellow\" phlegm along with wheezing x 3-4 days. He was generalized fatigue and low energy levels and was using a neto to ambulate, had leg swelling sinc 3 days. On admission his BNP was elevated, troponin elevated, CXR showed congestive heart failure findings. Patient has ESRD and has been undergoing dialysis for 10 years, MWF, missed last dialysis. Echo done in 2021 showed EF 55%, aortic stenosis, moderate MR. He underwent coronary endarterectomy in 2015. D-dimer was elevated but CTPE was negative for pulmonary embolus. Dialysis was done and 4L of fluid was removed. Was placed on BiPAP due to continued SOB and cough. Pulmonology was consulted due to 7/19/23 CTPE noting innumerable small pulmonary nodules bilaterally measuring up to 7 mm in the right lower lobe. No prior CT scans done. Had CXR done in 2015 that was unremarkable; 7/19/23 CXR noted congestive heart failure findings. Pt was evaluated at bedside while he was in dialysis, appeared sleepy but was cooperative and answering questions appropriately. Continues to have a productive cough but denies fever, chest pain or pressure, and decreased appetite. Denies hx of COPD, asthma or tobacco use. Vitals stable. Saturating well.      PAST MEDICAL HISTORY:        Diagnosis
Please see note from Kala Valdez CNP today.  Thank you
See consult note 7/22
Today's Date: 7/20/2023  Patient Name: Venessa Ku  Date of admission: 7/19/2023  9:55 AM  Patient's age: 76 y.o., 1945  Admission Dx: Leg swelling [M79.89]  Acute on chronic respiratory failure with hypoxia and hypercapnia (HCC) [J96.21, J96.22]  Acute cough [R05.1]    Reason for Consult: abnormal lung ct  Requesting Physician: Melina Luna MD    CHIEF COMPLAINT:    Chief Complaint   Patient presents with    Cough    Leg Swelling       History Obtained From:  patient and chart    HISTORY OF PRESENT ILLNESS:      Venessa Ku is a 76 y.o. male who is admitted to the hospital on 7/19/2023  for cough. Patient has past medical history of COPD, end-stage renal disease on hemodialysis, CAD, diabetes was admitted with cough and shortness of breath and as well as generalized weakness. In the ER he required BiPAP  CT of the chest showed multiple pulmonary nodules suspicious for metastatic disease and also calcified right renal mass noted suspicious for malignancy. CT urogram recommended. Oncology consulted for evaluation of his lung nodules and renal mass. Patient does complain of some cough but denies any chest pain. He does report some loss of appetite. Past Medical History:   has a past medical history of Allergic rhinitis, Anemia, BPH (benign prostatic hyperplasia), CAD (coronary artery disease), Carotid artery stenosis, Cerebrovascular disease, Chronic obstructive pulmonary disease with acute exacerbation ([address]), Diabetes mellitus ([address]), Eczema, ESRD (end stage renal disease) on dialysis ([address]), Full dentures, GERD (gastroesophageal reflux disease), Gout, Hemodialysis patient ([address]), Hyperlipidemia, Hypertension, Neuropathy, Osteoarthritis, Peripheral vascular disease ([address]), Seizures ([address]), Stroke ([address]), and Unspecified cerebral artery occlusion with cerebral infarction.     Past Surgical History:   has a past surgical history that includes Bladder surgery (Spring 2014); sinus surgery;
for dizziness, headaches and weakness  Behavioral/Psych: negative for decreased appetite, depression and fatigue    Functional History:  PTA: Independent with all activities. Current:  PT:    Bed Mobility Training  Bed Mobility Training: Yes  Overall Level of Assistance: Contact-guard assistance  Interventions: Safety awareness training, Tactile cues, Verbal cues  Supine to Sit: Contact-guard assistance (HOB elevated ~45 degrees, multiple reps kelin)  Sit to Supine: Stand-by assistance (HOb elevated ~45 degrees)  Scooting: Contact-guard assistance (to scoot to place feet onto floor)  Restrictions/Precautions  Restrictions/Precautions: Up as Tolerated, General Precautions  Required Braces or Orthoses?: No  Position Activity Restriction  Other position/activity restrictions: Weak in knees with OA. Imaging: nodules suspicious for metastatic disease. Transfer Training  Transfer Training: Yes  Overall Level of Assistance: Contact-guard assistance  Interventions: Safety awareness training, Tactile cues, Verbal cues  Sit to Stand: Contact-guard assistance (AD)  Stand to Sit: Contact-guard assistance (AD)  Bed mobility  Supine to Sit: Unable to assess  Sit to Supine: Unable to assess  Scooting: Independent  Bed Mobility Comments: Pt began and concluded session seated in recliner.  Pt able to scoot in andout of recliner independently and safely    Gait Training  Gait Training: Yes  Gait  Overall Level of Assistance: Stand-by assistance  Interventions: Verbal cues  Base of Support: Widened  Speed/Bijal: Slow (occasional stand)  Step Length: Left shortened, Right shortened  Stance: Left increased, Right increased  Gait Abnormalities: Ataxic  Distance (ft):  (Room distance with walker)  Assistive Device: Walker  Transfers  Sit to Stand: Contact guard assistance  Stand to Sit: Contact guard assistance  Comment: RW used for transfers, performed x1 from EOB, required cueing for hand placement with poor return demo, pt
09/29/2015 11:39 AM    WBCUA 2 TO 5 09/29/2015 11:39 AM    RBCUA 2 TO 5 09/29/2015 11:39 AM    MUCUS NOT REPORTED 09/29/2015 11:39 AM    TRICHOMONAS NOT REPORTED 09/29/2015 11:39 AM    YEAST NOT REPORTED 09/29/2015 11:39 AM    BACTERIA FEW 09/29/2015 11:39 AM    SPECGRAV 1.015 09/29/2015 11:39 AM    LEUKOCYTESUR NEGATIVE 09/29/2015 11:39 AM    UROBILINOGEN Normal 09/29/2015 11:39 AM    BILIRUBINUR NEGATIVE 09/29/2015 11:39 AM    GLUCOSEU NEGATIVE 09/29/2015 11:39 AM    KETUA NEGATIVE 09/29/2015 11:39 AM    AMORPHOUS NOT REPORTED 09/29/2015 11:39 AM       Radiology:     Reviewed. Assessment:       ESRD on Hemodialysis. His regular HD days are MWF at [facility] hemodialysis facility using tunneled catheter under Dr. Edmond Barnett. His dry weight is 92.7 kgs. Shortness of breath. Fatigue. History of falls. Fluid overloaded. A-fib. Moderate AS. Bilateral lower extremity edema. Anemia of chronic disease  Secondary hyperparathyroidism  Hypertension    Plan:     Patient was seen and examined on HD at bedside. Orders were confirmed with the HD nurse. Strict Input and Output, Daily weigh and document in the chart. Low Potassium, Low phosphorus and low salt diet. Fluids to be restricted to 1500ml/day. IV Aranesp/Epogen for anemia of chronic disease with HD weekly. IV Zemplar per protocol for secondary hyperparathyroidism with HD thrice a week. BMP in AM.  Will follow. Nutrition   Please ensure that patient is on a renal diet/TF. Thank you for the consultation. Please do not hesitate to contact us for any further questions/concerns. Cali Call MD  [facility]     This note is created with the assistance of a speech-recognition program. While intending to generate a document that actually reflects the content of the visit, no guarantees can be provided that every mistake has been identified and corrected by editing.
[facility] Number [phone]    [facility] Palliative [facility] Number [phone]    Please call with any palliative questions or concerns. Palliative Care Team is available via perfect serve or via phone.

## 2023-08-11 LAB
ANION GAP SERPL CALCULATED.3IONS-SCNC: 10 MMOL/L (ref 9–17)
BUN SERPL-MCNC: 52 MG/DL (ref 8–23)
CALCIUM SERPL-MCNC: 9.8 MG/DL (ref 8.6–10.4)
CHLORIDE SERPL-SCNC: 94 MMOL/L (ref 98–107)
CO2 SERPL-SCNC: 29 MMOL/L (ref 20–31)
CREAT SERPL-MCNC: 7.7 MG/DL (ref 0.7–1.2)
GFR SERPL CREATININE-BSD FRML MDRD: 7 ML/MIN/1.73M2
GLUCOSE SERPL-MCNC: 127 MG/DL (ref 70–99)
POTASSIUM SERPL-SCNC: 4.5 MMOL/L (ref 3.7–5.3)
SODIUM SERPL-SCNC: 133 MMOL/L (ref 135–144)

## 2023-08-11 PROCEDURE — 2060000000 HC ICU INTERMEDIATE R&B

## 2023-08-11 PROCEDURE — 99239 HOSP IP/OBS DSCHRG MGMT >30: CPT | Performed by: INTERNAL MEDICINE

## 2023-08-11 PROCEDURE — 6370000000 HC RX 637 (ALT 250 FOR IP)

## 2023-08-11 PROCEDURE — 6360000002 HC RX W HCPCS: Performed by: INTERNAL MEDICINE

## 2023-08-11 PROCEDURE — 90935 HEMODIALYSIS ONE EVALUATION: CPT

## 2023-08-11 PROCEDURE — 2580000003 HC RX 258

## 2023-08-11 PROCEDURE — 94640 AIRWAY INHALATION TREATMENT: CPT

## 2023-08-11 PROCEDURE — 80048 BASIC METABOLIC PNL TOTAL CA: CPT

## 2023-08-11 PROCEDURE — 2580000003 HC RX 258: Performed by: STUDENT IN AN ORGANIZED HEALTH CARE EDUCATION/TRAINING PROGRAM

## 2023-08-11 PROCEDURE — 2700000000 HC OXYGEN THERAPY PER DAY

## 2023-08-11 PROCEDURE — 90935 HEMODIALYSIS ONE EVALUATION: CPT | Performed by: INTERNAL MEDICINE

## 2023-08-11 PROCEDURE — 94761 N-INVAS EAR/PLS OXIMETRY MLT: CPT

## 2023-08-11 PROCEDURE — 02HV33Z INSERTION OF INFUSION DEVICE INTO SUPERIOR VENA CAVA, PERCUTANEOUS APPROACH: ICD-10-PCS | Performed by: INTERNAL MEDICINE

## 2023-08-11 RX ORDER — HEPARIN SODIUM 1000 [USP'U]/ML
2300 INJECTION, SOLUTION INTRAVENOUS; SUBCUTANEOUS PRN
Status: DISCONTINUED | OUTPATIENT
Start: 2023-08-11 | End: 2023-08-15 | Stop reason: HOSPADM

## 2023-08-11 RX ADMIN — HYDROCORTISONE: 25 CREAM TOPICAL at 20:43

## 2023-08-11 RX ADMIN — HYDROCORTISONE: 25 CREAM TOPICAL at 14:16

## 2023-08-11 RX ADMIN — FAMOTIDINE 20 MG: 20 TABLET, FILM COATED ORAL at 14:15

## 2023-08-11 RX ADMIN — SODIUM CHLORIDE, PRESERVATIVE FREE 10 ML: 5 INJECTION INTRAVENOUS at 14:15

## 2023-08-11 RX ADMIN — Medication 5 MG: at 21:17

## 2023-08-11 RX ADMIN — HEPARIN SODIUM 2300 UNITS: 1000 INJECTION INTRAVENOUS; SUBCUTANEOUS at 13:23

## 2023-08-11 RX ADMIN — APIXABAN 5 MG: 5 TABLET, FILM COATED ORAL at 14:15

## 2023-08-11 RX ADMIN — Medication 1 CAPSULE: at 14:15

## 2023-08-11 RX ADMIN — PSYLLIUM HUSK 1 PACKET: 3.4 POWDER ORAL at 14:14

## 2023-08-11 RX ADMIN — APIXABAN 5 MG: 5 TABLET, FILM COATED ORAL at 20:43

## 2023-08-11 RX ADMIN — DESMOPRESSIN ACETATE 40 MG: 0.2 TABLET ORAL at 14:15

## 2023-08-11 RX ADMIN — ERYTHROPOIETIN 3000 UNITS: 3000 INJECTION, SOLUTION INTRAVENOUS; SUBCUTANEOUS at 13:17

## 2023-08-11 RX ADMIN — MIDODRINE HYDROCHLORIDE 10 MG: 5 TABLET ORAL at 20:43

## 2023-08-11 RX ADMIN — BUDESONIDE AND FORMOTEROL FUMARATE DIHYDRATE 2 PUFF: 80; 4.5 AEROSOL RESPIRATORY (INHALATION) at 21:05

## 2023-08-11 RX ADMIN — MIDODRINE HYDROCHLORIDE 10 MG: 5 TABLET ORAL at 14:15

## 2023-08-11 RX ADMIN — SODIUM CHLORIDE, PRESERVATIVE FREE 10 ML: 5 INJECTION INTRAVENOUS at 20:47

## 2023-08-11 NOTE — CARE COORDINATION
Transitional planning      0840 call from Butch with [facility], patient denied. Call to [facility] and spoke with Taylor Wen, she will confirm if patient is able to come today. [employer] precert to be initiated. Patient currently in dialysis and will update when he returns.

## 2023-08-12 PROCEDURE — 94761 N-INVAS EAR/PLS OXIMETRY MLT: CPT

## 2023-08-12 PROCEDURE — 6370000000 HC RX 637 (ALT 250 FOR IP)

## 2023-08-12 PROCEDURE — 99232 SBSQ HOSP IP/OBS MODERATE 35: CPT | Performed by: INTERNAL MEDICINE

## 2023-08-12 PROCEDURE — 2060000000 HC ICU INTERMEDIATE R&B

## 2023-08-12 PROCEDURE — 94640 AIRWAY INHALATION TREATMENT: CPT

## 2023-08-12 PROCEDURE — 2700000000 HC OXYGEN THERAPY PER DAY

## 2023-08-12 PROCEDURE — 2580000003 HC RX 258

## 2023-08-12 RX ORDER — DOCUSATE SODIUM 100 MG/1
100 CAPSULE, LIQUID FILLED ORAL DAILY
Status: DISCONTINUED | OUTPATIENT
Start: 2023-08-12 | End: 2023-08-15 | Stop reason: HOSPADM

## 2023-08-12 RX ADMIN — MIDODRINE HYDROCHLORIDE 10 MG: 5 TABLET ORAL at 20:58

## 2023-08-12 RX ADMIN — PSYLLIUM HUSK 1 PACKET: 3.4 POWDER ORAL at 10:06

## 2023-08-12 RX ADMIN — APIXABAN 5 MG: 5 TABLET, FILM COATED ORAL at 10:06

## 2023-08-12 RX ADMIN — MIDODRINE HYDROCHLORIDE 10 MG: 5 TABLET ORAL at 10:05

## 2023-08-12 RX ADMIN — HYDROCORTISONE: 25 CREAM TOPICAL at 07:28

## 2023-08-12 RX ADMIN — DESMOPRESSIN ACETATE 40 MG: 0.2 TABLET ORAL at 10:07

## 2023-08-12 RX ADMIN — DOCUSATE SODIUM 100 MG: 100 CAPSULE, LIQUID FILLED ORAL at 10:05

## 2023-08-12 RX ADMIN — HYDROCORTISONE: 25 CREAM TOPICAL at 20:59

## 2023-08-12 RX ADMIN — HYDROCORTISONE: 25 CREAM TOPICAL at 15:17

## 2023-08-12 RX ADMIN — SODIUM CHLORIDE, PRESERVATIVE FREE 10 ML: 5 INJECTION INTRAVENOUS at 07:29

## 2023-08-12 RX ADMIN — APIXABAN 5 MG: 5 TABLET, FILM COATED ORAL at 20:58

## 2023-08-12 RX ADMIN — SODIUM CHLORIDE, PRESERVATIVE FREE 10 ML: 5 INJECTION INTRAVENOUS at 20:58

## 2023-08-12 RX ADMIN — BUDESONIDE AND FORMOTEROL FUMARATE DIHYDRATE 2 PUFF: 80; 4.5 AEROSOL RESPIRATORY (INHALATION) at 07:53

## 2023-08-12 RX ADMIN — Medication 5 MG: at 21:05

## 2023-08-12 RX ADMIN — Medication 1 CAPSULE: at 10:04

## 2023-08-12 RX ADMIN — FAMOTIDINE 20 MG: 20 TABLET, FILM COATED ORAL at 10:05

## 2023-08-12 ASSESSMENT — PAIN DESCRIPTION - LOCATION: LOCATION: BUTTOCKS

## 2023-08-12 ASSESSMENT — PAIN SCALES - GENERAL
PAINLEVEL_OUTOF10: 0
PAINLEVEL_OUTOF10: 2

## 2023-08-12 ASSESSMENT — PAIN DESCRIPTION - DESCRIPTORS: DESCRIPTORS: PRESSURE;DISCOMFORT

## 2023-08-12 ASSESSMENT — PAIN - FUNCTIONAL ASSESSMENT: PAIN_FUNCTIONAL_ASSESSMENT: ACTIVITIES ARE NOT PREVENTED

## 2023-08-12 NOTE — CARE COORDINATION
Transitional planning note: plan is [facility]. Auth is pending per mynexus. Call placed to [facility] with [facility] to inquire if patient is able to come over the weekend if [employer] is approved. Also inquired if [place] is need for hemodialysis at [facility] as patient is a hemodialysis patient. 0915: received vm message from marixa with [facility] stating they can accept patient over the weekend if insurance approval obtained. 1110: received notification from bedside nurse that patient would like to meet with case management    1125: met with patient to discuss transitional planning. Plan remains [facility]. Patient wants to know why he has not been discharged yet. CM explained that [facility] has accepted but we are still awaiting insurance approval for the facility. Patient states his children have already been to the facility and that the facility has accepted him. CM again attempted to explain that facility did accept but insurance needs to approve SNF stay before he can admit there. Patient states, \"This is piss poor planning and I have a home and a life and my children will be here by noon to take me to [facility]. \" CM acknowledged patient's frustration with insurance process but again advised that he will need insurance approval before facility can admit. Patient then stated he wants to leave. CM advised that patient is certainly allowed to leave but asked what patient's goal is. CM asked if patient still wants to go to rehab or would he like to go home with possible services like home healthcare. Patient states his plan is to still go to rehab.  Notified bedside nurse of above conversation

## 2023-08-13 PROCEDURE — 99232 SBSQ HOSP IP/OBS MODERATE 35: CPT | Performed by: INTERNAL MEDICINE

## 2023-08-13 PROCEDURE — 97110 THERAPEUTIC EXERCISES: CPT

## 2023-08-13 PROCEDURE — 2580000003 HC RX 258

## 2023-08-13 PROCEDURE — 6370000000 HC RX 637 (ALT 250 FOR IP)

## 2023-08-13 PROCEDURE — 1200000000 HC SEMI PRIVATE

## 2023-08-13 PROCEDURE — 94760 N-INVAS EAR/PLS OXIMETRY 1: CPT

## 2023-08-13 PROCEDURE — 97116 GAIT TRAINING THERAPY: CPT

## 2023-08-13 PROCEDURE — 99231 SBSQ HOSP IP/OBS SF/LOW 25: CPT | Performed by: INTERNAL MEDICINE

## 2023-08-13 PROCEDURE — 94640 AIRWAY INHALATION TREATMENT: CPT

## 2023-08-13 RX ADMIN — MIDODRINE HYDROCHLORIDE 10 MG: 5 TABLET ORAL at 15:30

## 2023-08-13 RX ADMIN — SODIUM CHLORIDE, PRESERVATIVE FREE 10 ML: 5 INJECTION INTRAVENOUS at 08:58

## 2023-08-13 RX ADMIN — PSYLLIUM HUSK 1 PACKET: 3.4 POWDER ORAL at 08:57

## 2023-08-13 RX ADMIN — MIDODRINE HYDROCHLORIDE 10 MG: 5 TABLET ORAL at 08:56

## 2023-08-13 RX ADMIN — HYDROCORTISONE: 25 CREAM TOPICAL at 21:40

## 2023-08-13 RX ADMIN — SODIUM CHLORIDE, PRESERVATIVE FREE 10 ML: 5 INJECTION INTRAVENOUS at 21:40

## 2023-08-13 RX ADMIN — BUDESONIDE AND FORMOTEROL FUMARATE DIHYDRATE 2 PUFF: 80; 4.5 AEROSOL RESPIRATORY (INHALATION) at 09:55

## 2023-08-13 RX ADMIN — APIXABAN 5 MG: 5 TABLET, FILM COATED ORAL at 21:40

## 2023-08-13 RX ADMIN — Medication 1 CAPSULE: at 08:57

## 2023-08-13 RX ADMIN — APIXABAN 5 MG: 5 TABLET, FILM COATED ORAL at 08:57

## 2023-08-13 RX ADMIN — HYDROCORTISONE: 25 CREAM TOPICAL at 15:30

## 2023-08-13 RX ADMIN — DOCUSATE SODIUM 100 MG: 100 CAPSULE, LIQUID FILLED ORAL at 08:57

## 2023-08-13 RX ADMIN — FAMOTIDINE 20 MG: 20 TABLET, FILM COATED ORAL at 08:57

## 2023-08-13 RX ADMIN — BUDESONIDE AND FORMOTEROL FUMARATE DIHYDRATE 2 PUFF: 80; 4.5 AEROSOL RESPIRATORY (INHALATION) at 19:49

## 2023-08-13 RX ADMIN — DESMOPRESSIN ACETATE 40 MG: 0.2 TABLET ORAL at 08:57

## 2023-08-13 RX ADMIN — HYDROCORTISONE: 25 CREAM TOPICAL at 08:57

## 2023-08-13 RX ADMIN — MIDODRINE HYDROCHLORIDE 10 MG: 5 TABLET ORAL at 21:40

## 2023-08-13 ASSESSMENT — PAIN DESCRIPTION - LOCATION: LOCATION: BUTTOCKS

## 2023-08-13 ASSESSMENT — PAIN SCALES - GENERAL
PAINLEVEL_OUTOF10: 0
PAINLEVEL_OUTOF10: 2

## 2023-08-13 ASSESSMENT — PAIN - FUNCTIONAL ASSESSMENT: PAIN_FUNCTIONAL_ASSESSMENT: ACTIVITIES ARE NOT PREVENTED

## 2023-08-13 ASSESSMENT — PAIN DESCRIPTION - PAIN TYPE: TYPE: CHRONIC PAIN

## 2023-08-13 ASSESSMENT — PAIN DESCRIPTION - DESCRIPTORS: DESCRIPTORS: PRESSURE

## 2023-08-14 ENCOUNTER — APPOINTMENT (OUTPATIENT)
Dept: DIALYSIS | Age: 78
DRG: 286 | End: 2023-08-14
Payer: MEDICARE

## 2023-08-14 LAB
ANION GAP SERPL CALCULATED.3IONS-SCNC: 18 MMOL/L (ref 9–17)
BASOPHILS # BLD: 0.04 K/UL (ref 0–0.2)
BASOPHILS NFR BLD: 1 % (ref 0–2)
BUN SERPL-MCNC: 60 MG/DL (ref 8–23)
CALCIUM SERPL-MCNC: 10.1 MG/DL (ref 8.6–10.4)
CHLORIDE SERPL-SCNC: 98 MMOL/L (ref 98–107)
CO2 SERPL-SCNC: 21 MMOL/L (ref 20–31)
CREAT SERPL-MCNC: 8.5 MG/DL (ref 0.7–1.2)
EOSINOPHIL # BLD: 0.26 K/UL (ref 0–0.44)
EOSINOPHILS RELATIVE PERCENT: 6 % (ref 1–4)
ERYTHROCYTE [DISTWIDTH] IN BLOOD BY AUTOMATED COUNT: 20.9 % (ref 11.8–14.4)
GFR SERPL CREATININE-BSD FRML MDRD: 6 ML/MIN/1.73M2
GLUCOSE BLD-MCNC: 130 MG/DL (ref 75–110)
GLUCOSE BLD-MCNC: 78 MG/DL (ref 75–110)
GLUCOSE SERPL-MCNC: 85 MG/DL (ref 70–99)
HCT VFR BLD AUTO: 28 % (ref 40.7–50.3)
HGB BLD-MCNC: 8.7 G/DL (ref 13–17)
IMM GRANULOCYTES # BLD AUTO: 0 K/UL (ref 0–0.3)
IMM GRANULOCYTES NFR BLD: 0 %
LYMPHOCYTES NFR BLD: 1.06 K/UL (ref 1.1–3.7)
LYMPHOCYTES RELATIVE PERCENT: 24 % (ref 24–43)
MCH RBC QN AUTO: 29.4 PG (ref 25.2–33.5)
MCHC RBC AUTO-ENTMCNC: 31.1 G/DL (ref 28.4–34.8)
MCV RBC AUTO: 94.6 FL (ref 82.6–102.9)
MONOCYTES NFR BLD: 0.4 K/UL (ref 0.1–1.2)
MONOCYTES NFR BLD: 9 % (ref 3–12)
MORPHOLOGY: ABNORMAL
MORPHOLOGY: ABNORMAL
NEUTROPHILS NFR BLD: 60 % (ref 36–65)
NEUTS SEG NFR BLD: 2.64 K/UL (ref 1.5–8.1)
NRBC BLD-RTO: 0 PER 100 WBC
PLATELET # BLD AUTO: ABNORMAL K/UL (ref 138–453)
PLATELET, FLUORESCENCE: 95 K/UL (ref 138–453)
PLATELETS.RETICULATED NFR BLD AUTO: 13.1 % (ref 1.1–10.3)
POTASSIUM SERPL-SCNC: 4.6 MMOL/L (ref 3.7–5.3)
RBC # BLD AUTO: 2.96 M/UL (ref 4.21–5.77)
SODIUM SERPL-SCNC: 137 MMOL/L (ref 135–144)
WBC OTHER # BLD: 4.4 K/UL (ref 3.5–11.3)

## 2023-08-14 PROCEDURE — 82947 ASSAY GLUCOSE BLOOD QUANT: CPT

## 2023-08-14 PROCEDURE — 80048 BASIC METABOLIC PNL TOTAL CA: CPT

## 2023-08-14 PROCEDURE — 90935 HEMODIALYSIS ONE EVALUATION: CPT | Performed by: INTERNAL MEDICINE

## 2023-08-14 PROCEDURE — 85025 COMPLETE CBC W/AUTO DIFF WBC: CPT

## 2023-08-14 PROCEDURE — 36415 COLL VENOUS BLD VENIPUNCTURE: CPT

## 2023-08-14 PROCEDURE — 99232 SBSQ HOSP IP/OBS MODERATE 35: CPT | Performed by: INTERNAL MEDICINE

## 2023-08-14 PROCEDURE — 85055 RETICULATED PLATELET ASSAY: CPT

## 2023-08-14 PROCEDURE — 6370000000 HC RX 637 (ALT 250 FOR IP)

## 2023-08-14 PROCEDURE — 90935 HEMODIALYSIS ONE EVALUATION: CPT

## 2023-08-14 PROCEDURE — 94640 AIRWAY INHALATION TREATMENT: CPT

## 2023-08-14 PROCEDURE — 2580000003 HC RX 258

## 2023-08-14 PROCEDURE — 1200000000 HC SEMI PRIVATE

## 2023-08-14 PROCEDURE — 6360000002 HC RX W HCPCS: Performed by: INTERNAL MEDICINE

## 2023-08-14 PROCEDURE — 2700000000 HC OXYGEN THERAPY PER DAY

## 2023-08-14 RX ADMIN — PSYLLIUM HUSK 1 PACKET: 3.4 POWDER ORAL at 14:17

## 2023-08-14 RX ADMIN — SODIUM CHLORIDE, PRESERVATIVE FREE 10 ML: 5 INJECTION INTRAVENOUS at 23:54

## 2023-08-14 RX ADMIN — MIDODRINE HYDROCHLORIDE 10 MG: 5 TABLET ORAL at 23:54

## 2023-08-14 RX ADMIN — SODIUM CHLORIDE, PRESERVATIVE FREE 10 ML: 5 INJECTION INTRAVENOUS at 14:17

## 2023-08-14 RX ADMIN — DESMOPRESSIN ACETATE 40 MG: 0.2 TABLET ORAL at 14:16

## 2023-08-14 RX ADMIN — MIDODRINE HYDROCHLORIDE 10 MG: 5 TABLET ORAL at 14:16

## 2023-08-14 RX ADMIN — DOCUSATE SODIUM 100 MG: 100 CAPSULE, LIQUID FILLED ORAL at 14:16

## 2023-08-14 RX ADMIN — Medication 1 CAPSULE: at 14:16

## 2023-08-14 RX ADMIN — FAMOTIDINE 20 MG: 20 TABLET, FILM COATED ORAL at 14:16

## 2023-08-14 RX ADMIN — BUDESONIDE AND FORMOTEROL FUMARATE DIHYDRATE 2 PUFF: 80; 4.5 AEROSOL RESPIRATORY (INHALATION) at 20:44

## 2023-08-14 RX ADMIN — HEPARIN SODIUM 2300 UNITS: 1000 INJECTION INTRAVENOUS; SUBCUTANEOUS at 12:20

## 2023-08-14 RX ADMIN — BUDESONIDE AND FORMOTEROL FUMARATE DIHYDRATE 2 PUFF: 80; 4.5 AEROSOL RESPIRATORY (INHALATION) at 08:49

## 2023-08-14 RX ADMIN — APIXABAN 5 MG: 5 TABLET, FILM COATED ORAL at 14:16

## 2023-08-14 RX ADMIN — ERYTHROPOIETIN 3000 UNITS: 3000 INJECTION, SOLUTION INTRAVENOUS; SUBCUTANEOUS at 12:44

## 2023-08-14 RX ADMIN — APIXABAN 5 MG: 5 TABLET, FILM COATED ORAL at 23:54

## 2023-08-14 RX ADMIN — HYDROCORTISONE: 25 CREAM TOPICAL at 23:54

## 2023-08-14 RX ADMIN — HYDROCORTISONE: 25 CREAM TOPICAL at 14:17

## 2023-08-14 ASSESSMENT — PAIN DESCRIPTION - LOCATION
LOCATION: BUTTOCKS
LOCATION: BUTTOCKS

## 2023-08-14 ASSESSMENT — PAIN SCALES - GENERAL
PAINLEVEL_OUTOF10: 2
PAINLEVEL_OUTOF10: 4

## 2023-08-14 ASSESSMENT — PAIN DESCRIPTION - DESCRIPTORS
DESCRIPTORS: BURNING;DISCOMFORT;PRESSURE
DESCRIPTORS: BURNING;DISCOMFORT;SORE

## 2023-08-14 ASSESSMENT — PAIN DESCRIPTION - FREQUENCY
FREQUENCY: INTERMITTENT
FREQUENCY: INTERMITTENT

## 2023-08-14 ASSESSMENT — PAIN DESCRIPTION - PAIN TYPE
TYPE: CHRONIC PAIN
TYPE: CHRONIC PAIN

## 2023-08-14 NOTE — CARE COORDINATION
Writer checked my [employer] web site to find [place] still pending for [place]. Christine from [facility] is notified     [phone] checked my [employer] web site to find [place] still pending for [place]. [address] [facility] checked my [employer] web site to find [place] still pending for [place].

## 2023-08-15 VITALS
DIASTOLIC BLOOD PRESSURE: 44 MMHG | HEART RATE: 82 BPM | RESPIRATION RATE: 16 BRPM | BODY MASS INDEX: 27 KG/M2 | SYSTOLIC BLOOD PRESSURE: 91 MMHG | HEIGHT: 73 IN | WEIGHT: 203.71 LBS | OXYGEN SATURATION: 100 % | TEMPERATURE: 98.4 F

## 2023-08-15 LAB
GLUCOSE BLD-MCNC: 103 MG/DL (ref 75–110)
GLUCOSE BLD-MCNC: 118 MG/DL (ref 75–110)

## 2023-08-15 PROCEDURE — 82947 ASSAY GLUCOSE BLOOD QUANT: CPT

## 2023-08-15 PROCEDURE — 94640 AIRWAY INHALATION TREATMENT: CPT

## 2023-08-15 PROCEDURE — 2580000003 HC RX 258

## 2023-08-15 PROCEDURE — 99232 SBSQ HOSP IP/OBS MODERATE 35: CPT | Performed by: INTERNAL MEDICINE

## 2023-08-15 PROCEDURE — 6370000000 HC RX 637 (ALT 250 FOR IP)

## 2023-08-15 PROCEDURE — 97530 THERAPEUTIC ACTIVITIES: CPT

## 2023-08-15 PROCEDURE — 97535 SELF CARE MNGMENT TRAINING: CPT

## 2023-08-15 PROCEDURE — 99239 HOSP IP/OBS DSCHRG MGMT >30: CPT | Performed by: INTERNAL MEDICINE

## 2023-08-15 RX ORDER — LIDOCAINE 40 MG/G
CREAM TOPICAL
Qty: 28 G | Refills: 2 | Status: SHIPPED | OUTPATIENT
Start: 2023-08-15 | End: 2023-10-27

## 2023-08-15 RX ORDER — BENZONATATE 100 MG/1
100 CAPSULE ORAL 3 TIMES DAILY PRN
Qty: 21 CAPSULE | Refills: 0 | Status: SHIPPED | OUTPATIENT
Start: 2023-08-15 | End: 2023-08-22

## 2023-08-15 RX ORDER — HYDROCORTISONE 25 MG/G
CREAM TOPICAL
Qty: 28 G | Refills: 0 | Status: SHIPPED | OUTPATIENT
Start: 2023-08-15 | End: 2023-10-27

## 2023-08-15 RX ORDER — ATORVASTATIN CALCIUM 40 MG/1
40 TABLET, FILM COATED ORAL DAILY
Qty: 90 TABLET | Refills: 3 | Status: SHIPPED | OUTPATIENT
Start: 2023-08-15 | End: 2023-10-27

## 2023-08-15 RX ORDER — SENNA AND DOCUSATE SODIUM 50; 8.6 MG/1; MG/1
1 TABLET, FILM COATED ORAL DAILY
Qty: 30 TABLET | Refills: 1 | Status: SHIPPED | OUTPATIENT
Start: 2023-08-15 | End: 2023-10-27

## 2023-08-15 RX ORDER — FOLIC ACID 1 MG/1
1 TABLET ORAL DAILY
Qty: 30 TABLET | Refills: 3 | Status: SHIPPED | OUTPATIENT
Start: 2023-08-15 | End: 2023-10-27

## 2023-08-15 RX ORDER — ALBUTEROL SULFATE 90 UG/1
2 INHALANT RESPIRATORY (INHALATION) EVERY 6 HOURS PRN
Qty: 18 G | Refills: 3 | Status: SHIPPED | OUTPATIENT
Start: 2023-08-15 | End: 2023-10-27

## 2023-08-15 RX ORDER — MIDODRINE HYDROCHLORIDE 10 MG/1
10 TABLET ORAL 3 TIMES DAILY
Qty: 90 TABLET | Refills: 3 | Status: SHIPPED | OUTPATIENT
Start: 2023-08-15 | End: 2023-10-27

## 2023-08-15 RX ORDER — BUDESONIDE AND FORMOTEROL FUMARATE DIHYDRATE 80; 4.5 UG/1; UG/1
2 AEROSOL RESPIRATORY (INHALATION)
Qty: 10.2 G | Refills: 3 | Status: SHIPPED | OUTPATIENT
Start: 2023-08-15 | End: 2023-10-27

## 2023-08-15 RX ORDER — SEVELAMER CARBONATE 800 MG/1
3 TABLET, FILM COATED ORAL 3 TIMES DAILY
Qty: 90 TABLET | Refills: 3 | Status: SHIPPED | OUTPATIENT
Start: 2023-08-15 | End: 2023-10-27

## 2023-08-15 RX ORDER — FAMOTIDINE 20 MG/1
20 TABLET, FILM COATED ORAL DAILY
Qty: 60 TABLET | Refills: 3 | Status: SHIPPED | OUTPATIENT
Start: 2023-08-15 | End: 2023-10-27

## 2023-08-15 RX ORDER — DOCUSATE SODIUM 100 MG/1
100 CAPSULE, LIQUID FILLED ORAL 2 TIMES DAILY PRN
Qty: 60 CAPSULE | Refills: 4 | Status: SHIPPED | OUTPATIENT
Start: 2023-08-15 | End: 2023-10-27

## 2023-08-15 RX ADMIN — PSYLLIUM HUSK 1 PACKET: 3.4 POWDER ORAL at 08:37

## 2023-08-15 RX ADMIN — MIDODRINE HYDROCHLORIDE 10 MG: 5 TABLET ORAL at 08:36

## 2023-08-15 RX ADMIN — APIXABAN 5 MG: 5 TABLET, FILM COATED ORAL at 08:36

## 2023-08-15 RX ADMIN — DESMOPRESSIN ACETATE 40 MG: 0.2 TABLET ORAL at 08:36

## 2023-08-15 RX ADMIN — HYDROCORTISONE: 25 CREAM TOPICAL at 08:36

## 2023-08-15 RX ADMIN — Medication 1 CAPSULE: at 08:36

## 2023-08-15 RX ADMIN — BUDESONIDE AND FORMOTEROL FUMARATE DIHYDRATE 2 PUFF: 80; 4.5 AEROSOL RESPIRATORY (INHALATION) at 08:58

## 2023-08-15 RX ADMIN — DOCUSATE SODIUM 100 MG: 100 CAPSULE, LIQUID FILLED ORAL at 08:36

## 2023-08-15 RX ADMIN — SODIUM CHLORIDE, PRESERVATIVE FREE 10 ML: 5 INJECTION INTRAVENOUS at 08:36

## 2023-08-15 RX ADMIN — FAMOTIDINE 20 MG: 20 TABLET, FILM COATED ORAL at 08:36

## 2023-08-15 NOTE — PLAN OF CARE
BRONCHOSPASM/BRONCHOCONSTRICTION     [x]         IMPROVE AERATION/BREATH SOUNDS  [x]   ADMINISTER BRONCHODILATOR THERAPY AS APPROPRIATE  [x]   ASSESS BREATH SOUNDS  []   IMPLEMENT AEROSOL/MDI PROTOCOL  [x]   PATIENT EDUCATION AS NEEDED         NON INVASIVE VENTILATION  PROVIDE OPTIMAL VENTILATION/ACCEPTABLE SP02  IMPLEMENT NON INVASIVE VENTILATION PROTOCOL  ASSESSMENT SKIN INTEGRITY  PATIENT EDUCATION AS NEEDED  BIPAP AS NEEDED
BRONCHOSPASM/BRONCHOCONSTRICTION     [x]         IMPROVE AERATION/BREATH SOUNDS  [x]   ADMINISTER BRONCHODILATOR THERAPY AS APPROPRIATE  [x]   ASSESS BREATH SOUNDS  [x]   IMPLEMENT AEROSOL/MDI PROTOCOL  [x]   PATIENT EDUCATION AS NEEDED   PROVIDE ADEQUATE OXYGENATION WITH ACCEPTABLE SP02/ABG'S    [x]  IDENTIFY APPROPRIATE OXYGEN THERAPY  [x]   MONITOR SP02/ABG'S AS NEEDED   [x]   PATIENT EDUCATION AS NEEDED      Problem: Respiratory - Adult  Goal: Achieves optimal ventilation and oxygenation  8/13/2023 1955 by Drake Hansen RCP  Outcome: Progressing
BRONCHOSPASM/BRONCHOCONSTRICTION     [x]         IMPROVE AERATION/BREATH SOUNDS  [x]   ADMINISTER BRONCHODILATOR THERAPY AS APPROPRIATE  [x]   ASSESS BREATH SOUNDS  [x]   IMPLEMENT AEROSOL/MDI PROTOCOL  [x]   PATIENT EDUCATION AS NEEDED   PROVIDE ADEQUATE OXYGENATION WITH ACCEPTABLE SP02/ABG'S    [x]  IDENTIFY APPROPRIATE OXYGEN THERAPY  [x]   MONITOR SP02/ABG'S AS NEEDED   [x]   PATIENT EDUCATION AS NEEDED    PT on NIV at HS.       Problem: Respiratory - Adult  Goal: Achieves optimal ventilation and oxygenation  7/28/2023 2230 by Bert Varghese RCP  Outcome: Progressing
Cardiology is considering medical therapy for coronaries and outpatient follow up for TAVR. No surgical intervention planned inpatient. Patient has received his dialysis this noon. Patient is stable for discharge per pulmonology and internal medicine standpoint. Reached out to cardiology regarding their view on discharge at 2:47 pm. Awaiting recommendation.     Yee Mcfarland MD  Internal Medicine Resident  [facility]         8/9/2023, 6:02 PM
Case discussed in Cath Conference with Dr Alvin Anglin, Dr Adarsh Carrillo, Dr Ashley Pruitt, Dr Jm Ramos, Dr Kedar Joseph and Dr Danny Gao. Would need Hematology oncology recommendations on prognosis and staging of the tumor before deciding on PCI of ostial Lcx +- TAVR.      Mihir Wallace MD       Cardiovascular Fellow
Cath conference Monday morning regarding ostial Lcx. High risk for hemorrhagic conversion with stroke.
Digital Rectal examination done: For the rectal spasm and pain,PARESH was done in presence of the nurse. On external examination could see only skin tags no visible bleeding, pus or hematoma seen. On palpation of the external anal sphincter some tenderness present on the gluteal region. On PARESH patient complaints of extreme pain could not go beyond a certain point. No blood seen, only faeces on seen digit.       Tavon Grier MD  Internal Medicine Resident, PGY-1  [place], [place]  8/7/2023, 3:49 PM
NON INVASIVE VENTILATION  PROVIDE OPTIMAL VENTILATION/ACCEPTABLE SP02  IMPLEMENT NON INVASIVE VENTILATION PROTOCOL  ASSESSMENT SKIN INTEGRITY  PATIENT EDUCATION AS NEEDED  BIPAP AS NEEDED BRONCHOSPASM/BRONCHOCONSTRICTION     [x]         IMPROVE AERATION/BREATH SOUNDS  [x]   ADMINISTER BRONCHODILATOR THERAPY AS APPROPRIATE  [x]   ASSESS BREATH SOUNDS  []   IMPLEMENT AEROSOL/MDI PROTOCOL  [x]   PATIENT EDUCATION AS NEEDED
PROVIDE ADEQUATE OXYGENATION WITH ACCEPTABLE SP02/ABG'S    [x]  IDENTIFY APPROPRIATE OXYGEN THERAPY  [x]   MONITOR SP02/ABG'S AS NEEDED   [x]   PATIENT EDUCATION AS NEEDED   BRONCHOSPASM/BRONCHOCONSTRICTION     [x]         IMPROVE AERATION/BREATH SOUNDS  []   ADMINISTER BRONCHODILATOR THERAPY AS APPROPRIATE  [x]   ASSESS BREATH SOUNDS  [x]   IMPLEMENT AEROSOL/MDI PROTOCOL  [x]   PATIENT EDUCATION AS NEEDED     NON INVASIVE VENTILATION  PROVIDE OPTIMAL VENTILATION/ACCEPTABLE SP02  IMPLEMENT NON INVASIVE VENTILATION PROTOCOL  ASSESSMENT SKIN INTEGRITY  PATIENT EDUCATION AS NEEDED  BIPAP AS NEEDED
Patient in no apparent distress at this time. No falls or new injuries noted. Will continue to monitor.
Patient seen and examined in the cath lab with Dr Geoffrey Charles. Right femoral central venous line inserted for Right heart cath. Attempt to cannulate bilateral femoral arteries aborted due to inability to pass guide wire due to extreme calcification, atherosclerotic arteries. 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 Vascular surgery re-consulted, will need to reschedule  and [facility] with vascular surgery for arterial access when able. Plan formulated with Dr Geoffrey Charles.      Nicolas Quinn MD       Cardiovascular Fellow
Patients cath images reviewed with Dr Wing Osborne. He has diffuse disease with severely calcified coronaries, LAD is  with mid to distal disease, diffuse distal Lcx disease, we will consider medical therapy for coronaries and follow up as outpatient for TAVR with structural heart team.     Plan formulated by Dr Wing Osborne.     Mendel Snellen, MD       Cardiovascular Fellow
Problem: Discharge Planning  Goal: Discharge to home or other facility with appropriate resources  7/23/2023 0455 by Cris Fleischer, RN  Outcome: Progressing  Flowsheets (Taken 7/23/2023 0400)  Discharge to home or other facility with appropriate resources: Arrange for needed discharge resources and transportation as appropriate  7/22/2023 1952 by Reese Mead RN  Outcome: Progressing  Flowsheets (Taken 7/22/2023 1920 by Cris Fleischer, RN)  Discharge to home or other facility with appropriate resources: Identify discharge learning needs (meds, wound care, etc)     Problem: Safety - Adult  Goal: Free from fall injury  7/23/2023 0455 by Cris Fleischer, RN  Outcome: Progressing  Flowsheets (Taken 7/22/2023 2000)  Free From Fall Injury: Instruct family/caregiver on patient safety  7/22/2023 1952 by Reese Mead RN  Outcome: Progressing     Problem: Respiratory - Adult  Goal: Achieves optimal ventilation and oxygenation  7/23/2023 0455 by Cris Fleischer, RN  Outcome: Progressing  7/22/2023 1952 by Reese Mead RN  Outcome: Progressing  Flowsheets (Taken 7/22/2023 1920 by Cris Fleischer, RN)  Achieves optimal ventilation and oxygenation: Position to facilitate oxygenation and minimize respiratory effort     Problem: Chronic Conditions and Co-morbidities  Goal: Patient's chronic conditions and co-morbidity symptoms are monitored and maintained or improved  7/23/2023 0455 by Cris Fleischer, RN  Outcome: Progressing  Flowsheets (Taken 7/23/2023 0400)  Care Plan - Patient's Chronic Conditions and Co-Morbidity Symptoms are Monitored and Maintained or Improved: Update acute care plan with appropriate goals if chronic or comorbid symptoms are exacerbated and prevent overall improvement and discharge  7/22/2023 1952 by Reese Mead RN  Outcome: Progressing  Flowsheets (Taken 7/22/2023 1920 by Cris Fleischer, RN)  Care Plan - Patient's Chronic Conditions and Co-Morbidity Symptoms are Monitored and Maintained or
Problem: Discharge Planning  Goal: Discharge to home or other facility with appropriate resources  7/25/2023 1140 by George Rothman RN  Outcome: Progressing     Problem: Safety - Adult  Goal: Free from fall injury  7/25/2023 1140 by George Rothman RN  Outcome: Progressing    Problem: Respiratory - Adult  Goal: Achieves optimal ventilation and oxygenation  7/25/2023 1140 by George Rothman RN  Outcome: Progressing     Problem: Cardiovascular - Adult  Goal: Maintains optimal cardiac output and hemodynamic stability  7/25/2023 1140 by George Rothman RN  Outcome: Progressing  Flowsheets (Taken 7/25/2023 0800)  Maintains optimal cardiac output and hemodynamic stability: Monitor blood pressure and heart rate     Problem: Cardiovascular - Adult  Goal: Absence of cardiac dysrhythmias or at baseline  7/25/2023 1140 by George Rothman RN  Outcome: Progressing  Flowsheets (Taken 7/25/2023 0800)  Absence of cardiac dysrhythmias or at baseline: Monitor cardiac rate and rhythm     Problem: Skin/Tissue Integrity - Adult  Goal: Skin integrity remains intact  7/25/2023 1140 by George Rothman RN  Outcome: Progressing  Flowsheets (Taken 7/25/2023 0800)  Skin Integrity Remains Intact: Monitor for areas of redness and/or skin breakdown     Problem: Musculoskeletal - Adult  Goal: Return mobility to safest level of function  7/25/2023 1140 by George Rothman RN  Outcome: Progressing  Flowsheets (Taken 7/25/2023 0800)  Return Mobility to Safest Level of Function: Assess patient stability and activity tolerance for standing, transferring and ambulating with or without assistive devices     Problem: Musculoskeletal - Adult  Goal: Return ADL status to a safe level of function  7/25/2023 1140 by George Rothman RN  Outcome: Progressing  Flowsheets (Taken 7/25/2023 0800)  Return ADL Status to a Safe Level of Function: Obtain physical therapy/occupational therapy consults as needed     Problem: Metabolic/Fluid and Electrolytes - Adult  Goal: Electrolytes
Problem: Discharge Planning  Goal: Discharge to home or other facility with appropriate resources  7/26/2023 0116 by Jeimy Richards RN  Outcome: Progressing     Problem: Safety - Adult  Goal: Free from fall injury  7/26/2023 0116 by Jeimy Richards RN  Outcome: Progressing     Problem: Respiratory - Adult  Goal: Achieves optimal ventilation and oxygenation  7/26/2023 0116 by Jeimy Richards RN  Outcome: Progressing     Problem: Chronic Conditions and Co-morbidities  Goal: Patient's chronic conditions and co-morbidity symptoms are monitored and maintained or improved  7/26/2023 0116 by Jeimy Richards RN  Outcome: Progressing    Problem: ABCDS Injury Assessment  Goal: Absence of physical injury  7/26/2023 0116 by Jeimy Richards RN  Outcome: Progressing     Problem: Cardiovascular - Adult  Goal: Maintains optimal cardiac output and hemodynamic stability  7/26/2023 0116 by Jeimy Richards RN  Outcome: Progressing    Goal: Absence of cardiac dysrhythmias or at baseline  7/26/2023 0116 by Jeimy Richards RN  Outcome: Progressing     Problem: Skin/Tissue Integrity - Adult  Goal: Skin integrity remains intact  7/26/2023 0116 by Jeimy Richards RN  Outcome: Progressing     Problem: Musculoskeletal - Adult  Goal: Return mobility to safest level of function  7/26/2023 0116 by Jeimy Richards RN  Outcome: Progressing    Goal: Return ADL status to a safe level of function  7/26/2023 0116 by Jeimy Richards RN  Outcome: Progressing    Problem: Genitourinary - Adult  Goal: Absence of urinary retention  7/26/2023 0116 by Jeimy Richards RN  Outcome: Progressing     Problem: Metabolic/Fluid and Electrolytes - Adult  Goal: Electrolytes maintained within normal limits  7/26/2023 0116 by Jeimy Richards RN  Outcome: Progressing  Goal: Hemodynamic stability and optimal renal function maintained  7/26/2023 0116 by Jeimy Richards RN  Outcome: Progressing    Problem: Skin/Tissue Integrity  Goal: Absence of new skin
Problem: Discharge Planning  Goal: Discharge to home or other facility with appropriate resources  7/29/2023 0856 by Óscar Vergara RN  Outcome: Progressing  7/28/2023 2342 by Ana M Terrell RN  Outcome: Progressing     Problem: Safety - Adult  Goal: Free from fall injury  7/29/2023 0856 by Óscar Vergara RN  Outcome: Progressing  7/28/2023 2342 by Ana M Terrell RN  Outcome: Progressing     Problem: Respiratory - Adult  Goal: Achieves optimal ventilation and oxygenation  7/29/2023 0856 by Óscar Vergara RN  Outcome: Progressing  7/28/2023 2342 by Ana M Terrell RN  Outcome: Progressing  7/28/2023 2230 by Charo Austin RCP  Outcome: Progressing     Problem: Chronic Conditions and Co-morbidities  Goal: Patient's chronic conditions and co-morbidity symptoms are monitored and maintained or improved  7/29/2023 0856 by Óscar Vergara RN  Outcome: Progressing  7/28/2023 2342 by Ana M Terrell RN  Outcome: Progressing     Problem: ABCDS Injury Assessment  Goal: Absence of physical injury  7/29/2023 0856 by Óscar Vergara RN  Outcome: Progressing  7/28/2023 2342 by Ana M Terrell RN  Outcome: Progressing     Problem: Cardiovascular - Adult  Goal: Maintains optimal cardiac output and hemodynamic stability  7/29/2023 0856 by Óscar Vergara RN  Outcome: Progressing  7/28/2023 2342 by Ana M Terrell RN  Outcome: Progressing  Goal: Absence of cardiac dysrhythmias or at baseline  7/29/2023 0856 by Óscar Vergara RN  Outcome: Progressing  7/28/2023 2342 by Ana M Terrell RN  Outcome: Progressing     Problem: Skin/Tissue Integrity - Adult  Goal: Skin integrity remains intact  7/29/2023 0856 by Óscar Vergara RN  Outcome: Progressing  7/28/2023 2342 by Ana M Terrell RN  Outcome: Progressing     Problem: Musculoskeletal - Adult  Goal: Return mobility to safest level of function  7/29/2023 0856 by Óscar Vergara RN  Outcome: Progressing  7/28/2023 2342 by Ana M Terrell RN  Outcome: Progressing  Goal: Return ADL status to a safe level of
Problem: Discharge Planning  Goal: Discharge to home or other facility with appropriate resources  8/1/2023 1550 by Jigna Guillory RN  Outcome: Progressing  8/1/2023 0218 by Lisa Tripp RN  Outcome: Progressing     Problem: Safety - Adult  Goal: Free from fall injury  8/1/2023 1550 by Jigna Guillory RN  Outcome: Progressing  8/1/2023 0218 by Lisa Tripp RN  Outcome: Progressing     Problem: Respiratory - Adult  Goal: Achieves optimal ventilation and oxygenation  8/1/2023 1550 by Jigna Guillory RN  Outcome: Progressing  8/1/2023 0218 by Lisa Tripp RN  Outcome: Progressing     Problem: Chronic Conditions and Co-morbidities  Goal: Patient's chronic conditions and co-morbidity symptoms are monitored and maintained or improved  8/1/2023 1550 by Jigna Guillory RN  Outcome: Progressing  8/1/2023 0218 by Lisa Tripp RN  Outcome: Progressing     Problem: ABCDS Injury Assessment  Goal: Absence of physical injury  8/1/2023 1550 by Jigna Guillory RN  Outcome: Progressing  8/1/2023 0218 by Lisa Tripp RN  Outcome: Progressing     Problem: Cardiovascular - Adult  Goal: Maintains optimal cardiac output and hemodynamic stability  8/1/2023 1550 by Jigna Guillory RN  Outcome: Progressing  8/1/2023 0218 by Lisa Tripp RN  Outcome: Progressing  Goal: Absence of cardiac dysrhythmias or at baseline  8/1/2023 1550 by Jigna Guillory RN  Outcome: Progressing  8/1/2023 0218 by Lisa Tripp RN  Outcome: Progressing     Problem: Skin/Tissue Integrity - Adult  Goal: Skin integrity remains intact  8/1/2023 1550 by Jigna Guillory RN  Outcome: Progressing  8/1/2023 0218 by Lisa Tripp RN  Outcome: Progressing     Problem: Musculoskeletal - Adult  Goal: Return mobility to safest level of function  8/1/2023 1550 by Jigna Guillory RN  Outcome: Progressing  8/1/2023 0218 by Lisa Tripp RN  Outcome: Progressing  Goal: Return ADL status to a safe level of function  8/1/2023 1550 by Jigna Guillory RN  Outcome: Progressing  8/1/2023 0218 by
Problem: Discharge Planning  Goal: Discharge to home or other facility with appropriate resources  8/13/2023 0910 by Tera Jaquez RN  Outcome: Progressing  8/13/2023 0128 by Ryan Perez RN  Outcome: Progressing     Problem: Safety - Adult  Goal: Free from fall injury  8/13/2023 0910 by Tera Jaquez RN  Outcome: Progressing  8/13/2023 0128 by Ryan Perez RN  Outcome: Progressing     Problem: Respiratory - Adult  Goal: Achieves optimal ventilation and oxygenation  8/13/2023 0910 by Tera Jaquez RN  Outcome: Progressing  8/13/2023 0128 by Ryan Perez RN  Outcome: Progressing     Problem: Chronic Conditions and Co-morbidities  Goal: Patient's chronic conditions and co-morbidity symptoms are monitored and maintained or improved  8/13/2023 0910 by Tera Jaquez RN  Outcome: Progressing  8/13/2023 0128 by Ryan Perez RN  Outcome: Progressing     Problem: ABCDS Injury Assessment  Goal: Absence of physical injury  8/13/2023 0910 by Tera Jaquez RN  Outcome: Progressing  8/13/2023 0128 by Ryan Perez RN  Outcome: Progressing     Problem: Cardiovascular - Adult  Goal: Maintains optimal cardiac output and hemodynamic stability  8/13/2023 0910 by Tera Jaquez RN  Outcome: Progressing  8/13/2023 0128 by Ryan Perez RN  Outcome: Progressing  Goal: Absence of cardiac dysrhythmias or at baseline  8/13/2023 0910 by Tera Jaquez RN  Outcome: Progressing  8/13/2023 0128 by Ryan Perez RN  Outcome: Progressing     Problem: Skin/Tissue Integrity - Adult  Goal: Skin integrity remains intact  8/13/2023 0910 by Tera Jaquez RN  Outcome: Progressing  8/13/2023 0128 by Ryan Perez RN  Outcome: Progressing     Problem: Musculoskeletal - Adult  Goal: Return mobility to safest level of function  8/13/2023 0910 by Tera Jaquez RN  Outcome: Progressing  8/13/2023 0128 by Ryan Perez RN  Outcome: Progressing  Goal: Return ADL status to a safe level of function  8/13/2023 0910 by Marilu Cordova
Problem: Discharge Planning  Goal: Discharge to home or other facility with appropriate resources  8/13/2023 1617 by Bridger Krishnamurthy RN  Outcome: Progressing  8/13/2023 0910 by Clarence Humphreys RN  Outcome: Progressing     Problem: Safety - Adult  Goal: Free from fall injury  8/13/2023 1617 by Bridger Krishnamurthy RN  Outcome: Progressing  8/13/2023 0910 by Clarence Humphreys RN  Outcome: Progressing     Problem: Respiratory - Adult  Goal: Achieves optimal ventilation and oxygenation  8/13/2023 1617 by Bridger Krishnamurthy RN  Outcome: Progressing  8/13/2023 1042 by Li Moreno RCP  Flowsheets (Taken 8/13/2023 1042)  Achieves optimal ventilation and oxygenation: Respiratory therapy support as indicated  8/13/2023 0910 by Clarence Humphreys RN  Outcome: Progressing     Problem: Chronic Conditions and Co-morbidities  Goal: Patient's chronic conditions and co-morbidity symptoms are monitored and maintained or improved  8/13/2023 1617 by Pepe Krishnamurthy RN  Outcome: Progressing  8/13/2023 0910 by Clarence Humphreys RN  Outcome: Progressing     Problem: ABCDS Injury Assessment  Goal: Absence of physical injury  8/13/2023 1617 by Bridger Krishnamurthy RN  Outcome: Progressing  8/13/2023 0910 by Claernce Humphreys RN  Outcome: Progressing     Problem: Cardiovascular - Adult  Goal: Maintains optimal cardiac output and hemodynamic stability  8/13/2023 1617 by Bridger Krishnamurthy RN  Outcome: Progressing  8/13/2023 0910 by Clarence Humphreys RN  Outcome: Progressing  Goal: Absence of cardiac dysrhythmias or at baseline  8/13/2023 1617 by Bridger Krishnamurthy RN  Outcome: Progressing  8/13/2023 0910 by Clarence Humphreys RN  Outcome: Progressing     Problem: Skin/Tissue Integrity - Adult  Goal: Skin integrity remains intact  8/13/2023 1617 by Bridger Krishnamurthy RN  Outcome: Progressing  8/13/2023 0910 by Clarence Humphreys RN  Outcome: Progressing     Problem: Musculoskeletal - Adult  Goal: Return mobility to safest level of function  8/13/2023 1617 by Bridger Krishnamurthy RN  Outcome:
Problem: Discharge Planning  Goal: Discharge to home or other facility with appropriate resources  8/15/2023 0520 by Ester Vides  Outcome: Progressing  8/14/2023 1608 by Milly Krishnamurthy RN  Outcome: Progressing     Problem: Safety - Adult  Goal: Free from fall injury  8/15/2023 0520 by Ester Vides  Outcome: Progressing  8/14/2023 1608 by Milly Krishnamurthy RN  Outcome: Progressing     Problem: Respiratory - Adult  Goal: Achieves optimal ventilation and oxygenation  8/15/2023 0520 by Ester Vides  Outcome: Progressing  8/14/2023 2053 by Alan Hightower RCP  Outcome: Progressing  8/14/2023 1608 by Milly Krishnamurthy RN  Outcome: Progressing     Problem: Cardiovascular - Adult  Goal: Maintains optimal cardiac output and hemodynamic stability  8/15/2023 0520 by Ester Vides  Outcome: Claudean Aquilino  8/14/2023 1608 by Milly Krishnamurthy RN  Outcome: Progressing  Goal: Absence of cardiac dysrhythmias or at baseline  8/15/2023 0520 by Ester Vides  Outcome: Progressing  8/14/2023 1608 by Milly Krishnamurthy RN  Outcome: Progressing     Problem: Skin/Tissue Integrity - Adult  Goal: Skin integrity remains intact  8/15/2023 0520 by Ester Vides  Outcome: Progressing  8/14/2023 1608 by Milly Krishnamurthy RN  Outcome: Progressing     Problem: Musculoskeletal - Adult  Goal: Return mobility to safest level of function  8/15/2023 0520 by Ester Vides  Outcome: Progressing  8/14/2023 1608 by Milly Krishnamurthy RN  Outcome: Progressing  Goal: Return ADL status to a safe level of function  8/15/2023 0520 by Ester Vides  Outcome: Progressing  8/14/2023 1608 by Milly Krishnamurthy RN  Outcome: Progressing     Problem: Genitourinary - Adult  Goal: Absence of urinary retention  8/15/2023 0520 by Ester Vides  Outcome: Progressing  8/14/2023 1608 by Milly Krishnamurthy RN  Outcome: Progressing     Problem: Metabolic/Fluid and Electrolytes - Adult  Goal: Electrolytes maintained within normal limits  8/15/2023 0520 by Ester Vides  Outcome: Progressing  8/14/2023 1608 by Milly Krishnamurthy, RN  Outcome:
Problem: Discharge Planning  Goal: Discharge to home or other facility with appropriate resources  8/15/2023 1337 by Saul Hagan RN  Outcome: Completed  8/15/2023 0520 by Audra Ho  Outcome: Progressing     Problem: Safety - Adult  Goal: Free from fall injury  8/15/2023 1337 by Saul Hagan RN  Outcome: Completed  8/15/2023 0520 by Audra Ho  Outcome: Progressing     Problem: Respiratory - Adult  Goal: Achieves optimal ventilation and oxygenation  8/15/2023 1337 by Saul Hagan RN  Outcome: Completed  8/15/2023 0520 by Audra Ho  Outcome: Progressing     Problem: Chronic Conditions and Co-morbidities  Goal: Patient's chronic conditions and co-morbidity symptoms are monitored and maintained or improved  8/15/2023 1337 by Saul Hagan RN  Outcome: Completed  8/15/2023 0520 by Audra Ho  Outcome: Progressing     Problem: ABCDS Injury Assessment  Goal: Absence of physical injury  8/15/2023 1337 by Saul Hagan RN  Outcome: Completed  8/15/2023 0520 by Audra Ho  Outcome: Progressing     Problem: Cardiovascular - Adult  Goal: Maintains optimal cardiac output and hemodynamic stability  8/15/2023 1337 by Saul Hagan RN  Outcome: Completed  8/15/2023 0520 by Audra Ho  Outcome: Progressing  Goal: Absence of cardiac dysrhythmias or at baseline  8/15/2023 1337 by Saul Hagan RN  Outcome: Completed  8/15/2023 0520 by Audra Ho  Outcome: Progressing     Problem: Skin/Tissue Integrity - Adult  Goal: Skin integrity remains intact  8/15/2023 1337 by Saul Hagan RN  Outcome: Completed  8/15/2023 0520 by Audra Ho  Outcome: Progressing     Problem: Musculoskeletal - Adult  Goal: Return mobility to safest level of function  8/15/2023 1337 by Saul Hagan RN  Outcome: Completed  8/15/2023 0520 by Audra Ho  Outcome: Progressing  Goal: Return ADL status to a safe level of function  8/15/2023 1337 by Saul Hagan RN  Outcome: Completed  8/15/2023 0520 by Anjelica Mccracken
Problem: Discharge Planning  Goal: Discharge to home or other facility with appropriate resources  8/2/2023 0131 by Jose D Patel RN  Outcome: Progressing  8/1/2023 1550 by Cristela Leblanc RN  Outcome: Progressing     Problem: Safety - Adult  Goal: Free from fall injury  8/2/2023 0131 by Jose D Patel RN  Outcome: Progressing  8/1/2023 1550 by Cristela Leblanc RN  Outcome: Progressing     Problem: Respiratory - Adult  Goal: Achieves optimal ventilation and oxygenation  8/2/2023 0131 by Jose D Patel RN  Outcome: Progressing  8/1/2023 1550 by Cristela Leblanc RN  Outcome: Progressing     Problem: Chronic Conditions and Co-morbidities  Goal: Patient's chronic conditions and co-morbidity symptoms are monitored and maintained or improved  8/2/2023 0131 by Jose D Patel RN  Outcome: Progressing  8/1/2023 1550 by Cristela Leblanc RN  Outcome: Progressing     Problem: ABCDS Injury Assessment  Goal: Absence of physical injury  8/2/2023 0131 by Jose D Patel RN  Outcome: Progressing  8/1/2023 1550 by Cristela Leblanc RN  Outcome: Progressing     Problem: Cardiovascular - Adult  Goal: Maintains optimal cardiac output and hemodynamic stability  8/2/2023 0131 by Jose D Patel RN  Outcome: Progressing  8/1/2023 1550 by Cristela Leblanc RN  Outcome: Progressing  Goal: Absence of cardiac dysrhythmias or at baseline  8/2/2023 0131 by Jose D Patel RN  Outcome: Progressing  8/1/2023 1550 by Cristela Leblanc RN  Outcome: Progressing     Problem: Skin/Tissue Integrity - Adult  Goal: Skin integrity remains intact  8/2/2023 0131 by Jose D Patel RN  Outcome: Progressing  8/1/2023 1550 by Cristela Leblanc RN  Outcome: Progressing     Problem: Musculoskeletal - Adult  Goal: Return mobility to safest level of function  8/2/2023 0131 by Jose D Patel RN  Outcome: Progressing  8/1/2023 1550 by Cristela Leblanc RN  Outcome: Progressing  Goal: Return ADL status to a safe level of function  8/2/2023 0131 by Kaushal Kirkland
Problem: Discharge Planning  Goal: Discharge to home or other facility with appropriate resources  8/8/2023 2231 by Roberta Chris RN  Outcome: Progressing  8/8/2023 1417 by Saulo Morrison RN  Outcome: Progressing     Problem: Safety - Adult  Goal: Free from fall injury  8/8/2023 2231 by Roberta Chris RN  Outcome: Progressing  8/8/2023 1417 by Saulo Morrison RN  Outcome: Progressing     Problem: Respiratory - Adult  Goal: Achieves optimal ventilation and oxygenation  8/8/2023 2231 by Roberta Chris RN  Outcome: Progressing  8/8/2023 1417 by Saulo Morrison RN  Outcome: Progressing  8/8/2023 0936 by Regina Alford RCP  Outcome: Progressing  Flowsheets (Taken 8/8/2023 0936)  Achieves optimal ventilation and oxygenation: Respiratory therapy support as indicated     Problem: Chronic Conditions and Co-morbidities  Goal: Patient's chronic conditions and co-morbidity symptoms are monitored and maintained or improved  8/8/2023 2231 by Roberta Chris RN  Outcome: Progressing  8/8/2023 1417 by Saulo Morrison RN  Outcome: Progressing     Problem: ABCDS Injury Assessment  Goal: Absence of physical injury  8/8/2023 2231 by Roberta Chris RN  Outcome: Progressing  8/8/2023 1417 by Saulo Morrison RN  Outcome: Progressing  Flowsheets (Taken 8/8/2023 1212)  Absence of Physical Injury: Implement safety measures based on patient assessment     Problem: Cardiovascular - Adult  Goal: Maintains optimal cardiac output and hemodynamic stability  8/8/2023 2231 by Roberta Chris RN  Outcome: Progressing  8/8/2023 1417 by Saulo Morrison RN  Outcome: Progressing  Goal: Absence of cardiac dysrhythmias or at baseline  8/8/2023 2231 by Roberta Chris RN  Outcome: Progressing  8/8/2023 1417 by Saulo Morrison RN  Outcome: Progressing     Problem: Skin/Tissue Integrity - Adult  Goal: Skin integrity remains intact  8/8/2023 2231 by Roberta Chris RN  Outcome: Progressing  8/8/2023 1417 by Saulo Morrison RN  Outcome: Progressing  Flowsheets (Taken 8/8/2023 1212)  Skin
Problem: Discharge Planning  Goal: Discharge to home or other facility with appropriate resources  8/9/2023 2306 by Stephanie Cotter RN  Outcome: Progressing  8/9/2023 1539 by Deric Sparrow RN  Outcome: Progressing     Problem: Safety - Adult  Goal: Free from fall injury  8/9/2023 2306 by Stephanie Cotter RN  Outcome: Progressing  8/9/2023 1539 by Deric Sparrow RN  Outcome: Progressing     Problem: Respiratory - Adult  Goal: Achieves optimal ventilation and oxygenation  8/9/2023 2306 by Stephanie Cotter RN  Outcome: Progressing  8/9/2023 2015 by Raquel Veliz RCP  Outcome: Progressing  Flowsheets (Taken 8/9/2023 2015)  Achieves optimal ventilation and oxygenation:   Assess for changes in respiratory status   Respiratory therapy support as indicated   Assess for changes in mentation and behavior   Oxygen supplementation based on oxygen saturation or arterial blood gases   Assess and instruct to report shortness of breath or any respiratory difficulty  8/9/2023 1539 by Deric Sparrow RN  Outcome: Progressing     Problem: Chronic Conditions and Co-morbidities  Goal: Patient's chronic conditions and co-morbidity symptoms are monitored and maintained or improved  8/9/2023 2306 by Stephanie Cotter RN  Outcome: Progressing  8/9/2023 1539 by Deric Sparrow RN  Outcome: Progressing     Problem: ABCDS Injury Assessment  Goal: Absence of physical injury  8/9/2023 2306 by Stephanie Cotter RN  Outcome: Progressing  8/9/2023 1539 by Deric Sparrow RN  Outcome: Progressing     Problem: Cardiovascular - Adult  Goal: Maintains optimal cardiac output and hemodynamic stability  8/9/2023 2306 by Stephanie Cotter RN  Outcome: Progressing  8/9/2023 1539 by Deric Sparrow RN  Outcome: Progressing  Goal: Absence of cardiac dysrhythmias or at baseline  8/9/2023 2306 by Stephanie Cotter RN  Outcome: Progressing  8/9/2023 1539 by Deric Sparrow RN  Outcome: Progressing     Problem: Skin/Tissue Integrity - Adult  Goal: Skin integrity remains intact  8/9/2023 2306 by
Problem: Discharge Planning  Goal: Discharge to home or other facility with appropriate resources  Outcome: Progressing
Problem: Discharge Planning  Goal: Discharge to home or other facility with appropriate resources  Outcome: Progressing     Problem: Respiratory - Adult  Goal: Achieves optimal ventilation and oxygenation  Outcome: Progressing     Problem: Musculoskeletal - Adult  Goal: Return mobility to safest level of function  Outcome: Progressing
Problem: Discharge Planning  Goal: Discharge to home or other facility with appropriate resources  Outcome: Progressing     Problem: Safety - Adult  Goal: Free from fall injury  Outcome: Progressing     Problem: Respiratory - Adult  Goal: Achieves optimal ventilation and oxygenation  7/21/2023 2214 by Jordon Norris RN  Outcome: Progressing  7/21/2023 1356 by Jeffy Weiss RCP  Outcome: Progressing     Problem: Chronic Conditions and Co-morbidities  Goal: Patient's chronic conditions and co-morbidity symptoms are monitored and maintained or improved  Outcome: Progressing     Problem: ABCDS Injury Assessment  Goal: Absence of physical injury  Outcome: Progressing
Problem: Discharge Planning  Goal: Discharge to home or other facility with appropriate resources  Outcome: Progressing     Problem: Safety - Adult  Goal: Free from fall injury  Outcome: Progressing     Problem: Respiratory - Adult  Goal: Achieves optimal ventilation and oxygenation  7/22/2023 1952 by Abiel Duval RN  Outcome: Progressing  7/22/2023 1239 by Mindy Gallegos RCP  Flowsheets (Taken 7/22/2023 1239)  Achieves optimal ventilation and oxygenation: Respiratory therapy support as indicated     Problem: Chronic Conditions and Co-morbidities  Goal: Patient's chronic conditions and co-morbidity symptoms are monitored and maintained or improved  Outcome: Progressing     Problem: ABCDS Injury Assessment  Goal: Absence of physical injury  Outcome: Progressing     Problem: Cardiovascular - Adult  Goal: Maintains optimal cardiac output and hemodynamic stability  Outcome: Progressing  Goal: Absence of cardiac dysrhythmias or at baseline  Outcome: Progressing     Problem: Skin/Tissue Integrity - Adult  Goal: Skin integrity remains intact  Outcome: Progressing     Problem: Musculoskeletal - Adult  Goal: Return mobility to safest level of function  Outcome: Progressing  Goal: Return ADL status to a safe level of function  Outcome: Progressing     Problem: Genitourinary - Adult  Goal: Absence of urinary retention  Outcome: Progressing     Problem: Metabolic/Fluid and Electrolytes - Adult  Goal: Electrolytes maintained within normal limits  Outcome: Progressing  Goal: Hemodynamic stability and optimal renal function maintained  Outcome: Progressing
Problem: Discharge Planning  Goal: Discharge to home or other facility with appropriate resources  Outcome: Progressing     Problem: Safety - Adult  Goal: Free from fall injury  Outcome: Progressing     Problem: Respiratory - Adult  Goal: Achieves optimal ventilation and oxygenation  8/3/2023 0205 by Surendra Flores RN  Outcome: Progressing  8/2/2023 2011 by Marylene Screws, RCP  Outcome: Progressing     Problem: Chronic Conditions and Co-morbidities  Goal: Patient's chronic conditions and co-morbidity symptoms are monitored and maintained or improved  Outcome: Progressing     Problem: ABCDS Injury Assessment  Goal: Absence of physical injury  Outcome: Progressing     Problem: Cardiovascular - Adult  Goal: Maintains optimal cardiac output and hemodynamic stability  Outcome: Progressing  Goal: Absence of cardiac dysrhythmias or at baseline  Outcome: Progressing     Problem: Skin/Tissue Integrity - Adult  Goal: Skin integrity remains intact  Outcome: Progressing     Problem: Musculoskeletal - Adult  Goal: Return mobility to safest level of function  Outcome: Progressing  Goal: Return ADL status to a safe level of function  Outcome: Progressing     Problem: Genitourinary - Adult  Goal: Absence of urinary retention  Outcome: Progressing     Problem: Metabolic/Fluid and Electrolytes - Adult  Goal: Electrolytes maintained within normal limits  Outcome: Progressing  Goal: Hemodynamic stability and optimal renal function maintained  Outcome: Progressing     Problem: Skin/Tissue Integrity  Goal: Absence of new skin breakdown  Description: 1. Monitor for areas of redness and/or skin breakdown  2. Assess vascular access sites hourly  3. Every 4-6 hours minimum:  Change oxygen saturation probe site  4. Every 4-6 hours:  If on nasal continuous positive airway pressure, respiratory therapy assess nares and determine need for appliance change or resting period.   Outcome: Progressing     Problem: Pain  Goal:
Problem: Discharge Planning  Goal: Discharge to home or other facility with appropriate resources  Outcome: Progressing     Problem: Safety - Adult  Goal: Free from fall injury  Outcome: Progressing     Problem: Respiratory - Adult  Goal: Achieves optimal ventilation and oxygenation  8/8/2023 1417 by Tiffanie Dennis RN  Outcome: Progressing  8/8/2023 0936 by Marian Randolph RCP  Outcome: Progressing  Flowsheets (Taken 8/8/2023 0936)  Achieves optimal ventilation and oxygenation: Respiratory therapy support as indicated     Problem: Chronic Conditions and Co-morbidities  Goal: Patient's chronic conditions and co-morbidity symptoms are monitored and maintained or improved  Outcome: Progressing     Problem: ABCDS Injury Assessment  Goal: Absence of physical injury  Outcome: Progressing  Flowsheets (Taken 8/8/2023 1212)  Absence of Physical Injury: Implement safety measures based on patient assessment     Problem: Cardiovascular - Adult  Goal: Maintains optimal cardiac output and hemodynamic stability  Outcome: Progressing  Goal: Absence of cardiac dysrhythmias or at baseline  Outcome: Progressing     Problem: Skin/Tissue Integrity - Adult  Goal: Skin integrity remains intact  Outcome: Progressing  Flowsheets (Taken 8/8/2023 1212)  Skin Integrity Remains Intact: Monitor for areas of redness and/or skin breakdown     Problem: Musculoskeletal - Adult  Goal: Return mobility to safest level of function  Outcome: Progressing  Goal: Return ADL status to a safe level of function  Outcome: Progressing     Problem: Genitourinary - Adult  Goal: Absence of urinary retention  Outcome: Progressing     Problem: Metabolic/Fluid and Electrolytes - Adult  Goal: Electrolytes maintained within normal limits  Outcome: Progressing  Goal: Hemodynamic stability and optimal renal function maintained  Outcome: Progressing     Problem: Skin/Tissue Integrity  Goal: Absence of new skin breakdown  Description: 1.   Monitor for areas of redness
Problem: Discharge Planning  Goal: Discharge to home or other facility with appropriate resources  Outcome: Progressing     Problem: Safety - Adult  Goal: Free from fall injury  Outcome: Progressing     Problem: Respiratory - Adult  Goal: Achieves optimal ventilation and oxygenation  Outcome: Progressing
Problem: Discharge Planning  Goal: Discharge to home or other facility with appropriate resources  Outcome: Progressing     Problem: Safety - Adult  Goal: Free from fall injury  Outcome: Progressing     Problem: Respiratory - Adult  Goal: Achieves optimal ventilation and oxygenation  Outcome: Progressing     Problem: Chronic Conditions and Co-morbidities  Goal: Patient's chronic conditions and co-morbidity symptoms are monitored and maintained or improved  Outcome: Progressing     Problem: ABCDS Injury Assessment  Goal: Absence of physical injury  Outcome: Progressing     Problem: Cardiovascular - Adult  Goal: Maintains optimal cardiac output and hemodynamic stability  Outcome: Progressing  Goal: Absence of cardiac dysrhythmias or at baseline  Outcome: Progressing     Problem: Skin/Tissue Integrity - Adult  Goal: Skin integrity remains intact  Outcome: Progressing     Problem: Musculoskeletal - Adult  Goal: Return mobility to safest level of function  Outcome: Progressing  Goal: Return ADL status to a safe level of function  Outcome: Progressing     Problem: Genitourinary - Adult  Goal: Absence of urinary retention  Outcome: Progressing     Problem: Metabolic/Fluid and Electrolytes - Adult  Goal: Electrolytes maintained within normal limits  Outcome: Progressing  Goal: Hemodynamic stability and optimal renal function maintained  Outcome: Progressing     Problem: Skin/Tissue Integrity  Goal: Absence of new skin breakdown  Description: 1. Monitor for areas of redness and/or skin breakdown  2. Assess vascular access sites hourly  3. Every 4-6 hours minimum:  Change oxygen saturation probe site  4. Every 4-6 hours:  If on nasal continuous positive airway pressure, respiratory therapy assess nares and determine need for appliance change or resting period.   Outcome: Progressing     Problem: Pain  Goal: Verbalizes/displays adequate comfort level or baseline comfort level  Outcome: Progressing
Problem: Discharge Planning  Goal: Discharge to home or other facility with appropriate resources  Outcome: Progressing     Problem: Safety - Adult  Goal: Free from fall injury  Outcome: Progressing     Problem: Respiratory - Adult  Goal: Achieves optimal ventilation and oxygenation  Outcome: Progressing     Problem: Chronic Conditions and Co-morbidities  Goal: Patient's chronic conditions and co-morbidity symptoms are monitored and maintained or improved  Outcome: Progressing     Problem: ABCDS Injury Assessment  Goal: Absence of physical injury  Outcome: Progressing  Flowsheets (Taken 8/7/2023 6972 by Marion Baker RN)  Absence of Physical Injury: Implement safety measures based on patient assessment     Problem: Cardiovascular - Adult  Goal: Maintains optimal cardiac output and hemodynamic stability  Outcome: Progressing  Goal: Absence of cardiac dysrhythmias or at baseline  Outcome: Progressing     Problem: Skin/Tissue Integrity - Adult  Goal: Skin integrity remains intact  Outcome: Progressing     Problem: Musculoskeletal - Adult  Goal: Return mobility to safest level of function  Outcome: Progressing  Goal: Return ADL status to a safe level of function  Outcome: Progressing     Problem: Genitourinary - Adult  Goal: Absence of urinary retention  Outcome: Progressing     Problem: Metabolic/Fluid and Electrolytes - Adult  Goal: Electrolytes maintained within normal limits  Outcome: Progressing  Goal: Hemodynamic stability and optimal renal function maintained  Outcome: Progressing     Problem: Skin/Tissue Integrity  Goal: Absence of new skin breakdown  Description: 1. Monitor for areas of redness and/or skin breakdown  2. Assess vascular access sites hourly  3. Every 4-6 hours minimum:  Change oxygen saturation probe site  4. Every 4-6 hours:  If on nasal continuous positive airway pressure, respiratory therapy assess nares and determine need for appliance change or resting period.   Outcome: Progressing
Problem: Discharge Planning  Goal: Discharge to home or other facility with appropriate resources  Outcome: Progressing     Problem: Safety - Adult  Goal: Free from fall injury  Outcome: Progressing  Flowsheets (Taken 8/3/2023 2000)  Free From Fall Injury: Instruct family/caregiver on patient safety     Problem: Respiratory - Adult  Goal: Achieves optimal ventilation and oxygenation  Outcome: Progressing  Flowsheets (Taken 8/3/2023 2050)  Achieves optimal ventilation and oxygenation: Assess for changes in respiratory status     Problem: Chronic Conditions and Co-morbidities  Goal: Patient's chronic conditions and co-morbidity symptoms are monitored and maintained or improved  Outcome: Progressing     Problem: Cardiovascular - Adult  Goal: Maintains optimal cardiac output and hemodynamic stability  Outcome: Progressing  Goal: Absence of cardiac dysrhythmias or at baseline  Outcome: Progressing     Problem: Skin/Tissue Integrity - Adult  Goal: Skin integrity remains intact  Outcome: Progressing  Flowsheets (Taken 8/3/2023 2000)  Skin Integrity Remains Intact: Monitor for areas of redness and/or skin breakdown     Problem: Musculoskeletal - Adult  Goal: Return mobility to safest level of function  Outcome: Progressing  Goal: Return ADL status to a safe level of function  Outcome: Progressing     Problem: Genitourinary - Adult  Goal: Absence of urinary retention  Outcome: Progressing     Problem: Metabolic/Fluid and Electrolytes - Adult  Goal: Electrolytes maintained within normal limits  Outcome: Progressing  Goal: Hemodynamic stability and optimal renal function maintained  Outcome: Progressing
Problem: Discharge Planning  Goal: Discharge to home or other facility with appropriate resources  Outcome: Progressing     Problem: Safety - Adult  Goal: Free from fall injury  Outcome: Progressing  Flowsheets (Taken 8/4/2023 2000)  Free From Fall Injury: Instruct family/caregiver on patient safety     Problem: Respiratory - Adult  Goal: Achieves optimal ventilation and oxygenation  Outcome: Progressing  Flowsheets (Taken 8/4/2023 2100)  Achieves optimal ventilation and oxygenation: Assess for changes in respiratory status     Problem: Chronic Conditions and Co-morbidities  Goal: Patient's chronic conditions and co-morbidity symptoms are monitored and maintained or improved  Outcome: Progressing     Problem: ABCDS Injury Assessment  Goal: Absence of physical injury  Outcome: Progressing  Flowsheets (Taken 8/4/2023 2000)  Absence of Physical Injury: Implement safety measures based on patient assessment     Problem: Cardiovascular - Adult  Goal: Maintains optimal cardiac output and hemodynamic stability  Outcome: Progressing  Goal: Absence of cardiac dysrhythmias or at baseline  Outcome: Progressing     Problem: Skin/Tissue Integrity - Adult  Goal: Skin integrity remains intact  Outcome: Progressing  Flowsheets (Taken 8/4/2023 2000)  Skin Integrity Remains Intact: Monitor for areas of redness and/or skin breakdown     Problem: Musculoskeletal - Adult  Goal: Return mobility to safest level of function  Outcome: Progressing  Goal: Return ADL status to a safe level of function  Outcome: Progressing     Problem: Genitourinary - Adult  Goal: Absence of urinary retention  Outcome: Progressing     Problem: Metabolic/Fluid and Electrolytes - Adult  Goal: Electrolytes maintained within normal limits  Outcome: Progressing
Problem: Discharge Planning  Goal: Discharge to home or other facility with appropriate resources  Outcome: Progressing     Problem: Safety - Adult  Goal: Free from fall injury  Outcome: Progressing  Flowsheets (Taken 8/5/2023 2000)  Free From Fall Injury: Instruct family/caregiver on patient safety     Problem: Respiratory - Adult  Goal: Achieves optimal ventilation and oxygenation  Outcome: Progressing     Problem: Chronic Conditions and Co-morbidities  Goal: Patient's chronic conditions and co-morbidity symptoms are monitored and maintained or improved  Outcome: Progressing     Problem: ABCDS Injury Assessment  Goal: Absence of physical injury  Outcome: Progressing  Flowsheets (Taken 8/5/2023 2000)  Absence of Physical Injury: Implement safety measures based on patient assessment     Problem: Cardiovascular - Adult  Goal: Maintains optimal cardiac output and hemodynamic stability  Outcome: Progressing  Goal: Absence of cardiac dysrhythmias or at baseline  Outcome: Progressing     Problem: Skin/Tissue Integrity - Adult  Goal: Skin integrity remains intact  Outcome: Progressing  Flowsheets (Taken 8/5/2023 2000)  Skin Integrity Remains Intact: Monitor for areas of redness and/or skin breakdown     Problem: Musculoskeletal - Adult  Goal: Return mobility to safest level of function  Outcome: Progressing  Goal: Return ADL status to a safe level of function  Outcome: Progressing     Problem: Genitourinary - Adult  Goal: Absence of urinary retention  Outcome: Progressing     Problem: Metabolic/Fluid and Electrolytes - Adult  Goal: Electrolytes maintained within normal limits  Outcome: Progressing  Goal: Hemodynamic stability and optimal renal function maintained  Outcome: Progressing     Problem: Skin/Tissue Integrity  Goal: Absence of new skin breakdown  Description: 1. Monitor for areas of redness and/or skin breakdown  2. Assess vascular access sites hourly  3.   Every 4-6 hours minimum:  Change oxygen saturation
Problem: Discharge Planning  Goal: Discharge to home or other facility with appropriate resources  Outcome: Progressing  Flowsheets (Taken 7/27/2023 2015)  Discharge to home or other facility with appropriate resources:   Identify barriers to discharge with patient and caregiver   Arrange for needed discharge resources and transportation as appropriate   Identify discharge learning needs (meds, wound care, etc)   Refer to discharge planning if patient needs post-hospital services based on physician order or complex needs related to functional status, cognitive ability or social support system     Problem: Safety - Adult  Goal: Free from fall injury  Outcome: Progressing  Flowsheets (Taken 7/27/2023 2015)  Free From Fall Injury:   Instruct family/caregiver on patient safety   Based on caregiver fall risk screen, instruct family/caregiver to ask for assistance with transferring infant if caregiver noted to have fall risk factors     Problem: Respiratory - Adult  Goal: Achieves optimal ventilation and oxygenation  Outcome: Progressing  Flowsheets (Taken 7/27/2023 2015)  Achieves optimal ventilation and oxygenation:   Assess for changes in mentation and behavior   Assess for changes in respiratory status   Encourage broncho-pulmonary hygiene including cough, deep breathe, incentive spirometry   Assess the need for suctioning and aspirate as needed   Assess and instruct to report shortness of breath or any respiratory difficulty     Problem: Cardiovascular - Adult  Goal: Maintains optimal cardiac output and hemodynamic stability  Outcome: Progressing  Flowsheets (Taken 7/27/2023 2015)  Maintains optimal cardiac output and hemodynamic stability:   Monitor blood pressure and heart rate   Monitor urine output and notify Licensed Independent Practitioner for values outside of normal range   Assess for signs of decreased cardiac output  Goal: Absence of cardiac dysrhythmias or at baseline  Outcome: Progressing  Flowsheets
Problem: Discharge Planning  Goal: Discharge to home or other facility with appropriate resources  Outcome: Progressing  Flowsheets (Taken 8/6/2023 1939)  Discharge to home or other facility with appropriate resources: Identify discharge learning needs (meds, wound care, etc)     Problem: Safety - Adult  Goal: Free from fall injury  Outcome: Progressing  Flowsheets (Taken 8/6/2023 2000)  Free From Fall Injury: Instruct family/caregiver on patient safety     Problem: Respiratory - Adult  Goal: Achieves optimal ventilation and oxygenation  8/6/2023 2345 by Billy Najjar, RN  Outcome: Progressing  Flowsheets (Taken 8/6/2023 1939)  Achieves optimal ventilation and oxygenation: Assess for changes in respiratory status  8/6/2023 1024 by Pradeep Patton RCP  Outcome: Progressing     Problem: Chronic Conditions and Co-morbidities  Goal: Patient's chronic conditions and co-morbidity symptoms are monitored and maintained or improved  Outcome: Progressing  Flowsheets (Taken 8/6/2023 1939)  Care Plan - Patient's Chronic Conditions and Co-Morbidity Symptoms are Monitored and Maintained or Improved: Monitor and assess patient's chronic conditions and comorbid symptoms for stability, deterioration, or improvement     Problem: ABCDS Injury Assessment  Goal: Absence of physical injury  Outcome: Progressing  Flowsheets (Taken 8/6/2023 2000)  Absence of Physical Injury: Implement safety measures based on patient assessment     Problem: Cardiovascular - Adult  Goal: Maintains optimal cardiac output and hemodynamic stability  Outcome: Progressing  Flowsheets (Taken 8/6/2023 1939)  Maintains optimal cardiac output and hemodynamic stability: Monitor blood pressure and heart rate  Goal: Absence of cardiac dysrhythmias or at baseline  Outcome: Progressing  Flowsheets (Taken 8/6/2023 1939)  Absence of cardiac dysrhythmias or at baseline: Monitor cardiac rate and rhythm     Problem: Skin/Tissue Integrity - Adult  Goal: Skin
Problem: Respiratory - Adult  Goal: Achieves optimal ventilation and oxygenation  7/26/2023 1116 by Tomer Crowley RCP  Outcome: Progressing   BRONCHOSPASM/BRONCHOCONSTRICTION     [x]         IMPROVE AERATION/BREATH SOUNDS  [x]   ADMINISTER BRONCHODILATOR THERAPY AS APPROPRIATE  [x]   ASSESS BREATH SOUNDS  [x]   IMPLEMENT AEROSOL/MDI PROTOCOL  [x]   PATIENT EDUCATION AS NEEDED
Problem: Respiratory - Adult  Goal: Achieves optimal ventilation and oxygenation  7/28/2023 0724 by Evi Mccoy RCP  Outcome: Progressing   BRONCHOSPASM/BRONCHOCONSTRICTION     [x]         IMPROVE AERATION/BREATH SOUNDS  [x]   ADMINISTER BRONCHODILATOR THERAPY AS APPROPRIATE  [x]   ASSESS BREATH SOUNDS  []   IMPLEMENT AEROSOL/MDI PROTOCOL  [x]   PATIENT EDUCATION AS NEEDED   PROVIDE ADEQUATE OXYGENATION WITH ACCEPTABLE SP02/ABG'S    [x]  IDENTIFY APPROPRIATE OXYGEN THERAPY  [x]   MONITOR SP02/ABG'S AS NEEDED   [x]   PATIENT EDUCATION AS NEEDED
Problem: Respiratory - Adult  Goal: Achieves optimal ventilation and oxygenation  7/29/2023 1634 by Elias Jaime RCP  Outcome: Progressing   BRONCHOSPASM/BRONCHOCONSTRICTION     [x]         IMPROVE AERATION/BREATH SOUNDS  [x]   ADMINISTER BRONCHODILATOR THERAPY AS APPROPRIATE  [x]   ASSESS BREATH SOUNDS  []   IMPLEMENT AEROSOL/MDI PROTOCOL  [x]   PATIENT EDUCATION AS NEEDED
Problem: Respiratory - Adult  Goal: Achieves optimal ventilation and oxygenation  7/30/2023 0827 by Dahiana Brock RCP  Outcome: Progressing   PROVIDE ADEQUATE OXYGENATION WITH ACCEPTABLE SP02/ABG'S    [x]  IDENTIFY APPROPRIATE OXYGEN THERAPY  [x]   MONITOR SP02/ABG'S AS NEEDED   [x]   PATIENT EDUCATION AS NEEDED   BRONCHOSPASM/BRONCHOCONSTRICTION     [x]         IMPROVE AERATION/BREATH SOUNDS  [x]   ADMINISTER BRONCHODILATOR THERAPY AS APPROPRIATE  [x]   ASSESS BREATH SOUNDS  []   IMPLEMENT AEROSOL/MDI PROTOCOL  [x]   PATIENT EDUCATION AS NEEDED
Problem: Respiratory - Adult  Goal: Achieves optimal ventilation and oxygenation  7/31/2023 0925 by Panchito Loco RCP  Outcome: Progressing   PROVIDE ADEQUATE OXYGENATION WITH ACCEPTABLE SP02/ABG'S    [x]  IDENTIFY APPROPRIATE OXYGEN THERAPY  [x]   MONITOR SP02/ABG'S AS NEEDED   [x]   PATIENT EDUCATION AS NEEDED   BRONCHOSPASM/BRONCHOCONSTRICTION     [x]         IMPROVE AERATION/BREATH SOUNDS  [x]   ADMINISTER BRONCHODILATOR THERAPY AS APPROPRIATE  [x]   ASSESS BREATH SOUNDS  [x]   IMPLEMENT AEROSOL/MDI PROTOCOL  [x]   PATIENT EDUCATION AS NEEDED
Problem: Respiratory - Adult  Goal: Achieves optimal ventilation and oxygenation  7/31/2023 2119 by Bessy Berrios RCP  Outcome: Progressing   BRONCHOSPASM/BRONCHOCONSTRICTION     [x]         IMPROVE AERATION/BREATH SOUNDS  [x]   ADMINISTER BRONCHODILATOR THERAPY AS APPROPRIATE  [x]   ASSESS BREATH SOUNDS  []   IMPLEMENT AEROSOL/MDI PROTOCOL  [x]   PATIENT EDUCATION AS NEEDED   NON INVASIVE VENTILATION  PROVIDE OPTIMAL VENTILATION/ACCEPTABLE SP02  IMPLEMENT NON INVASIVE VENTILATION PROTOCOL  ASSESSMENT SKIN INTEGRITY  PATIENT EDUCATION AS NEEDED  BIPAP AS NEEDED
Problem: Respiratory - Adult  Goal: Achieves optimal ventilation and oxygenation  8/10/2023 1010 by Tommie Lucero KOLBY  Outcome: Progressing  Flowsheets (Taken 8/10/2023 1010)  Achieves optimal ventilation and oxygenation:   Respiratory therapy support as indicated   Assess for changes in respiratory status   Assess and instruct to report shortness of breath or any respiratory difficulty   Encourage broncho-pulmonary hygiene including cough, deep breathe, incentive spirometry   Assess for changes in mentation and behavior  8/9/2023 2306 by Heide Holloway RN  Outcome: Progressing  8/9/2023 2015 by Ana Luisa Mendez KOLBY  Outcome: Progressing  Flowsheets (Taken 8/9/2023 2015)  Achieves optimal ventilation and oxygenation:   Assess for changes in respiratory status   Respiratory therapy support as indicated   Assess for changes in mentation and behavior   Oxygen supplementation based on oxygen saturation or arterial blood gases   Assess and instruct to report shortness of breath or any respiratory difficulty
Problem: Respiratory - Adult  Goal: Achieves optimal ventilation and oxygenation  8/12/2023 0908 by Dayana Landon RCP  Outcome: Progressing   BRONCHOSPASM/BRONCHOCONSTRICTION     [x]         IMPROVE AERATION/BREATH SOUNDS  [x]   ADMINISTER BRONCHODILATOR THERAPY AS APPROPRIATE  [x]   ASSESS BREATH SOUNDS  []   IMPLEMENT AEROSOL/MDI PROTOCOL  [x]   PATIENT EDUCATION AS NEEDED   PROVIDE ADEQUATE OXYGENATION WITH ACCEPTABLE SP02/ABG'S    [x]  IDENTIFY APPROPRIATE OXYGEN THERAPY  [x]   MONITOR SP02/ABG'S AS NEEDED   [x]   PATIENT EDUCATION AS NEEDED
Problem: Respiratory - Adult  Goal: Achieves optimal ventilation and oxygenation  8/13/2023 1042 by Fabiola Miranda RCP  Flowsheets (Taken 8/13/2023 1042)  Achieves optimal ventilation and oxygenation: Respiratory therapy support as indicated
Problem: Respiratory - Adult  Goal: Achieves optimal ventilation and oxygenation  8/14/2023 2053 by Emy Griffin RCP  Outcome: Progressing   BRONCHOSPASM/BRONCHOCONSTRICTION     [x]         IMPROVE AERATION/BREATH SOUNDS  [x]   ADMINISTER BRONCHODILATOR THERAPY AS APPROPRIATE  [x]   ASSESS BREATH SOUNDS  []   IMPLEMENT AEROSOL/MDI PROTOCOL  [x]   PATIENT EDUCATION AS NEEDED
Problem: Respiratory - Adult  Goal: Achieves optimal ventilation and oxygenation  8/2/2023 2011 by Farzaneh Valentino RCP  Outcome: Progressing   BRONCHOSPASM/BRONCHOCONSTRICTION     [x]         IMPROVE AERATION/BREATH SOUNDS  [x]   ADMINISTER BRONCHODILATOR THERAPY AS APPROPRIATE  [x]   ASSESS BREATH SOUNDS  []   IMPLEMENT AEROSOL/MDI PROTOCOL  [x]   PATIENT EDUCATION AS NEEDED   PROVIDE ADEQUATE OXYGENATION WITH ACCEPTABLE SP02/ABG'S    [x]  IDENTIFY APPROPRIATE OXYGEN THERAPY  [x]   MONITOR SP02/ABG'S AS NEEDED   [x]   PATIENT EDUCATION AS NEEDED   NONINVASIVE VENTILATION    PROVIDE OPTIMAL VENTILATION/ACCEPTABLE SPO2   IMPLEMENT NONINVASIVE VENTILATION PROTOCOL   MAINTAIN ACCEPTABLE SPO2   ASSESS SKIN INTEGRITY/BREAKDOWN SCORE   PATIENT EDUCATION AS NEEDED   BIPAP AS NEEDED
Problem: Respiratory - Adult  Goal: Achieves optimal ventilation and oxygenation  8/3/2023 0820 by Mireya Bailey RCP  Outcome: Progressing
Problem: Respiratory - Adult  Goal: Achieves optimal ventilation and oxygenation  8/5/2023 1051 by Jeanne Yadav RCP  Outcome: Progressing   BRONCHOSPASM/BRONCHOCONSTRICTION     [x]         IMPROVE AERATION/BREATH SOUNDS  [x]   ADMINISTER BRONCHODILATOR THERAPY AS APPROPRIATE  [x]   ASSESS BREATH SOUNDS  [x]   IMPLEMENT AEROSOL/MDI PROTOCOL  [x]   PATIENT EDUCATION AS NEEDED
Problem: Respiratory - Adult  Goal: Achieves optimal ventilation and oxygenation  8/6/2023 1024 by Shy Blount RCP  Outcome: Progressing   BRONCHOSPASM/BRONCHOCONSTRICTION     [x]         IMPROVE AERATION/BREATH SOUNDS  [x]   ADMINISTER BRONCHODILATOR THERAPY AS APPROPRIATE  [x]   ASSESS BREATH SOUNDS  []   IMPLEMENT AEROSOL/MDI PROTOCOL  [x]   PATIENT EDUCATION AS NEEDED
Problem: Respiratory - Adult  Goal: Achieves optimal ventilation and oxygenation  8/8/2023 0936 by Caitie Perez RCP  Outcome: Progressing  Flowsheets (Taken 8/8/2023 0936)  Achieves optimal ventilation and oxygenation: Respiratory therapy support as indicated
Problem: Respiratory - Adult  Goal: Achieves optimal ventilation and oxygenation  8/9/2023 2015 by Bryan Armas Sr., P  Outcome: Progressing  Flowsheets (Taken 8/9/2023 2015)  Achieves optimal ventilation and oxygenation:   Assess for changes in respiratory status   Respiratory therapy support as indicated   Assess for changes in mentation and behavior   Oxygen supplementation based on oxygen saturation or arterial blood gases   Assess and instruct to report shortness of breath or any respiratory difficulty
Problem: Respiratory - Adult  Goal: Achieves optimal ventilation and oxygenation  Flowsheets (Taken 7/22/2023 1239)  Achieves optimal ventilation and oxygenation: Respiratory therapy support as indicated
Problem: Respiratory - Adult  Goal: Achieves optimal ventilation and oxygenation  Outcome: Progressing   BRONCHOSPASM/BRONCHOCONSTRICTION     [x]         IMPROVE AERATION/BREATH SOUNDS  [x]   ADMINISTER BRONCHODILATOR THERAPY AS APPROPRIATE  [x]   ASSESS BREATH SOUNDS  []   IMPLEMENT AEROSOL/MDI PROTOCOL  [x]   PATIENT EDUCATION AS NEEDED
Problem: Respiratory - Adult  Goal: Achieves optimal ventilation and oxygenation  Outcome: Progressing   BRONCHOSPASM/BRONCHOCONSTRICTION     [x]         IMPROVE AERATION/BREATH SOUNDS  [x]   ADMINISTER BRONCHODILATOR THERAPY AS APPROPRIATE  [x]   ASSESS BREATH SOUNDS  []   IMPLEMENT AEROSOL/MDI PROTOCOL  [x]   PATIENT EDUCATION AS NEEDED   PROVIDE ADEQUATE OXYGENATION WITH ACCEPTABLE SP02/ABG'S    [x]  IDENTIFY APPROPRIATE OXYGEN THERAPY  [x]   MONITOR SP02/ABG'S AS NEEDED   [x]   PATIENT EDUCATION AS NEEDED
Problem: Respiratory - Adult  Goal: Achieves optimal ventilation and oxygenation  Outcome: Progressing   BRONCHOSPASM/BRONCHOCONSTRICTION     [x]         IMPROVE AERATION/BREATH SOUNDS  [x]   ADMINISTER BRONCHODILATOR THERAPY AS APPROPRIATE  [x]   ASSESS BREATH SOUNDS  [x]   IMPLEMENT AEROSOL/MDI PROTOCOL  [x]   PATIENT EDUCATION AS NEEDED   PROVIDE ADEQUATE OXYGENATION WITH ACCEPTABLE SP02/ABG'S    [x]  IDENTIFY APPROPRIATE OXYGEN THERAPY  [x]   MONITOR SP02/ABG'S AS NEEDED   [x]   PATIENT EDUCATION AS NEEDED
Problem: Respiratory - Adult  Goal: Achieves optimal ventilation and oxygenation  Outcome: Progressing  Flowsheets (Taken 7/20/2023 2141)  Achieves optimal ventilation and oxygenation:   Assess for changes in respiratory status   Position to facilitate oxygenation and minimize respiratory effort   Respiratory therapy support as indicated   Assess for changes in mentation and behavior   Oxygen supplementation based on oxygen saturation or arterial blood gases   Assess and instruct to report shortness of breath or any respiratory difficulty    Patient refused bipap
Problem: Safety - Adult  Goal: Free from fall injury  Outcome: Progressing     Problem: Respiratory - Adult  Goal: Achieves optimal ventilation and oxygenation  Outcome: Progressing
With large LV thrombus patient is at risk for stroke. We would like to eval about PCI and TAVR in future  Questionable ostial Lcx lesion for stent.  No graftable target to OMs    Cath conference decision
Olena Bradford RN  Outcome: Progressing  Goal: Return ADL status to a safe level of function  8/3/2023 1002 by Janice Perkins RN  Outcome: Progressing  8/3/2023 0205 by Rock Cespedes RN  Outcome: Progressing     Problem: Genitourinary - Adult  Goal: Absence of urinary retention  8/3/2023 1002 by Janice Perkins RN  Outcome: Progressing  8/3/2023 0205 by Rock Cespedes RN  Outcome: Progressing     Problem: Metabolic/Fluid and Electrolytes - Adult  Goal: Electrolytes maintained within normal limits  8/3/2023 1002 by Janice Perkins RN  Outcome: Progressing  8/3/2023 0205 by Rock Cespedes RN  Outcome: Progressing  Goal: Hemodynamic stability and optimal renal function maintained  8/3/2023 1002 by Janice Perkins RN  Outcome: Progressing  8/3/2023 0205 by Rock Cespedes RN  Outcome: Progressing     Problem: Skin/Tissue Integrity  Goal: Absence of new skin breakdown  Description: 1. Monitor for areas of redness and/or skin breakdown  2. Assess vascular access sites hourly  3. Every 4-6 hours minimum:  Change oxygen saturation probe site  4. Every 4-6 hours:  If on nasal continuous positive airway pressure, respiratory therapy assess nares and determine need for appliance change or resting period.   8/3/2023 1002 by Janice Perkins RN  Outcome: Progressing  8/3/2023 0205 by Rock Cespedes RN  Outcome: Progressing     Problem: Pain  Goal: Verbalizes/displays adequate comfort level or baseline comfort level  8/3/2023 1002 by Janice Perkins RN  Outcome: Progressing  8/3/2023 0205 by Rock Cespedes RN  Outcome: Progressing
Progressing     Problem: Skin/Tissue Integrity  Goal: Absence of new skin breakdown  Description: 1. Monitor for areas of redness and/or skin breakdown  2. Assess vascular access sites hourly  3. Every 4-6 hours minimum:  Change oxygen saturation probe site  4. Every 4-6 hours:  If on nasal continuous positive airway pressure, respiratory therapy assess nares and determine need for appliance change or resting period.   Outcome: Progressing     Problem: Pain  Goal: Verbalizes/displays adequate comfort level or baseline comfort level  Outcome: Progressing
Progressing  Flowsheets (Taken 7/28/2023 0800)  Maintains optimal cardiac output and hemodynamic stability:   Monitor blood pressure and heart rate   Monitor urine output and notify Licensed Independent Practitioner for values outside of normal range  Goal: Absence of cardiac dysrhythmias or at baseline  7/28/2023 2342 by Damian Velez RN  Outcome: Progressing  7/28/2023 0953 by Sree Griffin RN  Outcome: Progressing  Flowsheets (Taken 7/28/2023 0800)  Absence of cardiac dysrhythmias or at baseline: Monitor cardiac rate and rhythm     Problem: Skin/Tissue Integrity - Adult  Goal: Skin integrity remains intact  7/28/2023 2342 by Damian Velez RN  Outcome: Progressing  7/28/2023 0953 by Sree Griffin RN  Outcome: Progressing  Flowsheets (Taken 7/28/2023 0800)  Skin Integrity Remains Intact: Monitor for areas of redness and/or skin breakdown     Problem: Musculoskeletal - Adult  Goal: Return mobility to safest level of function  7/28/2023 2342 by Damian Velez RN  Outcome: Progressing  7/28/2023 0953 by Sree Griffin RN  Outcome: Progressing  Flowsheets (Taken 7/28/2023 0800)  Return Mobility to Safest Level of Function:   Assess patient stability and activity tolerance for standing, transferring and ambulating with or without assistive devices   Assist with transfers and ambulation using safe patient handling equipment as needed  Goal: Return ADL status to a safe level of function  7/28/2023 2342 by Damian Velez RN  Outcome: Progressing  7/28/2023 0953 by Sree Griffin RN  Outcome: Progressing  Flowsheets (Taken 7/28/2023 0800)  Return ADL Status to a Safe Level of Function: Administer medication as ordered     Problem: Genitourinary - Adult  Goal: Absence of urinary retention  7/28/2023 2342 by Damian Velez RN  Outcome: Progressing  7/28/2023 0953 by Sree Griffin RN  Outcome: Progressing     Problem: Metabolic/Fluid and Electrolytes - Adult  Goal: Electrolytes maintained within normal
RN  Outcome: Progressing  8/2/2023 0131 by Dat Wiseman RN  Outcome: Progressing     Problem: Genitourinary - Adult  Goal: Absence of urinary retention  8/2/2023 0702 by Liz Parada RN  Outcome: Progressing  8/2/2023 0131 by Dat Wiseman RN  Outcome: Progressing     Problem: Metabolic/Fluid and Electrolytes - Adult  Goal: Electrolytes maintained within normal limits  8/2/2023 0702 by Liz Parada RN  Outcome: Progressing  8/2/2023 0131 by Dat Wiseman RN  Outcome: Progressing  Goal: Hemodynamic stability and optimal renal function maintained  8/2/2023 0702 by Liz Parada RN  Outcome: Progressing  8/2/2023 0131 by Dat Wiseman RN  Outcome: Progressing     Problem: Skin/Tissue Integrity  Goal: Absence of new skin breakdown  Description: 1. Monitor for areas of redness and/or skin breakdown  2. Assess vascular access sites hourly  3. Every 4-6 hours minimum:  Change oxygen saturation probe site  4. Every 4-6 hours:  If on nasal continuous positive airway pressure, respiratory therapy assess nares and determine need for appliance change or resting period.   8/2/2023 5061 by Liz Parada RN  Outcome: Progressing  8/2/2023 0131 by Dat Wiseman RN  Outcome: Progressing     Problem: Pain  Goal: Verbalizes/displays adequate comfort level or baseline comfort level  8/2/2023 0702 by Liz Parada RN  Outcome: Progressing  8/2/2023 0131 by Dat Wiseman RN  Outcome: Progressing
Progressing
Monitor labs and assess patient for signs and symptoms of electrolyte imbalances   Administer electrolyte replacement as ordered  7/28/2023 0055 by Chuck Mobley RN  Outcome: Progressing  Flowsheets (Taken 7/27/2023 2015)  Electrolytes maintained within normal limits:   Monitor labs and assess patient for signs and symptoms of electrolyte imbalances   Administer electrolyte replacement as ordered  Goal: Hemodynamic stability and optimal renal function maintained  7/28/2023 0953 by Salbador Stevenson RN  Outcome: Progressing  Flowsheets (Taken 7/28/2023 0800)  Hemodynamic stability and optimal renal function maintained: Monitor labs and assess for signs and symptoms of volume excess or deficit  7/28/2023 0055 by Chuck Mobley RN  Outcome: Progressing  Flowsheets (Taken 7/27/2023 2015)  Hemodynamic stability and optimal renal function maintained:   Monitor labs and assess for signs and symptoms of volume excess or deficit   Monitor intake, output and patient weight     Problem: Chronic Conditions and Co-morbidities  Goal: Patient's chronic conditions and co-morbidity symptoms are monitored and maintained or improved  7/28/2023 0953 by Salbador Stevenson RN  Outcome: Progressing  Flowsheets (Taken 7/28/2023 0800)  Care Plan - Patient's Chronic Conditions and Co-Morbidity Symptoms are Monitored and Maintained or Improved: Monitor and assess patient's chronic conditions and comorbid symptoms for stability, deterioration, or improvement  7/28/2023 0055 by Chuck Mobley RN  Outcome: Progressing  Flowsheets (Taken 7/27/2023 2015)  Care Plan - Patient's Chronic Conditions and Co-Morbidity Symptoms are Monitored and Maintained or Improved:   Monitor and assess patient's chronic conditions and comorbid symptoms for stability, deterioration, or improvement   Update acute care plan with appropriate goals if chronic or comorbid symptoms are exacerbated and prevent overall improvement and discharge     Problem: ABCDS Injury

## 2023-08-15 NOTE — CARE COORDINATION
Dr Eddie Haddad is notified that the SNF stay is denied. Given Peer to Peer appeal information:  A prescribing provider may contact [id number] and request a Peer to Peer discussion for adverse decisions with a [address] [place] [address] Director by  calling [phone]. Dr Eddie Haddad indicated that he will discuss this with his attending.

## 2023-08-15 NOTE — CARE COORDINATION
Transitional planning    Writer placed call to [facility], spoke with Nadir Rebollar, can accept, faxed clinicals and AVS to [phone].

## 2023-08-16 ENCOUNTER — CARE COORDINATION (OUTPATIENT)
Dept: CASE MANAGEMENT | Age: 78
End: 2023-08-16

## 2023-08-16 NOTE — CARE COORDINATION
Request from [address], RN., please schedule patient for hospital f/u.     Patient scheduled for tues 8/22 @ 1pm    Left voice mail with time and date    Pierce Gomez, [address] [facility] Coordination Transition

## 2023-08-18 ENCOUNTER — CARE COORDINATION (OUTPATIENT)
Dept: CASE MANAGEMENT | Age: 78
End: 2023-08-18

## 2023-08-18 LAB — ECHO BSA: 2.23 M2

## 2023-08-18 NOTE — DISCHARGE SUMMARY
capsule by mouth 2 times daily as needed for Constipation, Disp-60 capsule, R-4Normal      apixaban (ELIQUIS) 2.5 MG TABS tablet Take 2 tablets by mouth 2 times daily, Disp-180 tablet, R-3Normal      atorvastatin (LIPITOR) 40 MG tablet Take 1 tablet by mouth daily, Disp-90 tablet, [id number]      folic acid (FOLVITE) 1 MG tablet Take 1 tablet by mouth daily, Disp-30 tablet, R-3Normal      sennosides-docusate sodium (SENOKOT-S) 8.6-50 MG tablet Take 1 tablet by mouth daily, Disp-30 tablet, R-1Normal      sevelamer (RENVELA) 800 MG tablet Take 3 tablets by mouth 3 times daily, Disp-90 tablet, R-3Normal           CONTINUE these medications which have NOT CHANGED    Details   Sucroferric Oxyhydroxide (VELPHORO) 500 MG CHEW Take 1 mg by mouthHistorical Med             Activity: [address] [facility]      Diet: cardiac diet    Follow-up:    [address]  [address] [facility],[address] [id number] [phone]    Follow up  Follow up with the kidney transplant clinic to obtain more information on how to get on waiting list for transplant. Have your nephrologist reach out to the clinic as well. [address]  [phone]    Follow up in 1 week(s)      [facility]  [address] [place] [address]  [phone]  Follow up on 9/6/2023  at 3pm with [address] Lluvia Naidu [address] [address] [address]  [phone]    Schedule an appointment as soon as possible for a visit in 1 week(s)  Postdischarge follow-up    MD Lluvia Bailey 2nd [address] [facility],[phone] [id number]  [address] [place], [place] [id number]  [phone]  Schedule an appointment as soon as possible for a visit in 3 week(s)  Postdischarge follow-up on renal mass.     Collette Pata, MD  [address]  [phone]    Follow up in 3 month(s)        Patient Instructions:

## 2023-08-18 NOTE — CARE COORDINATION
[facility] Care Transitions Follow Up Call sub #1 -Attempted to reach patient/spouse for subsequent transitional call. VM left to return call to CTN & other line had full mailbox      Patient Current Location:   not reached      Patient: Michael Obando             Patient : 1945   MRN: [id number]             Reason for Admission: acute on chronic respiratory failure, CHF, CKD (dialysis), Ischemic CM, CAD, severe mitral regurg, pulmonary nodules, possible CABG/TAVR planned  Discharge Date: 8/15/23         RARS: Readmission Risk Score: 21.1    Discussed follow-up appointments. If no appointment was previously scheduled, appointment scheduling offered: Yes. Is follow up appointment scheduled within 7 days of discharge? Yes. Follow Up  Future Appointments   Date Time Provider [address]   2023  1:20 PM Leona Eisenmenger, MD [facility]   2023  3:00 PM SYLVIE Anguiano - NP [facility]   2023 10:30 AM Yefri Caraballo DO [facility]      Care Transition Nurse provided contact information for future needs.      Plan for next call: symptom management-check with patient re: symptoms - swelling, SOB, cough, expectorant, tolerance of activity at home  follow-up appointment-check appts - assist with scheduling - Remind of PCP appt  HFU  medication management-check all meds with spouse - need to do 1111F when patient gets meds from pharmacy - waiting for 15 medications  referrals-check re: Jose De Jesus Alford RN

## 2023-08-21 ENCOUNTER — CARE COORDINATION (OUTPATIENT)
Dept: CASE MANAGEMENT | Age: 78
End: 2023-08-21

## 2023-08-21 NOTE — CARE COORDINATION
Care Transitions Outreach Attempt #2      Attempt #2 to reach patient for transitions of care follow up. Unable to reach patient. Left message to home number. Mobile number VMB is full. CTN sign off if no return call received. Pt has PCP HFU tomorrow. Patient: Cristina Oliva Patient : 1945 MRN: [id number]    Last Discharge [address]       Date Complaint Diagnosis Description Type Department Provider    23 Cough; Leg Swelling Leg swelling . .. ED to Hosp-Admission (Discharged) (ADMITTED) Tanya Wells MD; Christine Menjivar. .. Noted following upcoming appointments from discharge chart review:   [facility] follow up appointment(s):   Future Appointments   Date Time Provider [address]   2023  1:20 PM Zeb Castillo MD [address] [place]  [facility]   2023  3:00 PM Do Baires APRN - NP [facility]   2023 10:30 AM Yefri Caraballo DO [facility]     Gricelda Singh, [address] Transitions Nurse  [phone]   [email or web address]

## 2023-08-22 ENCOUNTER — OFFICE VISIT (OUTPATIENT)
Dept: INTERNAL MEDICINE | Age: 78
End: 2023-08-22

## 2023-08-22 VITALS
SYSTOLIC BLOOD PRESSURE: 87 MMHG | BODY MASS INDEX: 27.5 KG/M2 | DIASTOLIC BLOOD PRESSURE: 63 MMHG | TEMPERATURE: 97.3 F | HEIGHT: 72 IN | OXYGEN SATURATION: 98 % | HEART RATE: 78 BPM | WEIGHT: 203 LBS

## 2023-08-22 DIAGNOSIS — I48.0 PAROXYSMAL ATRIAL FIBRILLATION (HCC): ICD-10-CM

## 2023-08-22 DIAGNOSIS — I34.0 MITRAL VALVE INSUFFICIENCY, UNSPECIFIED ETIOLOGY: ICD-10-CM

## 2023-08-22 DIAGNOSIS — J96.11 CHRONIC RESPIRATORY FAILURE WITH HYPOXIA (HCC): ICD-10-CM

## 2023-08-22 DIAGNOSIS — I35.0 AORTIC STENOSIS, SEVERE: ICD-10-CM

## 2023-08-22 DIAGNOSIS — I50.23 ACUTE ON CHRONIC SYSTOLIC CHF (CONGESTIVE HEART FAILURE) (HCC): Primary | ICD-10-CM

## 2023-08-22 DIAGNOSIS — Z09 HOSPITAL DISCHARGE FOLLOW-UP: ICD-10-CM

## 2023-08-22 DIAGNOSIS — I25.10 CORONARY ARTERY DISEASE INVOLVING NATIVE CORONARY ARTERY OF NATIVE HEART WITHOUT ANGINA PECTORIS: ICD-10-CM

## 2023-08-22 DIAGNOSIS — D63.8 ANEMIA OF CHRONIC DISEASE: ICD-10-CM

## 2023-08-22 DIAGNOSIS — Z99.2 ESRD ON HEMODIALYSIS (HCC): ICD-10-CM

## 2023-08-22 DIAGNOSIS — D68.69 SECONDARY HYPERCOAGULABLE STATE (HCC): ICD-10-CM

## 2023-08-22 DIAGNOSIS — N18.6 ESRD ON HEMODIALYSIS (HCC): ICD-10-CM

## 2023-08-22 DIAGNOSIS — R91.8 PULMONARY NODULES: ICD-10-CM

## 2023-08-22 NOTE — PROGRESS NOTES
Post-Discharge Transitional Care Follow Up      Silver Callaway   YOB: 1945    Date of Office Visit:  8/22/2023  Date of Hospital Admission: 7/19/23  Date of Hospital Discharge: 8/15/23  Readmission Risk Score (high >=14%. Medium >=10%):Readmission Risk Score: 21.1      Care management risk score Rising risk (score 2-5) and Complex Care (Scores >=6): No Risk Score On File     Non face to face  following discharge, date last encounter closed (first attempt may have been earlier): 08/16/2023     Call initiated 2 business days of discharge: Yes     Acute on chronic systolic CHF (congestive heart failure) ([address])  Patient on HD 4/week. Not on diuretic as anuric  Advised to follow up with Cardiology and Nephrology  Home oxygen eval including nocturnal oximetry      -     Home O2 eval (desaturation screen); Future  -     External Referral To [facility] discharge follow-up  -     ID DISCHARGE MEDS RECONCILED W/ CURRENT OUTPATIENT MED LIST  Aortic stenosis, severe  He is high risk for surgery per Cardiology  May need care at tertiary hospital.  Avoid hypotension  No h/o syncope    -     External Referral To Home Health  Coronary artery disease involving native coronary artery of native heart without angina pectoris  Unable to tolerate meds due to hypotension  Continue statin/asa    -     External Referral To Home Health  Mitral valve insufficiency, unspecified etiology  ESRD on hemodialysis ([address])  -     External Referral To Home Health  Paroxysmal atrial fibrillation (HCC)  On Eliquis    Secondary hypercoagulable state ([address])  Pulmonary nodules  Needs repeat CT chest in 6-8 weeks  Keep follow up with Oncology    Anemia of chronic disease  Monitor  Labs done at HD  On procrit    Chronic respiratory failure with hypoxia (HCC)  -     Home O2 eval (desaturation screen); Future    Medical Decision Making: high complexity  Return in 1 month (on 9/22/2023).     On this date 8/22/2023 I have spent 50 minutes

## 2023-08-24 ENCOUNTER — TELEPHONE (OUTPATIENT)
Dept: INTERNAL MEDICINE | Age: 78
End: 2023-08-24

## 2023-08-24 ASSESSMENT — ENCOUNTER SYMPTOMS
SHORTNESS OF BREATH: 1
WHEEZING: 0
COUGH: 0
BLOOD IN STOOL: 0
ABDOMINAL PAIN: 0

## 2023-08-24 NOTE — TELEPHONE ENCOUNTER
Received phone call from [facility]. They are unable to accept patient for home care.  Please refer to another agency

## 2023-08-26 LAB — ECHO BSA: 2.23 M2

## 2023-08-28 ENCOUNTER — TELEPHONE (OUTPATIENT)
Dept: PULMONOLOGY | Age: 78
End: 2023-08-28

## 2023-08-28 DIAGNOSIS — R91.8 LUNG NODULES: Primary | ICD-10-CM

## 2023-08-28 DIAGNOSIS — R06.09 DYSPNEA ON EXERTION: Primary | ICD-10-CM

## 2023-08-28 DIAGNOSIS — I50.22 CHRONIC SYSTOLIC CONGESTIVE HEART FAILURE (HCC): ICD-10-CM

## 2023-08-28 NOTE — TELEPHONE ENCOUNTER
Patient was in hospital in 8/2023. Per our conversation, he needs a CT scan in 3 months. Please place order and I will take care of scheduling.

## 2023-08-28 NOTE — TELEPHONE ENCOUNTER
Patient's daughter notified of Dr. Mann Mercury response.  Patient is scheduled for 6 min walk tomorrow (8/29/23) at 10am.
Patient's daughter states he was on oxygen at the hospital but was not discharged with oxygen. Daughter states he is having difficulty breathing and shortness of breath on exertion. They want to know if there is a way he could get oxygen now that he is discharged and is still having problems. Patient is scheduled for 12/12/23 with you for an appt. Please advise.
Detail Level: Detailed
Quality 130: Documentation Of Current Medications In The Medical Record: Current Medications Documented

## 2023-08-29 ENCOUNTER — NURSE ONLY (OUTPATIENT)
Dept: PULMONOLOGY | Age: 78
End: 2023-08-29
Payer: MEDICARE

## 2023-08-29 VITALS — WEIGHT: 205 LBS | BODY MASS INDEX: 27.77 KG/M2 | HEIGHT: 72 IN | HEART RATE: 120 BPM | OXYGEN SATURATION: 99 %

## 2023-08-29 DIAGNOSIS — R91.8 LUNG NODULES: Primary | ICD-10-CM

## 2023-08-29 PROCEDURE — 94618 PULMONARY STRESS TESTING: CPT | Performed by: INTERNAL MEDICINE

## 2023-08-29 NOTE — PROGRESS NOTES
Patient was 99% on room air. Patient had very swollen feet and ankles stated he goes to dialysis  4x a week. Could only walk about 100 ft and could not go any further. Heart rate went up to 150 while walk and SpO2 was 95%. Patient agreed to nocturnal oximetry. Sent order to [facility].

## 2023-08-30 NOTE — TELEPHONE ENCOUNTER
Called patient's daughter and explained the order for repeat CT chest was placed and needed scheduled around 10/19. I provided her with the number for scheduling and let her know to give me a call once scheduled so we could schedule for a follow up in 1-2 weeks after CT.

## 2023-09-04 ENCOUNTER — APPOINTMENT (OUTPATIENT)
Dept: GENERAL RADIOLOGY | Age: 78
DRG: 189 | End: 2023-09-04
Payer: MEDICARE

## 2023-09-04 ENCOUNTER — ANCILLARY PROCEDURE (OUTPATIENT)
Dept: EMERGENCY DEPT | Age: 78
DRG: 189 | End: 2023-09-04
Attending: EMERGENCY MEDICINE
Payer: MEDICARE

## 2023-09-04 ENCOUNTER — HOSPITAL ENCOUNTER (INPATIENT)
Age: 78
LOS: 1 days | Discharge: ANOTHER HOSPITAL | DRG: 189 | End: 2023-09-05
Attending: EMERGENCY MEDICINE | Admitting: INTERNAL MEDICINE
Payer: MEDICARE

## 2023-09-04 DIAGNOSIS — R57.0 CARDIOGENIC SHOCK (HCC): Primary | ICD-10-CM

## 2023-09-04 DIAGNOSIS — R06.03 RESPIRATORY DISTRESS: ICD-10-CM

## 2023-09-04 PROBLEM — I48.91 ATRIAL FIBRILLATION WITH RVR (HCC): Status: ACTIVE | Noted: 2023-09-04

## 2023-09-04 PROBLEM — J96.90 RESPIRATORY FAILURE (HCC): Status: ACTIVE | Noted: 2023-09-04

## 2023-09-04 PROBLEM — J81.0 ACUTE PULMONARY EDEMA (HCC): Status: ACTIVE | Noted: 2023-07-24

## 2023-09-04 PROBLEM — I21.4 NSTEMI (NON-ST ELEVATED MYOCARDIAL INFARCTION) (HCC): Status: ACTIVE | Noted: 2023-09-04

## 2023-09-04 LAB
ALBUMIN SERPL-MCNC: 4 G/DL (ref 3.5–5.2)
ALBUMIN/GLOB SERPL: 1.4 {RATIO} (ref 1–2.5)
ALP SERPL-CCNC: 76 U/L (ref 40–129)
ALT SERPL-CCNC: 71 U/L (ref 5–41)
ANION GAP SERPL CALCULATED.3IONS-SCNC: 17 MMOL/L (ref 9–17)
ANION GAP SERPL CALCULATED.3IONS-SCNC: 19 MMOL/L (ref 9–17)
ANTI-XA UNFRAC HEPARIN: 0.96 IU/L
AST SERPL-CCNC: 67 U/L
BASOPHILS # BLD: 0 K/UL (ref 0–0.2)
BASOPHILS NFR BLD: 0 % (ref 0–2)
BILIRUB SERPL-MCNC: 0.6 MG/DL (ref 0.3–1.2)
BNP SERPL-MCNC: ABNORMAL PG/ML
BUN BLD-MCNC: 38 MG/DL (ref 8–26)
BUN SERPL-MCNC: 36 MG/DL (ref 8–23)
BUN SERPL-MCNC: 40 MG/DL (ref 8–23)
CA-I BLD-SCNC: 0.71 MMOL/L (ref 1.15–1.33)
CA-I BLD-SCNC: 1.08 MMOL/L (ref 1.13–1.33)
CALCIUM SERPL-MCNC: 8.7 MG/DL (ref 8.6–10.4)
CALCIUM SERPL-MCNC: 9 MG/DL (ref 8.6–10.4)
CHLORIDE BLD-SCNC: 111 MMOL/L (ref 98–107)
CHLORIDE SERPL-SCNC: 100 MMOL/L (ref 98–107)
CHLORIDE SERPL-SCNC: 99 MMOL/L (ref 98–107)
CO2 BLD CALC-SCNC: 13 MMOL/L (ref 22–30)
CO2 SERPL-SCNC: 19 MMOL/L (ref 20–31)
CO2 SERPL-SCNC: 21 MMOL/L (ref 20–31)
CREAT SERPL-MCNC: 6.3 MG/DL (ref 0.7–1.2)
CREAT SERPL-MCNC: 6.5 MG/DL (ref 0.7–1.2)
EGFR, POC: 7 ML/MIN/1.73M2
EOSINOPHIL # BLD: 0.1 K/UL (ref 0–0.4)
EOSINOPHILS RELATIVE PERCENT: 2 % (ref 1–4)
ERYTHROCYTE [DISTWIDTH] IN BLOOD BY AUTOMATED COUNT: 23.6 % (ref 11.8–14.4)
GFR SERPL CREATININE-BSD FRML MDRD: 8 ML/MIN/1.73M2
GFR SERPL CREATININE-BSD FRML MDRD: 8 ML/MIN/1.73M2
GLUCOSE BLD-MCNC: 169 MG/DL (ref 74–100)
GLUCOSE SERPL-MCNC: 112 MG/DL (ref 70–99)
GLUCOSE SERPL-MCNC: 117 MG/DL (ref 70–99)
HCO3 VENOUS: 12.9 MMOL/L (ref 22–29)
HCT VFR BLD AUTO: 35.2 % (ref 40.7–50.3)
HCT VFR BLD AUTO: 40 % (ref 41–53)
HGB BLD-MCNC: 10.9 G/DL (ref 13–17)
IMM GRANULOCYTES # BLD AUTO: 0 K/UL (ref 0–0.3)
IMM GRANULOCYTES NFR BLD: 0 %
INR PPP: 1.6
LACTIC ACID, SEPSIS WHOLE BLOOD: 2.9 MMOL/L (ref 0.5–1.9)
LYMPHOCYTES NFR BLD: 1.72 K/UL (ref 1–4.8)
LYMPHOCYTES RELATIVE PERCENT: 33 % (ref 24–44)
MAGNESIUM SERPL-MCNC: 2.4 MG/DL (ref 1.6–2.6)
MAGNESIUM SERPL-MCNC: 2.5 MG/DL (ref 1.6–2.6)
MCH RBC QN AUTO: 29.8 PG (ref 25.2–33.5)
MCHC RBC AUTO-ENTMCNC: 31 G/DL (ref 28.4–34.8)
MCV RBC AUTO: 96.2 FL (ref 82.6–102.9)
MONOCYTES NFR BLD: 0.52 K/UL (ref 0.1–0.8)
MONOCYTES NFR BLD: 10 % (ref 1–7)
MORPHOLOGY: ABNORMAL
NEGATIVE BASE EXCESS, VEN: 4.3 MMOL/L (ref 0–2)
NEUTROPHILS NFR BLD: 55 % (ref 36–66)
NEUTS SEG NFR BLD: 2.86 K/UL (ref 1.8–7.7)
NRBC BLD-RTO: 2.5 PER 100 WBC
NUCLEATED RED BLOOD CELLS: 2 PER 100 WBC
O2 SAT, VEN: 99.9 % (ref 60–85)
PARTIAL THROMBOPLASTIN TIME: 36.2 SEC (ref 23–36.5)
PARTIAL THROMBOPLASTIN TIME: 52.9 SEC (ref 23–36.5)
PCO2, VEN: 11.9 MM HG (ref 41–51)
PH VENOUS: 7.64 (ref 7.32–7.43)
PHOSPHATE SERPL-MCNC: 5.1 MG/DL (ref 2.5–4.5)
PHOSPHATE SERPL-MCNC: 5.2 MG/DL (ref 2.5–4.5)
PLATELET # BLD AUTO: ABNORMAL K/UL (ref 138–453)
PLATELET, FLUORESCENCE: 150 K/UL (ref 138–453)
PLATELETS.RETICULATED NFR BLD AUTO: 9.5 % (ref 1.1–10.3)
PO2, VEN: 187.5 MM HG (ref 30–50)
POC ANION GAP: 14 MMOL/L (ref 7–16)
POC CREATININE: 7.3 MG/DL (ref 0.51–1.19)
POC HEMOGLOBIN (CALC): 13.5 G/DL (ref 13.5–17.5)
POC LACTIC ACID: 2.7 MMOL/L (ref 0.56–1.39)
POTASSIUM BLD-SCNC: 4.3 MMOL/L (ref 3.5–4.5)
POTASSIUM SERPL-SCNC: 4.4 MMOL/L (ref 3.7–5.3)
POTASSIUM SERPL-SCNC: 5.3 MMOL/L (ref 3.7–5.3)
PROT SERPL-MCNC: 6.9 G/DL (ref 6.4–8.3)
PROTHROMBIN TIME: 18.5 SEC (ref 11.7–14.9)
RBC # BLD AUTO: 3.66 M/UL (ref 4.21–5.77)
SAMPLE SITE: ABNORMAL
SARS-COV-2 RDRP RESP QL NAA+PROBE: NOT DETECTED
SARS-COV-2 RDRP RESP QL NAA+PROBE: NOT DETECTED
SODIUM BLD-SCNC: 136 MMOL/L (ref 138–146)
SODIUM SERPL-SCNC: 137 MMOL/L (ref 135–144)
SODIUM SERPL-SCNC: 138 MMOL/L (ref 135–144)
SPECIMEN DESCRIPTION: NORMAL
SPECIMEN DESCRIPTION: NORMAL
TROPONIN I SERPL HS-MCNC: 1136 NG/L (ref 0–22)
TROPONIN I SERPL HS-MCNC: 1269 NG/L (ref 0–22)
WBC OTHER # BLD: 5.2 K/UL (ref 3.5–11.3)

## 2023-09-04 PROCEDURE — 82947 ASSAY GLUCOSE BLOOD QUANT: CPT

## 2023-09-04 PROCEDURE — 82803 BLOOD GASES ANY COMBINATION: CPT

## 2023-09-04 PROCEDURE — 83605 ASSAY OF LACTIC ACID: CPT

## 2023-09-04 PROCEDURE — 6360000002 HC RX W HCPCS: Performed by: INTERNAL MEDICINE

## 2023-09-04 PROCEDURE — 71045 X-RAY EXAM CHEST 1 VIEW: CPT

## 2023-09-04 PROCEDURE — 94761 N-INVAS EAR/PLS OXIMETRY MLT: CPT

## 2023-09-04 PROCEDURE — 82330 ASSAY OF CALCIUM: CPT

## 2023-09-04 PROCEDURE — 84520 ASSAY OF UREA NITROGEN: CPT

## 2023-09-04 PROCEDURE — 84100 ASSAY OF PHOSPHORUS: CPT

## 2023-09-04 PROCEDURE — 6360000002 HC RX W HCPCS

## 2023-09-04 PROCEDURE — 85730 THROMBOPLASTIN TIME PARTIAL: CPT

## 2023-09-04 PROCEDURE — 2000000000 HC ICU R&B

## 2023-09-04 PROCEDURE — 99291 CRITICAL CARE FIRST HOUR: CPT | Performed by: INTERNAL MEDICINE

## 2023-09-04 PROCEDURE — 2500000003 HC RX 250 WO HCPCS: Performed by: INTERNAL MEDICINE

## 2023-09-04 PROCEDURE — 2580000003 HC RX 258: Performed by: INTERNAL MEDICINE

## 2023-09-04 PROCEDURE — 85055 RETICULATED PLATELET ASSAY: CPT

## 2023-09-04 PROCEDURE — 90945 DIALYSIS ONE EVALUATION: CPT

## 2023-09-04 PROCEDURE — 94660 CPAP INITIATION&MGMT: CPT

## 2023-09-04 PROCEDURE — 80051 ELECTROLYTE PANEL: CPT

## 2023-09-04 PROCEDURE — 5A1D90Z PERFORMANCE OF URINARY FILTRATION, CONTINUOUS, GREATER THAN 18 HOURS PER DAY: ICD-10-PCS | Performed by: INTERNAL MEDICINE

## 2023-09-04 PROCEDURE — 85014 HEMATOCRIT: CPT

## 2023-09-04 PROCEDURE — 76937 US GUIDE VASCULAR ACCESS: CPT

## 2023-09-04 PROCEDURE — 80048 BASIC METABOLIC PNL TOTAL CA: CPT

## 2023-09-04 PROCEDURE — 2580000003 HC RX 258

## 2023-09-04 PROCEDURE — 2500000003 HC RX 250 WO HCPCS

## 2023-09-04 PROCEDURE — 93005 ELECTROCARDIOGRAM TRACING: CPT

## 2023-09-04 PROCEDURE — 99285 EMERGENCY DEPT VISIT HI MDM: CPT

## 2023-09-04 PROCEDURE — 84484 ASSAY OF TROPONIN QUANT: CPT

## 2023-09-04 PROCEDURE — 82565 ASSAY OF CREATININE: CPT

## 2023-09-04 PROCEDURE — 87040 BLOOD CULTURE FOR BACTERIA: CPT

## 2023-09-04 PROCEDURE — 80053 COMPREHEN METABOLIC PANEL: CPT

## 2023-09-04 PROCEDURE — 83735 ASSAY OF MAGNESIUM: CPT

## 2023-09-04 PROCEDURE — 02HV33Z INSERTION OF INFUSION DEVICE INTO SUPERIOR VENA CAVA, PERCUTANEOUS APPROACH: ICD-10-PCS | Performed by: EMERGENCY MEDICINE

## 2023-09-04 PROCEDURE — 2700000000 HC OXYGEN THERAPY PER DAY

## 2023-09-04 PROCEDURE — 85520 HEPARIN ASSAY: CPT

## 2023-09-04 PROCEDURE — 83880 ASSAY OF NATRIURETIC PEPTIDE: CPT

## 2023-09-04 PROCEDURE — 93308 TTE F-UP OR LMTD: CPT

## 2023-09-04 PROCEDURE — A4216 STERILE WATER/SALINE, 10 ML: HCPCS

## 2023-09-04 PROCEDURE — 87641 MR-STAPH DNA AMP PROBE: CPT

## 2023-09-04 PROCEDURE — 87635 SARS-COV-2 COVID-19 AMP PRB: CPT

## 2023-09-04 PROCEDURE — 99233 SBSQ HOSP IP/OBS HIGH 50: CPT | Performed by: INTERNAL MEDICINE

## 2023-09-04 PROCEDURE — 85025 COMPLETE CBC W/AUTO DIFF WBC: CPT

## 2023-09-04 PROCEDURE — 85610 PROTHROMBIN TIME: CPT

## 2023-09-04 PROCEDURE — C9113 INJ PANTOPRAZOLE SODIUM, VIA: HCPCS

## 2023-09-04 RX ORDER — HEPARIN SODIUM 1000 [USP'U]/ML
4000 INJECTION, SOLUTION INTRAVENOUS; SUBCUTANEOUS PRN
Status: DISCONTINUED | OUTPATIENT
Start: 2023-09-04 | End: 2023-09-05 | Stop reason: HOSPADM

## 2023-09-04 RX ORDER — ONDANSETRON 2 MG/ML
4 INJECTION INTRAMUSCULAR; INTRAVENOUS EVERY 6 HOURS PRN
Status: DISCONTINUED | OUTPATIENT
Start: 2023-09-04 | End: 2023-09-05 | Stop reason: HOSPADM

## 2023-09-04 RX ORDER — ASPIRIN 81 MG/1
324 TABLET, CHEWABLE ORAL ONCE
Status: DISCONTINUED | OUTPATIENT
Start: 2023-09-04 | End: 2023-09-05 | Stop reason: HOSPADM

## 2023-09-04 RX ORDER — HEPARIN SODIUM 1000 [USP'U]/ML
2300 INJECTION, SOLUTION INTRAVENOUS; SUBCUTANEOUS ONCE
Status: DISCONTINUED | OUTPATIENT
Start: 2023-09-04 | End: 2023-09-04

## 2023-09-04 RX ORDER — SODIUM CHLORIDE 9 MG/ML
INJECTION, SOLUTION INTRAVENOUS PRN
Status: DISCONTINUED | OUTPATIENT
Start: 2023-09-04 | End: 2023-09-05 | Stop reason: HOSPADM

## 2023-09-04 RX ORDER — HEPARIN SODIUM 10000 [USP'U]/100ML
5-30 INJECTION, SOLUTION INTRAVENOUS CONTINUOUS
Status: DISCONTINUED | OUTPATIENT
Start: 2023-09-04 | End: 2023-09-05 | Stop reason: HOSPADM

## 2023-09-04 RX ORDER — ACETAMINOPHEN 325 MG/1
650 TABLET ORAL EVERY 6 HOURS PRN
Status: DISCONTINUED | OUTPATIENT
Start: 2023-09-04 | End: 2023-09-05 | Stop reason: HOSPADM

## 2023-09-04 RX ORDER — HEPARIN SODIUM 1000 [USP'U]/ML
4000 INJECTION, SOLUTION INTRAVENOUS; SUBCUTANEOUS ONCE
Status: COMPLETED | OUTPATIENT
Start: 2023-09-04 | End: 2023-09-04

## 2023-09-04 RX ORDER — SODIUM CHLORIDE 0.9 % (FLUSH) 0.9 %
5-40 SYRINGE (ML) INJECTION PRN
Status: DISCONTINUED | OUTPATIENT
Start: 2023-09-04 | End: 2023-09-05 | Stop reason: HOSPADM

## 2023-09-04 RX ORDER — CALCIUM GLUCONATE 20 MG/ML
1000 INJECTION, SOLUTION INTRAVENOUS PRN
Status: DISCONTINUED | OUTPATIENT
Start: 2023-09-04 | End: 2023-09-05 | Stop reason: HOSPADM

## 2023-09-04 RX ORDER — HEPARIN SODIUM 1000 [USP'U]/ML
2000 INJECTION, SOLUTION INTRAVENOUS; SUBCUTANEOUS PRN
Status: DISCONTINUED | OUTPATIENT
Start: 2023-09-04 | End: 2023-09-05 | Stop reason: HOSPADM

## 2023-09-04 RX ORDER — NOREPINEPHRINE BITARTRATE 0.06 MG/ML
1-100 INJECTION, SOLUTION INTRAVENOUS CONTINUOUS
Status: DISCONTINUED | OUTPATIENT
Start: 2023-09-04 | End: 2023-09-05 | Stop reason: HOSPADM

## 2023-09-04 RX ORDER — POLYETHYLENE GLYCOL 3350 17 G/17G
17 POWDER, FOR SOLUTION ORAL DAILY PRN
Status: DISCONTINUED | OUTPATIENT
Start: 2023-09-04 | End: 2023-09-05 | Stop reason: HOSPADM

## 2023-09-04 RX ORDER — ONDANSETRON 4 MG/1
4 TABLET, ORALLY DISINTEGRATING ORAL EVERY 8 HOURS PRN
Status: DISCONTINUED | OUTPATIENT
Start: 2023-09-04 | End: 2023-09-05 | Stop reason: HOSPADM

## 2023-09-04 RX ORDER — HEPARIN SODIUM 1000 [USP'U]/ML
2300 INJECTION, SOLUTION INTRAVENOUS; SUBCUTANEOUS PRN
Status: DISCONTINUED | OUTPATIENT
Start: 2023-09-04 | End: 2023-09-05 | Stop reason: HOSPADM

## 2023-09-04 RX ORDER — POTASSIUM CHLORIDE 29.8 MG/ML
20 INJECTION INTRAVENOUS PRN
Status: DISCONTINUED | OUTPATIENT
Start: 2023-09-04 | End: 2023-09-05 | Stop reason: HOSPADM

## 2023-09-04 RX ORDER — HEPARIN SODIUM 5000 [USP'U]/ML
5000 INJECTION, SOLUTION INTRAVENOUS; SUBCUTANEOUS EVERY 8 HOURS SCHEDULED
Status: DISCONTINUED | OUTPATIENT
Start: 2023-09-04 | End: 2023-09-04

## 2023-09-04 RX ORDER — ACETAMINOPHEN 650 MG/1
650 SUPPOSITORY RECTAL EVERY 6 HOURS PRN
Status: DISCONTINUED | OUTPATIENT
Start: 2023-09-04 | End: 2023-09-05 | Stop reason: HOSPADM

## 2023-09-04 RX ORDER — MAGNESIUM SULFATE 1 G/100ML
1000 INJECTION INTRAVENOUS PRN
Status: DISCONTINUED | OUTPATIENT
Start: 2023-09-04 | End: 2023-09-05 | Stop reason: HOSPADM

## 2023-09-04 RX ORDER — CALCIUM GLUCONATE 20 MG/ML
2000 INJECTION, SOLUTION INTRAVENOUS PRN
Status: DISCONTINUED | OUTPATIENT
Start: 2023-09-04 | End: 2023-09-05 | Stop reason: HOSPADM

## 2023-09-04 RX ORDER — SODIUM CHLORIDE 0.9 % (FLUSH) 0.9 %
5-40 SYRINGE (ML) INJECTION EVERY 12 HOURS SCHEDULED
Status: DISCONTINUED | OUTPATIENT
Start: 2023-09-04 | End: 2023-09-05 | Stop reason: HOSPADM

## 2023-09-04 RX ADMIN — HEPARIN SODIUM 4000 UNITS: 1000 INJECTION INTRAVENOUS; SUBCUTANEOUS at 20:38

## 2023-09-04 RX ADMIN — AMIODARONE HYDROCHLORIDE 150 MG: 150 INJECTION, SOLUTION INTRAVENOUS at 11:05

## 2023-09-04 RX ADMIN — Medication 1500 ML/HR: at 18:32

## 2023-09-04 RX ADMIN — SODIUM CHLORIDE, PRESERVATIVE FREE 10 ML: 5 INJECTION INTRAVENOUS at 16:37

## 2023-09-04 RX ADMIN — AMIODARONE HYDROCHLORIDE 1 MG/MIN: 150 INJECTION, SOLUTION INTRAVENOUS at 11:21

## 2023-09-04 RX ADMIN — AMIODARONE HYDROCHLORIDE 0.5 MG/MIN: 50 INJECTION, SOLUTION INTRAVENOUS at 19:27

## 2023-09-04 RX ADMIN — HEPARIN SODIUM 10 UNITS/KG/HR: 10000 INJECTION, SOLUTION INTRAVENOUS at 11:09

## 2023-09-04 RX ADMIN — CALCIUM GLUCONATE 1000 MG: 20 INJECTION, SOLUTION INTRAVENOUS at 20:07

## 2023-09-04 RX ADMIN — Medication 1500 ML/HR: at 18:30

## 2023-09-04 RX ADMIN — HEPARIN SODIUM 4000 UNITS: 1000 INJECTION INTRAVENOUS; SUBCUTANEOUS at 11:07

## 2023-09-04 RX ADMIN — SODIUM CHLORIDE, PRESERVATIVE FREE 10 ML: 5 INJECTION INTRAVENOUS at 20:14

## 2023-09-04 RX ADMIN — Medication 10 MCG/MIN: at 11:11

## 2023-09-04 RX ADMIN — Medication 1500 ML/HR: at 18:31

## 2023-09-04 RX ADMIN — SODIUM CHLORIDE 40 MG: 9 INJECTION INTRAMUSCULAR; INTRAVENOUS; SUBCUTANEOUS at 16:37

## 2023-09-04 NOTE — ED NOTES
Discussion by Dr. Koby Chiu with pt and multiple family members regarding code status and plan of care.   Family discussing, awaiting family decisions for further care plan per Dr. Monse Bass, RN  09/04/23 [id number]

## 2023-09-04 NOTE — ED NOTES
The following labs were labeled with appropriate pt sticker and tubed to lab:     [x] Blue     [x] Lavender   [] on ice  [x] Green/yellow  [x] Green/black [] on ice  [] Joglenis Kali  [] on ice  [] Yellow  [] Red  [] Type/ Screen  [] ABG  [] VBG    [] COVID-19 swab    [] Rapid  [] PCR  [] Flu swab  [] Peds Viral Panel     [] Urine Sample  [] Fecal Sample  [] Pelvic Cultures  [] Blood Cultures  [] X 2  [] STREP Cultures       Jon Morgan RN  09/04/23 [id number]

## 2023-09-04 NOTE — CONSULTS
Nephrology Consult Note    Reason for Consult: Need for hemodialysis  Requesting Physician:  Nic Ugalde    Chief Complaint: Shortness of breath  History Obtained From:  patient, electronic medical record    History of Present Illness: This is a 76 y.o. male who presented with past medical history significant for ESRD on MWF schedule, COPD, severe aortic stenosis, paroxysmal A-fib on Eliquis who came into the ED with increasing shortness of breath and bilateral lower extremity swelling that has been progressively increasing for last few days. Upon initial evaluation patient was found to be hypotensive and tachycardic. Was started on pressor support with Levophed. Patient is supposed to get hemodialysis today. Nephrology was consulted for scheduled hemodialysis today. He is an MWF schedule, goes at [facility].               Past Medical History:        Diagnosis Date    AAA (abdominal aortic aneurysm) (HCC) 4.2 X 5.3 cm 7/22/2023    Allergic rhinitis     Anemia     BPH (benign prostatic hyperplasia)     CAD (coronary artery disease)     Carotid artery stenosis 2013    ulcerated plaque in left ICA 60 - 70% on carotid angiogram    Cerebrovascular disease 2013    left frontal hemorrhage    Chronic obstructive pulmonary disease with acute exacerbation ([address]) 7/19/2023    Diabetes mellitus ([address]) 2013    Eczema     ESRD (end stage renal disease) on dialysis ([address])     Full dentures     Upper / Lower    GERD (gastroesophageal reflux disease)     Gout     Hemodialysis patient ([address]) 10/2015    [facility] [address] [address]  M,W F    Hyperlipidemia     Hypertension     Neuropathy     Osteoarthritis     Peripheral vascular disease ([address])     Seizures ([address]) 2013    after stroke, LAST [id number]/20/2016    Stroke [facility]) 2013    Unspecified cerebral artery occlusion with cerebral infarction 4-    speech short term and lt side       Past Surgical History:        Procedure Laterality Date    ABDOMEN SURGERY

## 2023-09-04 NOTE — H&P
Critical Care - History and Physical Examination    Patient's name:  Jose Kong Record Number: [id number]  Patient's account/billing number: [de-identified]  Patient's YOB: 1945  Age: 76 y.o. Date of Admission: 9/4/2023  9:32 AM  Reason of ICU admission:   Date of History and Physical Examination: 9/4/2023      Primary Care Physician: Yajaira Sims MD  Attending Physician:    Code Status: Prior    Chief complaint: SOB    Reason for ICU admission: bipap, watch respiratory status      History Of Present Illness:   History was obtained from chart review and the patient. Carleen Guzmán is a 76 y.o. M, hx HTN, ESRD, copd, CHF, aortic stenosis, presented to ED with c/o SOB, not on home o2. In ED, BNP significantly elevated, 2+ BLE edema, CXR clear, tachycardic in afib rvr and on amio, levo due to hypotension. Trops elevated. On bipap, full code, mentating appropriately and following commands. Pt denies CP. Notes chronic edema in BLE. Notes SOB improving on bipap. Pt daughter notes pt due for dialysis today. Missed at least 1 days of meds. Has hx lung nodules, possibly malignant but not worked up yet.           Past Medical History:        Diagnosis Date    AAA (abdominal aortic aneurysm) (HCC) 4.2 X 5.3 cm 7/22/2023    Allergic rhinitis     Anemia     BPH (benign prostatic hyperplasia)     CAD (coronary artery disease)     Carotid artery stenosis 2013    ulcerated plaque in left ICA 60 - 70% on carotid angiogram    Cerebrovascular disease 2013    left frontal hemorrhage    Chronic obstructive pulmonary disease with acute exacerbation ([address]) 7/19/2023    Diabetes mellitus ([address]) 2013    Eczema     ESRD (end stage renal disease) on dialysis ([address])     Full dentures     Upper / Lower    GERD (gastroesophageal reflux disease)     Gout     Hemodialysis patient ([address]) 10/2015    [facility] [address] [address]  M,W F    Hyperlipidemia     Hypertension     Neuropathy     Osteoarthritis     Peripheral

## 2023-09-04 NOTE — ED NOTES
Update to Josie Haney RN in ICU aware of pt changes including multiple family members present and discussion of code status and pt still continues to be full code current time.   Pt transferring to ICU with MARCEL Richards from ICU     Delia Hernandez RN  09/04/23 [id number]

## 2023-09-04 NOTE — ED TRIAGE NOTES
PT with shortness of breath every night for past several nights. Legs increasing in size.  Pt is M-W-F dialysis pt has not missed and treatments due for dialysis at 12 noon today

## 2023-09-04 NOTE — ED PROVIDER NOTES
[address]- [facility]  Emergency Department Encounter  Emergency Medicine Resident     Pt Amarilis Fried  MRN: [id number]  [address] [facility] 1945  Date of evaluation: 9/4/23  PCP:  Yajaira Sims MD  Note Started: 2:32 PM EDT      CHIEF COMPLAINT       Chief Complaint   Patient presents with    Shortness of Breath     Short of breath every night       HISTORY OF PRESENT ILLNESS  (Location/Symptom, Timing/Onset, Context/Setting, Quality, Duration, Modifying Factors, Severity.)      Carleen Guzmán is a 76 y.o. male, history of HTN, ESRD, COPD, CHF and aortic stenosis, who presents with shortness of breath. His shortness of breath started 2 days ago. He denies fevers, chest pain, cough, calf pain. He is not currently on home oxygen however he believes that he would benefit if he was put on it.  he is also complaining of worsening leg swelling and orthopnea. He is on M-W-F dialysis, he states that he has not missed a session. He is next treatment is due today at noon. Patient was diagnosed with severe aortic stenosis however he was deemed a poor surgical candidate for CABG referred for TAVR. Denies dizziness, headaches, visual changes, loss of consciousness, abnormal movement, rashes    PAST MEDICAL / SURGICAL / SOCIAL / FAMILY HISTORY      has a past medical history of AAA (abdominal aortic aneurysm) (HCC) 4.2 X 5.3 cm, Allergic rhinitis, Anemia, BPH (benign prostatic hyperplasia), CAD (coronary artery disease), Carotid artery stenosis, Cerebrovascular disease, Chronic obstructive pulmonary disease with acute exacerbation ([address]), Diabetes mellitus ([address]), Eczema, ESRD (end stage renal disease) on dialysis ([address]), Full dentures, GERD (gastroesophageal reflux disease), Gout, Hemodialysis patient ([address]), Hyperlipidemia, Hypertension, Neuropathy, Osteoarthritis, Peripheral vascular disease ([address]), Seizures ([address]), Stroke ([address]), and Unspecified cerebral artery occlusion with cerebral infarction.        has a past

## 2023-09-04 NOTE — CONSULTS
Attestation signed by      Attending Physician Statement:    I have discussed the care of  Narcisa Ho , including pertinent history and exam findings, with the Cardiology fellow/resident. I have seen and examined the patient and the key elements of all parts of the encounter have been performed by me. I agree with the assessment, plan and orders as documented by the fellow/resident, after I modified exam findings and plan of treatments, and the final version is my approved version of the assessment. Additional Comments: The patient was seen and examined, agree with evaluation and plan documented below. Detailed discussion with patient and family. Does have severe CAD with severe calcification with poor LAD/LCX targets. Also has Severely calcified and stenotic aortic valve. Moderate to severe mitral regurgitation. Severely reduced LV systolic function. Was evaluated by CT surgery and deemed not a candidate. Also does have severe PAD with severe calcification. Oncology also evaluated the patient for possible metastatic lung lesions. Presented with chest pain and SOB. NSTEMI. Cardiogenic shock. On IV heparin and pressors for hemodynamic support. IV Amiodarone for Atrial flutter. The patient will be of very high risk for invasive cardiac procedures. Discussed with family and patient in details. Offered to transfer to [facility] or [facility]. Critical care time 40 minutes. [facility]    Consult / H&P               Today's Date: 9/4/2023  Patient Name: Narcisa Ho  Date of admission: 9/4/2023  9:32 AM  Patient's age: 76 y.o., 1945  Admission Dx: Respiratory failure ([address]) [J96.90]    Reason for Consult:  Cardiac evaluation    Requesting Physician: Ann Mcdonald MD    CHIEF COMPLAINT:    Shortness of breath    History Obtained From:  patient, electronic medical record    HISTORY OF PRESENT ILLNESS:      The patient is a 76 y.o.   male who

## 2023-09-04 NOTE — PLAN OF CARE
Received call back from [facility] regarding patient transfer. Discussed with the Cardiology fellow on call, all patient details and history provided in detail along with current clinical situation, the patient was accepted to [facility], await bed placement.      Ryder Mckeon MD  Fellow, [address]

## 2023-09-05 ENCOUNTER — APPOINTMENT (OUTPATIENT)
Dept: GENERAL RADIOLOGY | Age: 78
DRG: 189 | End: 2023-09-05
Payer: MEDICARE

## 2023-09-05 VITALS
HEIGHT: 73 IN | RESPIRATION RATE: 28 BRPM | HEART RATE: 123 BPM | BODY MASS INDEX: 26.21 KG/M2 | WEIGHT: 197.8 LBS | SYSTOLIC BLOOD PRESSURE: 142 MMHG | OXYGEN SATURATION: 94 % | TEMPERATURE: 97.5 F | DIASTOLIC BLOOD PRESSURE: 98 MMHG

## 2023-09-05 LAB
ANION GAP SERPL CALCULATED.3IONS-SCNC: 15 MMOL/L (ref 9–17)
ANION GAP SERPL CALCULATED.3IONS-SCNC: 16 MMOL/L (ref 9–17)
BUN SERPL-MCNC: 34 MG/DL (ref 8–23)
BUN SERPL-MCNC: 37 MG/DL (ref 8–23)
CA-I BLD-SCNC: 1.16 MMOL/L (ref 1.13–1.33)
CA-I BLD-SCNC: 1.19 MMOL/L (ref 1.13–1.33)
CALCIUM SERPL-MCNC: 9 MG/DL (ref 8.6–10.4)
CALCIUM SERPL-MCNC: 9.3 MG/DL (ref 8.6–10.4)
CHLORIDE SERPL-SCNC: 100 MMOL/L (ref 98–107)
CHLORIDE SERPL-SCNC: 100 MMOL/L (ref 98–107)
CO2 SERPL-SCNC: 21 MMOL/L (ref 20–31)
CO2 SERPL-SCNC: 22 MMOL/L (ref 20–31)
CREAT SERPL-MCNC: 5.1 MG/DL (ref 0.7–1.2)
CREAT SERPL-MCNC: 5.6 MG/DL (ref 0.7–1.2)
EKG ATRIAL RATE: 264 BPM
EKG P AXIS: -148 DEGREES
EKG Q-T INTERVAL: 356 MS
EKG QRS DURATION: 92 MS
EKG QTC CALCULATION (BAZETT): 515 MS
EKG R AXIS: -10 DEGREES
EKG T AXIS: -178 DEGREES
EKG VENTRICULAR RATE: 126 BPM
ERYTHROCYTE [DISTWIDTH] IN BLOOD BY AUTOMATED COUNT: 23.4 % (ref 11.8–14.4)
GFR SERPL CREATININE-BSD FRML MDRD: 10 ML/MIN/1.73M2
GFR SERPL CREATININE-BSD FRML MDRD: 11 ML/MIN/1.73M2
GLUCOSE SERPL-MCNC: 106 MG/DL (ref 70–99)
GLUCOSE SERPL-MCNC: 97 MG/DL (ref 70–99)
HCT VFR BLD AUTO: 37.7 % (ref 40.7–50.3)
HGB BLD-MCNC: 11.4 G/DL (ref 13–17)
MAGNESIUM SERPL-MCNC: 2.2 MG/DL (ref 1.6–2.6)
MAGNESIUM SERPL-MCNC: 2.2 MG/DL (ref 1.6–2.6)
MCH RBC QN AUTO: 29.4 PG (ref 25.2–33.5)
MCHC RBC AUTO-ENTMCNC: 30.2 G/DL (ref 28.4–34.8)
MCV RBC AUTO: 97.2 FL (ref 82.6–102.9)
MRSA, DNA, NASAL: NEGATIVE
NRBC BLD-RTO: 1.2 PER 100 WBC
PARTIAL THROMBOPLASTIN TIME: 102.2 SEC (ref 23–36.5)
PHOSPHATE SERPL-MCNC: 4.1 MG/DL (ref 2.5–4.5)
PHOSPHATE SERPL-MCNC: 4.4 MG/DL (ref 2.5–4.5)
PLATELET # BLD AUTO: ABNORMAL K/UL (ref 138–453)
PLATELET, FLUORESCENCE: 142 K/UL (ref 138–453)
PLATELETS.RETICULATED NFR BLD AUTO: 11.8 % (ref 1.1–10.3)
POTASSIUM SERPL-SCNC: 4.1 MMOL/L (ref 3.7–5.3)
POTASSIUM SERPL-SCNC: 4.2 MMOL/L (ref 3.7–5.3)
RBC # BLD AUTO: 3.88 M/UL (ref 4.21–5.77)
SODIUM SERPL-SCNC: 137 MMOL/L (ref 135–144)
SODIUM SERPL-SCNC: 137 MMOL/L (ref 135–144)
SPECIMEN DESCRIPTION: NORMAL
WBC OTHER # BLD: 12 K/UL (ref 3.5–11.3)

## 2023-09-05 PROCEDURE — 80048 BASIC METABOLIC PNL TOTAL CA: CPT

## 2023-09-05 PROCEDURE — 85730 THROMBOPLASTIN TIME PARTIAL: CPT

## 2023-09-05 PROCEDURE — 2500000003 HC RX 250 WO HCPCS

## 2023-09-05 PROCEDURE — 82330 ASSAY OF CALCIUM: CPT

## 2023-09-05 PROCEDURE — 84100 ASSAY OF PHOSPHORUS: CPT

## 2023-09-05 PROCEDURE — 85027 COMPLETE CBC AUTOMATED: CPT

## 2023-09-05 PROCEDURE — 83735 ASSAY OF MAGNESIUM: CPT

## 2023-09-05 PROCEDURE — 93010 ELECTROCARDIOGRAM REPORT: CPT | Performed by: INTERNAL MEDICINE

## 2023-09-05 PROCEDURE — 85055 RETICULATED PLATELET ASSAY: CPT

## 2023-09-05 PROCEDURE — 2580000003 HC RX 258: Performed by: INTERNAL MEDICINE

## 2023-09-05 PROCEDURE — 6360000002 HC RX W HCPCS: Performed by: INTERNAL MEDICINE

## 2023-09-05 PROCEDURE — 71045 X-RAY EXAM CHEST 1 VIEW: CPT

## 2023-09-05 PROCEDURE — 6370000000 HC RX 637 (ALT 250 FOR IP)

## 2023-09-05 RX ORDER — BUDESONIDE AND FORMOTEROL FUMARATE DIHYDRATE 80; 4.5 UG/1; UG/1
2 AEROSOL RESPIRATORY (INHALATION)
Status: DISCONTINUED | OUTPATIENT
Start: 2023-09-05 | End: 2023-09-05 | Stop reason: HOSPADM

## 2023-09-05 RX ORDER — FAMOTIDINE 20 MG/1
20 TABLET, FILM COATED ORAL DAILY
Status: DISCONTINUED | OUTPATIENT
Start: 2023-09-05 | End: 2023-09-05 | Stop reason: HOSPADM

## 2023-09-05 RX ORDER — ALBUTEROL SULFATE 90 UG/1
2 AEROSOL, METERED RESPIRATORY (INHALATION) EVERY 6 HOURS PRN
Status: DISCONTINUED | OUTPATIENT
Start: 2023-09-05 | End: 2023-09-05 | Stop reason: HOSPADM

## 2023-09-05 RX ORDER — ATORVASTATIN CALCIUM 40 MG/1
40 TABLET, FILM COATED ORAL DAILY
Status: DISCONTINUED | OUTPATIENT
Start: 2023-09-05 | End: 2023-09-05 | Stop reason: HOSPADM

## 2023-09-05 RX ORDER — MIDODRINE HYDROCHLORIDE 5 MG/1
10 TABLET ORAL 3 TIMES DAILY
Status: DISCONTINUED | OUTPATIENT
Start: 2023-09-05 | End: 2023-09-05 | Stop reason: HOSPADM

## 2023-09-05 RX ORDER — FOLIC ACID 1 MG/1
1 TABLET ORAL DAILY
Status: DISCONTINUED | OUTPATIENT
Start: 2023-09-05 | End: 2023-09-05 | Stop reason: HOSPADM

## 2023-09-05 RX ADMIN — Medication 14 MCG/MIN: at 00:45

## 2023-09-05 RX ADMIN — MIDODRINE HYDROCHLORIDE 10 MG: 5 TABLET ORAL at 08:43

## 2023-09-05 RX ADMIN — FAMOTIDINE 20 MG: 20 TABLET, FILM COATED ORAL at 08:44

## 2023-09-05 RX ADMIN — FOLIC ACID 1 MG: 1 TABLET ORAL at 08:43

## 2023-09-05 RX ADMIN — HEPARIN SODIUM 2300 UNITS: 1000 INJECTION INTRAVENOUS; SUBCUTANEOUS at 08:48

## 2023-09-05 RX ADMIN — ATORVASTATIN CALCIUM 40 MG: 40 TABLET, FILM COATED ORAL at 08:43

## 2023-09-05 RX ADMIN — Medication 1500 ML/HR: at 03:51

## 2023-09-05 RX ADMIN — Medication 1500 ML/HR: at 03:53

## 2023-09-05 RX ADMIN — Medication 1500 ML/HR: at 03:50

## 2023-09-05 ASSESSMENT — ENCOUNTER SYMPTOMS
BACK PAIN: 0
DIARRHEA: 0
WHEEZING: 0
EYE PAIN: 0
NAUSEA: 0
SHORTNESS OF BREATH: 0
SORE THROAT: 0
RHINORRHEA: 0
COUGH: 0
VOMITING: 0
ABDOMINAL PAIN: 0
EYE REDNESS: 0

## 2023-09-05 NOTE — CARE COORDINATION
Transitional planning. Informed that pt will be leaving w/ [facility] @ [address] with cardiology imaging, she will sent everything through the cloud to [facility]. She will not have an imaging disc completed by the time the pt is scheduled to leave. Radiology imaging has been sent to [facility] and an imaging disc placed in pt's blue transport envelope. Pt will be going to [address] [facility] [address], Report number [phone], accepting physician is Dr. Joaquin Reyna     Provided transfer packet to pt's RN, Dorothy Contreras with report number.  Pt has already called his son and informed him of transport

## 2023-09-05 NOTE — ACP (ADVANCE CARE PLANNING)
Advance Care Planning     Advance Care Planning Activator (Inpatient)  Conversation Note      Date of ACP Conversation: 9/5/2023     Conversation Conducted with: Patient with Decision Making Capacity    ACP Activator: Patsey Kocher, RN    Health Care Decision Maker:     Current Designated Health Care Decision Maker:     Primary Decision Maker: Maxwell Lynch - [phone]    Today we documented Decision Maker(s) consistent with ACP documents on file. Care Preferences    Ventilation: \"If you were in your present state of health and suddenly became very ill and were unable to breathe on your own, what would your preference be about the use of a ventilator (breathing machine) if it were available to you? \"      Would the patient desire the use of ventilator (breathing machine)?: yes    \"If your health worsens and it becomes clear that your chance of recovery is unlikely, what would your preference be about the use of a ventilator (breathing machine) if it were available to you? \"     Would the patient desire the use of ventilator (breathing machine)?: Yes      Resuscitation  \"CPR works best to restart the heart when there is a sudden event, like a heart attack, in someone who is otherwise healthy. Unfortunately, CPR does not typically restart the heart for people who have serious health conditions or who are very sick. \"    \"In the event your heart stopped as a result of an underlying serious health condition, would you want attempts to be made to restart your heart (answer \"yes\" for attempt to resuscitate) or would you prefer a natural death (answer \"no\" for do not attempt to resuscitate)? \" yes       [x] Yes   [] No   Educated Patient / Zion Posadas regarding differences between Advance Directives and portable DNR orders.     Length of ACP Conversation in minutes:  5 min    Conversation Outcomes:  ACP discussion completed

## 2023-09-05 NOTE — CARE COORDINATION
Case Management Assessment  Initial Evaluation    Date/Time of Evaluation: 9/5/2023 7:49 AM  Assessment Completed by: Virgen Gan RN    If patient is discharged prior to next notation, then this note serves as note for discharge by case management. Patient Name: Angeline Xie                   YOB: 1945  Diagnosis: Cardiogenic shock ([address]) [R57.0]  Respiratory failure ([address]) [J96.90]  Respiratory distress [R06.03]                   Date / Time: 9/4/2023  9:32 AM    Patient Admission Status: Inpatient   Readmission Risk (Low < 19, Mod (19-27), High > 27): Readmission Risk Score: 23.7    Current PCP: Arun Zambrano MD  PCP verified by CM? (P) Yes    Chart Reviewed: Yes      History Provided by: (P) Patient  Patient Orientation: (P) Alert and Oriented    Patient Cognition: (P) Alert    Hospitalization in the last 30 days (Readmission):  Yes    If yes, Readmission Assessment in CM Navigator will be completed.     Advance Directives:      Code Status: Full Code   Patient's Primary Decision Maker is: (P) Legal Next of Kin (see acp note)    Primary Decision MakerHortkathryn Irlanda Spouse - [phone]    Discharge Planning:    Patient lives with: (P) Children, Spouse/Significant Other Type of Home: (P) House  Primary Care Giver: (P) Self  Patient Support Systems include: (P) Spouse/Significant Other, Family Members   Current Financial resources:    Current community resources:    Current services prior to admission: (P) Durable Medical Equipment            Current DME: (P) Walker, [employer]            Type of Home Care services:  (P) None    ADLS  Prior functional level: (P) Independent in ADLs/IADLs  Current functional level: (P) Other (see comment) (INDIRA, pt resting in bed.)    PT AM-PAC:   /24  OT AM-PAC:   /24    Family can provide assistance at DC: (P) Yes  Would you like Case Management to discuss the discharge plan with any other family members/significant others, and if so, who? (P) No  Plans to

## 2023-09-05 NOTE — PROGRESS NOTES
Not seen by me  Will not be billed by me  Tx to [facility] before I could see. Chaparro Mcmahon DO, [facility], [facility], [facility], [address] [facility]  [employer]. [employer]  [phone]    [facility]            Progress note               Today's Date: 9/5/2023  Patient Name: Angeli Fry  Date of admission: 9/4/2023  9:32 AM  Patient's age: 76 y.o., 1945  Admission Dx: Cardiogenic shock ([address]) [R57.0]  Respiratory failure ([address]) [J96.90]  Respiratory distress [R06.03]    Reason for Consult:  Cardiac evaluation    Requesting Physician: Orlin Mclean MD    CHIEF COMPLAINT:    Shortness of breath    Subjective 9/5/23    Patient seen and examined at bedside. No significant overnight noted. Overall patient is doing well and denies chest pain shortness of breath. Blood pressure 100/83 requiring Levophed. Tachycardic heart rate 122. On 4 L nasal cannula. Labs reviewed and evaluated. Troponin [id number]. Potassium 4.1 magnesium 2.2. Patient accepted at [facility] [employer] [facility] clinic. Transport set up at 845    History Obtained From:  patient, electronic medical record    HISTORY OF PRESENT ILLNESS:      The patient is a 76 y.o.  male who is admitted to the hospital for shortness of breath that started 2 days back associated with bilateral worsening pedal edema and orthopnea. The patient did not report any significant chest pain. Upon arrival to the ED, he was noted to be hypotensive and in atrial flutter with RVR, was started on amiodarone bolus and gtt along with levophed for pressor support. Troponin 1200, acutely elevated from prior admission, EKG s/o flutter with ST depression in inferior leads. Heparin gtt was initiated and Cardiology was consulted for further recommendations. The patient has significant h/o MVCAD and severe aortic stenosis, was deemed a poor surgical candidate for CABG/ AVR and was referred for high risk PCI and TAVR.  He also has significant PAD, carotid
unspecified 06/28/2021    Pain, unspecified 04/01/2020    Hypercalcemia 02/24/2019    Pancytopenia ([address]) 06/22/2018    Cramp and spasm 06/22/2018    Disorder of phosphorus metabolism, unspecified 06/22/2018    Fever, unspecified 06/22/2018    Iron deficiency anemia, unspecified 06/22/2018    Other fatigue 06/22/2018    Pneumonia due to other staphylococcus ([address]) 06/22/2018    Secondary hyperparathyroidism of renal origin ([address]) 06/22/2018    Bilateral carotid artery stenosis 05/15/2018    Respiratory failure ([address]) 09/04/2023    Atrial fibrillation with RVR ([address]) 09/04/2023    NSTEMI (non-ST elevated myocardial infarction) ([address]) 09/04/2023    Cardiogenic shock ([address]) 09/04/2023    Secondary hypercoagulable state ([address]) 08/22/2023    Acute cough 07/29/2023    Acute pulmonary edema ([address]) 07/24/2023    Congestive heart failure ([address]) 07/22/2023    Right renal mass 07/20/2023    Acute on chronic respiratory failure with hypoxia and hypercapnia (HCC) 07/19/2023    Leg swelling 07/19/2023    Multiple pulmonary nodules 07/19/2023    (HFpEF) heart failure with preserved ejection fraction ([address]) 07/19/2023    Leukopenia 07/19/2023    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 07/19/2023    Cardiomyopathy, ischemic 07/19/2023    Cardiomyopathy ([address]) 07/19/2023    Abnormal ECG 06/23/2023    Paroxysmal atrial fibrillation ([address]) 06/23/2023    Atherosclerosis of native coronary artery of native heart without angina pectoris 06/23/2023    Dyspnea on exertion 06/23/2023    SOB (shortness of breath) 06/23/2023    Preprocedural cardiovascular examination 06/23/2023    Severe aortic stenosis 06/23/2023    Dialysis AV fistula malfunction, initial encounter ([address]) 02/27/2022    ESRD on dialysis ([address]) 02/27/2022    Hemorrhage of arteriovenous fistula ([address]) 07/26/2021    Paroxysmal SVT (supraventricular tachycardia) ([address]) 07/24/2021    Nonsustained supraventricular tachycardia ([address]) 07/19/2021    NSVT (nonsustained

## 2023-09-06 NOTE — DISCHARGE SUMMARY
[address]     Department of Internal Medicine - Critical Care Service    INPATIENT DISCHARGE SUMMARY      PATIENT IDENTIFICATION:  NAME:  Narcisa Ho   :   1945  MRN:    [id number]     Acct:    [de-identified]   Admit Date:  2023  Discharge date:  2023  9:35 AM   Attending Provider: No att. providers found                                     Principal Problem:    Respiratory failure ([address])  Active Problems:    End stage renal disease ([address])    Severe aortic stenosis    Acute pulmonary edema (HCC)    Atrial fibrillation with RVR (HCC)    NSTEMI (non-ST elevated myocardial infarction) ([address])    Cardiogenic shock ([address])  Resolved Problems:    * No resolved hospital problems. *       REASON FOR HOSPITALIZATION:   Chief Complaint   Patient presents with    Shortness of Breath     Short of breath every night          [address] is a 76 y.o. M, hx HTN, ESRD, copd, CHF, aortic stenosis, presented to ED with c/o SOB, not on home o2. In ED, BNP significantly elevated, 2+ BLE edema, CXR clear, tachycardic in afib rvr and on amio, levo due to hypotension. Trops elevated. On bipap, full code, mentating appropriately and following commands. Pt denied CP. Notes chronic edema in BLE. Notes SOB improving on bipap. Pt daughter notes pt due for dialysis on the day of presentation . Missed at least 1 day of meds. Has hx lung nodules, possibly malignant but not worked up yet. : Weaned from bipap, Nephrology was consulted for emergency dialysis  due to severe fluid overload , but patient could not have regular dialysis so started on CRRT. Cardiology were consulted as pt has severe CAD and stenotic aortic valve, severely reduced systolic function. Not candidate for surgery. Patient had a cardiogenic shock , on pressors and heparin drip.  Due to being a very high risk for any cardiac procedure, decision was made to transfer patient to [facility] clinic for higher level of

## 2023-09-09 LAB
MICROORGANISM SPEC CULT: NORMAL
MICROORGANISM SPEC CULT: NORMAL
SERVICE CMNT-IMP: NORMAL
SERVICE CMNT-IMP: NORMAL
SPECIMEN DESCRIPTION: NORMAL
SPECIMEN DESCRIPTION: NORMAL

## 2023-10-12 DIAGNOSIS — R06.09 DYSPNEA ON EXERTION: ICD-10-CM

## 2023-10-12 DIAGNOSIS — I50.22 CHRONIC SYSTOLIC CONGESTIVE HEART FAILURE (HCC): ICD-10-CM

## (undated) DEVICE — ANGIOGRAPHIC CATHETER: Brand: EXPO™

## (undated) DEVICE — INTRODUCER SHTH 7FR CANN L11CM 0.038IN STD ORNG W/ MINI

## (undated) DEVICE — GLOVE SURG SZ 65 CRM LTX FREE POLYISOPRENE POLYMER BEAD ANTI

## (undated) DEVICE — COVER,LIGHT HANDLE,FLX,2/PK: Brand: MEDLINE INDUSTRIES, INC.

## (undated) DEVICE — CONTAINER SPEC 33OZ URIN COLL BIODEGRADABLE PAPER DISP

## (undated) DEVICE — GLOVE SURG SZ 65 L12IN FNGR THK79MIL GRN LTX FREE

## (undated) DEVICE — Z DISCONTINUED NO SUB IDED CATHETER HEMODYNAMIC MON 7FR L110CM 0.038IN PULM WDG PRSS

## (undated) DEVICE — ANGIOGRAPHIC CATHETER: Brand: IMPULSE™

## (undated) DEVICE — DRESSING TRNSPAR W2XL2.75IN FLM SHT SEMIPERMEABLE WIND

## (undated) DEVICE — CATHETER DUAL LUMEN LANGSTON 6F L110CM GWIRE 0.038IN 1000PSI

## (undated) DEVICE — INTENDED FOR TISSUE SEPARATION, AND OTHER PROCEDURES THAT REQUIRE A SHARP SURGICAL BLADE TO PUNCTURE OR CUT.: Brand: BARD-PARKER ® CARBON RIB-BACK BLADES

## (undated) DEVICE — SUTURE VCRL + SZ 4-0 L27IN ABSRB UD L26MM SH 1/2 CIR VCP415H

## (undated) DEVICE — SPONGE GZ W2XL2IN NONWOVEN 4 PLY FASTER WICKING ABIL AVANT

## (undated) DEVICE — PROVE COVER: Brand: UNBRANDED

## (undated) DEVICE — TRAY SURG CUST CRD CATH TOLEDO

## (undated) DEVICE — Device

## (undated) DEVICE — GLOVE SURG SZ 7 CRM LTX FREE POLYISOPRENE POLYMER BEAD ANTI

## (undated) DEVICE — TOWEL,OR,DSP,ST,BLUE,DLX,XR,4/PK,20PK/CS: Brand: MEDLINE

## (undated) DEVICE — 3M™ STERI-STRIP™ COMPOUND BENZOIN TINCTURE 40 BAGS/CARTON 4 CARTONS/CASE C1544: Brand: 3M™ STERI-STRIP™

## (undated) DEVICE — STRIP,CLOSURE,WOUND,MEDI-STRIP,1/2X4: Brand: MEDLINE

## (undated) DEVICE — SYRINGE 20ML LL S/C 50

## (undated) DEVICE — GLOVE SURG SZ 75 CRM LTX FREE POLYISOPRENE POLYMER BEAD ANTI

## (undated) DEVICE — KIT MICRO INTRO 4FR STIFF 40CM NIGHTENALL TUNGSTEN 7SMT

## (undated) DEVICE — APPLICATOR MEDICATED 10.5 CC SOLUTION HI LT ORNG CHLORAPREP

## (undated) DEVICE — GOWN,SIRUS,NONRNF,SETINSLV,XL,20/CS: Brand: MEDLINE

## (undated) DEVICE — SUTURE PROL SZ 3-0 L48IN NONABSORBABLE BLU SH L26MM 1/2 CIR 8534H

## (undated) DEVICE — ADHESIVE SKIN CLOSURE TOP 36 CC HI VISC DERMBND MINI

## (undated) DEVICE — BLADE ES ELASTOMERIC COAT INSUL DURABLE BEND UPTO 90DEG

## (undated) DEVICE — GUIDEWIRE VASC L180CM DIA0.035IN TIP L7CM PTFE S STL STR

## (undated) DEVICE — SINGLE-USE BIOPSY FORCEPS: Brand: RADIAL JAW 4

## (undated) DEVICE — FLEXOR, CHECK-FLO, INTRODUCER ANSEL MODIFICATION: Brand: FLEXOR

## (undated) DEVICE — GOWN,AURORA,NONREINFORCED,LARGE: Brand: MEDLINE

## (undated) DEVICE — DECANTER BAG 9": Brand: MEDLINE INDUSTRIES, INC.

## (undated) DEVICE — SUTURE MCRYL SZ 5-0 L18IN ABSRB UD PC-3 L16MM 3/8 CIR Y844G

## (undated) DEVICE — THE STERILE LIGHT HANDLE COVER IS USED WITH STERIS SURGICAL LIGHTING AND VISUALIZATION SYSTEMS.

## (undated) DEVICE — APPLICATOR MEDICATED 26 CC SOLUTION HI LT ORNG CHLORAPREP

## (undated) DEVICE — GLOVE SURG SZ 7.5 L11.73IN FNGR THK9.8MIL STRW LTX POLYMER

## (undated) DEVICE — SUTURE NONABSORBABLE MONOFILAMENT 2-0 FS 18 IN ETHILON 664H

## (undated) DEVICE — RADIFOCUS GLIDEWIRE: Brand: GLIDEWIRE

## (undated) DEVICE — GUIDEWIRE VASC L260CM DIA0.035IN STR TIP L7CM PTFE FIX COR

## (undated) DEVICE — SUTURE MCRYL SZ 5-0 L18IN ABSRB VLT P-3 L13MM 3/8 CIR REV Y463G

## (undated) DEVICE — CATH ANGIO 100CM 6FR AL1 SUPERTORQUE

## (undated) DEVICE — GLOVE SURG SZ 8 CRM LTX FREE POLYISOPRENE POLYMER BEAD ANTI

## (undated) DEVICE — GUIDEWIRE 35/260/FC/PTFE/3J: Brand: GUIDEWIRE

## (undated) DEVICE — TUBING AMB

## (undated) DEVICE — MARKER,SKIN,WI/RULER AND LABELS: Brand: MEDLINE

## (undated) DEVICE — SNAP KAP: Brand: UNBRANDED